# Patient Record
Sex: FEMALE | Race: WHITE | NOT HISPANIC OR LATINO | Employment: OTHER | URBAN - NONMETROPOLITAN AREA
[De-identification: names, ages, dates, MRNs, and addresses within clinical notes are randomized per-mention and may not be internally consistent; named-entity substitution may affect disease eponyms.]

---

## 2021-04-01 ENCOUNTER — APPOINTMENT (EMERGENCY)
Dept: RADIOLOGY | Facility: HOSPITAL | Age: 76
End: 2021-04-01
Payer: MEDICARE

## 2021-04-01 ENCOUNTER — HOSPITAL ENCOUNTER (EMERGENCY)
Facility: HOSPITAL | Age: 76
End: 2021-04-01
Attending: EMERGENCY MEDICINE | Admitting: EMERGENCY MEDICINE
Payer: MEDICARE

## 2021-04-01 ENCOUNTER — HOSPITAL ENCOUNTER (INPATIENT)
Facility: HOSPITAL | Age: 76
LOS: 6 days | Discharge: LEFT BEFORE DISCHARGE | DRG: 660 | End: 2021-04-07
Attending: INTERNAL MEDICINE | Admitting: INTERNAL MEDICINE
Payer: MEDICARE

## 2021-04-01 ENCOUNTER — APPOINTMENT (EMERGENCY)
Dept: CT IMAGING | Facility: HOSPITAL | Age: 76
End: 2021-04-01
Payer: MEDICARE

## 2021-04-01 VITALS
TEMPERATURE: 97.4 F | SYSTOLIC BLOOD PRESSURE: 124 MMHG | WEIGHT: 161.6 LBS | HEIGHT: 60 IN | DIASTOLIC BLOOD PRESSURE: 61 MMHG | BODY MASS INDEX: 31.73 KG/M2 | OXYGEN SATURATION: 95 % | HEART RATE: 134 BPM | RESPIRATION RATE: 24 BRPM

## 2021-04-01 DIAGNOSIS — N17.9 ACUTE KIDNEY INJURY (HCC): Primary | ICD-10-CM

## 2021-04-01 DIAGNOSIS — N13.30 BILATERAL HYDRONEPHROSIS: Primary | ICD-10-CM

## 2021-04-01 DIAGNOSIS — N19 RENAL FAILURE: ICD-10-CM

## 2021-04-01 DIAGNOSIS — N13.39 OTHER HYDRONEPHROSIS: ICD-10-CM

## 2021-04-01 PROBLEM — K74.60 CIRRHOSIS OF LIVER (HCC): Status: ACTIVE | Noted: 2021-04-01

## 2021-04-01 PROBLEM — E05.90 HYPERTHYROIDISM: Status: ACTIVE | Noted: 2021-04-01

## 2021-04-01 PROBLEM — E11.9 CONTROLLED TYPE 2 DIABETES MELLITUS, WITHOUT LONG-TERM CURRENT USE OF INSULIN (HCC): Status: ACTIVE | Noted: 2021-04-01

## 2021-04-01 PROBLEM — Z85.3 HISTORY OF BREAST CANCER: Status: ACTIVE | Noted: 2021-04-01

## 2021-04-01 PROBLEM — K59.00 CONSTIPATION: Status: ACTIVE | Noted: 2021-04-01

## 2021-04-01 PROBLEM — E83.52 HYPERCALCEMIA: Status: ACTIVE | Noted: 2021-04-01

## 2021-04-01 PROBLEM — R07.9 CHEST PAIN: Status: ACTIVE | Noted: 2021-04-01

## 2021-04-01 LAB
ALBUMIN SERPL BCP-MCNC: 3.3 G/DL (ref 3.5–5)
ALP SERPL-CCNC: 98 U/L (ref 46–116)
ALT SERPL W P-5'-P-CCNC: 17 U/L (ref 12–78)
ANION GAP SERPL CALCULATED.3IONS-SCNC: 14 MMOL/L (ref 4–13)
AST SERPL W P-5'-P-CCNC: 29 U/L (ref 5–45)
BASOPHILS # BLD AUTO: 0.03 THOUSANDS/ΜL (ref 0–0.1)
BASOPHILS NFR BLD AUTO: 0 % (ref 0–1)
BILIRUB SERPL-MCNC: 0.93 MG/DL (ref 0.2–1)
BILIRUB UR QL STRIP: NEGATIVE
BUN SERPL-MCNC: 35 MG/DL (ref 5–25)
CALCIUM ALBUM COR SERPL-MCNC: 10.3 MG/DL (ref 8.3–10.1)
CALCIUM SERPL-MCNC: 9.7 MG/DL (ref 8.3–10.1)
CHLORIDE SERPL-SCNC: 101 MMOL/L (ref 100–108)
CLARITY UR: CLEAR
CO2 SERPL-SCNC: 24 MMOL/L (ref 21–32)
COLOR UR: YELLOW
CREAT SERPL-MCNC: 4.26 MG/DL (ref 0.6–1.3)
EOSINOPHIL # BLD AUTO: 0.05 THOUSAND/ΜL (ref 0–0.61)
EOSINOPHIL NFR BLD AUTO: 1 % (ref 0–6)
ERYTHROCYTE [DISTWIDTH] IN BLOOD BY AUTOMATED COUNT: 13 % (ref 11.6–15.1)
FLUAV RNA RESP QL NAA+PROBE: NEGATIVE
FLUBV RNA RESP QL NAA+PROBE: NEGATIVE
GFR SERPL CREATININE-BSD FRML MDRD: 10 ML/MIN/1.73SQ M
GLUCOSE SERPL-MCNC: 119 MG/DL (ref 65–140)
GLUCOSE SERPL-MCNC: 138 MG/DL (ref 65–140)
GLUCOSE UR STRIP-MCNC: NEGATIVE MG/DL
HCT VFR BLD AUTO: 46.9 % (ref 34.8–46.1)
HGB BLD-MCNC: 15.6 G/DL (ref 11.5–15.4)
HGB UR QL STRIP.AUTO: NEGATIVE
IMM GRANULOCYTES # BLD AUTO: 0.04 THOUSAND/UL (ref 0–0.2)
IMM GRANULOCYTES NFR BLD AUTO: 0 % (ref 0–2)
KETONES UR STRIP-MCNC: NEGATIVE MG/DL
LACTATE SERPL-SCNC: 2 MMOL/L (ref 0.5–2)
LEUKOCYTE ESTERASE UR QL STRIP: NEGATIVE
LYMPHOCYTES # BLD AUTO: 1.53 THOUSANDS/ΜL (ref 0.6–4.47)
LYMPHOCYTES NFR BLD AUTO: 17 % (ref 14–44)
MCH RBC QN AUTO: 30.8 PG (ref 26.8–34.3)
MCHC RBC AUTO-ENTMCNC: 33.3 G/DL (ref 31.4–37.4)
MCV RBC AUTO: 93 FL (ref 82–98)
MONOCYTES # BLD AUTO: 0.45 THOUSAND/ΜL (ref 0.17–1.22)
MONOCYTES NFR BLD AUTO: 5 % (ref 4–12)
NEUTROPHILS # BLD AUTO: 6.91 THOUSANDS/ΜL (ref 1.85–7.62)
NEUTS SEG NFR BLD AUTO: 77 % (ref 43–75)
NITRITE UR QL STRIP: NEGATIVE
NRBC BLD AUTO-RTO: 0 /100 WBCS
NT-PROBNP SERPL-MCNC: 4025 PG/ML
PH UR STRIP.AUTO: 6.5 [PH]
PLATELET # BLD AUTO: 152 THOUSANDS/UL (ref 149–390)
PMV BLD AUTO: 12.1 FL (ref 8.9–12.7)
POTASSIUM SERPL-SCNC: 3.3 MMOL/L (ref 3.5–5.3)
PROT SERPL-MCNC: 8.8 G/DL (ref 6.4–8.2)
PROT UR STRIP-MCNC: NEGATIVE MG/DL
RBC # BLD AUTO: 5.07 MILLION/UL (ref 3.81–5.12)
RSV RNA RESP QL NAA+PROBE: NEGATIVE
SARS-COV-2 RNA RESP QL NAA+PROBE: NEGATIVE
SODIUM SERPL-SCNC: 139 MMOL/L (ref 136–145)
SP GR UR STRIP.AUTO: 1.01 (ref 1–1.03)
TROPONIN I SERPL-MCNC: 0.02 NG/ML
UROBILINOGEN UR QL STRIP.AUTO: 0.2 E.U./DL
WBC # BLD AUTO: 9.01 THOUSAND/UL (ref 4.31–10.16)

## 2021-04-01 PROCEDURE — 99285 EMERGENCY DEPT VISIT HI MDM: CPT

## 2021-04-01 PROCEDURE — 80053 COMPREHEN METABOLIC PANEL: CPT | Performed by: EMERGENCY MEDICINE

## 2021-04-01 PROCEDURE — 83880 ASSAY OF NATRIURETIC PEPTIDE: CPT | Performed by: EMERGENCY MEDICINE

## 2021-04-01 PROCEDURE — 74176 CT ABD & PELVIS W/O CONTRAST: CPT

## 2021-04-01 PROCEDURE — 71045 X-RAY EXAM CHEST 1 VIEW: CPT

## 2021-04-01 PROCEDURE — 96374 THER/PROPH/DIAG INJ IV PUSH: CPT

## 2021-04-01 PROCEDURE — 0241U HB NFCT DS VIR RESP RNA 4 TRGT: CPT | Performed by: EMERGENCY MEDICINE

## 2021-04-01 PROCEDURE — 96361 HYDRATE IV INFUSION ADD-ON: CPT

## 2021-04-01 PROCEDURE — 82948 REAGENT STRIP/BLOOD GLUCOSE: CPT

## 2021-04-01 PROCEDURE — 93005 ELECTROCARDIOGRAM TRACING: CPT

## 2021-04-01 PROCEDURE — 99222 1ST HOSP IP/OBS MODERATE 55: CPT | Performed by: INTERNAL MEDICINE

## 2021-04-01 PROCEDURE — 1124F ACP DISCUSS-NO DSCNMKR DOCD: CPT | Performed by: INTERNAL MEDICINE

## 2021-04-01 PROCEDURE — 36415 COLL VENOUS BLD VENIPUNCTURE: CPT | Performed by: EMERGENCY MEDICINE

## 2021-04-01 PROCEDURE — 96375 TX/PRO/DX INJ NEW DRUG ADDON: CPT

## 2021-04-01 PROCEDURE — 85025 COMPLETE CBC W/AUTO DIFF WBC: CPT | Performed by: EMERGENCY MEDICINE

## 2021-04-01 PROCEDURE — 81003 URINALYSIS AUTO W/O SCOPE: CPT | Performed by: EMERGENCY MEDICINE

## 2021-04-01 PROCEDURE — 99291 CRITICAL CARE FIRST HOUR: CPT | Performed by: EMERGENCY MEDICINE

## 2021-04-01 PROCEDURE — 83605 ASSAY OF LACTIC ACID: CPT | Performed by: EMERGENCY MEDICINE

## 2021-04-01 PROCEDURE — 84484 ASSAY OF TROPONIN QUANT: CPT | Performed by: EMERGENCY MEDICINE

## 2021-04-01 RX ORDER — LETROZOLE 2.5 MG/1
2.5 TABLET, FILM COATED ORAL DAILY
Status: DISCONTINUED | OUTPATIENT
Start: 2021-04-02 | End: 2021-04-07 | Stop reason: HOSPADM

## 2021-04-01 RX ORDER — POLYETHYLENE GLYCOL 3350 17 G/17G
17 POWDER, FOR SOLUTION ORAL DAILY
Status: DISCONTINUED | OUTPATIENT
Start: 2021-04-02 | End: 2021-04-01

## 2021-04-01 RX ORDER — POTASSIUM CHLORIDE 750 MG/1
10 TABLET, EXTENDED RELEASE ORAL ONCE
Status: COMPLETED | OUTPATIENT
Start: 2021-04-01 | End: 2021-04-01

## 2021-04-01 RX ORDER — HEPARIN SODIUM 5000 [USP'U]/ML
5000 INJECTION, SOLUTION INTRAVENOUS; SUBCUTANEOUS EVERY 8 HOURS SCHEDULED
Status: DISCONTINUED | OUTPATIENT
Start: 2021-04-01 | End: 2021-04-07 | Stop reason: HOSPADM

## 2021-04-01 RX ORDER — METHIMAZOLE 5 MG/1
TABLET ORAL
COMMUNITY
Start: 2020-10-12 | End: 2021-08-04 | Stop reason: HOSPADM

## 2021-04-01 RX ORDER — LANOLIN ALCOHOL/MO/W.PET/CERES
1 CREAM (GRAM) TOPICAL
COMMUNITY
End: 2021-08-04 | Stop reason: HOSPADM

## 2021-04-01 RX ORDER — SENNOSIDES 8.6 MG
1 TABLET ORAL DAILY
Status: DISCONTINUED | OUTPATIENT
Start: 2021-04-02 | End: 2021-04-07 | Stop reason: HOSPADM

## 2021-04-01 RX ORDER — ACETAMINOPHEN 325 MG/1
650 TABLET ORAL EVERY 6 HOURS PRN
Status: DISCONTINUED | OUTPATIENT
Start: 2021-04-01 | End: 2021-04-07 | Stop reason: HOSPADM

## 2021-04-01 RX ORDER — ONDANSETRON 2 MG/ML
4 INJECTION INTRAMUSCULAR; INTRAVENOUS ONCE
Status: COMPLETED | OUTPATIENT
Start: 2021-04-01 | End: 2021-04-01

## 2021-04-01 RX ORDER — SODIUM CHLORIDE 9 MG/ML
100 INJECTION, SOLUTION INTRAVENOUS CONTINUOUS
Status: DISPENSED | OUTPATIENT
Start: 2021-04-01 | End: 2021-04-02

## 2021-04-01 RX ORDER — METHIMAZOLE 5 MG/1
2.5 TABLET ORAL EVERY OTHER DAY
Status: DISCONTINUED | OUTPATIENT
Start: 2021-04-02 | End: 2021-04-07 | Stop reason: HOSPADM

## 2021-04-01 RX ORDER — CALCIUM CARBONATE 200(500)MG
2 TABLET,CHEWABLE ORAL
COMMUNITY
End: 2021-08-04 | Stop reason: HOSPADM

## 2021-04-01 RX ORDER — LOPERAMIDE HYDROCHLORIDE 2 MG/1
2 CAPSULE ORAL
COMMUNITY
End: 2021-08-04 | Stop reason: HOSPADM

## 2021-04-01 RX ORDER — POLYETHYLENE GLYCOL 3350 17 G/17G
17 POWDER, FOR SOLUTION ORAL DAILY
Status: DISCONTINUED | OUTPATIENT
Start: 2021-04-01 | End: 2021-04-07 | Stop reason: HOSPADM

## 2021-04-01 RX ORDER — DOCUSATE SODIUM 100 MG/1
100 CAPSULE, LIQUID FILLED ORAL 2 TIMES DAILY
Status: DISCONTINUED | OUTPATIENT
Start: 2021-04-02 | End: 2021-04-07 | Stop reason: HOSPADM

## 2021-04-01 RX ORDER — LETROZOLE 2.5 MG/1
2.5 TABLET, FILM COATED ORAL DAILY
COMMUNITY
End: 2021-08-04 | Stop reason: HOSPADM

## 2021-04-01 RX ORDER — ONDANSETRON 2 MG/ML
4 INJECTION INTRAMUSCULAR; INTRAVENOUS EVERY 6 HOURS PRN
Status: DISCONTINUED | OUTPATIENT
Start: 2021-04-01 | End: 2021-04-07 | Stop reason: HOSPADM

## 2021-04-01 RX ORDER — HYDROMORPHONE HCL/PF 1 MG/ML
1 SYRINGE (ML) INJECTION ONCE
Status: COMPLETED | OUTPATIENT
Start: 2021-04-01 | End: 2021-04-01

## 2021-04-01 RX ORDER — BISACODYL 10 MG
10 SUPPOSITORY, RECTAL RECTAL ONCE
Status: DISCONTINUED | OUTPATIENT
Start: 2021-04-01 | End: 2021-04-07 | Stop reason: HOSPADM

## 2021-04-01 RX ORDER — SODIUM CHLORIDE 9 MG/ML
250 INJECTION, SOLUTION INTRAVENOUS CONTINUOUS
Status: DISCONTINUED | OUTPATIENT
Start: 2021-04-01 | End: 2021-04-01 | Stop reason: HOSPADM

## 2021-04-01 RX ADMIN — POTASSIUM CHLORIDE 10 MEQ: 750 TABLET, EXTENDED RELEASE ORAL at 23:52

## 2021-04-01 RX ADMIN — SODIUM CHLORIDE 100 ML/HR: 0.9 INJECTION, SOLUTION INTRAVENOUS at 23:45

## 2021-04-01 RX ADMIN — SODIUM CHLORIDE 1000 ML: 0.9 INJECTION, SOLUTION INTRAVENOUS at 16:49

## 2021-04-01 RX ADMIN — HYDROMORPHONE HYDROCHLORIDE 1 MG: 1 INJECTION, SOLUTION INTRAMUSCULAR; INTRAVENOUS; SUBCUTANEOUS at 17:22

## 2021-04-01 RX ADMIN — ONDANSETRON 4 MG: 2 INJECTION INTRAMUSCULAR; INTRAVENOUS at 16:47

## 2021-04-01 RX ADMIN — POLYETHYLENE GLYCOL 3350 17 G: 17 POWDER, FOR SOLUTION ORAL at 23:44

## 2021-04-01 RX ADMIN — HEPARIN SODIUM 5000 UNITS: 5000 INJECTION INTRAVENOUS; SUBCUTANEOUS at 23:45

## 2021-04-01 NOTE — EMTALA/ACUTE CARE TRANSFER
[address]  [facility] [address]  Dept: [phone]      EMTALA TRANSFER CONSENT    NAME Stefani John                                         1945                              MRN [id number]    I have been informed of my rights regarding examination, treatment, and transfer   by Dr Mindy Figueroa DO    Benefits: Specialized equipment and/or services available at the receiving facility (Include comment)________________________urology    Risks: Potential for delay in receiving treatment      Consent for Transfer:  I acknowledge that my medical condition has been evaluated and explained to me by the emergency department physician or other qualified medical person and/or my attending physician, who has recommended that I be transferred to the service of  Accepting Physician: Kenia Bliss at [address] Name, [address] : [facility]  The above potential benefits of such transfer, the potential risks associated with such transfer, and the probable risks of not being transferred have been explained to me, and I fully understand them  The doctor has explained that, in my case, the benefits of transfer outweigh the risks  I agree to be transferred  I authorize the performance of emergency medical procedures and treatments upon me in both transit and upon arrival at the receiving facility  Additionally, I authorize the release of any and all medical records to the receiving facility and request they be transported with me, if possible  I understand that the safest mode of transportation during a medical emergency is an ambulance and that the Hospital advocates the use of this mode of transport  Risks of traveling to the receiving facility by car, including absence of medical control, life sustaining equipment, such as oxygen, and medical personnel has been explained to me and I fully understand them      (DENISE CORRECT BOX BELOW)  [x  ]  I consent to the stated transfer and to be transported by ambulance/helicopter  [  ]  I consent to the stated transfer, but refuse transportation by ambulance and accept full responsibility for my transportation by car  I understand the risks of non-ambulance transfers and I exonerate the Hospital and its staff from any deterioration in my condition that results from this refusal     X___________________________________________    DATE  21  TIME________  Signature of patient or legally responsible individual signing on patient behalf           RELATIONSHIP TO PATIENT_________________________          Provider Certification    NAME Eda See                                         1945                              MRN [id number]    A medical screening exam was performed on the above named patient  Based on the examination:    Condition Necessitating Transfer The primary encounter diagnosis was Bilateral hydronephrosis  A diagnosis of Renal failure was also pertinent to this visit  Patient Condition: The patient has been stabilized such that within reasonable medical probability, no material deterioration of the patient condition or the condition of the unborn child(madhavi) is likely to result from the transfer    Reason for Transfer: Level of Care needed not available at this facility    Transfer Requirements: Facility [facility]   · Space available and qualified personnel available for treatment as acknowledged by    · Agreed to accept transfer and to provide appropriate medical treatment as acknowledged by       Robert Phan  · Appropriate medical records of the examination and treatment of the patient are provided at the time of transfer   [address], [address] _______  · Transfer will be performed by qualified personnel from    and appropriate transfer equipment as required, including the use of necessary and appropriate life support measures      Provider Certification: I have examined the patient and explained the following risks and benefits of being transferred/refusing transfer to the patient/family:  General risk, such as traffic hazards, adverse weather conditions, rough terrain or turbulence, possible failure of equipment (including vehicle or aircraft), or consequences of actions of persons outside the control of the transport personnel, Unanticipated needs of medical equipment and personnel during transport      Based on these reasonable risks and benefits to the patient and/or the unborn child(madhavi), and based upon the information available at the time of the patients examination, I certify that the medical benefits reasonably to be expected from the provision of appropriate medical treatments at another medical facility outweigh the increasing risks, if any, to the individuals medical condition, and in the case of labor to the unborn child, from effecting the transfer      X____________________________________________ DATE 04/01/21        TIME_______      ORIGINAL - SEND TO MEDICAL RECORDS   COPY - SEND WITH PATIENT DURING TRANSFER

## 2021-04-01 NOTE — ED PROVIDER NOTES
History  Chief Complaint   Patient presents with    Abdominal Pain     pt c/o lower left back pain and ULQ abdominal pain  hx of diverticulitis  pt also states she was having midsternal chest pain last night that radiated into her left arm     62-year-old female complains of worsening left lower abdominal mid left back pain past 2 5 weeks  Notes history of diverticulitis years ago, colostomy and reversal   Began with key lime pie, having diarrhea diarrhea, but anorexic and stooling less, drinking well  No urinary complaints  Also, had episodes of CP last night  Mild discomfort radiating into LUE lasting 15 minutes at approximately 0100 while in bed, was awake  Orthopnea ongoing for years without change  History provided by:  Patient  Abdominal Pain  Pain location:  LLQ, L flank and LUQ  Pain quality: pressure    Pain severity:  Severe  Onset quality:  Gradual  Timing:  Constant  Progression:  Worsening  Chronicity:  New  Associated symptoms: chest pain and diarrhea    Associated symptoms: no fever, no shortness of breath and no vomiting        Prior to Admission Medications   Prescriptions Last Dose Informant Patient Reported? Taking?   calcium carbonate (Tums) 500 mg chewable tablet   Yes No   Sig: Chew 2 tablets   glucosamine-chondroitin 500-400 MG tablet   Yes No   Sig: Take 1 tablet by mouth   loperamide (IMODIUM) 2 mg capsule   Yes No   Sig: Take 2 mg by mouth   methimazole (TAPAZOLE) 5 mg tablet   Yes Yes   Sig: TAKE 1/2 TABLET BY MOUTH EVERY OTHER DAY      Facility-Administered Medications: None       Past Medical History:   Diagnosis Date    Diabetes mellitus ([facility] [address] )     Diverticulitis     Hyperthyroidism        Past Surgical History:   Procedure Laterality Date    APPENDECTOMY      COLOSTOMY      MASTECTOMY         History reviewed  No pertinent family history  I have reviewed and agree with the history as documented      E-Cigarette/Vaping    E-Cigarette Use Never User      E-Cigarette/Vaping Substances     Social History     Tobacco Use    Smoking status: Former Smoker    Smokeless tobacco: Never Used   Substance Use Topics    Alcohol use: Not Currently    Drug use: Not Currently       Review of Systems   Constitutional: Negative for fever  Respiratory: Negative for shortness of breath  Cardiovascular: Positive for chest pain  Gastrointestinal: Positive for abdominal pain and diarrhea  Negative for vomiting  All other systems reviewed and are negative  Physical Exam  Physical Exam  Vitals signs and nursing note reviewed  Constitutional:       Comments: Pleasant, comfortable-appearing   HENT:      Head: Normocephalic and atraumatic  Eyes:      Conjunctiva/sclera: Conjunctivae normal       Pupils: Pupils are equal, round, and reactive to light  Neck:      Musculoskeletal: Neck supple  Cardiovascular:      Rate and Rhythm: Normal rate and regular rhythm  Heart sounds: Normal heart sounds  Pulmonary:      Effort: Pulmonary effort is normal       Comments: Decreased breath sounds bilaterally  Abdominal:      General: Bowel sounds are normal  There is no distension  Palpations: Abdomen is soft  Tenderness: There is abdominal tenderness in the left upper quadrant and left lower quadrant  There is left CVA tenderness  There is no guarding or rebound  Musculoskeletal: Normal range of motion  Skin:     General: Skin is warm and dry  Neurological:      General: No focal deficit present  Mental Status: She is alert and oriented to person, place, and time  Cranial Nerves: No cranial nerve deficit  Coordination: Coordination normal    Psychiatric:         Mood and Affect: Mood normal          Behavior: Behavior normal          Thought Content:  Thought content normal          Judgment: Judgment normal          Vital Signs  ED Triage Vitals   Temperature Pulse Respirations Blood Pressure SpO2   04/01/21 1544 04/01/21 1544 04/01/21 1544 04/01/21 1544 04/01/21 1544   (!) 97 4 °F (36 3 °C) 83 20 154/71 95 %      Temp Source Heart Rate Source Patient Position - Orthostatic VS BP Location FiO2 (%)   04/01/21 1544 04/01/21 1544 04/01/21 1544 04/01/21 1544 --   Temporal Monitor Sitting Right arm       Pain Score       04/01/21 1722       8           Vitals:    04/01/21 1600 04/01/21 1815 04/01/21 1900 04/01/21 2033   BP: 164/65 134/62 145/63 124/61   Pulse: 76 88 95 (!) 134   Patient Position - Orthostatic VS: Lying Lying  Lying         Visual Acuity      ED Medications  Medications   ondansetron (ZOFRAN) injection 4 mg (4 mg Intravenous Given 4/1/21 1647)   HYDROmorphone (DILAUDID) injection 1 mg (1 mg Intravenous Given 4/1/21 1722)   sodium chloride 0 9 % bolus 1,000 mL (0 mL Intravenous Stopped 4/1/21 1749)       Diagnostic Studies  Results Reviewed     Procedure Component Value Units Date/Time    Fingerstick Glucose (POCT) [[id number]]  (Normal) Collected: 04/01/21 2043    Lab Status: Final result Updated: 04/01/21 2239     POC Glucose 119 mg/dl     UA (URINE) with reflex to Scope [[id number]] Collected: 04/01/21 2012    Lab Status: Final result Specimen: Urine, Clean Catch Updated: 04/01/21 2031     Color, UA Yellow     Clarity, UA Clear     Specific Gravity, UA 1 010     pH, UA 6 5     Leukocytes, UA Negative     Nitrite, UA Negative     Protein, UA Negative mg/dl      Glucose, UA Negative mg/dl      Ketones, UA Negative mg/dl      Urobilinogen, UA 0 2 E U /dl      Bilirubin, UA Negative     Blood, UA Negative    NT-BNP PRO [[id number]]  (Abnormal) Collected: 04/01/21 1551    Lab Status: Final result Specimen: Blood from Arm, Right Updated: 04/01/21 1739     NT-proBNP 4,025 pg/mL     COVID[facility], Influ[facility]/B, [facility] [[id number]]  (Normal) Collected: 04/01/21 1551    Lab Status: Final result Specimen: Nares from Nasopharyngeal Swab Updated: 04/01/21 1643     SARS-CoV-2 Negative     INFLUENZA A PCR Negative     INFLUENZA B PCR Negative     RSV PCR Negative Narrative: This test has been authorized by FDA under an EUA (Emergency Use Assay) for use by authorized laboratories  Clinical caution and judgement should be used with the interpretation of these results with consideration of the clinical impression and other laboratory testing  Testing reported as "Positive" or "Negative" has been proven to be accurate according to standard laboratory validation requirements  All testing is performed with control materials showing appropriate reactivity at standard intervals  Lactic acid, plasma [[id number]]  (Normal) Collected: 04/01/21 1551    Lab Status: Final result Specimen: Blood from Arm, Right Updated: 04/01/21 1617     LACTIC ACID 2 0 mmol/L     Narrative:      Result may be elevated if tourniquet was used during collection      Troponin I [[id number]]  (Normal) Collected: 04/01/21 1555    Lab Status: Final result Specimen: Blood from Arm, Right Updated: 04/01/21 1616     Troponin I 0 02 ng/mL     Comprehensive metabolic panel [[id number]]  (Abnormal) Collected: 04/01/21 1551    Lab Status: Final result Specimen: Blood from Arm, Right Updated: 04/01/21 1612     Sodium 139 mmol/L      Potassium 3 3 mmol/L      Chloride 101 mmol/L      CO2 24 mmol/L      ANION GAP 14 mmol/L      BUN 35 mg/dL      Creatinine 4 26 mg/dL      Glucose 138 mg/dL      Calcium 9 7 mg/dL      Corrected Calcium 10 3 mg/dL      AST 29 U/L      ALT 17 U/L      Alkaline Phosphatase 98 U/L      Total Protein 8 8 g/dL      Albumin 3 3 g/dL      Total Bilirubin 0 93 mg/dL      eGFR 10 ml/min/1 73sq m     Narrative:      [place] guidelines for Chronic Kidney Disease (CKD):     Stage 1 with normal or high GFR (GFR > 90 mL/min/1 73 square meters)    Stage 2 Mild CKD (GFR = 60-89 mL/min/1 73 square meters)    Stage 3A Moderate CKD (GFR = 45-59 mL/min/1 73 square meters)    Stage 3B Moderate CKD (GFR = 30-44 mL/min/1 73 square meters)    Stage 4 Severe CKD (GFR = 15-29 mL/min/1 73 square meters)    Stage 5 End Stage CKD (GFR <15 mL/min/1 73 square meters)  Note: GFR calculation is accurate only with a steady state creatinine    CBC and differential [[id number]]  (Abnormal) Collected: 04/01/21 1551    Lab Status: Final result Specimen: Blood from Arm, Right Updated: 04/01/21 1557     WBC 9 01 Thousand/uL      RBC 5 07 Million/uL      Hemoglobin 15 6 g/dL      Hematocrit 46 9 %      MCV 93 fL      MCH 30 8 pg      MCHC 33 3 g/dL      RDW 13 0 %      MPV 12 1 fL      Platelets 023 Thousands/uL      nRBC 0 /100 WBCs      Neutrophils Relative 77 %      Immat GRANS % 0 %      Lymphocytes Relative 17 %      Monocytes Relative 5 %      Eosinophils Relative 1 %      Basophils Relative 0 %      Neutrophils Absolute 6 91 Thousands/µL      Immature Grans Absolute 0 04 Thousand/uL      Lymphocytes Absolute 1 53 Thousands/µL      Monocytes Absolute 0 45 Thousand/µL      Eosinophils Absolute 0 05 Thousand/µL      Basophils Absolute 0 03 Thousands/µL                  XR chest portable   Final Result by Cam Villalta MD (04/01 1711)      No acute cardiopulmonary disease  Workstation performed: [id number]         CT abdomen pelvis wo contrast   Final Result by Jeff Valle DO (04/01 1718)   This is a limited exam performed without oral or IV contrast and interpreted without benefit of prior examinations  The liver morphology suggests mild cirrhotic change  Main portal vein is top normal caliber at 15 mm  Small volume intra-abdominal ascites  Diffuse mesenteric edema  Mildly distended gallbladder with small calcified stones  No CT evidence for cholecystitis  Normal caliber biliary ducts  Bilateral hydronephrosis and proximal hydroureter  No CT evidence of renal colic  No obstructing lesions are found in the retroperitoneum  Urinary bladder was not distended  Recommend follow-up renal ultrasound to assess for ureteral jets/urinary    obstruction  Diffuse wall thickening affecting the entire colon, most pronounced in the region of the splenic flexure, transverse colon and cecum  This may be related to ascites and/or underdistention  No bowel wall pneumatosis or mesenteric/portal venous gas to    suggest ischemic colitis  Colonic diverticulosis without evidence of diverticulitis  Expected postoperative changes  Large volume of formed stool in the cecum without obstruction  Myomatous uterus  No obvious adnexal masses  I personally discussed this study with Serafin Pal on 4/1/2021 at 5:18 PM                Workstation performed: [id number]                    Procedures  CriticalCare Time  Performed by: Loraine Madsen DO  Authorized by: Loraine Madsen DO     Critical care provider statement:     Critical care time (minutes):  30    Critical care was necessary to treat or prevent imminent or life-threatening deterioration of the following conditions:  Renal failure    Critical care was time spent personally by me on the following activities:  Obtaining history from patient or surrogate, development of treatment plan with patient or surrogate, evaluation of patient's response to treatment, examination of patient, review of old charts, re-evaluation of patient's condition, ordering and review of radiographic studies, ordering and review of laboratory studies and ordering and performing treatments and interventions             ED Course  ED Course as of Apr 02 0754   Thu Apr 01, 2021   1609 EKG 3:59 p m  Normal sinus rhythm rate 89 left axis normal intervals bigeminy no ST elevation or depression interpreted by me      1615 Potassium(!): 3 3   1615 BUN(!): 35   1615 2/24/21 Cr 1 3   Creatinine(!): 4 26   1624 Troponin I: 0 02   1644 SARS-COV-2: Negative   1645 LACTIC ACID: 2 0   1716 Radiology [facility] [employer] cirrhotic liver, ascites, hydro neph / ureter nicky, no renal colic or mass   Colon edematous      1750 We discussed results and agreeable with transfer, [facility]  PACs contacts [facility] accepts, contacting urology                                SBIRT 22yo+      Most Recent Value   SBIRT (23 yo +)   In order to provide better care to our patients, we are screening all of our patients for alcohol and drug use  Would it be okay to ask you these screening questions? Yes Filed at: 04/01/2021 2043   Initial Alcohol Screen: US AUDIT-C    1  How often do you have a drink containing alcohol?  0 Filed at: 04/01/2021 2043   2  How many drinks containing alcohol do you have on a typical day you are drinking? 0 Filed at: 04/01/2021 2043   3a  Male UNDER 65: How often do you have five or more drinks on one occasion? 0 Filed at: 04/01/2021 2043   3b  FEMALE Any Age, or MALE 65+: How often do you have 4 or more drinks on one occassion? 0 Filed at: 04/01/2021 2043   Audit-C Score  0 Filed at: 04/01/2021 2043   ISSA: How many times in the past year have you    Used an illegal drug or used a prescription medication for non-medical reasons? Never Filed at: 04/01/2021 2043                    MDM    Disposition  Final diagnoses:   Bilateral hydronephrosis   Renal failure     Time reflects when diagnosis was documented in both MDM as applicable and the Disposition within this note     Time User Action Codes Description Comment    4/1/2021  5:45 PM Yarely Greer Add [N13 30] Bilateral hydronephrosis     4/1/2021  5:45 PM Yarely Greer Add [N19] Renal failure       ED Disposition     ED Disposition Condition Date/Time Comment    Transfer to Another Facility-In Network  Thu Apr 1, 2021  5:44 PM Caroleeanadna Abarcalovely should be transferred out to [facility]          MD Documentation      Most Recent Value   Patient Condition  The patient has been stabilized such that within reasonable medical probability, no material deterioration of the patient condition or the condition of the unborn child(madhavi) is likely to result from the transfer   Reason for Transfer Level of Care needed not available at this facility   Benefits of Transfer  Specialized equipment and/or services available at the receiving facility (Include comment)________________________   Risks of Transfer  Potential for delay in receiving treatment   Accepting Physician  [address] Name, [place]   Sending MD  [facility]   Provider Certification  General risk, such as traffic hazards, adverse weather conditions, rough terrain or turbulence, possible failure of equipment (including vehicle or aircraft), or consequences of actions of persons outside the control of the transport personnel, Unanticipated needs of medical equipment and personnel during transport      RN Documentation      Most [address] [address] Name, [address]   SL   Bed Assignment  558   Report Given to  Tucker Noguera RN      Follow-up Information    None         Discharge Medication List as of 4/1/2021  9:01 PM      CONTINUE these medications which have NOT CHANGED    Details   methimazole (TAPAZOLE) 5 mg tablet TAKE 1/2 TABLET BY MOUTH EVERY OTHER DAY, Historical Med      calcium carbonate (Tums) 500 mg chewable tablet Chew 2 tablets, Historical Med      glucosamine-chondroitin 500-400 MG tablet Take 1 tablet by mouth, Historical Med      loperamide (IMODIUM) 2 mg capsule Take 2 mg by mouth, Historical Med           No discharge procedures on file      PDMP Review     None          ED Provider  Electronically Signed by           Lorenza Bailey DO  04/02/21 5579

## 2021-04-02 ENCOUNTER — APPOINTMENT (INPATIENT)
Dept: RADIOLOGY | Facility: HOSPITAL | Age: 76
DRG: 660 | End: 2021-04-02
Payer: MEDICARE

## 2021-04-02 PROBLEM — E87.6 HYPOKALEMIA: Status: ACTIVE | Noted: 2021-04-02

## 2021-04-02 LAB
ALBUMIN SERPL BCP-MCNC: 3.1 G/DL (ref 3.5–5)
ALP SERPL-CCNC: 83 U/L (ref 46–116)
ALT SERPL W P-5'-P-CCNC: 14 U/L (ref 12–78)
ANION GAP SERPL CALCULATED.3IONS-SCNC: 8 MMOL/L (ref 4–13)
ANION GAP SERPL CALCULATED.3IONS-SCNC: 9 MMOL/L (ref 4–13)
AST SERPL W P-5'-P-CCNC: 26 U/L (ref 5–45)
BILIRUB SERPL-MCNC: 0.82 MG/DL (ref 0.2–1)
BUN SERPL-MCNC: 31 MG/DL (ref 5–25)
BUN SERPL-MCNC: 33 MG/DL (ref 5–25)
CA-I BLD-SCNC: 1.14 MMOL/L (ref 1.12–1.32)
CALCIUM ALBUM COR SERPL-MCNC: 10 MG/DL (ref 8.3–10.1)
CALCIUM SERPL-MCNC: 9 MG/DL (ref 8.3–10.1)
CALCIUM SERPL-MCNC: 9.3 MG/DL (ref 8.3–10.1)
CHLORIDE SERPL-SCNC: 106 MMOL/L (ref 100–108)
CHLORIDE SERPL-SCNC: 113 MMOL/L (ref 100–108)
CO2 SERPL-SCNC: 21 MMOL/L (ref 21–32)
CO2 SERPL-SCNC: 23 MMOL/L (ref 21–32)
CREAT SERPL-MCNC: 3.76 MG/DL (ref 0.6–1.3)
CREAT SERPL-MCNC: 3.9 MG/DL (ref 0.6–1.3)
ERYTHROCYTE [DISTWIDTH] IN BLOOD BY AUTOMATED COUNT: 13.3 % (ref 11.6–15.1)
GFR SERPL CREATININE-BSD FRML MDRD: 11 ML/MIN/1.73SQ M
GFR SERPL CREATININE-BSD FRML MDRD: 11 ML/MIN/1.73SQ M
GLUCOSE SERPL-MCNC: 106 MG/DL (ref 65–140)
GLUCOSE SERPL-MCNC: 119 MG/DL (ref 65–140)
GLUCOSE SERPL-MCNC: 87 MG/DL (ref 65–140)
GLUCOSE SERPL-MCNC: 91 MG/DL (ref 65–140)
GLUCOSE SERPL-MCNC: 91 MG/DL (ref 65–140)
GLUCOSE SERPL-MCNC: 93 MG/DL (ref 65–140)
GLUCOSE SERPL-MCNC: 95 MG/DL (ref 65–140)
HCT VFR BLD AUTO: 44.7 % (ref 34.8–46.1)
HGB BLD-MCNC: 14.2 G/DL (ref 11.5–15.4)
MAGNESIUM SERPL-MCNC: 2.2 MG/DL (ref 1.6–2.6)
MCH RBC QN AUTO: 30.4 PG (ref 26.8–34.3)
MCHC RBC AUTO-ENTMCNC: 31.8 G/DL (ref 31.4–37.4)
MCV RBC AUTO: 96 FL (ref 82–98)
PLATELET # BLD AUTO: 141 THOUSANDS/UL (ref 149–390)
PMV BLD AUTO: 12.1 FL (ref 8.9–12.7)
POTASSIUM SERPL-SCNC: 3 MMOL/L (ref 3.5–5.3)
POTASSIUM SERPL-SCNC: 3.8 MMOL/L (ref 3.5–5.3)
PROT SERPL-MCNC: 8.2 G/DL (ref 6.4–8.2)
PTH-INTACT SERPL-MCNC: 68.2 PG/ML (ref 18.4–80.1)
RBC # BLD AUTO: 4.67 MILLION/UL (ref 3.81–5.12)
SODIUM SERPL-SCNC: 138 MMOL/L (ref 136–145)
SODIUM SERPL-SCNC: 142 MMOL/L (ref 136–145)
TROPONIN I SERPL-MCNC: 0.02 NG/ML
TROPONIN I SERPL-MCNC: 0.03 NG/ML
WBC # BLD AUTO: 9.48 THOUSAND/UL (ref 4.31–10.16)

## 2021-04-02 PROCEDURE — 80053 COMPREHEN METABOLIC PANEL: CPT | Performed by: INTERNAL MEDICINE

## 2021-04-02 PROCEDURE — 83970 ASSAY OF PARATHORMONE: CPT | Performed by: INTERNAL MEDICINE

## 2021-04-02 PROCEDURE — 82948 REAGENT STRIP/BLOOD GLUCOSE: CPT

## 2021-04-02 PROCEDURE — 83735 ASSAY OF MAGNESIUM: CPT | Performed by: INTERNAL MEDICINE

## 2021-04-02 PROCEDURE — 99232 SBSQ HOSP IP/OBS MODERATE 35: CPT | Performed by: NURSE PRACTITIONER

## 2021-04-02 PROCEDURE — 97167 OT EVAL HIGH COMPLEX 60 MIN: CPT

## 2021-04-02 PROCEDURE — 84484 ASSAY OF TROPONIN QUANT: CPT | Performed by: INTERNAL MEDICINE

## 2021-04-02 PROCEDURE — 99223 1ST HOSP IP/OBS HIGH 75: CPT | Performed by: INTERNAL MEDICINE

## 2021-04-02 PROCEDURE — 82330 ASSAY OF CALCIUM: CPT | Performed by: INTERNAL MEDICINE

## 2021-04-02 PROCEDURE — 76770 US EXAM ABDO BACK WALL COMP: CPT

## 2021-04-02 PROCEDURE — 99222 1ST HOSP IP/OBS MODERATE 55: CPT | Performed by: UROLOGY

## 2021-04-02 PROCEDURE — 85027 COMPLETE CBC AUTOMATED: CPT | Performed by: INTERNAL MEDICINE

## 2021-04-02 PROCEDURE — 97163 PT EVAL HIGH COMPLEX 45 MIN: CPT

## 2021-04-02 PROCEDURE — 80048 BASIC METABOLIC PNL TOTAL CA: CPT | Performed by: NURSE PRACTITIONER

## 2021-04-02 RX ORDER — POTASSIUM CHLORIDE 20 MEQ/1
40 TABLET, EXTENDED RELEASE ORAL ONCE
Status: COMPLETED | OUTPATIENT
Start: 2021-04-02 | End: 2021-04-02

## 2021-04-02 RX ORDER — POTASSIUM CHLORIDE 20 MEQ/1
20 TABLET, EXTENDED RELEASE ORAL ONCE
Status: COMPLETED | OUTPATIENT
Start: 2021-04-02 | End: 2021-04-02

## 2021-04-02 RX ADMIN — ACETAMINOPHEN 650 MG: 325 TABLET, FILM COATED ORAL at 21:19

## 2021-04-02 RX ADMIN — POTASSIUM CHLORIDE 20 MEQ: 1500 TABLET, EXTENDED RELEASE ORAL at 15:28

## 2021-04-02 RX ADMIN — ACETAMINOPHEN 650 MG: 325 TABLET, FILM COATED ORAL at 15:27

## 2021-04-02 RX ADMIN — SODIUM CHLORIDE 100 ML/HR: 0.9 INJECTION, SOLUTION INTRAVENOUS at 19:34

## 2021-04-02 RX ADMIN — SODIUM CHLORIDE 100 ML/HR: 0.9 INJECTION, SOLUTION INTRAVENOUS at 08:21

## 2021-04-02 RX ADMIN — ACETAMINOPHEN 650 MG: 325 TABLET, FILM COATED ORAL at 05:34

## 2021-04-02 RX ADMIN — SENNOSIDES 8.6 MG: 8.6 TABLET, FILM COATED ORAL at 08:21

## 2021-04-02 RX ADMIN — LETROZOLE 2.5 MG: 2.5 TABLET, FILM COATED ORAL at 08:23

## 2021-04-02 RX ADMIN — HEPARIN SODIUM 5000 UNITS: 5000 INJECTION INTRAVENOUS; SUBCUTANEOUS at 21:12

## 2021-04-02 RX ADMIN — DOCUSATE SODIUM 100 MG: 100 CAPSULE, LIQUID FILLED ORAL at 08:21

## 2021-04-02 RX ADMIN — POTASSIUM CHLORIDE 40 MEQ: 1500 TABLET, EXTENDED RELEASE ORAL at 05:25

## 2021-04-02 RX ADMIN — ONDANSETRON 4 MG: 2 INJECTION INTRAMUSCULAR; INTRAVENOUS at 05:34

## 2021-04-02 RX ADMIN — HEPARIN SODIUM 5000 UNITS: 5000 INJECTION INTRAVENOUS; SUBCUTANEOUS at 05:25

## 2021-04-02 RX ADMIN — METHIMAZOLE 2.5 MG: 5 TABLET ORAL at 08:22

## 2021-04-02 RX ADMIN — HEPARIN SODIUM 5000 UNITS: 5000 INJECTION INTRAVENOUS; SUBCUTANEOUS at 15:00

## 2021-04-02 NOTE — PLAN OF CARE
Problem: PHYSICAL THERAPY ADULT  Goal: Performs mobility at highest level of function for planned discharge setting  See evaluation for individualized goals  Description: Treatment/Interventions: Functional transfer training, LE strengthening/ROM, Elevations, Therapeutic exercise, Endurance training, Gait training, Bed mobility          See flowsheet documentation for full assessment, interventions and recommendations  Note: Prognosis: Good  Problem List: Decreased mobility, Decreased endurance, Decreased strength  Assessment: Pt is a 71 yo female admitted to [place] [address] on 4/1/2021 s/p abdominal pain  Dx: acute kidney injury, chest pain, DM, hypercalcemia, hx of breast CA, hyperthyroidism, cirrhosis of liver, constipation  Two patient identifiers were used to confirm  Pt lives alone in a mobile home with 1 step then ramp entrance  Pt was I for ADL's and mobility with RW  Pt has aides 2 days a week for 3 or 2 hours each day  Pt has assistance with IADL's  Pt reports one fall  Pt can dive but does not currently  Pt's impairments include reduced mobility, high risk of falling, poor activity tolerance, gait abnormaltiites  These impairments limit the ability of the patient to perform mobility without increased assistance, return to PLOF and participate in everyday life activities  Pt would benefit from continued skilled therapy while in the hospital to improve overall mobility and work towards a safe d/c  Recommend discharge to rehab  At the end of the session the patient was left in seated position with call bell and phone within reach  The patient's AM-PAC Basic Mobility Inpatient Short Form Raw Score is 19, Standardized Score is 42 48  A standardized score less than 42 9 suggests the patient may benefit from discharge to post-acute rehabilitation services  Please also refer to the recommendation of the Physical Therapist for safe discharge planning    Barriers to Discharge: Inaccessible home environment, Decreased caregiver support     PT Discharge Recommendation: Post-Acute Rehabilitation Services     PT - OK to Discharge: Yes    See flowsheet documentation for full assessment

## 2021-04-02 NOTE — CONSULTS
Consult - Urology   Fernando Reyna 1945, 70 y o  female MRN: [id number]    Unit/Bed#: -[facility] Encounter: [id number]    Assessment and Plan:  1  Acute Kidney Injury with Bilateral Hydronephrosis  - CT (4/1) showed bilateral hydronephrosis and proximal hydroureter with no evidence of renal colic or obstructing lesions  - Creatinine today 3 76, down from yesterday (4 26)  - Continue to monitor for creatinine down trend  - Follow up US kidney and bladder ordered to assess for hydronephrosis improvement and bilateral jets  - Voiding trial prior to discharge  - Follow up outpatient with urology   - No urgent urological intervention needed; will sign off       Subjective: Fernando Reyna is a 70year old female with history of breast cancer diagnosed in 2017 status post chemotherapy, mastectomy, perforated diverticulitis in 2018 with subsequent colostomy now reversed, urinary incontinence, and type II diabetes mellitus  She presented yesterday (4/1) to [facility] ER with left lower abdominal pain radiating to left flank x 2 weeks and diarrhea  CT at that time showed bilateral hydronephrosis and proximal hydroureter with no evidence of renal colic or obstructing lesions and creatinine was 4 26  Way catheter was placed 4/1 in [facility] ER without difficulty; draining well, 650 mL  Patient was transferred to B last night  Patient reports no difficulties with urination  Patient denies frequency, urgency, dysuria, gross hematuria, fever, chills, nausea, or vomiting  Patient denies history of renal stones  Patient saw a urologist in [place], [place] approximately 4-5 years ago for urinary incontinence, but has not followed up since that time  Patient unsure of who she saw for this  Patient denies family history of  malignancies  Today, patient is feeling better  Stating her pain has improved and she now has an appetite  Way in place, draining clear straw color urine       Review of Systems Constitutional: Negative for activity change, appetite change, chills and fever  HENT: Negative for congestion and trouble swallowing  Respiratory: Negative for cough and shortness of breath  Cardiovascular: Negative for chest pain, palpitations and leg swelling  Gastrointestinal: Negative for abdominal pain, nausea and vomiting  Genitourinary: Negative for difficulty urinating, dysuria, flank pain, frequency, hematuria and urgency  Musculoskeletal: Negative for back pain and gait problem  Skin: Negative for wound  Allergic/Immunologic: Negative for immunocompromised state  Neurological: Negative for dizziness and syncope  Hematological: Does not bruise/bleed easily  Psychiatric/Behavioral: Negative for confusion  All other systems reviewed and are negative  Objective:  Vitals: Blood pressure 134/87, pulse 95, temperature 100 4 °F (38 °C), resp  rate 19, weight 71 3 kg (157 lb 3 oz), SpO2 92 %  ,Body mass index is 30 7 kg/m²  Physical Exam  Constitutional:       General: She is not in acute distress  Appearance: Normal appearance  She is not ill-appearing  HENT:      Head: Normocephalic  Neck:      Musculoskeletal: Normal range of motion  Pulmonary:      Effort: Pulmonary effort is normal    Abdominal:      General: There is no distension  Palpations: Abdomen is soft  Tenderness: There is no abdominal tenderness  There is no right CVA tenderness or left CVA tenderness  Genitourinary:     Comments: Way catheter in place; draining clear straw colored urine  Skin:     General: Skin is warm and dry  Neurological:      General: No focal deficit present  Mental Status: She is alert and oriented to person, place, and time     Psychiatric:         Mood and Affect: Mood normal          Behavior: Behavior normal          Imaging:  CT abdomen and pelvis without contrast (4/1)   IMPRESSION:  This is a limited exam performed without oral or IV contrast and interpreted without benefit of prior examinations      The liver morphology suggests mild cirrhotic change  Main portal vein is top normal caliber at 15 mm  Small volume intra-abdominal ascites  Diffuse mesenteric edema      Mildly distended gallbladder with small calcified stones  No CT evidence for cholecystitis  Normal caliber biliary ducts      Bilateral hydronephrosis and proximal hydroureter  No CT evidence of renal colic  No obstructing lesions are found in the retroperitoneum  Urinary bladder was not distended  Recommend follow-up renal ultrasound to assess for ureteral jets/urinary   obstruction      Diffuse wall thickening affecting the entire colon, most pronounced in the region of the splenic flexure, transverse colon and cecum  This may be related to ascites and/or underdistention  No bowel wall pneumatosis or mesenteric/portal venous gas to   suggest ischemic colitis  Colonic diverticulosis without evidence of diverticulitis  Expected postoperative changes  Large volume of formed stool in the cecum without obstruction      Myomatous uterus  No obvious adnexal masses  Imaging reviewed - both report and images personally reviewed       Labs:  Recent Labs     04/01/21  1551 04/02/21  0354   WBC 9 01 9 48     Recent Labs     04/01/21  1551 04/02/21  0354   HGB 15 6* 14 2       Recent Labs     04/01/21  1551 04/02/21  0353   CREATININE 4 26* 3 76*       History:  Social History     Socioeconomic History    Marital status: Single     Spouse name: Not on file    Number of children: Not on file    Years of education: Not on file    Highest education level: Not on file   Occupational History    Not on file   Social Needs    Financial resource strain: Not on file    Food insecurity     Worry: Not on file     Inability: Not on file    Transportation needs     Medical: Not on file     Non-medical: Not on file   Tobacco Use    Smoking status: Former Smoker    Smokeless tobacco: Never Used Substance and Sexual Activity    Alcohol use: Not Currently    Drug use: Not Currently    Sexual activity: Not on file   Lifestyle    Physical activity     Days per week: Not on file     Minutes per session: Not on file    Stress: Not on file   Relationships    Social connections     Talks on phone: Not on file     Gets together: Not on file     Attends L[employer]-day service: Not on file     Active member of club or organization: Not on file     Attends meetings of clubs or organizations: Not on file     Relationship status: Not on file    Intimate partner violence     Fear of current or ex partner: Not on file     Emotionally abused: Not on file     Physically abused: Not on file     Forced sexual activity: Not on file   Other Topics Concern    Not on file   Social History Narrative    Not on file       Past Medical History:   Diagnosis Date    [facility]     Diverticulitis     Hyperthyroidism      Past Surgical History:   Procedure Laterality Date    APPENDECTOMY      COLOSTOMY      MASTECTOMY       No family history on file      Tano Muse PA-C  Date: 4/2/2021 Time: 8:20 AM

## 2021-04-02 NOTE — ASSESSMENT & PLAN NOTE
X1 episode has had no recurrence  Low suspicion for ACS but will obtain serial troponin  EKG from the ED sinus rhythm at 89 beats per minute no acute ST changes  Continue telemetry monitoring

## 2021-04-02 NOTE — ASSESSMENT & PLAN NOTE
· Corrected calcium of 10 3 (cutoff at 10 1)  · Patient chronically on calcium carbonate which will hold  · IV fluid hydration  · Check ionized calcium 1 14 , PTH 68 2

## 2021-04-02 NOTE — ASSESSMENT & PLAN NOTE
Corrected calcium of 10 3 (cutoff at 10 1)  Patient chronically on calcium carbonate which will hold  IV fluid hydration  Check ionized calcium, PTH

## 2021-04-02 NOTE — PROGRESS NOTES
[address] [facility]  Progress Note - Domenic Dixon 1945, 70 y o  female MRN: [id number]  Unit/Bed#: -[facility] Encounter: [id number]  Primary Care Provider: Angie Reeves MD   Date and time admitted to hospital: 4/1/2021  9:57 PM    * Acute kidney injury [facility])  Assessment & Plan  · Concern for obstructive uropathy with bilateral hydronephrosis  · - however pt with slow return to norm on ivfluids and urinary catheter insertion yesterday  · Lab work from "care everywhere" indicating cr of 1 10 (2/24/21)  · Status post CT scan abdomen pelvis without contrast "Bilateral hydronephrosis and proximal hydroureter   No CT evidence of renal colic   No obstructing lesions are found in the retroperitoneum  [place] bladder was not distended   Recommend follow-up renal ultrasound to assess for ureteral jets/urinary   obstruction  "   · - however noting "Large volume of formed stool in the cecum without obstruction"  · Renal ultrasound  Results pending:   · Pt was npo overnight for possible procedure/ per urology not procedure necessary   · Way was placed with yellow urine   · Status was UA no sign of infection  · Continue to monitor input and output  · Avoid nephrotoxins/ NSAIDS and renally dose medication  · IV fluid hydration for now per nephrology continue to run for now   · - rechk bmp in the am     Hypokalemia  Assessment & Plan  Sp iv fluids hydration and poor oral intake   · Pt reports not eating well for last two weeks  · Will administer 40meq po now and rechk labs in the am     Hyperthyroidism  Assessment & Plan  · Recent TSH checked, reviewed from "care everywhere" (2/24) in euthyroid state  · Continue methimazole 2 5 mg every other day    Constipation  Assessment & Plan  · Bowel regimen  · Ct scan with large amt of stool in cecum   · Colace and miralax will add dulcolax at pts request     Chest pain  Assessment & Plan  · X1 episode has had no recurrence  · Low suspicion for ACS serial troponin, are negative   · EKG from the ED sinus rhythm at 89 beats per minute no acute ST changes  · Continue telemetry monitoring    Cirrhosis of liver (HCC)  Assessment & Plan  · Incidental finding status post CT noting mild cirrhotic changes  Liver function tests within normal limits  · Continued evaluation as outpatient    Hypercalcemia  Assessment & Plan  · Corrected calcium of 10 3 (cutoff at 10 1)  · Patient chronically on calcium carbonate which will hold  · IV fluid hydration  · Check ionized calcium 1 14 , PTH 68 2    Controlled type 2 diabetes mellitus, without long-term current use of insulin (HCC)  Assessment & Plan  No results found for: HGBA1C ordered for am fasting     Recent Labs     04/01/21  2043 04/01/21  2343 04/02/21  0540 04/02/21  1212   POCGLU 119 93 95 91       Blood Sugar Average: Last 72 hrs:  (P) 93 chronically on metformin  mg daily which will hold in the setting of acute kidney injury  · Placed on insulin sliding scale  · Pt had remained npo   Started diet as urology with no plan for urological intervention       History of breast cancer  Assessment & Plan  · Initially diagnosed in 2007 status post mastectomy/chemotherapy  · On letrozole, will continue      VTE Pharmacologic Prophylaxis:   Pharmacologic: Heparin  Mechanical VTE Prophylaxis in Place: Yes    Patient Centered Rounds: I have performed bedside rounds with nursing staff today  Discussions with Specialists or Other Care Team Provider: nursing     Education and Discussions with Family / Patient: patient reports she will keep her daughter updated     Time Spent for Care: 45 minutes  More than 50% of total time spent on counseling and coordination of care as described above      Current Length of Stay: 1 day(s)    Current Patient Status: Inpatient   Certification Statement: The patient will continue to require additional inpatient hospital stay due to ongoing treatment for veda on iv fluids with mathis Discharge Plan: when medically stable likely dc home with mathis per urology recommendations - to do voiding trial outpt     Code Status: Level 3 - DNAR and DNI      Subjective:   Pt reports a two week hx sp a "key lime pie few bites" she knew something was wrong with it because after that nothing was the same she lost her appetite she has not really been eating  She has no sob no fever or chills     Objective:     Vitals:   Temp (24hrs), Av 2 °F (37 3 °C), Min:98 °F (36 7 °C), Max:100 4 °F (38 °C)    Temp:  [98 °F (36 7 °C)-100 4 °F (38 °C)] 98 4 °F (36 9 °C)  HR:  [] 62  Resp:  [16-24] 16  BP: (124-156)/(61-87) 156/61  SpO2:  [91 %-95 %] 92 %  Body mass index is 30 7 kg/m²  Input and Output Summary (last 24 hours): Intake/Output Summary (Last 24 hours) at 2021 1841  Last data filed at 2021 1200  Gross per 24 hour   Intake 1340 ml   Output 850 ml   Net 490 ml       Physical Exam:     Physical Exam  Constitutional:       General: She is not in acute distress  Appearance: She is not ill-appearing, toxic-appearing or diaphoretic  HENT:      Head: Normocephalic and atraumatic  Mouth/Throat:      Pharynx: Oropharynx is clear  Eyes:      General:         Right eye: No discharge  Left eye: No discharge  Conjunctiva/sclera: Conjunctivae normal    Cardiovascular:      Rate and Rhythm: Normal rate  Heart sounds: No murmur  No friction rub  No gallop  Pulmonary:      Effort: No respiratory distress  Breath sounds: No stridor  Rales (fine bl crackles ) present  No wheezing or rhonchi  Chest:      Chest wall: No tenderness  Abdominal:      General: There is no distension  Palpations: There is no mass  Tenderness: There is no abdominal tenderness  There is no right CVA tenderness, left CVA tenderness, guarding or rebound  Hernia: No hernia is present        Comments: Round bloated appearing    Skin:     Coloration: Skin is not jaundiced or pale       Findings: No bruising, erythema, lesion or rash  Neurological:      Mental Status: She is alert and oriented to person, place, and time  Psychiatric:         Behavior: Behavior normal            Additional Data:     Labs:    Results from last 7 days   Lab Units 04/02/21  0354 04/01/21  1551   WBC Thousand/uL 9 48 9 01   HEMOGLOBIN g/dL 14 2 15 6*   HEMATOCRIT % 44 7 46 9*   PLATELETS Thousands/uL 141* 152   NEUTROS PCT %  --  77*   LYMPHS PCT %  --  17   MONOS PCT %  --  5   EOS PCT %  --  1     Results from last 7 days   Lab Units 04/02/21  1406 04/02/21  0353   SODIUM mmol/L 142 138   POTASSIUM mmol/L 3 8 3 0*   CHLORIDE mmol/L 113* 106   CO2 mmol/L 21 23   BUN mg/dL 33* 31*   CREATININE mg/dL 3 90* 3 76*   ANION GAP mmol/L 8 9   CALCIUM mg/dL 9 0 9 3   ALBUMIN g/dL  --  3 1*   TOTAL BILIRUBIN mg/dL  --  0 82   ALK PHOS U/L  --  83   ALT U/L  --  14   AST U/L  --  26   GLUCOSE RANDOM mg/dL 87 91         Results from last 7 days   Lab Units 04/02/21  1838 04/02/21  1212 04/02/21  0540 04/01/21  2343 04/01/21  2043   POC GLUCOSE mg/dl 119 91 95 93 119         Results from last 7 days   Lab Units 04/01/21  1551   LACTIC ACID mmol/L 2 0           * I Have Reviewed All Lab Data Listed Above  * Additional Pertinent Lab Tests Reviewed:  All Labs Within Last 24 Hours Reviewed    Imaging:    Imaging Reports Reviewed Today Include: reviewed     Recent Cultures (last 7 days):           Last 24 Hours Medication List:   Current Facility-Administered Medications   Medication Dose Route Frequency Provider Last Rate    acetaminophen  650 mg Oral Q6H PRN Jeffy Vasquez DO      bisacodyl  5 mg Oral Once [facility], CRNP      bisacodyl  10 mg Rectal Once Jeffy Vasquez DO      docusate sodium  100 mg Oral BID Jeffy Vasquez DO      heparin (porcine)  5,000 Units Subcutaneous Q8H [facility] Jeffy Vasquez DO      insulin lispro  1-5 Units Subcutaneous Q6H [facility] Jeffy Vasquez DO      letrozole  2 5 mg Oral Daily Radha Marrow, DO      methimazole  2 5 mg Oral Every Other Day Radha Marrow, DO      ondansetron  4 mg Intravenous Q6H PRN Radha Marrow, DO      polyethylene glycol  17 g Oral Daily Radha Marrow, DO      senna  1 tablet Oral Daily Radha Marrow, DO      sodium chloride  100 mL/hr Intravenous Continuous Radha Marrow,  mL/hr (04/02/21 3101)        Today, Patient Was Seen By: SIMON Adams    ** Please Note: Dictation voice to text software may have been used in the creation of this document   **

## 2021-04-02 NOTE — ASSESSMENT & PLAN NOTE
No results found for: HGBA1C    Recent Labs     04/01/21 2043   POCGLU 119       Blood Sugar Average: Last 72 hrs:   chronically on metformin  mg daily which will hold in the setting of acute kidney injury  Placed on insulin sliding scale

## 2021-04-02 NOTE — PLAN OF CARE
Problem: OCCUPATIONAL THERAPY ADULT  Goal: Performs self-care activities at highest level of function for planned discharge setting  See evaluation for individualized goals  Description: Treatment Interventions: ADL retraining, Functional transfer training, Endurance training, Cognitive reorientation, Patient/family training, Equipment evaluation/education, Compensatory technique education, Energy conservation, Activityengagement          See flowsheet documentation for full assessment, interventions and recommendations  Note: Limitation: Decreased ADL status, Decreased Safe judgement during ADL, Decreased cognition, Decreased endurance, Decreased self-care trans, Decreased high-level ADLs  Prognosis: Good  Assessment: 71 YO Female SEEN FOR INITIAL OCCUPATIONAL THERAPY EVALUATION FOLLOWING ADMISSION WITH ABDOMINAL PAIN WITH DX INCLUDING ZOYA WITH CONCERN FOR OBSTUCTIVE UROPATHY WITH B/L HYDRONEPHROSIS  PT WITH [facility] CATHETER PLACED 4/1  PROBLEMS LIST INCLUDES BREAST CANCER S/P CHEMOTHERAPY, MASTECTOMY, PERFORATED DIVERTICULITIS WITH COLOSTOMY (NOW REVERSED), INCONTINENCE, AND DM  PT IS FROM [facility] WHERE SHE REPORTS BEING INDEPENDENT WITH ADLS/LT IADLS/DRIVING PTA  PT HAS A PERSONAL CARE AIDE WHO COMES 2 DAYS/WK FOR 5 HOURS TOTAL TO ASSIST WITH HEAVY IADLS  PT CURRENTLY REQUIRES OVERALL MIN A WITH ADLS, TRANSFERS AND FUNCTIONAL MOBILITY WITH USE OF RW  PT IS LIMITED 2' PAIN, FATIGUE, IMPAIRED BALANCE, FALL RISK , LIMITED SAFETY AWARENESS/INSIGHT/JUDGEMENT, EASILY DISTRACTED, CUES TO ATTENDING TASK, OVERALL WEAKNESS/DECONDITIONING , LIMITED FAMILY/FRIEND SUPPORT , INACCESSIBLE HOME ENVIRONMENT and OVERALL LIMITED ACTIVITY TOLERANCE  PT EDUCATED ON DEEP BREATHING TECHNIQUES T/O ACTIVITY, ENERGY CONSERVATION TECHNIQUES FOR CARRY OVER UPON D/C, INCREASED FAMILY SUPPORT and CONTINUE PARTICIPATION IN SELF-CARE/MOBILITY WITH STAFF WH[facility]    The patient's raw score on the AM-PAC Daily Activity inpatient short form is 20, standardized score is 42 03, greater than 39 4  Patients at this level are likely to benefit from discharge to home  Please refer to the recommendation of the Occupational Therapist for safe discharge planning  HOWEVER PT WITH LIMITED AVAILABLE SUPPORT THEREFORE, FROM AN OCCUPATIONAL THERAPY PERSPECTIVE, PT WOULD BENEFIT FROM ADDITIONAL OT SERVICES IN AN INPT REHAB SETTING UPON D/C  WILL CONT TO FOLLOW TO ADDRESS THE BELOW DESCRIBED GOALS  OT Discharge Recommendation: Post-Acute Rehabilitation Services(PENDING PT PROGRESS)  OT - OK to Discharge:  Yes

## 2021-04-02 NOTE — PHYSICAL THERAPY NOTE
Physical Therapy Evaluation Note     Patient Name: Angelo Parks    Today's Date: 4/2/2021     Problem List  Principal Problem:    Acute kidney injury [facility])  Active Problems:    Hyperthyroidism    History of breast cancer    Controlled type 2 diabetes mellitus, without long-term current use of insulin ([facility] )    Hypercalcemia    Cirrhosis of liver ([facility] )    Chest pain    Constipation       Past Medical History  Past Medical History:   Diagnosis Date    Diabetes mellitus ([place] [address] )     Diverticulitis     Hyperthyroidism         Past Surgical History  Past Surgical History:   Procedure Laterality Date    APPENDECTOMY      COLOSTOMY      MASTECTOMY        04/02/21 1130   PT Last Visit   PT Visit Date 04/02/21   Note Type   Note type Evaluation   Pain Assessment   Pain Assessment Tool Pain Assessment not indicated - pt denies pain   Pain Score No Pain   Home Living   Type of Home Mobile home   Home Layout One level   Additional Comments Pt lives alone in a mobile home with 1 step on to ramp to enter  Pt was I for ADL's and mobility prior  Pt required some A with IADL's from hired aides  Pt does not drive but has a license  Pt uses a RW at baseline  Pt has an aide come one day for 3 hours and another day for 2 hours per week  Prior Function   Level of [place] Independent with ADLs and functional mobility   Lives With Alone   Receives Help From Personal care attendant   ADL Assistance Independent   IADLs Needs assistance   Falls in the last 6 months 1 to 4   Vocational Retired   Comments does not drive but has a license    Restrictions/Precautions   Weight Bearing Precautions Per Order No   Other Precautions Multiple lines; Fall Risk   General   Family/Caregiver Present No   Cognition   Overall Cognitive Status WFL   Arousal/Participation Alert   Orientation Level Oriented X4   Memory Within functional limits   Following Commands Follows all commands and directions without difficulty   RLE Assessment   RLE Assessment WNL   LLE Assessment   LLE Assessment WNL   Bed Mobility   Supine to Sit 5  Supervision   Additional Comments sitting EOB at a S level   Transfers   Sit to Stand 4  Minimal assistance   Additional items Assist x 1   Stand to Sit 4  Minimal assistance   Additional items Assist x 1   Ambulation/Elevation   Gait pattern Excessively slow; Step to; Foward flexed; Shuffling   Gait Assistance 4  Minimal assist   Additional items Assist x 1   Assistive Device Rolling walker   Distance 10ftx2   Balance   Static Sitting Fair +   Dynamic Sitting Fair   Static Standing Fair -   Dynamic Standing Fair -   Ambulatory Poor +   Endurance Deficit   Endurance Deficit Yes   Activity Tolerance   Activity Tolerance Patient limited by fatigue   Medical Staff Made Aware OT   Nurse Made Aware nurse approved therapy session   Assessment   Prognosis Good   Problem List Decreased mobility; Decreased endurance;Decreased strength   Assessment Pt is a 71 yo female admitted to [place] [address] on 4/1/2021 s/p abdominal pain  Dx: acute kidney injury, chest pain, DM, hypercalcemia, hx of breast CA, hyperthyroidism, cirrhosis of liver, constipation  Two patient identifiers were used to confirm  Pt lives alone in a mobile home with 1 step then ramp entrance  Pt was I for ADL's and mobility with RW  Pt has aides 2 days a week for 3 or 2 hours each day  Pt has assistance with IADL's  Pt reports one fall  Pt can dive but does not currently  Pt's impairments include reduced mobility, high risk of falling, poor activity tolerance, gait abnormaltiites  These impairments limit the ability of the patient to perform mobility without increased assistance, return to PLOF and participate in everyday life activities  Pt would benefit from continued skilled therapy while in the hospital to improve overall mobility and work towards a safe d/c  Recommend discharge to rehab   At the end of the session the patient was left in seated  On tilet with OT present  The patient's AM-PAC Basic Mobility Inpatient Short Form Raw Score is 19, Standardized Score is 42 48  A standardized score less than 42 9 suggests the patient may benefit from discharge to post-acute rehabilitation services  Please also refer to the recommendation of the Physical Therapist for safe discharge planning  Barriers to Discharge Inaccessible home environment;Decreased caregiver support   Goals   STG Expiration Date 04/16/21   Short Term Goal #1 STG 1: Pt will perform transfers at a MI level to return to baseline of function  STG 2: Pt will ambulate 300ft with RW at a MI level to reduce the level of assistance needed upon d/c home  STG 3: Pt will negotiate 1 steps with HR at a MI level to ensure safety with activity when able to ambulate 50ft at a min A level  STG 4: Pt will perform bed mobility at a I to safety return to PLOF  PT Treatment Day 0   Plan   Treatment/Interventions Functional transfer training;LE strengthening/ROM; Elevations; Therapeutic exercise; Endurance training;Gait training;Bed mobility   PT Frequency Other (Comment)  (3-5xwk)   Recommendation   PT Discharge Recommendation Post-Acute Rehabilitation Services   PT - OK to Discharge Yes   Additional Comments if to rehab   Banner 8 in Bed Without Bedrails 4   Lying on Back to Sitting on Edge of Flat Bed 4   Moving Bed to Chair 3   Standing Up From Chair 3   Walk in Room 3   Climb 3-5 Stairs 2   Basic Mobility Inpatient Raw Score 19   Basic Mobility Standardized Score 42 48   Dori Dowd, Pt, DPT

## 2021-04-02 NOTE — ASSESSMENT & PLAN NOTE
· X1 episode has had no recurrence  · Low suspicion for ACS serial troponin, are negative   · EKG from the ED sinus rhythm at 89 beats per minute no acute ST changes  · Continue telemetry monitoring

## 2021-04-02 NOTE — ASSESSMENT & PLAN NOTE
Recent TSH checked, reviewed from "care everywhere" (2/24) in euthyroid state  Continue methimazole 2 5 mg every other day

## 2021-04-02 NOTE — OCCUPATIONAL THERAPY NOTE
Occupational Therapy Evaluation     Patient Name: Domenic Dixon  Today's Date: 4/2/2021  Problem List  Principal Problem:    Acute kidney injury [facility])  Active Problems:    Hyperthyroidism    History of breast cancer    Controlled type 2 diabetes mellitus, without long-term current use of insulin ([email or web address] )    Hypercalcemia    Cirrhosis of liver ([email or web address] )    Chest pain    Constipation    Past Medical History  Past Medical History:   Diagnosis Date    Diabetes mellitus ([place] [address] )     Diverticulitis     Hyperthyroidism      Past Surgical History  Past Surgical History:   Procedure Laterality Date    APPENDECTOMY      COLOSTOMY      MASTECTOMY           04/02/21 1131   OT Last Visit   OT Visit Date 04/02/21   Note Type   Note type Evaluation   Restrictions/Precautions   Weight Bearing Precautions Per Order No   Other Precautions Multiple lines; Fall Risk;Pain;Cognitive; Chair Alarm   Pain Assessment   Pain Assessment Tool 0-10   Pain Score 4   Pain Location/Orientation Location: Abdomen   Hospital Pain Intervention(s) Repositioned; Ambulation/increased activity; Emotional support   Home Living   Type of Home Mobile home   Home Layout One level;Stairs to enter with rails; Ramped entrance  (1 MITCHELL + RAMP)   Bathroom Shower/Tub Tub/shower unit  (SPONGE BATHES AT BASELINE )   Bathroom Toilet Standard   Home Equipment Walker   Additional Comments + USE OF RW AT BASELINE    Prior Function   Level of [place] Independent with ADLs and functional mobility   Lives With Alone   Receives Help From Personal care attendant   ADL Assistance Independent   IADLs Needs assistance   Falls in the last 6 months 1 to 4   Vocational Retired   Lifestyle   Autonomy PT REPORTS BEING I WITH ADLS/LT IADLS/DRIVING AT [address] [place] [address] TO A FRIEND WHO "BLEW UP THE ENGINE"  PCA VISITS 2X PER WEEK, FOR 3 HOURS AND 2 HOURS TO ASSIST WITH HEAVY IADLS  LIMITED ADDITIONAL SUPPORT    Reciprocal Relationships LIVES ALONE   PCA 2 X/ WK FOR 5 HOURS TOTAL  Service to Others RETIRED;     Intrinsic Gratification ENJOYS WORKING ON THE FLOWERS IN HER MOBILE HOME COMMUNITY   ADL   Eating Assistance 7  Independent   Grooming Assistance 7  Independent   UB Bathing Assistance 5  Supervision/Setup   LB Bathing Assistance 4  Minimal Assistance   UB Dressing Assistance 5  Supervision/Setup   LB Dressing Assistance 4  Minimal Assistance   Toileting Assistance  4  Minimal Assistance   Functional Assistance 4  Minimal Assistance   Bed Mobility   Supine to Sit 5  Supervision   Additional items Increased time required;Verbal cues   Sit to Supine Unable to assess  (PT RETURNED TO BEDSIDE CHAIR WITH ALL NEEDS IN REACH + ALARM)   Transfers   Sit to Stand 4  Minimal assistance   Additional items Assist x 1; Increased time required;Verbal cues   Stand to Sit 4  Minimal assistance   Additional items Assist x 1; Increased time required;Verbal cues   Functional Mobility   Functional Mobility 4  Minimal assistance   Additional items Rolling walker   Balance   Static Sitting Fair +   Static Standing Fair -   Ambulatory Poor +   Activity Tolerance   Activity Tolerance Patient tolerated treatment well;Patient limited by fatigue   Medical Staff Made Aware Quynh Chen, PT   Nurse Made Aware APPROPRIATE TO SEE    RUE Assessment   RUE Assessment WFL   LUE Assessment   LUE Assessment WFL   Hand Function   Gross Motor Coordination Functional   Fine Motor Coordination Functional   Sensation   Light Touch No apparent deficits   Cognition   Overall Cognitive Status WFL   Arousal/Participation Alert; Cooperative   Attention Difficulty attending to directions   Orientation Level Oriented X4   Memory Within functional limits   Following Commands Follows one step commands without difficulty   Comments PT IS OVERALL PLEASANT AND COOPERATIVE HOWEVER VERY EASILY DISTRACTED REQUIRING CUES TO ATTEND TO TASK   Assessment   Limitation Decreased ADL status; Decreased Safe judgement during ADL;Decreased cognition;Decreased endurance;Decreased self-care trans;Decreased high-level ADLs   Prognosis Good   Assessment 69 YO Female SEEN FOR INITIAL OCCUPATIONAL THERAPY EVALUATION FOLLOWING ADMISSION WITH ABDOMINAL PAIN WITH DX INCLUDING ZOYA WITH CONCERN FOR OBSTUCTIVE UROPATHY WITH B/L HYDRONEPHROSIS  PT WITH DUMAS CATHETER PLACED 4/1  PROBLEMS LIST INCLUDES BREAST CANCER S/P CHEMOTHERAPY, MASTECTOMY, PERFORATED DIVERTICULITIS WITH COLOSTOMY (NOW REVERSED), INCONTINENCE, AND DM  PT IS FROM MOBILE HOME ALONE WHERE SHE REPORTS BEING INDEPENDENT WITH ADLS/LT IADLS/DRIVING PTA  PT HAS A PERSONAL CARE AIDE WHO COMES 2 DAYS/WK FOR 5 HOURS TOTAL TO ASSIST WITH HEAVY IADLS  PT CURRENTLY REQUIRES OVERALL MIN A WITH ADLS, TRANSFERS AND FUNCTIONAL MOBILITY WITH USE OF RW  PT IS LIMITED 2' PAIN, FATIGUE, IMPAIRED BALANCE, FALL RISK , LIMITED SAFETY AWARENESS/INSIGHT/JUDGEMENT, EASILY DISTRACTED, CUES TO ATTENDING TASK, OVERALL WEAKNESS/DECONDITIONING , LIMITED FAMILY/FRIEND SUPPORT , INACCESSIBLE HOME ENVIRONMENT and OVERALL LIMITED ACTIVITY TOLERANCE  PT EDUCATED ON DEEP BREATHING TECHNIQUES T/O ACTIVITY, ENERGY CONSERVATION TECHNIQUES FOR CARRY OVER UPON D/C, INCREASED FAMILY SUPPORT and CONTINUE PARTICIPATION IN SELF-CARE/MOBILITY WITH STAFF [facility]   The patient's raw score on the AM-PAC Daily Activity inpatient short form is 20, standardized score is 42 03, greater than 39 4  Patients at this level are likely to benefit from discharge to home  Please refer to the recommendation of the Occupational Therapist for safe discharge planning  HOWEVER PT WITH LIMITED AVAILABLE SUPPORT THEREFORE, FROM AN OCCUPATIONAL THERAPY PERSPECTIVE, PT WOULD BENEFIT FROM ADDITIONAL OT SERVICES IN AN INPT REHAB SETTING UPON D/C  WILL CONT TO FOLLOW TO ADDRESS THE BELOW DESCRIBED GOALS      Goals   Patient Goals TO GO TO THE BATHROOM    LTG Time Frame 10-14   Long Term Goal #1 SEE BELOW    Plan   Treatment Interventions ADL retraining;Functional transfer training; Endurance training;Cognitive reorientation;Patient/family training;Equipment evaluation/education; Compensatory technique education; Energy conservation; Activityengagement   Goal Expiration Date 04/16/21   OT Frequency 3-5x/wk   Recommendation   OT Discharge Recommendation Post-Acute Rehabilitation Services  (PENDING PT PROGRESS)   OT - OK to Discharge Yes   AM-PAC Daily Activity Inpatient   Lower Body Dressing 3   Bathing 3   Toileting 3   Upper Body Dressing 3   Grooming 4   Eating 4   Daily Activity Raw Score 20   Daily Activity Standardized Score (Calc for Raw Score >=11) 42 03   AM-PAC Applied Cognition Inpatient   Following a Speech/Presentation 3   Understanding Ordinary Conversation 4   Taking Medications 3   Remembering Where Things Are Placed or Put Away 3   Remembering List of 4-5 Errands 3   Taking Care of Complicated Tasks 3   Applied Cognition Raw Score 19   Applied Cognition Standardized Score 39 77   Modified [place] Scale   Modified [place] Scale 4       OCCUPATIONAL THERAPY GOALS TO BE MET WITHIN 14 DAYS:    -Pt will increase bed mobility to MOD I to participate in functional activities with G tolerance and balance  -Pt will improve functional mobility and transfers to MOD I on/off all surfaces w/ G balance/safety including toileting   -Pt will participate in lt grooming task with MOD I after set-up standing at sink ~3-5 minutes with G safety and balance     -Pt will increase independence in all ADLS to MOD I with G balance sitting upright in chair   -Pt will improve activity tolerance to G for 30 min txment sessions w/ G carry over of learned energy conservation techniques   -Pt will improve independence in lt homemaking activities to MOD I without requiring cues for safety   -Pt will demonstrate G carryover of learned safety techniques and proper body mechanics in functional and leisure activities with use of DME   -Pt will complete additional cognitive assessment with 100% attention to task in order to assist with safe d/c plan         Documentation completed by CHRISTINA Romo, OTR/L

## 2021-04-02 NOTE — ASSESSMENT & PLAN NOTE
· Incidental finding status post CT noting mild cirrhotic changes    Liver function tests within normal limits  · Continued evaluation as outpatient

## 2021-04-02 NOTE — ASSESSMENT & PLAN NOTE
Concern for obstructive uropathy with bilateral hydronephrosis  Lab work from "care [facility]" indicating cr of 1 10 (2/24/21)  Status post CT scan abdomen pelvis without contrast no ureteral stone  Will proceed with renal ultrasound  Consult urology  Make NPO after midnight in the event of  procedure  Way in place, draining yellow-colored  Status was UA no sign of infection  Continue to monitor input and output  Avoid nephrotoxins and renally dose medication  IV fluid hydration

## 2021-04-02 NOTE — ASSESSMENT & PLAN NOTE
· Recent TSH checked, reviewed from "[facility]" (2/24) in euthyroid state  · Continue methimazole 2 5 mg every other day

## 2021-04-02 NOTE — CONSULTS
Consultation - Nephrology   Stefani John 70 y o  female MRN: [id number]  Unit/Bed#: -01 Encounter: [id number]    ASSESSMENT AND PLAN:  Patient is 57-year-old female with multiple medical issues of breast cancer in 2017, status post chemo, history of diverticulitis perforated, status post colostomy eventually reversal, diabetes for five years, presented with abdominal pain  She was found to have bilateral hydronephrosis  We are consulted for ZOYA management  ZOYA POA, baseline serum creatinine 0 7 to 0 8 in 2020, isolated value 1 1 in February 2021  -creatinine 4 2 on admission now improving to 3 7  -ZOYA likely secondary to obstructive nephropathy with bilateral hydronephrosis with hydroureter, component of prerenal given poor p o  Intake  -currently has Awy catheter placed  -agree with continuing IV fluid  -UA this admission bland without hematuria or proteinuria  -avoid nephrotoxins or NSAIDs    Hypokalemia  -serum potassium 3 0 below goal  -status post 40 mEq potassium chloride once today   -serum magnesium stable   -will give additional 20 mEq potassium chloride later today  Repeat BMP in a m  Blood pressure overall acceptable and well controlled  Bilateral hydronephrosis with proximal hydroureter, no obvious evidence of obstructing lesion or calculi  -currently has Way catheter placed   -urology follow-up    Discussed above plan in detail with primary team     HISTORY OF PRESENT ILLNESS:  Requesting Physician: Shabana Bragg MD  Reason for Consult: ZOYA    Stefani John is a 70y o  year old female who was admitted to [facility] after presenting with abdominal/back pain  A renal consultation is requested today for assistance in the management of ZOYA  Old medical records including BMP results from [facility] reviewed  Patient presented with abdominal pain/back pain issues  She was found to have bilateral hydronephrosis with significant ZOYA    Eventually Way catheter was placed and was evaluated by Urology  Creatinine seems to be now improving  She denies any lightheadedness or dizziness  She does have occasional dry heaves  Overall poor p o  Intake  Denies any recent NSAID exposure  Currently has Way catheter  Denies any vomiting, diarrhea  PAST MEDICAL HISTORY:  Past Medical History:   Diagnosis Date    Diabetes mellitus ([address] )     Diverticulitis     Hyperthyroidism        PAST SURGICAL HISTORY:  Past Surgical History:   Procedure Laterality Date    APPENDECTOMY      COLOSTOMY      MASTECTOMY         ALLERGIES:  Allergies   Allergen Reactions    Latex - Food Allergy Itching    Nuts - Food Allergy Other (See Comments)     Martell allergy    Penicillins Rash       SOCIAL HISTORY:  Social History     Substance and Sexual Activity   Alcohol Use Not Currently     Social History     Substance and Sexual Activity   Drug Use Not Currently     Social History     Tobacco Use   Smoking Status Former Smoker   Smokeless Tobacco Never Used       FAMILY HISTORY:  No family history on file      MEDICATIONS:    Current Facility-Administered Medications:     acetaminophen (TYLENOL) tablet 650 mg, 650 mg, Oral, Q6H PRN, Alma Rosa De Luna DO, 650 mg at 04/02/21 0534    bisacodyl (DULCOLAX) rectal suppository 10 mg, 10 mg, Rectal, Once, Alma Rosa De Luna DO    docusate sodium (COLACE) capsule 100 mg, 100 mg, Oral, BID, Alma Rosa De Luna DO, 100 mg at 04/02/21 0703    heparin (porcine) subcutaneous injection 5,000 Units, 5,000 Units, Subcutaneous, Q8H [facility], Alma Rosa De Luna DO, 5,000 Units at 04/02/21 0525    insulin lispro (HumaLOG) 100 units/mL subcutaneous injection 1-5 Units, 1-5 Units, Subcutaneous, Q6H [facility] **AND** Fingerstick Glucose (POCT), , , Q6H, Alma Rosa De Luna DO    letrozole [facility]) tablet 2 5 mg, 2 5 mg, Oral, Daily, Alma Rosa De Luna DO, 2 5 mg at 04/02/21 3421    methimazole (TAPAZOLE) tablet 2 5 mg, 2 5 mg, Oral, Every Other Day, Alma Rosa De Luna DO, 2 5 mg at 04/02/21 0822    ondansetron (ZOFRAN) injection 4 mg, 4 mg, Intravenous, Q6H PRN, Lbis Annamarie, DO, 4 mg at 04/02/21 0534    polyethylene glycol (MIRALAX) packet 17 g, 17 g, Oral, Daily, Trellis Annamarie, DO, Stopped at 04/02/21 1090    senna (SENOKOT) tablet 8 6 mg, 1 tablet, Oral, Daily, Alma Rosa De Luna, , 8 6 mg at 04/02/21 0985    sodium chloride 0 9 % infusion, 100 mL/hr, Intravenous, Continuous, Alma Rosa De Luna DO, Last Rate: 100 mL/hr at 04/02/21 0821, 100 mL/hr at 04/02/21 5773    REVIEW OF SYSTEMS:  Complete 10 point review of systems were obtained and discussed in length with the patient  Complete review of systems were negative / unremarkable except mentioned in the HPI section       PHYSICAL EXAM:  Current Weight: Weight - Scale: 71 3 kg (157 lb 3 oz)  First Weight: Weight - Scale: 71 3 kg (157 lb 3 oz)  Vitals:    04/02/21 1049   BP: 133/85   Pulse:    Resp: 19   Temp: 99 3 °F (37 4 °C)   SpO2:        Intake/Output Summary (Last 24 hours) at 4/2/2021 1444  Last data filed at 4/2/2021 1049  Gross per 24 hour   Intake 1340 ml   Output 850 ml   Net 490 ml     Wt Readings from Last 3 Encounters:   04/01/21 71 3 kg (157 lb 3 oz)   04/01/21 73 3 kg (161 lb 9 6 oz)     Temp Readings from Last 3 Encounters:   04/02/21 99 3 °F (37 4 °C)   04/01/21 (!) 97 4 °F (36 3 °C) (Temporal)     BP Readings from Last 3 Encounters:   04/02/21 133/85   04/01/21 124/61     Pulse Readings from Last 3 Encounters:   04/02/21 95   04/01/21 (!) 134        Physical Examination:  General:  Lying in bed, no acute distress  Eyes:  No conjunctival pallor present  ENT:  External examination of ears and nose unremarkable  Neck:  No obvious lymphadenopathy appreciated  Respiratory:  Bilateral air entry present  CVS:  S1, S2 present  GI:  Soft, nondistended  CNS:  Active alert oriented x3  Extremities:  No significant edema in legs  Psych:  Conscious, coherent, oriented  Skin:  No new rash in legs    Invasive Devices:   Urethral Catheter Non-latex 16 Fr  (Active)   Amt returned on insertion(mL) 5 mL 04/01/21 2043   Reasons to continue Urinary Catheter  Accurate I&O assessment in critically ill patients (48 hr  max) 04/01/21 2253   Site Assessment Clean;Skin intact; Patent 04/01/21 2253   Collection Container Standard drainage bag 04/01/21 2253   Securement Method Securing device (Describe) 04/01/21 2253   Output (mL) 200 mL 04/02/21 1049     Lab Results:   Results from last 7 days   Lab Units 04/02/21  0354 04/02/21  0353 04/01/21  1551   WBC Thousand/uL 9 48  --  9 01   HEMOGLOBIN g/dL 14 2  --  15 6*   HEMATOCRIT % 44 7  --  46 9*   PLATELETS Thousands/uL 141*  --  152   POTASSIUM mmol/L  --  3 0* 3 3*   CHLORIDE mmol/L  --  106 101   CO2 mmol/L  --  23 24   BUN mg/dL  --  31* 35*   CREATININE mg/dL  --  3 76* 4 26*   CALCIUM mg/dL  --  9 3 9 7   MAGNESIUM mg/dL  --  2 2  --        Other Studies:   US kidney and bladder    (Results Pending)   Chest x-ray images personally reviewed which shows clear lungs  Portions of the record may have been created with voice recognition software  Occasional wrong word or "sound a like" substitutions may have occurred due to the inherent limitations of voice recognition software  Read the chart carefully and recognize, using context, where substitutions have occurred

## 2021-04-02 NOTE — ASSESSMENT & PLAN NOTE
Incidental finding status post CT noting mild cirrhotic changes    Liver function tests within normal limits  Continued evaluation as outpatient

## 2021-04-02 NOTE — ASSESSMENT & PLAN NOTE
No results found for: HGBA1C ordered for am fasting     Recent Labs     04/01/21  2043 04/01/21  2343 04/02/21  0540 04/02/21  1212   POCGLU 119 93 95 91       Blood Sugar Average: Last 72 hrs:  (P) 93 chronically on metformin  mg daily which will hold in the setting of acute kidney injury  · Placed on insulin sliding scale  · Pt had remained npo   Started diet as urology with no plan for urological intervention

## 2021-04-02 NOTE — H&P
[facility]  H&P- Ines Crowe 1945, 70 y o  female MRN: [id number]  Unit/Bed#: -[facility] Encounter: [id number]  Primary Care Provider: Swapna Fu MD   Date and time admitted to hospital: 4/1/2021  9:57 PM    * Acute kidney injury [facility])  Assessment & Plan  Concern for obstructive uropathy with bilateral hydronephrosis  Lab work from "care everywhere" indicating cr of 1 10 (2/24/21)  Status post CT scan abdomen pelvis without contrast no ureteral stone  Will proceed with renal ultrasound  Consult urology  Make NPO after midnight in the event of  procedure  Way in place, draining yellow-colored  Status was UA no sign of infection  Continue to monitor input and output  Avoid nephrotoxins and renally dose medication  IV fluid hydration    Chest pain  Assessment & Plan  X1 episode has had no recurrence  Low suspicion for ACS but will obtain serial troponin  EKG from the ED sinus rhythm at 89 beats per minute no acute ST changes  Continue telemetry monitoring    Controlled type 2 diabetes mellitus, without long-term current use of insulin ([facility] [address] )  Assessment & Plan  No results found for: HGBA1C    Recent Labs     04/01/21 2043   POCGLU 119       Blood Sugar Average: Last 72 hrs:   chronically on metformin  mg daily which will hold in the setting of acute kidney injury  Placed on insulin sliding scale      Hypercalcemia  Assessment & Plan  Corrected calcium of 10 3 (cutoff at 10 1)  Patient chronically on calcium carbonate which will hold  IV fluid hydration  Check ionized calcium, PTH    History of breast cancer  Assessment & Plan  Initially diagnosed in 2007 status post mastectomy/chemotherapy  On letrozole, will continue    Hyperthyroidism  Assessment & Plan  Recent TSH checked, reviewed from "care everywhere" (2/24) in euthyroid state  Continue methimazole 2 5 mg every other day    Cirrhosis of liver [facility])  Assessment & Plan  Incidental finding status post CT noting mild cirrhotic changes  Liver function tests within normal limits  Continued evaluation as outpatient    Constipation  Assessment & Plan  Bowel regimen    VTE Prophylaxis: Heparin  / sequential compression device   Code Status:  DNR/DNI  POLST: There is no POLST form on file for this patient (pre-hospital)  Discussion with family:  Plan of care extensively discussed with patient    Anticipated Length of Stay:  Patient will be admitted on an Inpatient basis with an anticipated length of stay of  > 2 midnights  Justification for Hospital Stay:  Acute kidney injury    Total Time for Visit, including Counseling / Coordination of Care: 1 hour  Greater than 50% of this total time spent on direct patient counseling and coordination of care  Chief Complaint:   Abdominal pain    History of Present Illness:    Gery Councilman is a 70 y o  female is a pleasant female past medical history significant for breast cancer diagnosed in 2017 status post chemotherapy, mastectomy, history of perforated diverticulitis in 2018 with subsequent colostomy now reversed, history of hypothyroidism  She presented to [address] [place] [address] ER with complaint of abdominal pain for the past 2 weeks  She reports of pain in the left lower quadrant radiating to her left flank  She denies dysuria, hematuria, fever, chills, urinary urgency  She reports that she has been urinating fine  She also reports one episode of loose bowel movement  She denies nausea, vomiting  She reports  she also had an incident last night where she had left substernal chest pain lasting for a few minutes, has been resolved since then and has not had recurrence  At this time she is chest pain-free  She denied any associated symptoms    Upon presentation to the ER, noted with creatinine of 4 26, her last lab work revealed creatinine of 1 1 (2/24)  Way catheter has been placed and is draining well    CT scan revealing bilateral hydronephrosis, no evidence of calculi    Review of Systems:    Review of Systems   Constitutional: Negative  HENT: Negative  Eyes: Negative  Respiratory: Negative  Cardiovascular: Negative  Gastrointestinal: Positive for abdominal pain and constipation  Endocrine: Negative  Genitourinary: Positive for flank pain  Allergic/Immunologic: Negative  Neurological: Negative  Hematological: Negative  Psychiatric/Behavioral: Negative  Past Medical and Surgical History:     Past Medical History:   Diagnosis Date    Diabetes mellitus ([address] )     Diverticulitis     Hyperthyroidism    Breast cancer status post chemotherapy, next mastectomy    Past Surgical History:   Procedure Laterality Date    APPENDECTOMY      COLOSTOMY      MASTECTOMY         Meds/Allergies:    Prior to Admission medications    Medication Sig Start Date End Date Taking? Authorizing Provider   calcium carbonate (Tums) 500 mg chewable tablet Chew 2 tablets   Yes Historical Provider, MD   glucosamine-chondroitin 500-400 MG tablet Take 1 tablet by mouth   Yes Historical Provider, MD   letrozole (FEMARA) 2 5 mg tablet Take 2 5 mg by mouth daily   Yes Historical Provider, MD   methimazole (TAPAZOLE) 5 mg tablet TAKE 1/2 TABLET BY MOUTH EVERY OTHER DAY 10/12/20  Yes Historical Provider, MD   loperamide (IMODIUM) 2 mg capsule Take 2 mg by mouth    Historical Provider, MD     I have reviewed home medications with patient personally  Allergies:    Allergies   Allergen Reactions    Latex - Food Allergy Itching    Nuts - Food Allergy Other (See Comments)     [place] allergy    Penicillins Rash       Social History:     Marital Status: Single   Occupation:   Patient Pre-hospital Living Situation:  Resides alone  Patient Pre-hospital Level of Mobility:  Independent  Patient Pre-hospital Diet Restrictions:  None  Substance Use History:   Social History     Substance and Sexual Activity   Alcohol Use Not Currently     Social History     Tobacco Use Smoking Status Former Smoker   Smokeless Tobacco Never Used     Social History     Substance and Sexual Activity   Drug Use Not Currently       Family History:    non-contributory    Physical Exam:     Vitals:   Blood Pressure: 147/78 (04/01/21 2216)  Pulse: 66 (04/01/21 2216)  Temperature: 98 °F (36 7 °C) (04/01/21 2216)  Respirations: 18 (04/01/21 2216)  Weight - Scale: 71 3 kg (157 lb 3 oz) (04/01/21 2216)  SpO2: 94 % (04/01/21 2216)    Physical Exam  Constitutional:       Appearance: Normal appearance  Neck:      Musculoskeletal: Normal range of motion and neck supple  Cardiovascular:      Rate and Rhythm: Normal rate and regular rhythm  Pulses: Normal pulses  Heart sounds: Normal heart sounds  Pulmonary:      Effort: Pulmonary effort is normal  No respiratory distress  Breath sounds: Normal breath sounds  No wheezing or rales  Abdominal:      General: Abdomen is flat  Bowel sounds are normal  There is no distension  Palpations: Abdomen is soft  Tenderness: There is abdominal tenderness  There is no right CVA tenderness, left CVA tenderness or guarding  Comments: Left lower quadrant   Musculoskeletal: Normal range of motion  Right lower leg: No edema  Left lower leg: No edema  Skin:     General: Skin is warm and dry  Neurological:      General: No focal deficit present  Mental Status: She is alert and oriented to person, place, and time  Mental status is at baseline  Cranial Nerves: No cranial nerve deficit  Motor: No weakness  Additional Data:     Lab Results: I have personally reviewed pertinent reports        Results from last 7 days   Lab Units 04/01/21  1551   WBC Thousand/uL 9 01   HEMOGLOBIN g/dL 15 6*   HEMATOCRIT % 46 9*   PLATELETS Thousands/uL 152   NEUTROS PCT % 77*   LYMPHS PCT % 17   MONOS PCT % 5   EOS PCT % 1     Results from last 7 days   Lab Units 04/01/21  1551   SODIUM mmol/L 139   POTASSIUM mmol/L 3 3* CHLORIDE mmol/L 101   CO2 mmol/L 24   BUN mg/dL 35*   CREATININE mg/dL 4 26*   ANION GAP mmol/L 14*   CALCIUM mg/dL 9 7   ALBUMIN g/dL 3 3*   TOTAL BILIRUBIN mg/dL 0 93   ALK PHOS U/L 98   ALT U/L 17   AST U/L 29   GLUCOSE RANDOM mg/dL 138         Results from last 7 days   Lab Units 04/01/21  2043   POC GLUCOSE mg/dl 119         Results from last 7 days   Lab Units 04/01/21  1551   LACTIC ACID mmol/L 2 0       Imaging: I have personally reviewed pertinent reports  US kidney and bladder    (Results Pending)       EKG, Pathology, and Other Studies Reviewed on Admission:   · EKG:  Sinus rhythm at 89 beats per minute    Allscripts / Epic Records Reviewed: Yes     ** Please Note: This note has been constructed using a voice recognition system   **

## 2021-04-02 NOTE — ASSESSMENT & PLAN NOTE
· Bowel regimen  · Ct scan with large amt of stool in cecum   · Colace and miralax will add dulcolax at pts request

## 2021-04-02 NOTE — PLAN OF CARE
Problem: Potential for Falls  Goal: Patient will remain free of falls  Description: INTERVENTIONS:  - Assess patient frequently for physical needs  -  Identify cognitive and physical deficits and behaviors that affect risk of falls  -  [place] fall precautions as indicated by assessment   - Educate patient/family on patient safety including physical limitations  - Instruct patient to call for assistance with activity based on assessment  - Modify environment to reduce risk of injury  - Consider OT/PT consult to assist with strengthening/mobility  Outcome: Progressing     Problem: Prexisting or High Potential for Compromised Skin Integrity  Goal: Skin integrity is maintained or improved  Description: INTERVENTIONS:  - Identify patients at risk for skin breakdown  - Assess and monitor skin integrity  - Assess and monitor nutrition and hydration status  - Monitor labs   - Assess for incontinence   - Turn and reposition patient  - Assist with mobility/ambulation  - Relieve pressure over bony prominences  - Avoid friction and shearing  - Provide appropriate hygiene as needed including keeping skin clean and dry  - Evaluate need for skin moisturizer/barrier cream  - Collaborate with interdisciplinary team   - Patient/family teaching  - Consider wound care consult   Outcome: Progressing     Problem: Nutrition/Hydration-ADULT  Goal: Nutrient/Hydration intake appropriate for improving, restoring or maintaining nutritional needs  Description: Monitor and assess patient's nutrition/hydration status for malnutrition  Collaborate with interdisciplinary team and initiate plan and interventions as ordered  Monitor patient's weight and dietary intake as ordered or per policy  Utilize nutrition screening tool and intervene as necessary  Determine patient's food preferences and provide high-protein, high-caloric foods as appropriate       INTERVENTIONS:  - Monitor oral intake, urinary output, labs, and treatment plans  - Assess nutrition and hydration status and recommend course of action  - Evaluate amount of meals eaten  - Assist patient with eating if necessary   - Allow adequate time for meals  - Recommend/ encourage appropriate diets, oral nutritional supplements, and vitamin/mineral supplements  - Order, calculate, and assess calorie counts as needed  - Recommend, monitor, and adjust tube feedings and TPN/PPN based on assessed needs  - Assess need for intravenous fluids  - Provide specific nutrition/hydration education as appropriate  - Include patient/family/caregiver in decisions related to nutrition  Outcome: Progressing     Problem: CARDIOVASCULAR - ADULT  Goal: Maintains optimal cardiac output and hemodynamic stability  Description: INTERVENTIONS:  - Monitor I/O, vital signs and rhythm  - Monitor for S/S and trends of decreased cardiac output  - Administer and titrate ordered vasoactive medications to optimize hemodynamic stability  - Assess quality of pulses, skin color and temperature  - Assess for signs of decreased coronary artery perfusion  - Instruct patient to report change in severity of symptoms  Outcome: Progressing  Goal: Absence of cardiac dysrhythmias or at baseline rhythm  Description: INTERVENTIONS:  - Continuous cardiac monitoring, vital signs, obtain 12 lead EKG if ordered  - Administer antiarrhythmic and heart rate control medications as ordered  - Monitor electrolytes and administer replacement therapy as ordered  Outcome: Progressing     Problem: GASTROINTESTINAL - ADULT  Goal: Minimal or absence of nausea and/or vomiting  Description: INTERVENTIONS:  - Administer IV fluids if ordered to ensure adequate hydration  - Maintain NPO status until nausea and vomiting are resolved  - Nasogastric tube if ordered  - Administer ordered antiemetic medications as needed  - Provide nonpharmacologic comfort measures as appropriate  - Advance diet as tolerated, if ordered  - Consider nutrition services referral to assist patient with adequate nutrition and appropriate food choices  Outcome: Progressing     Problem: GENITOURINARY - ADULT  Goal: Urinary catheter remains patent  Description: INTERVENTIONS:  - Assess patency of urinary catheter  - If patient has a chronic mathis, consider changing catheter if non-functioning  - Follow guidelines for intermittent irrigation of non-functioning urinary catheter  Outcome: Progressing     Problem: METABOLIC, FLUID AND ELECTROLYTES - ADULT  Goal: Glucose maintained within target range  Description: INTERVENTIONS:  - Monitor Blood Glucose as ordered  - Assess for signs and symptoms of hyperglycemia and hypoglycemia  - Administer ordered medications to maintain glucose within target range  - Assess nutritional intake and initiate nutrition service referral as needed  Outcome: Progressing

## 2021-04-02 NOTE — ASSESSMENT & PLAN NOTE
· Concern for obstructive uropathy with bilateral hydronephrosis  · - however pt with slow return to norm on ivfluids and urinary catheter insertion yesterday  · Lab work from "care everywhere" indicating cr of 1 10 (2/24/21)  · Status post CT scan abdomen pelvis without contrast "Bilateral hydronephrosis and proximal hydroureter   No CT evidence of renal colic   No obstructing lesions are found in the retroperitoneum   Urinary bladder was not distended   Recommend follow-up renal ultrasound to assess for ureteral jets/urinary   obstruction  "   · - however noting "Large volume of formed stool in the cecum without obstruction"  · Renal ultrasound  Results pending:   · Pt was npo overnight for possible procedure/ per urology not procedure necessary   · Way was placed with yellow urine   · Status was UA no sign of infection  · Continue to monitor input and output  · Avoid nephrotoxins/ NSAIDS and renally dose medication  · IV fluid hydration for now per nephrology continue to run for now   · - rechk bmp in the am

## 2021-04-03 PROBLEM — E87.0 HYPERNATREMIA: Status: ACTIVE | Noted: 2021-04-03

## 2021-04-03 LAB
ANION GAP SERPL CALCULATED.3IONS-SCNC: 6 MMOL/L (ref 4–13)
BUN SERPL-MCNC: 32 MG/DL (ref 5–25)
CALCIUM SERPL-MCNC: 8.8 MG/DL (ref 8.3–10.1)
CHLORIDE SERPL-SCNC: 119 MMOL/L (ref 100–108)
CO2 SERPL-SCNC: 21 MMOL/L (ref 21–32)
CREAT SERPL-MCNC: 4.03 MG/DL (ref 0.6–1.3)
EST. AVERAGE GLUCOSE BLD GHB EST-MCNC: 128 MG/DL
GFR SERPL CREATININE-BSD FRML MDRD: 10 ML/MIN/1.73SQ M
GLUCOSE SERPL-MCNC: 111 MG/DL (ref 65–140)
GLUCOSE SERPL-MCNC: 126 MG/DL (ref 65–140)
GLUCOSE SERPL-MCNC: 77 MG/DL (ref 65–140)
GLUCOSE SERPL-MCNC: 89 MG/DL (ref 65–140)
HBA1C MFR BLD: 6.1 %
POTASSIUM SERPL-SCNC: 4 MMOL/L (ref 3.5–5.3)
SODIUM SERPL-SCNC: 146 MMOL/L (ref 136–145)

## 2021-04-03 PROCEDURE — 80048 BASIC METABOLIC PNL TOTAL CA: CPT | Performed by: INTERNAL MEDICINE

## 2021-04-03 PROCEDURE — 83036 HEMOGLOBIN GLYCOSYLATED A1C: CPT | Performed by: NURSE PRACTITIONER

## 2021-04-03 PROCEDURE — 82948 REAGENT STRIP/BLOOD GLUCOSE: CPT

## 2021-04-03 PROCEDURE — 99233 SBSQ HOSP IP/OBS HIGH 50: CPT | Performed by: INTERNAL MEDICINE

## 2021-04-03 RX ORDER — SODIUM CHLORIDE 9 MG/ML
75 INJECTION, SOLUTION INTRAVENOUS CONTINUOUS
Status: DISCONTINUED | OUTPATIENT
Start: 2021-04-03 | End: 2021-04-03

## 2021-04-03 RX ORDER — OXYCODONE HYDROCHLORIDE 5 MG/1
2.5 TABLET ORAL EVERY 4 HOURS PRN
Status: DISCONTINUED | OUTPATIENT
Start: 2021-04-03 | End: 2021-04-03

## 2021-04-03 RX ORDER — SODIUM CHLORIDE 450 MG/100ML
75 INJECTION, SOLUTION INTRAVENOUS CONTINUOUS
Status: DISCONTINUED | OUTPATIENT
Start: 2021-04-03 | End: 2021-04-04

## 2021-04-03 RX ORDER — LIDOCAINE 50 MG/G
1 PATCH TOPICAL DAILY
Status: DISCONTINUED | OUTPATIENT
Start: 2021-04-03 | End: 2021-04-07 | Stop reason: HOSPADM

## 2021-04-03 RX ORDER — CALCIUM CARBONATE 200(500)MG
500 TABLET,CHEWABLE ORAL EVERY 8 HOURS PRN
Status: DISCONTINUED | OUTPATIENT
Start: 2021-04-03 | End: 2021-04-07 | Stop reason: HOSPADM

## 2021-04-03 RX ADMIN — LIDOCAINE 1 PATCH: 50 PATCH TOPICAL at 08:15

## 2021-04-03 RX ADMIN — HEPARIN SODIUM 5000 UNITS: 5000 INJECTION INTRAVENOUS; SUBCUTANEOUS at 04:35

## 2021-04-03 RX ADMIN — DOCUSATE SODIUM 100 MG: 100 CAPSULE, LIQUID FILLED ORAL at 08:16

## 2021-04-03 RX ADMIN — POLYETHYLENE GLYCOL 3350 17 G: 17 POWDER, FOR SOLUTION ORAL at 08:15

## 2021-04-03 RX ADMIN — SODIUM CHLORIDE 75 ML/HR: 0.45 INJECTION, SOLUTION INTRAVENOUS at 21:24

## 2021-04-03 RX ADMIN — SENNOSIDES 8.6 MG: 8.6 TABLET, FILM COATED ORAL at 08:16

## 2021-04-03 RX ADMIN — LIDOCAINE 1 PATCH: 50 PATCH TOPICAL at 00:40

## 2021-04-03 RX ADMIN — ACETAMINOPHEN 650 MG: 325 TABLET, FILM COATED ORAL at 21:24

## 2021-04-03 RX ADMIN — SODIUM CHLORIDE 75 ML/HR: 0.45 INJECTION, SOLUTION INTRAVENOUS at 08:17

## 2021-04-03 RX ADMIN — LETROZOLE 2.5 MG: 2.5 TABLET, FILM COATED ORAL at 08:15

## 2021-04-03 RX ADMIN — SODIUM CHLORIDE 75 ML/HR: 0.9 INJECTION, SOLUTION INTRAVENOUS at 04:40

## 2021-04-03 NOTE — PLAN OF CARE
Problem: Potential for Falls  Goal: Patient will remain free of falls  Description: INTERVENTIONS:  - Assess patient frequently for physical needs  -  Identify cognitive and physical deficits and behaviors that affect risk of falls  -  [place] fall precautions as indicated by assessment   - Educate patient/family on patient safety including physical limitations  - Instruct patient to call for assistance with activity based on assessment  - Modify environment to reduce risk of injury  - Consider OT/PT consult to assist with strengthening/mobility  Outcome: Progressing     Problem: Prexisting or High Potential for Compromised Skin Integrity  Goal: Skin integrity is maintained or improved  Description: INTERVENTIONS:  - Identify patients at risk for skin breakdown  - Assess and monitor skin integrity  - Assess and monitor nutrition and hydration status  - Monitor labs   - Assess for incontinence   - Turn and reposition patient  - Assist with mobility/ambulation  - Relieve pressure over bony prominences  - Avoid friction and shearing  - Provide appropriate hygiene as needed including keeping skin clean and dry  - Evaluate need for skin moisturizer/barrier cream  - Collaborate with interdisciplinary team   - Patient/family teaching  - Consider wound care consult   Outcome: Progressing     Problem: Nutrition/Hydration-ADULT  Goal: Nutrient/Hydration intake appropriate for improving, restoring or maintaining nutritional needs  Description: Monitor and assess patient's nutrition/hydration status for malnutrition  Collaborate with interdisciplinary team and initiate plan and interventions as ordered  Monitor patient's weight and dietary intake as ordered or per policy  Utilize nutrition screening tool and intervene as necessary  Determine patient's food preferences and provide high-protein, high-caloric foods as appropriate       INTERVENTIONS:  - Monitor oral intake, urinary output, labs, and treatment plans  - Assess nutrition and hydration status and recommend course of action  - Evaluate amount of meals eaten  - Assist patient with eating if necessary   - Allow adequate time for meals  - Recommend/ encourage appropriate diets, oral nutritional supplements, and vitamin/mineral supplements  - Order, calculate, and assess calorie counts as needed  - Recommend, monitor, and adjust tube feedings and TPN/PPN based on assessed needs  - Assess need for intravenous fluids  - Provide specific nutrition/hydration education as appropriate  - Include patient/family/caregiver in decisions related to nutrition  Outcome: Progressing     Problem: CARDIOVASCULAR - ADULT  Goal: Maintains optimal cardiac output and hemodynamic stability  Description: INTERVENTIONS:  - Monitor I/O, vital signs and rhythm  - Monitor for S/S and trends of decreased cardiac output  - Administer and titrate ordered vasoactive medications to optimize hemodynamic stability  - Assess quality of pulses, skin color and temperature  - Assess for signs of decreased coronary artery perfusion  - Instruct patient to report change in severity of symptoms  Outcome: Progressing  Goal: Absence of cardiac dysrhythmias or at baseline rhythm  Description: INTERVENTIONS:  - Continuous cardiac monitoring, vital signs, obtain 12 lead EKG if ordered  - Administer antiarrhythmic and heart rate control medications as ordered  - Monitor electrolytes and administer replacement therapy as ordered  Outcome: Progressing     Problem: GASTROINTESTINAL - ADULT  Goal: Minimal or absence of nausea and/or vomiting  Description: INTERVENTIONS:  - Administer IV fluids if ordered to ensure adequate hydration  - Maintain NPO status until nausea and vomiting are resolved  - Nasogastric tube if ordered  - Administer ordered antiemetic medications as needed  - Provide nonpharmacologic comfort measures as appropriate  - Advance diet as tolerated, if ordered  - Consider nutrition services referral to assist patient with adequate nutrition and appropriate food choices  Outcome: Progressing     Problem: GENITOURINARY - ADULT  Goal: Urinary catheter remains patent  Description: INTERVENTIONS:  - Assess patency of urinary catheter  - If patient has a chronic mathis, consider changing catheter if non-functioning  - Follow guidelines for intermittent irrigation of non-functioning urinary catheter  Outcome: Progressing     Problem: METABOLIC, FLUID AND ELECTROLYTES - ADULT  Goal: Glucose maintained within target range  Description: INTERVENTIONS:  - Monitor Blood Glucose as ordered  - Assess for signs and symptoms of hyperglycemia and hypoglycemia  - Administer ordered medications to maintain glucose within target range  - Assess nutritional intake and initiate nutrition service referral as needed  Outcome: Progressing     Problem: PAIN - ADULT  Goal: Verbalizes/displays adequate comfort level or baseline comfort level  Description: Interventions:  - Encourage patient to monitor pain and request assistance  - Assess pain using appropriate pain scale  - Administer analgesics based on type and severity of pain and evaluate response  - Implement non-pharmacological measures as appropriate and evaluate response  - Consider cultural and social influences on pain and pain management  - Notify physician/advanced practitioner if interventions unsuccessful or patient reports new pain  Outcome: Progressing     Problem: DISCHARGE PLANNING  Goal: Discharge to home or other facility with appropriate resources  Description: INTERVENTIONS:  - Identify barriers to discharge w/patient and caregiver  - Arrange for needed discharge resources and transportation as appropriate  - Identify discharge learning needs (meds, wound care, etc )  - Arrange for interpretive services to assist at discharge as needed  - Refer to Case Management Department for coordinating discharge planning if the patient needs post-hospital services based on physician/advanced practitioner order or complex needs related to functional status, cognitive ability, or social support system  Outcome: Progressing

## 2021-04-03 NOTE — PROGRESS NOTES
[facility]  Progress Note - Ifrah Turner 1945, 70 y o  female MRN: [id number]  Unit/Bed#: -Forrest Encounter: [id number]  Primary Care Provider: Mara Cornejo MD   Date and time admitted to hospital: 4/1/2021  9:57 PM    * Acute kidney injury [facility])  Assessment & Plan  · Concern for obstructive uropathy with bilateral hydronephrosis  · - however pt with slow return to norm on ivfluids and urinary catheter insertion yesterday  · Lab work from "care everywhere" indicating cr of 1 10 (2/24/21)  · Status post CT scan abdomen pelvis without contrast "Bilateral hydronephrosis and proximal hydroureter   No CT evidence of renal colic   No obstructing lesions are found in the retroperitoneum  Margaret Robb bladder was not distended   Recommend follow-up renal ultrasound to assess for ureteral jets/urinary   obstruction  "   · - however noting "Large volume of formed stool in the cecum without obstruction"  · Renal ultrasound demonstrates:   Mild bilateral hydronephrosis similar to April 1, 2021 allowing for difference in imaging modalities  No perinephric collection  · Way catheter decompressing the bladder and limiting its evaluation     · Pt was npo overnight for possible procedure/ per urology not procedure necessary   · Way was placed with yellow urine continues to drain no obstruction   · Status was UA no sign of infection  · Continue to monitor input and output  · Avoid nephrotoxins/ NSAIDS and renally dose medication  · IV fluid hydration changed to half normal saline   · - rechk bmp in the am     Hypokalemia  Assessment & Plan  Sp iv fluids hydration and poor oral intake   · Pt reports not eating well for last two weeks  · Will administer 40meq po now and rechk labs in the am     Hyperthyroidism  Assessment & Plan  · Recent TSH checked, reviewed from "care everywhere" (2/24) in euthyroid state  · Continue methimazole 2 5 mg every other day    Constipation  Assessment & Plan  · Bowel regimen  · Ct scan with large amt of stool in cecum   · Colace and miralax will add dulcolax at pts request     Chest pain  Assessment & Plan  · X1 episode has had no recurrence  · Low suspicion for ACS serial troponin, are negative   · EKG from the ED sinus rhythm at 89 beats per minute no acute ST changes  · Continue telemetry monitoring    Cirrhosis of liver (HCC)  Assessment & Plan  · Incidental finding status post CT noting mild cirrhotic changes  Liver function tests within normal limits  · Continued evaluation as outpatient    Hypercalcemia  Assessment & Plan  · Corrected calcium of 10 3 (cutoff at 10 1)  · Patient chronically on calcium carbonate which will hold  · IV fluid hydration  · Check ionized calcium 1 14 , PTH 68 2    Controlled type 2 diabetes mellitus, without long-term current use of insulin [facility])  Assessment & Plan  Lab Results   Component Value Date    HGBA1C 6 1 (H) 04/03/2021    ordered for am fasting     Recent Labs     04/02/21  1212 04/02/21  1838 04/02/21  2055 04/03/21  0624   POCGLU 91 119 106 89       Blood Sugar Average: Last 72 hrs:  (P) 18 [id number] chronically on metformin  mg daily which will hold in the setting of acute kidney injury  · Placed on insulin sliding scale  · Pt had remained npo   Started diet as urology with no plan for urological intervention       History of breast cancer  Assessment & Plan  · Initially diagnosed in 2007 status post mastectomy/chemotherapy  · On letrozole, will continue      VTE Pharmacologic Prophylaxis:   Pharmacologic: Heparin  Mechanical VTE Prophylaxis in Place: Yes    Patient Centered Rounds: I have performed bedside rounds with nursing staff today  Discussions with Specialists or Other Care Team Provider: nursing / nephrology     Education and Discussions with Family / Patient: patient prefers to update her daughter herself     Time Spent for Care: 45 minutes    More than 50% of total time spent on counseling and coordination of care as described above  Current Length of Stay: 2 day(s)    Current Patient Status: Inpatient   Certification Statement: The patient will continue to require additional inpatient hospital stay due to worsening renal function     Discharge Plan: Pt is not stable for discharge not in next 72 hrs     Code Status: Level 3 - DNAR and DNI      Subjective:   Pt feels a little tired today never slept well  She states that she cannot drink purified water that is being delivered to her room   She has no n/v/d and has not yet had a bm  She could not eat breakfast as she got artificial sugar and she states this upsets her stomach  She has normal blood sugars and would like regular food in order to eat   We discussed getting some spring water at her request, so that she would drink water  Objective:     Vitals:   Temp (24hrs), Av 9 °F (37 7 °C), Min:99 4 °F (37 4 °C), Max:100 4 °F (38 °C)    Temp:  [99 4 °F (37 4 °C)-100 4 °F (38 °C)] 100 4 °F (38 °C)  HR:  [98] 98  Resp:  [16-18] 18  BP: (142-150)/(89-94) 143/89  SpO2:  [90 %] 90 %  Body mass index is 30 7 kg/m²  Input and Output Summary (last 24 hours): Intake/Output Summary (Last 24 hours) at 4/3/2021 1737  Last data filed at 4/3/2021 0900  Gross per 24 hour   Intake 1858 ml   Output 1200 ml   Net 658 ml       Physical Exam:     Physical Exam  Constitutional:       General: She is not in acute distress  Appearance: She is obese  She is not ill-appearing, toxic-appearing or diaphoretic  HENT:      Head: Normocephalic  Mouth/Throat:      Pharynx: Oropharynx is clear  Eyes:      General:         Right eye: No discharge  Left eye: No discharge  Conjunctiva/sclera: Conjunctivae normal    Cardiovascular:      Rate and Rhythm: Normal rate  Heart sounds: No murmur  No friction rub  No gallop  Abdominal:      General: There is no distension  Palpations: There is no mass  Tenderness:  There is no abdominal tenderness  There is no right CVA tenderness, left CVA tenderness, guarding or rebound  Hernia: No hernia is present  Musculoskeletal:         General: No swelling, tenderness, deformity or signs of injury  Right lower leg: No edema  Left lower leg: No edema  Skin:     Coloration: Skin is not jaundiced or pale  Findings: No bruising, erythema, lesion or rash  Neurological:      Mental Status: She is alert and oriented to person, place, and time  Psychiatric:         Behavior: Behavior normal            Additional Data:     Labs:    Results from last 7 days   Lab Units 04/02/21  0354 04/01/21  1551   WBC Thousand/uL 9 48 9 01   HEMOGLOBIN g/dL 14 2 15 6*   HEMATOCRIT % 44 7 46 9*   PLATELETS Thousands/uL 141* 152   NEUTROS PCT %  --  77*   LYMPHS PCT %  --  17   MONOS PCT %  --  5   EOS PCT %  --  1     Results from last 7 days   Lab Units 04/03/21  0449  04/02/21  0353   SODIUM mmol/L 146*   < > 138   POTASSIUM mmol/L 4 0   < > 3 0*   CHLORIDE mmol/L 119*   < > 106   CO2 mmol/L 21   < > 23   BUN mg/dL 32*   < > 31*   CREATININE mg/dL 4 03*   < > 3 76*   ANION GAP mmol/L 6   < > 9   CALCIUM mg/dL 8 8   < > 9 3   ALBUMIN g/dL  --   --  3 1*   TOTAL BILIRUBIN mg/dL  --   --  0 82   ALK PHOS U/L  --   --  83   ALT U/L  --   --  14   AST U/L  --   --  26   GLUCOSE RANDOM mg/dL 77   < > 91    < > = values in this interval not displayed  Results from last 7 days   Lab Units 04/03/21  1626 04/03/21  1036 04/03/21  0624 04/02/21  2055 04/02/21  1838 04/02/21  1212 04/02/21  0540 04/01/21  2343 04/01/21  2043   POC GLUCOSE mg/dl 111 126 89 106 119 91 95 93 119     Results from last 7 days   Lab Units 04/03/21  0449   HEMOGLOBIN A1C % 6 1*     Results from last 7 days   Lab Units 04/01/21  1551   LACTIC ACID mmol/L 2 0           * I Have Reviewed All Lab Data Listed Above  * Additional Pertinent Lab Tests Reviewed:  All Labs Within Last 24 Hours Reviewed    Imaging:    Imaging Reports Reviewed Today Include:reviewed     Recent Cultures (last 7 days):           Last 24 Hours Medication List:   Current Facility-Administered Medications   Medication Dose Route Frequency Provider Last Rate    acetaminophen  650 mg Oral Q6H PRN Micky Shark, DO      bisacodyl  5 mg Oral Once SIMON Moreno      bisacodyl  10 mg Rectal Once Micky Shark, DO      calcium carbonate  500 mg Oral Q8H PRN SIMON Moreno      docusate sodium  100 mg Oral BID Micky Shark, DO      heparin (porcine)  5,000 Units Subcutaneous Q8H [address] Micky Shark, DO      insulin lispro  1-5 Units Subcutaneous 4x Daily (AC & HS) Meghan Wang PA-C      letrozole  2 5 mg Oral Daily Micky Shark, DO      lidocaine  1 patch Topical Daily Meghan Wang PA-C      methimazole  2 5 mg Oral Every Other Day Micky Shark, DO      ondansetron  4 mg Intravenous Q6H PRN Micky Shark, DO      polyethylene glycol  17 g Oral Daily Micky Shark, DO      senna  1 tablet Oral Daily Micky Shark, DO      sodium chloride  75 mL/hr Intravenous Continuous Marianna Galvan MD 75 mL/hr (04/03/21 0632)        Today, Patient Was Seen By: SIMON Moreno    ** Please Note: Dictation voice to text software may have been used in the creation of this document   **

## 2021-04-03 NOTE — ASSESSMENT & PLAN NOTE
· Concern for obstructive uropathy with bilateral hydronephrosis  · - however pt with slow return to norm on ivfluids and urinary catheter insertion yesterday  · Lab work from "care everywhere" indicating cr of 1 10 (2/24/21)  · Status post CT scan abdomen pelvis without contrast "Bilateral hydronephrosis and proximal hydroureter   No CT evidence of renal colic   No obstructing lesions are found in the retroperitoneum   Urinary bladder was not distended   Recommend follow-up renal ultrasound to assess for ureteral jets/urinary   obstruction  "   · - however noting "Large volume of formed stool in the cecum without obstruction"  · Renal ultrasound demonstrates:   Mild bilateral hydronephrosis similar to April 1, 2021 allowing for difference in imaging modalities  No perinephric collection  · Way catheter decompressing the bladder and limiting its evaluation     · Pt was npo overnight for possible procedure/ per urology not procedure necessary   · Way was placed with yellow urine continues to drain no obstruction   · Status was UA no sign of infection  · Continue to monitor input and output  · Avoid nephrotoxins/ NSAIDS and renally dose medication  · IV fluid hydration changed to half normal saline   · - rechk bmp in the am

## 2021-04-03 NOTE — ASSESSMENT & PLAN NOTE
Sp iv fluids hydration and poor oral intake   · Pt reports not eating well for last two weeks  · Sp repletion   · Monitor labs

## 2021-04-03 NOTE — ASSESSMENT & PLAN NOTE
Pt noted to have mild hypernatremia   · Iv fluids transitioned per nephro  · Continue to trend bmp   · Pt is also not drinking water dt purified water bottles (pt now with spring water bottle in the room)   · Pt has had reduced appetite

## 2021-04-03 NOTE — ASSESSMENT & PLAN NOTE
Lab Results   Component Value Date    HGBA1C 6 1 (H) 04/03/2021    ordered for am fasting     Recent Labs     04/02/21  1212 04/02/21  1838 04/02/21 2055 04/03/21  0624   POCGLU 91 119 106 89       Blood Sugar Average: Last 72 hrs:  (P) 03 [id number] chronically on metformin  mg daily which will hold in the setting of acute kidney injury  · Placed on insulin sliding scale  · Pt was npo yesterday also noted to have poor appetite and low A1c  · She reports she does not like diet as she doesn't like anything artificial (sweetners) she will not eat or drink dt this     · Transitioned diet to regular for now will need to monitor blood sugars

## 2021-04-04 ENCOUNTER — APPOINTMENT (INPATIENT)
Dept: RADIOLOGY | Facility: HOSPITAL | Age: 76
DRG: 660 | End: 2021-04-04
Payer: MEDICARE

## 2021-04-04 LAB
ANION GAP SERPL CALCULATED.3IONS-SCNC: 7 MMOL/L (ref 4–13)
ATRIAL RATE: 89 BPM
BUN SERPL-MCNC: 31 MG/DL (ref 5–25)
CALCIUM SERPL-MCNC: 8.7 MG/DL (ref 8.3–10.1)
CHLORIDE SERPL-SCNC: 115 MMOL/L (ref 100–108)
CO2 SERPL-SCNC: 20 MMOL/L (ref 21–32)
CREAT SERPL-MCNC: 4.08 MG/DL (ref 0.6–1.3)
GFR SERPL CREATININE-BSD FRML MDRD: 10 ML/MIN/1.73SQ M
GLUCOSE SERPL-MCNC: 103 MG/DL (ref 65–140)
GLUCOSE SERPL-MCNC: 116 MG/DL (ref 65–140)
GLUCOSE SERPL-MCNC: 118 MG/DL (ref 65–140)
GLUCOSE SERPL-MCNC: 84 MG/DL (ref 65–140)
GLUCOSE SERPL-MCNC: 85 MG/DL (ref 65–140)
GLUCOSE SERPL-MCNC: 92 MG/DL (ref 65–140)
P AXIS: 84 DEGREES
POTASSIUM SERPL-SCNC: 3.9 MMOL/L (ref 3.5–5.3)
PR INTERVAL: 158 MS
QRS AXIS: -75 DEGREES
QRSD INTERVAL: 102 MS
QT INTERVAL: 558 MS
QTC INTERVAL: 678 MS
SODIUM SERPL-SCNC: 142 MMOL/L (ref 136–145)
T WAVE AXIS: -44 DEGREES
VENTRICULAR RATE: 89 BPM

## 2021-04-04 PROCEDURE — 93010 ELECTROCARDIOGRAM REPORT: CPT | Performed by: INTERNAL MEDICINE

## 2021-04-04 PROCEDURE — 82948 REAGENT STRIP/BLOOD GLUCOSE: CPT

## 2021-04-04 PROCEDURE — 99232 SBSQ HOSP IP/OBS MODERATE 35: CPT | Performed by: NURSE PRACTITIONER

## 2021-04-04 PROCEDURE — 76770 US EXAM ABDO BACK WALL COMP: CPT

## 2021-04-04 PROCEDURE — 80048 BASIC METABOLIC PNL TOTAL CA: CPT | Performed by: INTERNAL MEDICINE

## 2021-04-04 PROCEDURE — 99232 SBSQ HOSP IP/OBS MODERATE 35: CPT | Performed by: UROLOGY

## 2021-04-04 PROCEDURE — 99232 SBSQ HOSP IP/OBS MODERATE 35: CPT | Performed by: INTERNAL MEDICINE

## 2021-04-04 RX ADMIN — HEPARIN SODIUM 5000 UNITS: 5000 INJECTION INTRAVENOUS; SUBCUTANEOUS at 05:37

## 2021-04-04 RX ADMIN — SODIUM BICARBONATE 75 ML/HR: 84 INJECTION INTRAVENOUS at 22:37

## 2021-04-04 RX ADMIN — LETROZOLE 2.5 MG: 2.5 TABLET, FILM COATED ORAL at 10:35

## 2021-04-04 RX ADMIN — LIDOCAINE 1 PATCH: 50 PATCH TOPICAL at 08:57

## 2021-04-04 RX ADMIN — METHIMAZOLE 2.5 MG: 5 TABLET ORAL at 10:35

## 2021-04-04 RX ADMIN — SODIUM BICARBONATE 75 ML/HR: 84 INJECTION INTRAVENOUS at 10:30

## 2021-04-04 NOTE — ASSESSMENT & PLAN NOTE
· X1 episode has had no recurrence  · Low suspicion for ACS serial troponin, are negative   · EKG from the ED sinus rhythm at 89 beats per minute no acute ST changes  · Will discontinue telemetry no events

## 2021-04-04 NOTE — ASSESSMENT & PLAN NOTE
· Corrected calcium of 10 3 (cutoff at 10 1)  · Patient chronically on calcium carbonate/ tums she is however having severe burping and requests one tums I discussed with her that should be used lightly one if any a day   · IV fluid hydration changed today   · Check ionized calcium 1 14 , PTH 68 2  · Repeat cmp in am

## 2021-04-04 NOTE — PROGRESS NOTES
[facility]  Progress Note - Herb Revloc 1945, 70 y o  female MRN: [id number]  Unit/Bed#: -Forrest Encounter: [id number]  Primary Care Provider: Ned Campbell MD   Date and time admitted to hospital: 4/1/2021  9:57 PM    * Acute kidney injury [facility])  Assessment & Plan  · Concern for obstructive uropathy with bilateral hydronephrosis  · - however pt with slow return to norm on ivfluids and urinary catheter insertion yesterday  · Lab work from "care everywhere" indicating cr of 1 10 (2/24/21)  · Status post CT scan abdomen pelvis without contrast "Bilateral hydronephrosis and proximal hydroureter   No CT evidence of renal colic   No obstructing lesions are found in the retroperitoneum  Sherrin Ache bladder was not distended   Recommend follow-up renal ultrasound to assess for ureteral jets/urinary   obstruction  "   · - however noting "Large volume of formed stool in the cecum without obstruction"  · Renal ultrasound demonstrates:   Mild bilateral hydronephrosis similar to April 1, 2021 allowing for difference in imaging modalities  No perinephric collection  · Way catheter decompressing the bladder and limiting its evaluation     · Repeat ultrasound 4/4 demonstrates no change in hydro, plan for stent monday  · Way was placed on admission with yellow urine continues to drain no obstruction   · Status was UA no sign of infection  · Continue to monitor input and output  · Avoid nephrotoxins/ NSAIDS and renally dose medication  · IV fluids changed dt metabolic acidosis to bicarb at 75ml/hr   · Creatinine continues to climb slowly - with mild hydro on US     Hypokalemia  Assessment & Plan  Sp iv fluids hydration and poor oral intake   · Pt reports not eating well for last two weeks  · Sp repletion   · Monitor labs     Hyperthyroidism  Assessment & Plan  · Recent TSH checked, reviewed from "care everywhere" (2/24) in euthyroid state  · Continue methimazole 2 5 mg every other day    Hypernatremia  Assessment & Plan  Pt noted to have mild hypernatremia   · Iv fluids transitioned per nephro  · Continue to trend bmp   · Pt is also not drinking water dt purified water bottles (pt now with spring water bottle in the room)   · Pt has had reduced appetite   · Encouraged to drink at this time     Constipation  Assessment & Plan  · Bowel regimen  · Ct scan with large amt of stool in cecum   · Colace and miralax will add dulcolax at pts request     Chest pain  Assessment & Plan  · X1 episode has had no recurrence  · Low suspicion for ACS serial troponin, are negative   · EKG from the ED sinus rhythm at 89 beats per minute no acute ST changes  · Will discontinue telemetry no events     Cirrhosis of liver ([address] )  Assessment & Plan  · Incidental finding status post CT noting mild cirrhotic changes  Liver function tests within normal limits  · Continued evaluation as outpatient    Hypercalcemia  Assessment & Plan  · Corrected calcium of 10 3 (cutoff at 10 1)  · Patient chronically on calcium carbonate/ tums she is however having severe burping and requests one tums I discussed with her that should be used lightly one if any a day   · IV fluid hydration changed today   · Check ionized calcium 1 14 , PTH 68 2  · Repeat cmp in am     History of breast cancer  Assessment & Plan  · Initially diagnosed in 2007 status post mastectomy/chemotherapy  · On letrozole, will continue      VTE Pharmacologic Prophylaxis:   Pharmacologic: Heparin  Mechanical VTE Prophylaxis in Place: Yes    Patient Centered Rounds: I have performed bedside rounds with nursing staff today  Discussions with Specialists or Other Care Team Provider: nursing/ urology     Education and Discussions with Family / Patient: patient patient reports she will keep family up to date     Time Spent for Care: 45 minutes  More than 50% of total time spent on counseling and coordination of care as described above      Current Length of Stay: 3 day(s)    Current Patient Status: Inpatient   Certification Statement: The patient will continue to require additional inpatient hospital stay due to pending possible need for urological procedure currently npo     Discharge Plan: Discharge home when medically stable not in next 72 hrs    Code Status: Level 3 - DNAR and DNI      Subjective:   Upon arrival to room patient is sitting on edge of bed rather upset as she reports that she is not eating breakfast   Discussion had in regards to patient being NPO for test however at same time texted by Urology who reports patient was eating overnight  Patient states that she did not realize she was not supposed to eat  However she has spoken to urology at this time and plan will be for procedure likely tomorrow  Patient will be NPO after midnight  Patient denies any pain no shortness of breath no dizziness or lightheadedness at this time  She has Way catheter continues to produce yellow urine  She is very particular about food she is eating and drinking  Objective:     Vitals:   Temp (24hrs), Av 5 °F (37 5 °C), Min:98 8 °F (37 1 °C), Max:100 4 °F (38 °C)    Temp:  [98 8 °F (37 1 °C)-100 4 °F (38 °C)] 99 2 °F (37 3 °C)  Resp:  [17-18] 18  BP: (125-143)/(64-89) 133/88  Body mass index is 30 7 kg/m²  Input and Output Summary (last 24 hours): Intake/Output Summary (Last 24 hours) at 2021 1449  Last data filed at 2021 1100  Gross per 24 hour   Intake 1752 ml   Output 1450 ml   Net 302 ml       Physical Exam:     Physical Exam  Constitutional:       General: She is not in acute distress  Appearance: She is not ill-appearing, toxic-appearing or diaphoretic  HENT:      Head: Normocephalic  Mouth/Throat:      Pharynx: Oropharynx is clear  Eyes:      Conjunctiva/sclera: Conjunctivae normal    Cardiovascular:      Rate and Rhythm: Normal rate  Heart sounds: No murmur  No friction rub  No gallop      Pulmonary:      Effort: No respiratory distress  Breath sounds: No stridor  No wheezing, rhonchi or rales  Chest:      Chest wall: No tenderness  Abdominal:      General: There is no distension  Palpations: There is no mass  Tenderness: There is no abdominal tenderness  There is no right CVA tenderness, left CVA tenderness, guarding or rebound  Hernia: No hernia is present  Skin:     Coloration: Skin is not jaundiced or pale  Findings: No bruising, erythema, lesion or rash  Neurological:      Mental Status: She is alert and oriented to person, place, and time  Psychiatric:         Behavior: Behavior normal            Additional Data:     Labs:    Results from last 7 days   Lab Units 04/02/21  0354 04/01/21  1551   WBC Thousand/uL 9 48 9 01   HEMOGLOBIN g/dL 14 2 15 6*   HEMATOCRIT % 44 7 46 9*   PLATELETS Thousands/uL 141* 152   NEUTROS PCT %  --  77*   LYMPHS PCT %  --  17   MONOS PCT %  --  5   EOS PCT %  --  1     Results from last 7 days   Lab Units 04/04/21  0537  04/02/21  0353   SODIUM mmol/L 142   < > 138   POTASSIUM mmol/L 3 9   < > 3 0*   CHLORIDE mmol/L 115*   < > 106   CO2 mmol/L 20*   < > 23   BUN mg/dL 31*   < > 31*   CREATININE mg/dL 4 08*   < > 3 76*   ANION GAP mmol/L 7   < > 9   CALCIUM mg/dL 8 7   < > 9 3   ALBUMIN g/dL  --   --  3 1*   TOTAL BILIRUBIN mg/dL  --   --  0 82   ALK PHOS U/L  --   --  83   ALT U/L  --   --  14   AST U/L  --   --  26   GLUCOSE RANDOM mg/dL 85   < > 91    < > = values in this interval not displayed           Results from last 7 days   Lab Units 04/04/21  1042 04/04/21  0637 04/03/21  2111 04/03/21  1626 04/03/21  1036 04/03/21  0624 04/02/21  2055 04/02/21  1838 04/02/21  1212 04/02/21  0540 04/01/21  2343 04/01/21  2043   POC GLUCOSE mg/dl 84 118 92 111 126 89 106 119 91 95 93 119     Results from last 7 days   Lab Units 04/03/21  0449   HEMOGLOBIN A1C % 6 1*     Results from last 7 days   Lab Units 04/01/21  1551   LACTIC ACID mmol/L 2 0           * I Have Reviewed All Lab Data Listed Above  * Additional Pertinent Lab Tests Reviewed: All Labs Within Last 24 Hours Reviewed    Imaging:    Imaging Reports Reviewed Today Include: reviewed   Recent Cultures (last 7 days):           Last 24 Hours Medication List:   Current Facility-Administered Medications   Medication Dose Route Frequency Provider Last Rate    acetaminophen  650 mg Oral Q6H PRN Ana Cristina Cocker, DO      bisacodyl  5 mg Oral Once SIMON Castle      bisacodyl  10 mg Rectal Once Ana Cristina Mendeler, DO      calcium carbonate  500 mg Oral Q8H PRN SIMON Castle      docusate sodium  100 mg Oral BID Ana Cristina Cocker, DO      heparin (porcine)  5,000 Units Subcutaneous Q8H [address] Ana Cristina Cocker, DO      insulin lispro  1-5 Units Subcutaneous 4x Daily (AC & HS) Momo De La Rosa PA-C      letrozole  2 5 mg Oral Daily Twanna Cocker, DO      lidocaine  1 patch Topical Daily Momo De La Rosa PA-C      methimazole  2 5 mg Oral Every Other Day Ana Cristina Cocker, DO      ondansetron  4 mg Intravenous Q6H PRN Twanna Cocker, DO      polyethylene glycol  17 g Oral Daily Twanna Cocker, DO      senna  1 tablet Oral Daily Ana Cristina Cocker, DO      sodium bicarbonate infusion  75 mL/hr Intravenous Continuous Katie Chávez MD 75 mL/hr (04/04/21 1030)        Today, Patient Was Seen By: SIMON Castle    ** Please Note: Dictation voice to text software may have been used in the creation of this document   **

## 2021-04-04 NOTE — QUICK NOTE
Ultrasound returns  Shows persistent hydronephrosis  Discussed with patient proceeding operating room tomorrow for bilateral stents  Discussed with primary medical service the patient is to be strict NPO after midnight  4/4/21  RENAL ULTRASOUND   INDICATION: Hydronephrosis  COMPARISON: CT abdomen and pelvis 4/1/2021  Retroperitoneal ultrasound 4/2/2021  TECHNIQUE: Ultrasound of the retroperitoneum was performed with a curvilinear transducer utilizing volumetric sweeps and still imaging techniques  KIDNEYS:   RIGHT kidney:   Normal size  Right renal size: 10 4 x 5 1 x 3 6 cm   Normal echogenicity  No suspicious contour-deforming mass detected  Similar mild right hydronephrosis  No shadowing calculi  No perinephric fluid collection  LEFT kidney:   Normal size  Left renal size: 12 4 x 4 7 x 4 5 cm   Normal echogenicity  No suspicious contour-deforming mass detected  Similar mild left hydronephrosis  No shadowing calculi  No perinephric fluid collection  URETERS:   Not well visualized  BLADDER:   Decompressed by a Way catheter, precluding evaluation  Incidentally again noted nodular hepatic contour suggesting cirrhotic change, as well as ascites  IMPRESSION:   1  Mild bilateral hydronephrosis similar to prior study  2  Bladder is decompressed by a Way catheter, precluding evaluation  3  Incidentally again noted nodular hepatic contour suggesting cirrhotic change, as well as ascites

## 2021-04-04 NOTE — PLAN OF CARE
Problem: Potential for Falls  Goal: Patient will remain free of falls  Description: INTERVENTIONS:  - Assess patient frequently for physical needs  -  Identify cognitive and physical deficits and behaviors that affect risk of falls  -  [place] fall precautions as indicated by assessment   - Educate patient/family on patient safety including physical limitations  - Instruct patient to call for assistance with activity based on assessment  - Modify environment to reduce risk of injury  - Consider OT/PT consult to assist with strengthening/mobility  Outcome: Progressing     Problem: Prexisting or High Potential for Compromised Skin Integrity  Goal: Skin integrity is maintained or improved  Description: INTERVENTIONS:  - Identify patients at risk for skin breakdown  - Assess and monitor skin integrity  - Assess and monitor nutrition and hydration status  - Monitor labs   - Assess for incontinence   - Turn and reposition patient  - Assist with mobility/ambulation  - Relieve pressure over bony prominences  - Avoid friction and shearing  - Provide appropriate hygiene as needed including keeping skin clean and dry  - Evaluate need for skin moisturizer/barrier cream  - Collaborate with interdisciplinary team   - Patient/family teaching  - Consider wound care consult   Outcome: Progressing     Problem: Nutrition/Hydration-ADULT  Goal: Nutrient/Hydration intake appropriate for improving, restoring or maintaining nutritional needs  Description: Monitor and assess patient's nutrition/hydration status for malnutrition  Collaborate with interdisciplinary team and initiate plan and interventions as ordered  Monitor patient's weight and dietary intake as ordered or per policy  Utilize nutrition screening tool and intervene as necessary  Determine patient's food preferences and provide high-protein, high-caloric foods as appropriate       INTERVENTIONS:  - Monitor oral intake, urinary output, labs, and treatment plans  - Assess nutrition and hydration status and recommend course of action  - Evaluate amount of meals eaten  - Assist patient with eating if necessary   - Allow adequate time for meals  - Recommend/ encourage appropriate diets, oral nutritional supplements, and vitamin/mineral supplements  - Order, calculate, and assess calorie counts as needed  - Recommend, monitor, and adjust tube feedings and TPN/PPN based on assessed needs  - Assess need for intravenous fluids  - Provide specific nutrition/hydration education as appropriate  - Include patient/family/caregiver in decisions related to nutrition  Outcome: Progressing     Problem: CARDIOVASCULAR - ADULT  Goal: Maintains optimal cardiac output and hemodynamic stability  Description: INTERVENTIONS:  - Monitor I/O, vital signs and rhythm  - Monitor for S/S and trends of decreased cardiac output  - Administer and titrate ordered vasoactive medications to optimize hemodynamic stability  - Assess quality of pulses, skin color and temperature  - Assess for signs of decreased coronary artery perfusion  - Instruct patient to report change in severity of symptoms  Outcome: Progressing  Goal: Absence of cardiac dysrhythmias or at baseline rhythm  Description: INTERVENTIONS:  - Continuous cardiac monitoring, vital signs, obtain 12 lead EKG if ordered  - Administer antiarrhythmic and heart rate control medications as ordered  - Monitor electrolytes and administer replacement therapy as ordered  Outcome: Progressing     Problem: GASTROINTESTINAL - ADULT  Goal: Minimal or absence of nausea and/or vomiting  Description: INTERVENTIONS:  - Administer IV fluids if ordered to ensure adequate hydration  - Maintain NPO status until nausea and vomiting are resolved  - Nasogastric tube if ordered  - Administer ordered antiemetic medications as needed  - Provide nonpharmacologic comfort measures as appropriate  - Advance diet as tolerated, if ordered  - Consider nutrition services referral to assist patient with adequate nutrition and appropriate food choices  Outcome: Progressing     Problem: GENITOURINARY - ADULT  Goal: Urinary catheter remains patent  Description: INTERVENTIONS:  - Assess patency of urinary catheter  - If patient has a chronic mathis, consider changing catheter if non-functioning  - Follow guidelines for intermittent irrigation of non-functioning urinary catheter  Outcome: Progressing     Problem: METABOLIC, FLUID AND ELECTROLYTES - ADULT  Goal: Glucose maintained within target range  Description: INTERVENTIONS:  - Monitor Blood Glucose as ordered  - Assess for signs and symptoms of hyperglycemia and hypoglycemia  - Administer ordered medications to maintain glucose within target range  - Assess nutritional intake and initiate nutrition service referral as needed  Outcome: Progressing     Problem: PAIN - ADULT  Goal: Verbalizes/displays adequate comfort level or baseline comfort level  Description: Interventions:  - Encourage patient to monitor pain and request assistance  - Assess pain using appropriate pain scale  - Administer analgesics based on type and severity of pain and evaluate response  - Implement non-pharmacological measures as appropriate and evaluate response  - Consider cultural and social influences on pain and pain management  - Notify physician/advanced practitioner if interventions unsuccessful or patient reports new pain  Outcome: Progressing     Problem: DISCHARGE PLANNING  Goal: Discharge to home or other facility with appropriate resources  Description: INTERVENTIONS:  - Identify barriers to discharge w/patient and caregiver  - Arrange for needed discharge resources and transportation as appropriate  - Identify discharge learning needs (meds, wound care, etc )  - Arrange for interpretive services to assist at discharge as needed  - Refer to Case Management Department for coordinating discharge planning if the patient needs post-hospital services based on physician/advanced practitioner order or complex needs related to functional status, cognitive ability, or social support system  Outcome: Progressing

## 2021-04-04 NOTE — ASSESSMENT & PLAN NOTE
Pt noted to have mild hypernatremia   · Iv fluids transitioned per nephro  · Continue to trend bmp   · Pt is also not drinking water dt purified water bottles (pt now with spring water bottle in the room)   · Pt has had reduced appetite   · Encouraged to drink at this time

## 2021-04-04 NOTE — PROGRESS NOTES
UROLOGY PROGRESS NOTE   Patient Identifiers: Yessy Jeronimo (MRN [id number])  Date of Service: 4/4/2021        Assessment:   77-year-old female with acute kidney injury and bilateral hydronephrosis    Plan:     I discussed the patient's case with Nephrology service yesterday  She had a CT scan on the 1st and a follow-up ultrasound on the 2nd which demonstrated persistent hydronephrosis  Her kidney function has not resolved although she is now making better urine output through the Way catheter  I had made the patient NPO after midnight in the event that her kidney function did not improved today that we could consider surgical stent placement  Unfortunately, the patient has not been NPO after midnight has been eating and drinking throughout the evening, crackers and juice at the bedside  She is extremely concerned about her diabetes and blood sugar control  After lengthy discussion, the patient I have agreed that she will not proceed to the operating room today especially given her p o  Status  We have agreed to reassess and reimage her with a repeat ultrasound today  If the hydronephrosis persists, she is agreeable to be strict NPO after midnight and she understands that the nursing staff will monitor her blood sugars and administer intravenous dextrose if needed  We can proceed to the operating room tomorrow for cystoscopy bilateral retrograde pyelogram and bilateral stents if the hydronephrosis persists  Patient is agreeable to this plan after discussion  Subjective:     24 HR EVENTS:   no significant events  Patient has significant concerns about her p o  Status  Was concerned that she was held NPO after midnight and has been eating crackers and juice despite this        Objective:     VITALS:    Vitals:    04/04/21 0723   BP: 133/88   Pulse:    Resp: 18   Temp: 99 2 °F (37 3 °C)   SpO2:        INS & OUTS:  1450cc/24 hours    LABS:  Lab Results   Component Value Date    HGB 14 2 04/02/2021    HCT 44 7 04/02/2021    WBC 9 48 04/02/2021     (L) 04/02/2021   ]    Lab Results   Component Value Date    K 3 9 04/04/2021     (H) 04/04/2021    CO2 20 (L) 04/04/2021    BUN 31 (H) 04/04/2021    CREATININE 4 08 (H) 04/04/2021    CALCIUM 8 7 04/04/2021   ]    INPATIENT MEDS:    Current Facility-Administered Medications:     acetaminophen (TYLENOL) tablet 650 mg, 650 mg, Oral, Q6H PRN, Marshall Aaron DO, 650 mg at 04/03/21 2124    bisacodyl (DULCOLAX) EC tablet 5 mg, 5 mg, Oral, Once, SIMON Tapia, Stopped at 04/02/21 1838    bisacodyl (DULCOLAX) rectal suppository 10 mg, 10 mg, Rectal, Once, Marshall Aaron DO    calcium carbonate (TUMS) chewable tablet 500 mg, 500 mg, Oral, Q8H PRN, SIMON Tapia    docusate sodium (COLACE) capsule 100 mg, 100 mg, Oral, BID, Marshall Aaron DO, 100 mg at 04/03/21 0816    heparin (porcine) subcutaneous injection 5,000 Units, 5,000 Units, Subcutaneous, Q8H [address], Marshall Aaron DO, 5,000 Units at 04/04/21 0537    insulin lispro (HumaLOG) 100 units/mL subcutaneous injection 1-5 Units, 1-5 Units, Subcutaneous, 4x Daily (AC & HS) **AND** Fingerstick Glucose (POCT), , , 4x Daily AC and at bedtime, Yocasta Grayson PA-C    letrozole [facility]) tablet 2 5 mg, 2 5 mg, Oral, Daily, Marshall Aaron DO, 2 5 mg at 04/03/21 0815    lidocaine (LIDODERM) 5 % patch 1 patch, 1 patch, Topical, Daily, Yocasta Grayson PA-C, 1 patch at 04/04/21 0857    methimazole (TAPAZOLE) tablet 2 5 mg, 2 5 mg, Oral, Every Other Day, Marshall Aaron DO, 2 5 mg at 04/02/21 7177    ondansetron (ZOFRAN) injection 4 mg, 4 mg, Intravenous, Q6H PRN, Marshall Aaron DO, 4 mg at 04/02/21 0534    polyethylene glycol (MIRALAX) packet 17 g, 17 g, Oral, Daily, Marshall Aaron DO, 17 g at 04/03/21 0815    senna (SENOKOT) tablet 8 6 mg, 1 tablet, Oral, Daily, Marshall Aaron DO, 8 6 mg at 04/03/21 0816    sodium bicarbonate 75 mEq in dextrose 5 % 1,000 mL infusion, 75 mL/hr, Intravenous, Continuous, Sonu Velázquez MD      Physical Exam:   /88   Pulse 98   Temp 99 2 °F (37 3 °C)   Resp 18   Wt 71 3 kg (157 lb 3 oz)   SpO2 90%   BMI 30 70 kg/m²   GEN: No acute distress, patient is irritated, mucous membranes are dry  RESP: breathing comfortably with no accessory muscle use  CV: no significant peripheral edema  ABD: soft and appropriately tender to palpation, midline back pain, no significant CVA tenderness bilaterally  MATHIS: draining pink clear urine    RADIOLOGY:   4/2/21    RENAL ULTRASOUND     INDICATION:   ZOYA, concern for obstructive uropathy      COMPARISON: CT abdomen and pelvis 4/1/2021     TECHNIQUE:   Ultrasound of the retroperitoneum was performed with a curvilinear transducer utilizing volumetric sweeps and still imaging techniques       FINDINGS:     KIDNEYS:  Symmetric and normal size  Right kidney:  10 1 x 4 1 cm  Left kidney:  11 7 x 4 2 cm      Right kidney  Normal echogenicity and contour  No suspicious masses detected  Mild hydronephrosis  Small extra renal pelvis  No shadowing calculi  No perinephric fluid collections      Left kidney  Normal echogenicity and contour  No suspicious masses detected  Mild hydronephrosis  Small extra renal pelvis  No shadowing calculi  No perinephric fluid collections      URETERS:  Nonvisualized      BLADDER:   A mathis catheter is in place, decompressing the bladder and limiting its evaluation         IMPRESSION:     Mild bilateral hydronephrosis similar to April 1, 2021 allowing for difference in imaging modalities  No perinephric collection      Mathis catheter decompressing the bladder and limiting its evaluation

## 2021-04-04 NOTE — PROGRESS NOTES
NEPHROLOGY PROGRESS NOTE   Gery Councilman 70 y o  female MRN: [id number]  Unit/Bed#: -01 Encounter: [id number]  Reason for Consult: ZOYA    ASSESSMENT AND PLAN:  Patient is 79-year-old female with multiple medical issues of breast cancer in 2017, status post chemo, history of diverticulitis perforated, status post colostomy eventually reversal, diabetes for five years, presented with abdominal pain  She was found to have bilateral hydronephrosis  We are consulted for ZOYA management      ZOYA POA, baseline serum creatinine 0 7 to 0 8 in 2020, isolated value 1 1 in February 2021  -creatinine 4 2 on admission improved to 3 7 and now seems to have plateaued at 4 0 since yesterday  -ZOYA likely secondary to obstructive nephropathy with bilateral hydronephrosis with hydroureter, component of prerenal given poor p o  Intake  -renal ultrasound shows bilateral hydronephrosis similar to prior  -currently has Way catheter placed, urine output decent  -IV fluid as below  -UA this admission bland without hematuria or proteinuria  -avoid nephrotoxins or NSAIDs     Hypernatremia, serum sodium improving 142  -encourage free water intake  -change IV fluid as below    Low bicarb suspect in the setting of metabolic acidosis with significant renal failure   -change IV fluid to 75 mEq sodium bicarb at 75 mL/hour      Blood pressure overall acceptable and well controlled      Bilateral hydronephrosis with proximal hydroureter, no obvious evidence of obstructing lesion or calculi  -currently has Way catheter placed   -discussed with Urology yesterday,? Potential plan stent urological intervention given creatinine not improving  Urology to follow      Discussed above plan in detail with primary team    SUBJECTIVE:  Patient seen and examined at bedside   No chest pain, shortness of breath, nausea, vomiting, abdominal pain    OBJECTIVE:  Current Weight: Weight - Scale: 71 3 kg (157 lb 3 oz)  Vitals:    04/04/21 0723   BP: 133/88   Pulse:    Resp: 18   Temp: 99 2 °F (37 3 °C)   SpO2:        Intake/Output Summary (Last 24 hours) at 4/4/2021 0813  Last data filed at 4/4/2021 0554  Gross per 24 hour   Intake 1830 ml   Output 1450 ml   Net 380 ml     Wt Readings from Last 3 Encounters:   04/01/21 71 3 kg (157 lb 3 oz)   04/01/21 73 3 kg (161 lb 9 6 oz)     Temp Readings from Last 3 Encounters:   04/04/21 99 2 °F (37 3 °C)   04/01/21 (!) 97 4 °F (36 3 °C) (Temporal)     BP Readings from Last 3 Encounters:   04/04/21 133/88   04/01/21 124/61     Pulse Readings from Last 3 Encounters:   04/03/21 98   04/01/21 (!) 134        Physical Examination:  General:  Lying in bed, no acute distress   Eyes:  Mild conjunctival pallor present  ENT:  External examination of ears and nose unremarkable  Neck:  No obvious lymphadenopathy appreciated  Respiratory:  Bilateral air entry present  CVS:  S1, S2 present  GI:  Soft, nondistended  CNS:  Active alert oriented x3  Skin:  No new rash in legs  Musculoskeletal:  No obvious gross deformity noted    Medications:    Current Facility-Administered Medications:     acetaminophen (TYLENOL) tablet 650 mg, 650 mg, Oral, Q6H PRN, Virginie Appl, DO, 650 mg at 04/03/21 2124    bisacodyl (DULCOLAX) EC tablet 5 mg, 5 mg, Oral, Once, ISMON Jain, Stopped at 04/02/21 1838    bisacodyl (DULCOLAX) rectal suppository 10 mg, 10 mg, Rectal, Once, Virginie Appl, DO    calcium carbonate (TUMS) chewable tablet 500 mg, 500 mg, Oral, Q8H PRN, Marisela Kresge Eye InstituteSIMON    docusate sodium (COLACE) capsule 100 mg, 100 mg, Oral, BID, Virginie Appl, DO, 100 mg at 04/03/21 0816    heparin (porcine) subcutaneous injection 5,000 Units, 5,000 Units, Subcutaneous, Q8H [address], Emory Appl, DO, 5,000 Units at 04/04/21 0537    insulin lispro (HumaLOG) 100 units/mL subcutaneous injection 1-5 Units, 1-5 Units, Subcutaneous, 4x Daily (AC & HS) **AND** Fingerstick Glucose (POCT), , , 4x Daily AC and at bedtime, Jose Avalos Maria Galaviz PA-C    letrozole [facility]) tablet 2 5 mg, 2 5 mg, Oral, Daily, Ольга Gan DO, 2 5 mg at 04/03/21 0815    lidocaine (LIDODERM) 5 % patch 1 patch, 1 patch, Topical, Daily, Shaheed Pete PA-C, 1 patch at 04/03/21 0815    methimazole (TAPAZOLE) tablet 2 5 mg, 2 5 mg, Oral, Every Other Day, Ольга Gan DO, 2 5 mg at 04/02/21 9106    ondansetron [facility]) injection 4 mg, 4 mg, Intravenous, Q6H PRN, Ольга Gan DO, 4 mg at 04/02/21 0534    polyethylene glycol (MIRALAX) packet 17 g, 17 g, Oral, Daily, Ольга Gan DO, 17 g at 04/03/21 0815    senna (SENOKOT) tablet 8 6 mg, 1 tablet, Oral, Daily, Ольга Gan DO, 8 6 mg at 04/03/21 0816    sodium chloride infusion 0 45 %, 75 mL/hr, Intravenous, Continuous, Jordana Addison MD, Last Rate: 75 mL/hr at 04/03/21 2124, 75 mL/hr at 04/03/21 2124    Laboratory Results:  Results from last 7 days   Lab Units 04/04/21  0537 04/03/21  0449 04/02/21  1406 04/02/21  0354 04/02/21  0353 04/01/21  1551   WBC Thousand/uL  --   --   --  9 48  --  9 01   HEMOGLOBIN g/dL  --   --   --  14 2  --  15 6*   HEMATOCRIT %  --   --   --  44 7  --  46 9*   PLATELETS Thousands/uL  --   --   --  141*  --  152   SODIUM mmol/L 142 146* 142  --  138 139   POTASSIUM mmol/L 3 9 4 0 3 8  --  3 0* 3 3*   CHLORIDE mmol/L 115* 119* 113*  --  106 101   CO2 mmol/L 20* 21 21  --  23 24   BUN mg/dL 31* 32* 33*  --  31* 35*   CREATININE mg/dL 4 08* 4 03* 3 90*  --  3 76* 4 26*   CALCIUM mg/dL 8 7 8 8 9 0  --  9 3 9 7   MAGNESIUM mg/dL  --   --   --   --  2 2  --        US kidney and bladder   Final Result by Christopher Schreiber MD (04/03 0827)      Mild bilateral hydronephrosis similar to April 1, 2021 allowing for difference in imaging modalities  No perinephric collection  Way catheter decompressing the bladder and limiting its evaluation        Workstation performed: [id number]         US kidney and bladder    (Results Pending)       Portions of the record may have been created with voice recognition software  Occasional wrong word or "sound a like" substitutions may have occurred due to the inherent limitations of voice recognition software  Read the chart carefully and recognize, using context, where substitutions have occurred

## 2021-04-04 NOTE — PLAN OF CARE
Problem: Potential for Falls  Goal: Patient will remain free of falls  Description: INTERVENTIONS:  - Assess patient frequently for physical needs  -  Identify cognitive and physical deficits and behaviors that affect risk of falls    -  [place] fall precautions as indicated by assessment   - Educate patient/family on patient safety including physical limitations  - Instruct patient to call for assistance with activity based on assessment  - Modify environment to reduce risk of injury  - Consider OT/PT consult to assist with strengthening/mobility  Outcome: Progressing

## 2021-04-04 NOTE — ASSESSMENT & PLAN NOTE
· Concern for obstructive uropathy with bilateral hydronephrosis  · - however pt with slow return to norm on ivfluids and urinary catheter insertion yesterday  · Lab work from "care everywhere" indicating cr of 1 10 (2/24/21)  · Status post CT scan abdomen pelvis without contrast "Bilateral hydronephrosis and proximal hydroureter   No CT evidence of renal colic   No obstructing lesions are found in the retroperitoneum   Urinary bladder was not distended   Recommend follow-up renal ultrasound to assess for ureteral jets/urinary   obstruction  "   · - however noting "Large volume of formed stool in the cecum without obstruction"  · Renal ultrasound demonstrates:   Mild bilateral hydronephrosis similar to April 1, 2021 allowing for difference in imaging modalities  No perinephric collection  · Way catheter decompressing the bladder and limiting its evaluation     · Repeat ultrasound 4/4 demonstrates no change in hydro, plan for stent monday  · Way was placed on admission with yellow urine continues to drain no obstruction   · Status was UA no sign of infection  · Continue to monitor input and output  · Avoid nephrotoxins/ NSAIDS and renally dose medication  · IV fluids changed dt metabolic acidosis to bicarb at 75ml/hr   · Creatinine continues to climb slowly - with mild hydro on US

## 2021-04-05 ENCOUNTER — ANESTHESIA EVENT (INPATIENT)
Dept: PERIOP | Facility: HOSPITAL | Age: 76
DRG: 660 | End: 2021-04-05
Payer: MEDICARE

## 2021-04-05 ENCOUNTER — ANESTHESIA (INPATIENT)
Dept: PERIOP | Facility: HOSPITAL | Age: 76
DRG: 660 | End: 2021-04-05
Payer: MEDICARE

## 2021-04-05 ENCOUNTER — APPOINTMENT (INPATIENT)
Dept: RADIOLOGY | Facility: HOSPITAL | Age: 76
DRG: 660 | End: 2021-04-05
Payer: MEDICARE

## 2021-04-05 PROBLEM — E83.52 HYPERCALCEMIA: Status: RESOLVED | Noted: 2021-04-01 | Resolved: 2021-04-05

## 2021-04-05 PROBLEM — N13.30 HYDRONEPHROSIS: Status: ACTIVE | Noted: 2021-04-05

## 2021-04-05 PROBLEM — E87.6 HYPOKALEMIA: Status: RESOLVED | Noted: 2021-04-02 | Resolved: 2021-04-05

## 2021-04-05 PROBLEM — E87.2 ACIDOSIS: Status: ACTIVE | Noted: 2021-04-05

## 2021-04-05 PROBLEM — E87.0 HYPERNATREMIA: Status: RESOLVED | Noted: 2021-04-03 | Resolved: 2021-04-05

## 2021-04-05 LAB
ALBUMIN SERPL BCP-MCNC: 2.7 G/DL (ref 3.5–5)
ALP SERPL-CCNC: 95 U/L (ref 46–116)
ALT SERPL W P-5'-P-CCNC: 16 U/L (ref 12–78)
ANION GAP SERPL CALCULATED.3IONS-SCNC: 8 MMOL/L (ref 4–13)
AST SERPL W P-5'-P-CCNC: 23 U/L (ref 5–45)
BASOPHILS # BLD AUTO: 0.05 THOUSANDS/ΜL (ref 0–0.1)
BASOPHILS NFR BLD AUTO: 1 % (ref 0–1)
BILIRUB SERPL-MCNC: 0.53 MG/DL (ref 0.2–1)
BUN SERPL-MCNC: 30 MG/DL (ref 5–25)
CALCIUM ALBUM COR SERPL-MCNC: 9.6 MG/DL (ref 8.3–10.1)
CALCIUM SERPL-MCNC: 8.6 MG/DL (ref 8.3–10.1)
CHLORIDE SERPL-SCNC: 112 MMOL/L (ref 100–108)
CO2 SERPL-SCNC: 20 MMOL/L (ref 21–32)
CREAT SERPL-MCNC: 4.3 MG/DL (ref 0.6–1.3)
EOSINOPHIL # BLD AUTO: 0.17 THOUSAND/ΜL (ref 0–0.61)
EOSINOPHIL NFR BLD AUTO: 3 % (ref 0–6)
ERYTHROCYTE [DISTWIDTH] IN BLOOD BY AUTOMATED COUNT: 13.8 % (ref 11.6–15.1)
GFR SERPL CREATININE-BSD FRML MDRD: 10 ML/MIN/1.73SQ M
GLUCOSE SERPL-MCNC: 122 MG/DL (ref 65–140)
GLUCOSE SERPL-MCNC: 85 MG/DL (ref 65–140)
GLUCOSE SERPL-MCNC: 94 MG/DL (ref 65–140)
GLUCOSE SERPL-MCNC: 95 MG/DL (ref 65–140)
GLUCOSE SERPL-MCNC: 95 MG/DL (ref 65–140)
HCT VFR BLD AUTO: 39 % (ref 34.8–46.1)
HGB BLD-MCNC: 12.3 G/DL (ref 11.5–15.4)
IMM GRANULOCYTES # BLD AUTO: 0.02 THOUSAND/UL (ref 0–0.2)
IMM GRANULOCYTES NFR BLD AUTO: 0 % (ref 0–2)
LYMPHOCYTES # BLD AUTO: 1.87 THOUSANDS/ΜL (ref 0.6–4.47)
LYMPHOCYTES NFR BLD AUTO: 29 % (ref 14–44)
MCH RBC QN AUTO: 30.2 PG (ref 26.8–34.3)
MCHC RBC AUTO-ENTMCNC: 31.5 G/DL (ref 31.4–37.4)
MCV RBC AUTO: 96 FL (ref 82–98)
MONOCYTES # BLD AUTO: 0.57 THOUSAND/ΜL (ref 0.17–1.22)
MONOCYTES NFR BLD AUTO: 9 % (ref 4–12)
NEUTROPHILS # BLD AUTO: 3.68 THOUSANDS/ΜL (ref 1.85–7.62)
NEUTS SEG NFR BLD AUTO: 58 % (ref 43–75)
NRBC BLD AUTO-RTO: 0 /100 WBCS
PLATELET # BLD AUTO: 137 THOUSANDS/UL (ref 149–390)
PMV BLD AUTO: 12.3 FL (ref 8.9–12.7)
POTASSIUM SERPL-SCNC: 3.5 MMOL/L (ref 3.5–5.3)
PROT SERPL-MCNC: 7.3 G/DL (ref 6.4–8.2)
RBC # BLD AUTO: 4.07 MILLION/UL (ref 3.81–5.12)
SODIUM SERPL-SCNC: 140 MMOL/L (ref 136–145)
WBC # BLD AUTO: 6.36 THOUSAND/UL (ref 4.31–10.16)

## 2021-04-05 PROCEDURE — 80053 COMPREHEN METABOLIC PANEL: CPT | Performed by: NURSE PRACTITIONER

## 2021-04-05 PROCEDURE — 99232 SBSQ HOSP IP/OBS MODERATE 35: CPT | Performed by: NURSE PRACTITIONER

## 2021-04-05 PROCEDURE — 99232 SBSQ HOSP IP/OBS MODERATE 35: CPT | Performed by: INTERNAL MEDICINE

## 2021-04-05 PROCEDURE — 82948 REAGENT STRIP/BLOOD GLUCOSE: CPT

## 2021-04-05 PROCEDURE — 85025 COMPLETE CBC W/AUTO DIFF WBC: CPT | Performed by: NURSE PRACTITIONER

## 2021-04-05 RX ADMIN — SODIUM BICARBONATE 75 ML/HR: 84 INJECTION INTRAVENOUS at 12:24

## 2021-04-05 RX ADMIN — HEPARIN SODIUM 5000 UNITS: 5000 INJECTION INTRAVENOUS; SUBCUTANEOUS at 05:51

## 2021-04-05 RX ADMIN — LIDOCAINE 1 PATCH: 50 PATCH TOPICAL at 08:54

## 2021-04-05 RX ADMIN — LETROZOLE 2.5 MG: 2.5 TABLET, FILM COATED ORAL at 13:15

## 2021-04-05 NOTE — PROGRESS NOTES
[facility] [facility]  Progress Note - Stefani John 1945, 70 y o  female MRN: [id number]  Unit/Bed#: [facility] Encounter: [id number]  Primary Care Provider: Katie Polk MD   Date and time admitted to hospital: 4/1/2021  9:57 PM    * Acute kidney injury [facility])  Assessment & Plan  · Concern for obstructive uropathy with bilateral hydronephrosis  · - however pt with slow return to norm on ivfluids and urinary catheter insertion yesterday  · Lab work from "care everywhere" indicating cr of 1 10 (2/24/21)  · Status post CT scan abdomen pelvis without contrast "Bilateral hydronephrosis and proximal hydroureter   No CT evidence of renal colic   No obstructing lesions are found in the retroperitoneum  Vicky Lambert bladder was not distended   Recommend follow-up renal ultrasound to assess for ureteral jets/urinary   obstruction  "   · - however noting "Large volume of formed stool in the cecum without obstruction"  · Renal ultrasound demonstrates:   Mild bilateral hydronephrosis similar to April 1, 2021 allowing for difference in imaging modalities  No perinephric collection  · Way catheter decompressing the bladder and limiting its evaluation     · Repeat ultrasound 4/4 demonstrates no change in hydro, plan for stent monday  · Way was placed on admission with yellow urine continues to drain no obstruction   · Status was UA no sign of infection  · Continue to monitor input and output  · Avoid nephrotoxins/ NSAIDS and renally dose medication  · IV fluids changed dt metabolic acidosis to bicarb at 75ml/hr   · Creatinine continues to climb slowly - with mild hydro on US  Today creatinine 4 30    Constipation  Assessment & Plan  · Bowel regimen  · Ct scan with large amt of stool in cecum   · Colace and miralax will add dulcolax at pts request     Chest pain  Assessment & Plan  · X1 episode has had no recurrence  · Low suspicion for ACS serial troponin, are negative   · EKG from the ED sinus rhythm at 89 beats per minute no acute ST changes  · Will discontinue telemetry no events     Cirrhosis of liver (HCC)  Assessment & Plan  · Incidental finding status post CT noting mild cirrhotic changes  Liver function tests within normal limits  · Continued evaluation as outpatient    Controlled type 2 diabetes mellitus, without long-term current use of insulin [facility])  Assessment & Plan  Lab Results   Component Value Date    HGBA1C 6 1 (H) 04/03/2021    ordered for am fasting     Recent Labs     04/04/21  1042 04/04/21  1623 04/04/21 2057 04/05/21  0519   POCGLU 84 103 116 95       Blood Sugar Average: Last 72 hrs:  (P) 947 [id number] chronically on metformin  mg daily which will hold in the setting of acute kidney injury  · Placed on insulin sliding scale, will keep going as she is more prone to hypoglycemia   · Pt was npo yesterday also noted to have poor appetite and low A1c  · She reports she does not like diet as she doesn't like anything artificial (sweetners) she will not eat or drink dt this  · Transitioned diet to regular for now will need to monitor blood sugars , continue to remain stable however pt has been mostly npo last few night in anticipation for procedure  · However, in am would monitor if need for dextrose as time unclear for procedure         VTE Pharmacologic Prophylaxis:   Pharmacologic: Heparin  Mechanical VTE Prophylaxis in Place: Yes    Patient Centered Rounds: I have performed bedside rounds with nursing staff today  Discussions with Specialists or Other Care Team Provider: nursing / nephrology/ urology pa and attending     Education and Discussions with Family / Patient: patient and daughter     Time Spent for Care: 45 minutes  More than 50% of total time spent on counseling and coordination of care as described above      Current Length of Stay: 4 day(s)    Current Patient Status: Inpatient   Certification Statement: The patient will continue to require additional inpatient hospital stay due to plan for surgical procedure tomorrow     Discharge Plan: discharge to home when medically stable     Code Status: Level 3 - DNAR and DNI      Subjective:   Greater than 1 hrs spend with pt discussion had with daughter at the bedside  Pt is frustrated she reports that she does not want to have procedure  At first unclear as pt reports that she would like a picture to be drawn to explain what the surgeon is going to do  Discussion was had a length and pt at this point wants to wait till her daughter comes in and they will talk   Surgery delayed at this point  No n/v/d no pain     Objective:     Vitals:   Temp (24hrs), Av 7 °F (37 1 °C), Min:97 9 °F (36 6 °C), Max:99 3 °F (37 4 °C)    Temp:  [97 9 °F (36 6 °C)-99 3 °F (37 4 °C)] 98 8 °F (37 1 °C)  Resp:  [18-20] 18  BP: (138-148)/(70-77) 148/77  Body mass index is 30 7 kg/m²  Input and Output Summary (last 24 hours): Intake/Output Summary (Last 24 hours) at 2021 194  Last data filed at 2021 1443  Gross per 24 hour   Intake 2108 75 ml   Output 1500 ml   Net 608 75 ml       Physical Exam:     Physical Exam  Constitutional:       General: She is not in acute distress  Appearance: She is not ill-appearing, toxic-appearing or diaphoretic  HENT:      Nose: No congestion  Mouth/Throat:      Pharynx: Oropharynx is clear  Eyes:      General:         Right eye: No discharge  Left eye: No discharge  Conjunctiva/sclera: Conjunctivae normal    Cardiovascular:      Rate and Rhythm: Normal rate  Heart sounds: No murmur  No friction rub  No gallop  Pulmonary:      Effort: No respiratory distress  Breath sounds: No stridor  Rales (bl crackles ) present  No wheezing or rhonchi  Chest:      Chest wall: No tenderness  Abdominal:      General: There is no distension  Palpations: There is no mass  Tenderness: There is no abdominal tenderness   There is no right CVA tenderness, left CVA tenderness, guarding or rebound  Hernia: No hernia is present  Skin:     Coloration: Skin is not jaundiced or pale  Findings: No bruising, erythema, lesion or rash  Neurological:      Mental Status: She is alert and oriented to person, place, and time  Additional Data:     Labs:    Results from last 7 days   Lab Units 04/05/21  0525   WBC Thousand/uL 6 36   HEMOGLOBIN g/dL 12 3   HEMATOCRIT % 39 0   PLATELETS Thousands/uL 137*   NEUTROS PCT % 58   LYMPHS PCT % 29   MONOS PCT % 9   EOS PCT % 3     Results from last 7 days   Lab Units 04/05/21  0634   SODIUM mmol/L 140   POTASSIUM mmol/L 3 5   CHLORIDE mmol/L 112*   CO2 mmol/L 20*   BUN mg/dL 30*   CREATININE mg/dL 4 30*   ANION GAP mmol/L 8   CALCIUM mg/dL 8 6   ALBUMIN g/dL 2 7*   TOTAL BILIRUBIN mg/dL 0 53   ALK PHOS U/L 95   ALT U/L 16   AST U/L 23   GLUCOSE RANDOM mg/dL 95         Results from last 7 days   Lab Units 04/05/21  1530 04/05/21  1134 04/05/21  0519 04/04/21  2057 04/04/21  1623 04/04/21  1042 04/04/21  0637 04/03/21  2111 04/03/21  1626 04/03/21  1036 04/03/21  0624 04/02/21  2055   POC GLUCOSE mg/dl 122 85 95 116 103 84 118 92 111 126 89 106     Results from last 7 days   Lab Units 04/03/21  0449   HEMOGLOBIN A1C % 6 1*     Results from last 7 days   Lab Units 04/01/21  1551   LACTIC ACID mmol/L 2 0           * I Have Reviewed All Lab Data Listed Above  * Additional Pertinent Lab Tests Reviewed:  All Labs Within Last 24 Hours Reviewed    Imaging:    Imaging Reports Reviewed Today Include: reviewed     Recent Cultures (last 7 days):           Last 24 Hours Medication List:   Current Facility-Administered Medications   Medication Dose Route Frequency Provider Last Rate    acetaminophen  650 mg Oral Q6H PRN Lawerence Chris, DO      bisacodyl  5 mg Oral Once [place]ango SIMON Lara      bisacodyl  10 mg Rectal Once Lawerence Chris, DO      calcium carbonate  500 mg Oral Q8H PRN Dobango SIMON Lara      docusate sodium  100 mg Oral BID Dory Madera, DO      heparin (porcine)  5,000 Units Subcutaneous [facility] Dory Madera, DO      insulin lispro  1-5 Units Subcutaneous 4x Daily (AC & HS) Carlos Galindo PA-C      letrozole  2 5 mg Oral Daily Dory Madera, DO      lidocaine  1 patch Topical Daily Malissa Wild      methimazole  2 5 mg Oral Every Other Day Dory Madera, DO      ondansetron  4 mg Intravenous Q6H PRN Dory Madera, DO      polyethylene glycol  17 g Oral Daily Dory Madera, DO      senna  1 tablet Oral Daily Dory Madera, DO      sodium bicarbonate infusion  75 mL/hr Intravenous Continuous Lidia Vincent MD 75 mL/hr (04/05/21 1224)        Today, Patient Was Seen By: SIMON Groves    ** Please Note: Dictation voice to text software may have been used in the creation of this document   **

## 2021-04-05 NOTE — PROGRESS NOTES
Attempted to visit patient and introduced self  Patient has a visitor who is her daughter  Talked with patient and her daughter together  Explored emotional and relational resources  Patient expressed her appreciate and thanked for stopping by her room       04/05/21 1500   Clinical Encounter Type   Visited With Patient and family together   Routine Visit Introduction

## 2021-04-05 NOTE — PLAN OF CARE
Problem: Potential for Falls  Goal: Patient will remain free of falls  Description: INTERVENTIONS:  - Assess patient frequently for physical needs  -  Identify cognitive and physical deficits and behaviors that affect risk of falls  -  [place] fall precautions as indicated by assessment   - Educate patient/family on patient safety including physical limitations  - Instruct patient to call for assistance with activity based on assessment  - Modify environment to reduce risk of injury  - Consider OT/PT consult to assist with strengthening/mobility  Outcome: Progressing     Problem: Prexisting or High Potential for Compromised Skin Integrity  Goal: Skin integrity is maintained or improved  Description: INTERVENTIONS:  - Identify patients at risk for skin breakdown  - Assess and monitor skin integrity  - Assess and monitor nutrition and hydration status  - Monitor labs   - Assess for incontinence   - Turn and reposition patient  - Assist with mobility/ambulation  - Relieve pressure over bony prominences  - Avoid friction and shearing  - Provide appropriate hygiene as needed including keeping skin clean and dry  - Evaluate need for skin moisturizer/barrier cream  - Collaborate with interdisciplinary team   - Patient/family teaching  - Consider wound care consult   Outcome: Progressing     Problem: Nutrition/Hydration-ADULT  Goal: Nutrient/Hydration intake appropriate for improving, restoring or maintaining nutritional needs  Description: Monitor and assess patient's nutrition/hydration status for malnutrition  Collaborate with interdisciplinary team and initiate plan and interventions as ordered  Monitor patient's weight and dietary intake as ordered or per policy  Utilize nutrition screening tool and intervene as necessary  Determine patient's food preferences and provide high-protein, high-caloric foods as appropriate       INTERVENTIONS:  - Monitor oral intake, urinary output, labs, and treatment plans  - Assess nutrition and hydration status and recommend course of action  - Evaluate amount of meals eaten  - Assist patient with eating if necessary   - Allow adequate time for meals  - Recommend/ encourage appropriate diets, oral nutritional supplements, and vitamin/mineral supplements  - Order, calculate, and assess calorie counts as needed  - Recommend, monitor, and adjust tube feedings and TPN/PPN based on assessed needs  - Assess need for intravenous fluids  - Provide specific nutrition/hydration education as appropriate  - Include patient/family/caregiver in decisions related to nutrition  Outcome: Progressing     Problem: CARDIOVASCULAR - ADULT  Goal: Maintains optimal cardiac output and hemodynamic stability  Description: INTERVENTIONS:  - Monitor I/O, vital signs and rhythm  - Monitor for S/S and trends of decreased cardiac output  - Administer and titrate ordered vasoactive medications to optimize hemodynamic stability  - Assess quality of pulses, skin color and temperature  - Assess for signs of decreased coronary artery perfusion  - Instruct patient to report change in severity of symptoms  Outcome: Progressing  Goal: Absence of cardiac dysrhythmias or at baseline rhythm  Description: INTERVENTIONS:  - Continuous cardiac monitoring, vital signs, obtain 12 lead EKG if ordered  - Administer antiarrhythmic and heart rate control medications as ordered  - Monitor electrolytes and administer replacement therapy as ordered  Outcome: Progressing     Problem: GASTROINTESTINAL - ADULT  Goal: Minimal or absence of nausea and/or vomiting  Description: INTERVENTIONS:  - Administer IV fluids if ordered to ensure adequate hydration  - Maintain NPO status until nausea and vomiting are resolved  - Nasogastric tube if ordered  - Administer ordered antiemetic medications as needed  - Provide nonpharmacologic comfort measures as appropriate  - Advance diet as tolerated, if ordered  - Consider nutrition services referral to assist patient with adequate nutrition and appropriate food choices  Outcome: Progressing     Problem: GENITOURINARY - ADULT  Goal: Urinary catheter remains patent  Description: INTERVENTIONS:  - Assess patency of urinary catheter  - If patient has a chronic mathis, consider changing catheter if non-functioning  - Follow guidelines for intermittent irrigation of non-functioning urinary catheter  Outcome: Progressing     Problem: METABOLIC, FLUID AND ELECTROLYTES - ADULT  Goal: Glucose maintained within target range  Description: INTERVENTIONS:  - Monitor Blood Glucose as ordered  - Assess for signs and symptoms of hyperglycemia and hypoglycemia  - Administer ordered medications to maintain glucose within target range  - Assess nutritional intake and initiate nutrition service referral as needed  Outcome: Progressing     Problem: PAIN - ADULT  Goal: Verbalizes/displays adequate comfort level or baseline comfort level  Description: Interventions:  - Encourage patient to monitor pain and request assistance  - Assess pain using appropriate pain scale  - Administer analgesics based on type and severity of pain and evaluate response  - Implement non-pharmacological measures as appropriate and evaluate response  - Consider cultural and social influences on pain and pain management  - Notify physician/advanced practitioner if interventions unsuccessful or patient reports new pain  Outcome: Progressing     Problem: DISCHARGE PLANNING  Goal: Discharge to home or other facility with appropriate resources  Description: INTERVENTIONS:  - Identify barriers to discharge w/patient and caregiver  - Arrange for needed discharge resources and transportation as appropriate  - Identify discharge learning needs (meds, wound care, etc )  - Arrange for interpretive services to assist at discharge as needed  - Refer to Case Management Department for coordinating discharge planning if the patient needs post-hospital services based on physician/advanced practitioner order or complex needs related to functional status, cognitive ability, or social support system  Outcome: Progressing

## 2021-04-05 NOTE — PROGRESS NOTES
8714: Ry Cee 5 with SLIM regarding patient stating to this RN that she was upset and did not want to proceed with her scheduled procedure today  Provider at bedside to speak with patient  0930: Transport on unit to take patient to procedure  Patient refused to go down for procedure, despite speaking with SLIM and Urology providers regarding patient condition and needed interventions, patient not agreeable at this time  Catherene Organ notified via [employer]  1150: Patient's daughter in room  Provider to speak with patient and daughter regarding procedure  1515: Patient agreeable to proceed with getting scheduled procedure completed  Catherene Organ notified via [employer]

## 2021-04-05 NOTE — ASSESSMENT & PLAN NOTE
· Concern for obstructive uropathy with bilateral hydronephrosis  · - however pt with slow return to norm on ivfluids and urinary catheter insertion yesterday  · Lab work from "care everywhere" indicating cr of 1 10 (2/24/21)  · Status post CT scan abdomen pelvis without contrast "Bilateral hydronephrosis and proximal hydroureter   No CT evidence of renal colic   No obstructing lesions are found in the retroperitoneum   Urinary bladder was not distended   Recommend follow-up renal ultrasound to assess for ureteral jets/urinary   obstruction  "   · - however noting "Large volume of formed stool in the cecum without obstruction"  · Renal ultrasound demonstrates:   Mild bilateral hydronephrosis similar to April 1, 2021 allowing for difference in imaging modalities  No perinephric collection  · Way catheter decompressing the bladder and limiting its evaluation     · Repeat ultrasound 4/4 demonstrates no change in hydro, plan for stent monday  · Way was placed on admission with yellow urine continues to drain no obstruction   · Status was UA no sign of infection  · Continue to monitor input and output  · Avoid nephrotoxins/ NSAIDS and renally dose medication  · IV fluids changed dt metabolic acidosis to bicarb at 75ml/hr   · Creatinine continues to climb slowly - with mild hydro on US  Today creatinine 4 30

## 2021-04-05 NOTE — ANESTHESIA PREPROCEDURE EVALUATION
Procedure:  CYSTOSCOPY RETROGRADE PYELOGRAM WITH INSERTION STENT URETERAL (Bilateral Bladder)    Relevant Problems   CARDIO   (+) Chest pain      ENDO   (+) Controlled type 2 diabetes mellitus, without long-term current use of insulin (HCC)   (+) Hyperthyroidism      GI/HEPATIC   (+) Cirrhosis of liver (HCC)      /RENAL   (+) Acute kidney injury ([facility] [address] )   (+) Hydronephrosis      NEURO/PSYCH   (+) History of breast cancer        Physical Exam    Airway    Mallampati score: II  TM Distance: >3 FB  Neck ROM: full     Dental   Comment: 4 teeth remaining,     Cardiovascular      Pulmonary      Other Findings        Anesthesia Plan  ASA Score- 2     Anesthesia Type- general with ASA Monitors  Additional Monitors:   Airway Plan: LMA  Comment: LMA VS ETT, ALL COMPLICATIONS AND POSSIBLE ANESTHESIA TX'S DISCUSSED W/ PT  Plan Factors-    Chart reviewed  Induction- intravenous  Postoperative Plan-     Informed Consent- Anesthetic plan and risks discussed with patient  I personally reviewed this patient with the CRNA  Discussed and agreed on the Anesthesia Plan with the CRNA           Lab Results   Component Value Date    WBC 6 18 04/06/2021    HGB 12 8 04/06/2021     (L) 04/06/2021     Lab Results   Component Value Date    K 3 1 (L) 04/06/2021    BUN 30 (H) 04/06/2021    CREATININE 4 28 (H) 04/06/2021       Lab Results   Component Value Date    HGBA1C 6 1 (H) 04/03/2021

## 2021-04-05 NOTE — ASSESSMENT & PLAN NOTE
Lab Results   Component Value Date    HGBA1C 6 1 (H) 04/03/2021    ordered for am fasting     Recent Labs     04/04/21  1042 04/04/21  1623 04/04/21 2057 04/05/21  0519   POCGLU 84 103 116 95       Blood Sugar Average: Last 72 hrs:  (P) 150 [id number] chronically on metformin  mg daily which will hold in the setting of acute kidney injury  · Placed on insulin sliding scale, will keep going as she is more prone to hypoglycemia   · Pt was npo yesterday also noted to have poor appetite and low A1c  · She reports she does not like diet as she doesn't like anything artificial (sweetners) she will not eat or drink dt this     · Transitioned diet to regular for now will need to monitor blood sugars , continue to remain stable however pt has been mostly npo last few night in anticipation for procedure  · However, in am would monitor if need for dextrose as time unclear for procedure

## 2021-04-05 NOTE — CASE MANAGEMENT
LOS 4 days  Not a Bundle  Not a Readmission  Unplanned Readmission Risk is 13 GREEN    Tx from [address]    CM spoke with pt and daughter at bedside to discuss role of CM and d/c planning  Pt lives alone in a mobile home, a few steps to enter  Pt receives waiver services for [facility] about 5 hours per week  Pt has a hospital bed, commode, RW, and cane at home as needed  Hx of VNA and IP rehab post diverticulitis a few years ago  No reported hx of MH or D/A treatment  PCP is Dr Coby Michael  Pt uses [facility] in [place]  Primary Contact:  Aquiles Vincent (dtr) [phone]  No POA, LW [address] the [address] pending dispo    PT/OT recommending IP rehab  Pt and dtr awaiting return call from pt's Physician whether or not to stay for Cysto or leave AMA and go to [address] [place] [address],[address] t[address]    CM reviewed d/c planning process including the following: identifying help at home, patient preference for d/c planning needs, Discharge Lounge, [facility] Meds to Bed program, availability of treatment team to discuss questions or concerns patient and/or family may have regarding understanding medications and recognizing signs and symptoms once discharged  CM also encouraged patient to follow up with all recommended appointments after discharge  Patient advised of importance for patient and family to participate in managing patients medical well being

## 2021-04-05 NOTE — QUICK NOTE
I again went to visit the patient  At this point time, her daughter was at the bedside  I again detailed for the patient and her daughter our initial impression on Friday when the patient had 600 cc urinary retention and bilateral hydronephrosis  Our hope was that Way catheter decompression would resolve the hydronephrosis and kidney injury  Over the weekend when her creatinine did not resolve, nephrology requested our Re involvement  Patient was scheduled for possible bilateral stent placement yesterday but the 5 her NPO order and was eating  We had a lengthy discussion together at that time about a repeat ultrasound and NPO status into today for possible stent placement if the ultrasound was concerning in her kidney function worsened  This was in fact the case  Ultrasound return with persistent hydronephrosis and her kidney function again has risen  Patient was scheduled for surgery today at 10:00 a m  but refused to be transferred for the procedure  The patient and her daughter reported that they have a trusted physician at the [facility] that they wanted to discuss the case with  They were awaiting a call back at the time of our visit  Subsequent to our visit, they did speak to this physician who is a general surgeon  They are now ready to proceed with bilateral stent placement which we will prepare for the patient tomorrow

## 2021-04-05 NOTE — QUICK NOTE
Urology  Quick note    I spoke to the patient and her daughter over the phone  I explained the procedure  Cystoscopy bilateral retrograde pyelogram and bilateral stent insertions  I explained urinary retention and neurogenic bladder with bilateral hydronephrosis and worsening renal function  Creatinine today is 4 30  Patient wishes to leave and see her doctor at [facility]   I recommended she have her procedure and stent placement here  She can then see her regular doctor as an outpatient  All questions were answered  Is unclear whether she will proceed with the cystoscopy bilateral stent insertion or not

## 2021-04-05 NOTE — ANESTHESIA PREPROCEDURE EVALUATION
Procedure:  CYSTOSCOPY RETROGRADE PYELOGRAM WITH INSERTION STENT URETERAL (Bilateral Bladder)    Relevant Problems   CARDIO   (+) Chest pain      ENDO   (+) Controlled type 2 diabetes mellitus, without long-term current use of insulin (HCC)   (+) Hyperthyroidism      GI/HEPATIC   (+) Cirrhosis of liver (HCC)      /RENAL   (+) Acute kidney injury ([facility] [address] )      NEURO/PSYCH   (+) History of breast cancer        Physical Exam    Airway    Mallampati score: II  TM Distance: >3 FB  Neck ROM: full     Dental       Cardiovascular      Pulmonary      Other Findings        Anesthesia Plan  ASA Score- 2     Anesthesia Type- general with ASA Monitors  Additional Monitors:   Airway Plan: LMA  Plan Factors-    Chart reviewed  Induction- intravenous  Postoperative Plan-     Informed Consent- Anesthetic plan and risks discussed with patient  I personally reviewed this patient with the CRNA  Discussed and agreed on the Anesthesia Plan with the CRNA  Charles Jaime

## 2021-04-05 NOTE — PROGRESS NOTES
Progress Note - Nephrology   Crawford Dakin 70 y o  female MRN: [id number]  Unit/Bed#: -01 Encounter: [id number]      Assessment / Plan:  1  ZOYA, POA, in setting of obstructive nephropathy with bilateral hydronephrosis with hydroureter plus or minus prerenal component as decreased oral intake on admission as well  -baseline serum creatinine 0 7-0 8, up to 1 1 once in 2021  -4 2 on admission now 4 3  -would highly benefit from relief of obstruction but patient refuses this at this time   -continue Way catheter per Urology  -urinalysis bland on admission  -avoid nephrotoxins/NSAIDs  -avoid relative hypotension in the setting of acute renal impairment  -thankfully, adequate UOP noted    2  Low serum bicarbonate likely due to metabolic acidosis from Acute kidney injury-continue sodium bicarbonate drip at 75 mL/hr, monitor bicarbonate level, bicarb stable at 20    3  Hypernatremia - resolved today    4  hypercalcemia - resolved    Subjective:   She denies chest pain, shortness of breath, nausea or vomiting  She canceled her surgery today as she wants to see another doctor  She complains of miscommunication months doctors  She also complains of being on sodium bicarbonate drip as she states that "that is 52% sodium "      Objective:     Vitals: Blood pressure 147/74, pulse 98, temperature 97 9 °F (36 6 °C), resp  rate 20, weight 71 3 kg (157 lb 3 oz), SpO2 90 %  ,Body mass index is 30 7 kg/m²  Temp (24hrs), Av 9 °F (37 2 °C), Min:97 9 °F (36 6 °C), Max:99 6 °F (37 6 °C)      Weight (last 2 days)     None            Intake/Output Summary (Last 24 hours) at 2021 1322  Last data filed at 2021 0900  Gross per 24 hour   Intake 1687 5 ml   Output 800 ml   Net 887 5 ml     I/O last 24 hours: In: 1907 5 [P O :220; I V :1687 5]  Out: 1450 [Urine:1450]        Physical Exam:   Physical Exam  Vitals signs and nursing note reviewed  Constitutional:       General: She is not in acute distress  Appearance: Normal appearance  She is well-developed  She is ill-appearing  She is not diaphoretic  HENT:      Head: Normocephalic and atraumatic  Mouth/Throat:      Pharynx: No oropharyngeal exudate  Eyes:      General: No scleral icterus  Right eye: No discharge  Left eye: No discharge  Neck:      Musculoskeletal: Normal range of motion and neck supple  Thyroid: No thyromegaly  Cardiovascular:      Rate and Rhythm: Normal rate and regular rhythm  Heart sounds: No murmur  Pulmonary:      Effort: Pulmonary effort is normal  No respiratory distress  Breath sounds: Normal breath sounds  No wheezing  Abdominal:      General: Bowel sounds are normal  There is no distension  Palpations: Abdomen is soft  Genitourinary:     Comments: +Way  Musculoskeletal:         General: No swelling  Skin:     General: Skin is warm and dry  Findings: No rash  Neurological:      General: No focal deficit present  Mental Status: She is alert  Motor: No abnormal muscle tone        Comments: awake   Psychiatric:         Mood and Affect: Mood normal       Comments: Odd affect         Invasive Devices     Peripheral Intravenous Line            Peripheral IV 04/04/21 Distal;Right;Ventral (anterior) Wrist 1 day          Drain            Urethral Catheter Non-latex 16 Fr  3 days                Medications:    Scheduled Meds:  Current Facility-Administered Medications   Medication Dose Route Frequency Provider Last Rate    acetaminophen  650 mg Oral Q6H PRN Ольга Gan, DO      bisacodyl  5 mg Oral Once Luisa Sartorius, CRNP      bisacodyl  10 mg Rectal Once Ольга Gan DO      calcium carbonate  500 mg Oral Q8H PRN Luisa Sartorius, CRNP      docusate sodium  100 mg Oral BID Ольга Gan DO      heparin (porcine)  5,000 Units Subcutaneous Q8H [facility] Ольга Gan DO      insulin lispro  1-5 Units Subcutaneous 4x Daily (AC & HS) Shaheed Pete PA-C  letrozole  2 5 mg Oral Daily Cheryl Perez, DO      lidocaine  1 patch Topical Daily [place], [place]      methimazole  2 5 mg Oral Every Other Day Cheryl Perez, DO      ondansetron  4 mg Intravenous Q6H PRN Cheryl Perez, DO      polyethylene glycol  17 g Oral Daily Cheryl Ana, DO      senna  1 tablet Oral Daily Cheryl Perez, DO      sodium bicarbonate infusion  75 mL/hr Intravenous Continuous Brendon Stallings MD 75 mL/hr (04/05/21 1224)       PRN Meds:   acetaminophen    calcium carbonate    ondansetron    Continuous Infusions:sodium bicarbonate infusion, 75 mL/hr, Last Rate: 75 mL/hr (04/05/21 1224)            LAB RESULTS:      Results from last 7 days   Lab Units 04/05/21  0634 04/05/21  0525 04/04/21  0537 04/03/21  0449 04/02/21  1406 04/02/21  0354 04/02/21  0353 04/01/21  1551   WBC Thousand/uL  --  6 36  --   --   --  9 48  --  9 01   HEMOGLOBIN g/dL  --  12 3  --   --   --  14 2  --  15 6*   HEMATOCRIT %  --  39 0  --   --   --  44 7  --  46 9*   PLATELETS Thousands/uL  --  137*  --   --   --  141*  --  152   NEUTROS PCT %  --  58  --   --   --   --   --  77*   LYMPHS PCT %  --  29  --   --   --   --   --  17   MONOS PCT %  --  9  --   --   --   --   --  5   EOS PCT %  --  3  --   --   --   --   --  1   POTASSIUM mmol/L 3 5  --  3 9 4 0 3 8  --  3 0* 3 3*   CHLORIDE mmol/L 112*  --  115* 119* 113*  --  106 101   CO2 mmol/L 20*  --  20* 21 21  --  23 24   BUN mg/dL 30*  --  31* 32* 33*  --  31* 35*   CREATININE mg/dL 4 30*  --  4 08* 4 03* 3 90*  --  3 76* 4 26*   CALCIUM mg/dL 8 6  --  8 7 8 8 9 0  --  9 3 9 7   ALK PHOS U/L 95  --   --   --   --   --  83 98   ALT U/L 16  --   --   --   --   --  14 17   AST U/L 23  --   --   --   --   --  26 29   MAGNESIUM mg/dL  --   --   --   --   --   --  2 2  --        CUTURES:  No results found for: Denette Wallace              Portions of the record may have been created with voice recognition software   Occasional wrong word or "sound a like" substitutions may have occurred due to the inherent limitations of voice recognition software  Read the chart carefully and recognize, using context, where substitutions have occurred  If you have any questions, please contact the dictating provider

## 2021-04-06 ENCOUNTER — APPOINTMENT (INPATIENT)
Dept: RADIOLOGY | Facility: HOSPITAL | Age: 76
DRG: 660 | End: 2021-04-06
Payer: MEDICARE

## 2021-04-06 ENCOUNTER — TELEPHONE (OUTPATIENT)
Dept: OTHER | Facility: HOSPITAL | Age: 76
End: 2021-04-06

## 2021-04-06 ENCOUNTER — ANESTHESIA (INPATIENT)
Dept: PERIOP | Facility: HOSPITAL | Age: 76
DRG: 660 | End: 2021-04-06
Payer: MEDICARE

## 2021-04-06 PROBLEM — E87.2 ACIDOSIS: Status: RESOLVED | Noted: 2021-04-05 | Resolved: 2021-04-06

## 2021-04-06 LAB
ANION GAP SERPL CALCULATED.3IONS-SCNC: 10 MMOL/L (ref 4–13)
BASOPHILS # BLD AUTO: 0.05 THOUSANDS/ΜL (ref 0–0.1)
BASOPHILS NFR BLD AUTO: 1 % (ref 0–1)
BUN SERPL-MCNC: 30 MG/DL (ref 5–25)
CALCIUM SERPL-MCNC: 8.6 MG/DL (ref 8.3–10.1)
CHLORIDE SERPL-SCNC: 108 MMOL/L (ref 100–108)
CO2 SERPL-SCNC: 24 MMOL/L (ref 21–32)
CREAT SERPL-MCNC: 4.28 MG/DL (ref 0.6–1.3)
EOSINOPHIL # BLD AUTO: 0.15 THOUSAND/ΜL (ref 0–0.61)
EOSINOPHIL NFR BLD AUTO: 2 % (ref 0–6)
ERYTHROCYTE [DISTWIDTH] IN BLOOD BY AUTOMATED COUNT: 13.7 % (ref 11.6–15.1)
GFR SERPL CREATININE-BSD FRML MDRD: 10 ML/MIN/1.73SQ M
GLUCOSE SERPL-MCNC: 114 MG/DL (ref 65–140)
GLUCOSE SERPL-MCNC: 116 MG/DL (ref 65–140)
GLUCOSE SERPL-MCNC: 164 MG/DL (ref 65–140)
GLUCOSE SERPL-MCNC: 196 MG/DL (ref 65–140)
GLUCOSE SERPL-MCNC: 80 MG/DL (ref 65–140)
GLUCOSE SERPL-MCNC: 83 MG/DL (ref 65–140)
HCT VFR BLD AUTO: 40.8 % (ref 34.8–46.1)
HGB BLD-MCNC: 12.8 G/DL (ref 11.5–15.4)
IMM GRANULOCYTES # BLD AUTO: 0.02 THOUSAND/UL (ref 0–0.2)
IMM GRANULOCYTES NFR BLD AUTO: 0 % (ref 0–2)
LYMPHOCYTES # BLD AUTO: 2.04 THOUSANDS/ΜL (ref 0.6–4.47)
LYMPHOCYTES NFR BLD AUTO: 33 % (ref 14–44)
MCH RBC QN AUTO: 30.3 PG (ref 26.8–34.3)
MCHC RBC AUTO-ENTMCNC: 31.4 G/DL (ref 31.4–37.4)
MCV RBC AUTO: 97 FL (ref 82–98)
MONOCYTES # BLD AUTO: 0.5 THOUSAND/ΜL (ref 0.17–1.22)
MONOCYTES NFR BLD AUTO: 8 % (ref 4–12)
NEUTROPHILS # BLD AUTO: 3.42 THOUSANDS/ΜL (ref 1.85–7.62)
NEUTS SEG NFR BLD AUTO: 56 % (ref 43–75)
NRBC BLD AUTO-RTO: 0 /100 WBCS
PLATELET # BLD AUTO: 137 THOUSANDS/UL (ref 149–390)
PMV BLD AUTO: 11.9 FL (ref 8.9–12.7)
POTASSIUM SERPL-SCNC: 3.1 MMOL/L (ref 3.5–5.3)
RBC # BLD AUTO: 4.22 MILLION/UL (ref 3.81–5.12)
SODIUM SERPL-SCNC: 142 MMOL/L (ref 136–145)
WBC # BLD AUTO: 6.18 THOUSAND/UL (ref 4.31–10.16)

## 2021-04-06 PROCEDURE — 80048 BASIC METABOLIC PNL TOTAL CA: CPT | Performed by: INTERNAL MEDICINE

## 2021-04-06 PROCEDURE — 74420 UROGRAPHY RTRGR +-KUB: CPT

## 2021-04-06 PROCEDURE — C2617 STENT, NON-COR, TEM W/O DEL: HCPCS | Performed by: UROLOGY

## 2021-04-06 PROCEDURE — 99232 SBSQ HOSP IP/OBS MODERATE 35: CPT | Performed by: INTERNAL MEDICINE

## 2021-04-06 PROCEDURE — 82948 REAGENT STRIP/BLOOD GLUCOSE: CPT

## 2021-04-06 PROCEDURE — C1769 GUIDE WIRE: HCPCS | Performed by: UROLOGY

## 2021-04-06 PROCEDURE — 52332 CYSTOSCOPY AND TREATMENT: CPT | Performed by: UROLOGY

## 2021-04-06 PROCEDURE — 99232 SBSQ HOSP IP/OBS MODERATE 35: CPT | Performed by: STUDENT IN AN ORGANIZED HEALTH CARE EDUCATION/TRAINING PROGRAM

## 2021-04-06 PROCEDURE — C1758 CATHETER, URETERAL: HCPCS | Performed by: UROLOGY

## 2021-04-06 PROCEDURE — 0T788DZ DILATION OF BILATERAL URETERS WITH INTRALUMINAL DEVICE, VIA NATURAL OR ARTIFICIAL OPENING ENDOSCOPIC: ICD-10-PCS | Performed by: UROLOGY

## 2021-04-06 PROCEDURE — 85025 COMPLETE CBC W/AUTO DIFF WBC: CPT | Performed by: INTERNAL MEDICINE

## 2021-04-06 PROCEDURE — BT141ZZ FLUOROSCOPY OF KIDNEYS, URETERS AND BLADDER USING LOW OSMOLAR CONTRAST: ICD-10-PCS | Performed by: UROLOGY

## 2021-04-06 DEVICE — INLAY OPTIMA URETERAL STENT W/O GUIDEWIRE
Type: IMPLANTABLE DEVICE | Site: URETER | Status: FUNCTIONAL
Brand: BARD® INLAY OPTIMA® URETERAL STENT

## 2021-04-06 RX ORDER — FENTANYL CITRATE 50 UG/ML
INJECTION, SOLUTION INTRAMUSCULAR; INTRAVENOUS AS NEEDED
Status: DISCONTINUED | OUTPATIENT
Start: 2021-04-06 | End: 2021-04-06

## 2021-04-06 RX ORDER — ONDANSETRON 2 MG/ML
4 INJECTION INTRAMUSCULAR; INTRAVENOUS ONCE AS NEEDED
Status: COMPLETED | OUTPATIENT
Start: 2021-04-06 | End: 2021-04-06

## 2021-04-06 RX ORDER — FENTANYL CITRATE/PF 50 MCG/ML
25 SYRINGE (ML) INJECTION
Status: DISCONTINUED | OUTPATIENT
Start: 2021-04-06 | End: 2021-04-06 | Stop reason: HOSPADM

## 2021-04-06 RX ORDER — CIPROFLOXACIN 2 MG/ML
400 INJECTION, SOLUTION INTRAVENOUS
Status: COMPLETED | OUTPATIENT
Start: 2021-04-06 | End: 2021-04-06

## 2021-04-06 RX ORDER — SODIUM CHLORIDE, SODIUM LACTATE, POTASSIUM CHLORIDE, CALCIUM CHLORIDE 600; 310; 30; 20 MG/100ML; MG/100ML; MG/100ML; MG/100ML
125 INJECTION, SOLUTION INTRAVENOUS CONTINUOUS
Status: DISCONTINUED | OUTPATIENT
Start: 2021-04-06 | End: 2021-04-07 | Stop reason: HOSPADM

## 2021-04-06 RX ORDER — ONDANSETRON 2 MG/ML
INJECTION INTRAMUSCULAR; INTRAVENOUS AS NEEDED
Status: DISCONTINUED | OUTPATIENT
Start: 2021-04-06 | End: 2021-04-06

## 2021-04-06 RX ORDER — PROPOFOL 10 MG/ML
INJECTION, EMULSION INTRAVENOUS AS NEEDED
Status: DISCONTINUED | OUTPATIENT
Start: 2021-04-06 | End: 2021-04-06

## 2021-04-06 RX ORDER — POTASSIUM CHLORIDE 20 MEQ/1
40 TABLET, EXTENDED RELEASE ORAL ONCE
Status: COMPLETED | OUTPATIENT
Start: 2021-04-06 | End: 2021-04-06

## 2021-04-06 RX ORDER — DEXAMETHASONE SODIUM PHOSPHATE 10 MG/ML
INJECTION, SOLUTION INTRAMUSCULAR; INTRAVENOUS AS NEEDED
Status: DISCONTINUED | OUTPATIENT
Start: 2021-04-06 | End: 2021-04-06

## 2021-04-06 RX ORDER — LEVOFLOXACIN 5 MG/ML
500 INJECTION, SOLUTION INTRAVENOUS ONCE
Status: DISCONTINUED | OUTPATIENT
Start: 2021-04-06 | End: 2021-04-06

## 2021-04-06 RX ORDER — SODIUM CHLORIDE, SODIUM LACTATE, POTASSIUM CHLORIDE, CALCIUM CHLORIDE 600; 310; 30; 20 MG/100ML; MG/100ML; MG/100ML; MG/100ML
INJECTION, SOLUTION INTRAVENOUS CONTINUOUS PRN
Status: DISCONTINUED | OUTPATIENT
Start: 2021-04-06 | End: 2021-04-06

## 2021-04-06 RX ORDER — LIDOCAINE HYDROCHLORIDE 10 MG/ML
INJECTION, SOLUTION EPIDURAL; INFILTRATION; INTRACAUDAL; PERINEURAL AS NEEDED
Status: DISCONTINUED | OUTPATIENT
Start: 2021-04-06 | End: 2021-04-06

## 2021-04-06 RX ORDER — POTASSIUM CHLORIDE 14.9 MG/ML
20 INJECTION INTRAVENOUS ONCE
Status: COMPLETED | OUTPATIENT
Start: 2021-04-06 | End: 2021-04-06

## 2021-04-06 RX ADMIN — ONDANSETRON 4 MG: 2 INJECTION INTRAMUSCULAR; INTRAVENOUS at 07:48

## 2021-04-06 RX ADMIN — PROPOFOL 130 MG: 10 INJECTION, EMULSION INTRAVENOUS at 07:37

## 2021-04-06 RX ADMIN — POTASSIUM CHLORIDE 20 MEQ: 14.9 INJECTION, SOLUTION INTRAVENOUS at 09:55

## 2021-04-06 RX ADMIN — FENTANYL CITRATE 25 MCG: 50 INJECTION INTRAMUSCULAR; INTRAVENOUS at 07:47

## 2021-04-06 RX ADMIN — HEPARIN SODIUM 5000 UNITS: 5000 INJECTION INTRAVENOUS; SUBCUTANEOUS at 05:10

## 2021-04-06 RX ADMIN — CIPROFLOXACIN: 2 INJECTION, SOLUTION INTRAVENOUS at 07:41

## 2021-04-06 RX ADMIN — POTASSIUM CHLORIDE 40 MEQ: 1500 TABLET, EXTENDED RELEASE ORAL at 09:52

## 2021-04-06 RX ADMIN — SODIUM CHLORIDE, SODIUM LACTATE, POTASSIUM CHLORIDE, AND CALCIUM CHLORIDE: .6; .31; .03; .02 INJECTION, SOLUTION INTRAVENOUS at 07:30

## 2021-04-06 RX ADMIN — FENTANYL CITRATE 25 MCG: 50 INJECTION INTRAMUSCULAR; INTRAVENOUS at 07:43

## 2021-04-06 RX ADMIN — DEXAMETHASONE SODIUM PHOSPHATE 4 MG: 10 INJECTION, SOLUTION INTRAMUSCULAR; INTRAVENOUS at 07:40

## 2021-04-06 RX ADMIN — ONDANSETRON 4 MG: 2 INJECTION INTRAMUSCULAR; INTRAVENOUS at 08:18

## 2021-04-06 RX ADMIN — METHIMAZOLE 2.5 MG: 5 TABLET ORAL at 09:08

## 2021-04-06 RX ADMIN — SODIUM BICARBONATE 75 ML/HR: 84 INJECTION INTRAVENOUS at 00:22

## 2021-04-06 RX ADMIN — LIDOCAINE HYDROCHLORIDE 40 MG: 10 INJECTION, SOLUTION EPIDURAL; INFILTRATION; INTRACAUDAL; PERINEURAL at 07:37

## 2021-04-06 RX ADMIN — LETROZOLE 2.5 MG: 2.5 TABLET, FILM COATED ORAL at 09:07

## 2021-04-06 NOTE — ASSESSMENT & PLAN NOTE
Lab Results   Component Value Date    HGBA1C 6 1 (H) 04/03/2021    ordered for am fasting     Recent Labs     04/05/21  2109 04/06/21  0610 04/06/21  0824 04/06/21  1030   POCGLU 94 83 116 114       Blood Sugar Average: Last 72 hrs:  (P) 103 2 chronically on metformin  mg daily which will hold in the setting of acute kidney injury  · Placed on insulin sliding scale, will keep going as she is more prone to hypoglycemia   · She reports she does not like diet as she doesn't like anything artificial (sweetners) she will not eat or drink dt this     · Refusing diabetic diet

## 2021-04-06 NOTE — QUICK NOTE
After multiple conversations on Saturday and Sunday, the patient again presents for surgery this morning  We discussed the plan for surgery and as she is signing consent she reports that she is doing under protest "  We stop the process of consent and I had a again a lengthy discussion with the patient  I am not interested in proceeding to the operating room if the patient feels that she is being forced to do so by the surgical or medical teams  She reports that after discussion with her daughter and her outside physician, she is agreeable to proceed  She is frustrated with the need for the procedure but is willing to undertake the stent placement in hopes that it will improve her renal dysfunction  She then signed informed consent of her own volition understanding the risks and benefits

## 2021-04-06 NOTE — NURSING NOTE
Patient became verbally abusive when taking away all cups of water and food of bedside table  Educated and explained that she is strict NPO for procedure this morning  Patient screamed at top of lungs to leave the cup alone and it needs to be kept on the table at all times  Unsure as to why this causes patient a great deal of anxiety

## 2021-04-06 NOTE — DISCHARGE INSTRUCTIONS
You have acute kidney injury  See instructions below  Avoid NSAIDs  Obtain BMP in 2 days after discharge  Establish care with a nephrologist at [facility]     Acute Kidney Injury (ZOYA)  You have been diagnosed with Acute Kidney Injury (ZOYA)  The following information has been developed to provide you with information about ZOYA and treatment  What is Acute Kidney Injury (ZOYA)? ZOYA occurs when kidney function decreases over a short period of time  This condition causes a buildup of waste products in the blood and can cause fluid to build up causing swelling in the legs and shortness of breath  Sometimes called Acute Kidney Failure or Acute Renal Failure, OZYA is often reversible if it is found and treated quickly  How do you know if you have ZOYA? ZOYA is diagnosed by assessing kidney function  This is done by obtaining a blood test to measure the blood level of creatinine  Decreased urine output can sometimes also indicate ZOYA  Who is at risk for ZOYA? ZOYA can happen to anyone but usually happens to people who are already sick and may be in the hospital  People are at higher risk for ZOYA if they have any of the following:   age 72 years or older   high or low blood pressure   underlying kidney disease (e g , Chronic Kidney Disease (CKD)   peripheral vascular disease (hardening of arteries)   chronic diseases such as liver disease, heart disease and diabetes   a single kidney    What are the symptoms of ZOYA? You may or may not have the symptoms to suggest you have kidney injury until the ZOYA has progressed  Some of the symptoms are listed below:   Not making enough urine   Increased swelling in legs    Feeling tired   Trouble breathing or shortness of breath   Nausea   New or worsening confusion    What causes ZOYA?   The causes are divided into three categories:   Not enough blood flowing to the kidneys (e g , low blood pressure, bleeding, diarrhea, dehydration)   Injury directly to the kidneys (e g , blood clots, severe infections such as sepsis, medicine toxicity, IV contrast dye used for cardiac catheterization or CT scans)   Blockage to the tubes (ureters) that drain the urine from the kidneys (e g , enlarged prostate, kidney stones, blood clots)    What is the treatment for ZOYA? The treatment for ZOYA depends on correcting what caused it  Treatment usually involves removing the cause and measures to prevent further injury to the kidneys  This may require the use of intravenous fluids or medications and/or temporary dialysis  Dialysis is a process using a machine that does the job of the kidneys to remove waste and help correct the electrolyte and fluid balance while the kidneys are recovering  If dialysis is needed to treat ZOYA, the doctor will assess daily to see if the kidneys are showing signs of recovery  The daily assessments determine how long dialysis needs to continue  Depending on the cause and the extent of damage, an episode of ZOYA may resolve in a few days to several weeks to several months  What are the long term effects of having an episode of ZOYA?  People who have one episode of ZOYA are at an increased risk of having another episode of ZOYA as well as other health problems such as kidney disease, stroke, and heart disease   In a small number of people who had unrecognized kidney disease, an ZOYA episode may result in Chronic Kidney Disease (CKD) which requires lifelong monitoring and treatment  How do you prevent future episodes of ZOYA?  Make sure to follow up with the kidney doctor after hospital discharge and obtain blood work to reassess and monitor kidney function     If you have diabetes, keep your blood sugar in goal range and keep appointments with your diabetes specialist    Gerson Tan If you have high blood pressure, have your blood pressure checked regularly to make sure it is in target range  o If you take blood pressure medicine called ACEIs or ARBs (e g , Lisinopril, Enalapril, Diovan, Losartan), your doctor may tell you to skip a dose or two if you have severe dehydration and your blood pressure is running low   Avoid using medicines such as NSAIDs (Nonsteroidal Anti-inflammatory Drugs) and Simmons-2 Inhibitors (a type of NSAID) that may be harmful to kidney function  These may include the medicines listed in the table that follows  Examples of NSAIDS and Simmons-2 Inhibitors   Talk to your doctor or healthcare provider before stopping any medicine ordered for you  Celecoxib (CELEBREX) Ketoprofen (ORUDIS, ORUVAIL)   Diclofenac (VOLTAREN, CATAFLAM) Ketorolac (TORADOL)   Diflunisal (DOLOBID) Meloxicam (MOBIC)   Etodolac (LODINE) Nabumetone (RELAFEN)   Fenoprofen (NALFON) Naproxen (ALEVE, NAPROSYN,    NAPRELAN, ANAPROX)   Flurbiprofen (ANSAID) Oxaprozin (DAYPRO)   Ibuprofen (MOTRIN, ADVIL) Piroxicam (FELDENE)   Indomethacin (INDOCIN) Sulindac (CLINORIL)    Tolmetin (Vannesa Adjutant)     Is there a special diet for people with ZOYA? People with ZOYA and/or other kidney disease often have high potassium and phosphorus levels in their blood  To protect the kidneys from further injury and to avoid complications, most doctors recommend following a healthy diet choosing foods low potassium and low phosphorus   Limiting dietary potassium to 2 5 grams/day and phosphorus to 800 milligrams/day is recommended   A dietitian can help you with learning more about this type of diet   The tables on the following page may help you to choose lower potassium and phosphorus foods  The following websites are also good sources of information:  Maris at  org/nutrition/Kidney-Disease-Stages-1-4 Ekaterina johnson  https://www niddk nih gov/health-information/kidney-disease/chronic-kidney-disease-ckd/eating-nutrition  https://[facility]/health/articles/15641-renal-diet-basics  [employer]    If you have any questions or concerns about your condition, please contact your doctor or healthcare provider  These tables may help you to choose lower potassium and phosphorus foods    AVOID these higher phosphorus* foods: CHOOSE these lower phosphorus* foods:   Milk, pudding , yogurt made from animals and from many soy varieties Rice milk (unfortified), nondairy creamer   Hard cheeses, ricotta, cottage cheese, fat-free cream cheese Regular and low-fat cream cheese   Ice cream, frozen yogurt Sherbet, frozen fruit pops, sorbet   Soups made with milk, dried peas, beans, lentils or other high phosphorus ingredients Soups made with broth, are water-based, or other lower phosphorus ingredients   Whole grains, including whole-grain breads, crackers, cereal, rice and pasta, corn tortillas Refined grains, including white bread, crackers, cereals, rice and pasta   Quick breads, biscuits, cornbread, muffins, pancakes, waffles, granola, wheat germ Homemade refined (white) dinner rolls, bagels, English muffins, sugar cookies, shortbread cookies, derrick food cake   Dried peas (split, black-eyed), beans (black, garbanzo, lima, kidney, navy, mukherjee), lentils Green peas (canned, frozen), green beans, wax beans   Organ meats, walleye, [place], sardines Lean beef, pork, lamb, poultry, other fish   Nuts and seeds Popcorn   Peanut butter, other nut butters; tofu, veggie or soy burgers Jam, jelly, honey   Chocolate, including chocolate drinks Carob (chocolate-flavored) candy, hard candy,  gumdrops   Cristian, pepper-type sodas, flavored perera, bottled teas, beer Lemon-lime soda, ginger ale or root beer, plain water, cream soda, grape soda   *Always read labels and avoid foods with ingredients containing "phos"  AVOID these higher potassium foods: CHOOSE these lower potassium foods:      Milk (fat free, low fat, whole, buttermilk, Soy), yogurt    Regular and low-fat cream cheese      Beans (white, [place]), [place] sprouts, spinach Swiss       chard, broccoli, avocado, artichoke, potatoes, sweet      potatoes, tomatoes/tomato sauce, beet greens      Green beans, alfalfa sprouts, bamboo shoots (canned),       cabbage, carrots, cauliflower, corn, cucumber, eggplant,      endive, lettuce, mushrooms, onions, radishes,  watercress,       water chestnuts (canned), rice, peas      Halibut, tuna, cod, snapper, tuna fish, turkey    Egg, lean beef, pork, lamb, shellfish, chicken       Banana, papaya, orange, cantaloupe, dates, raisins and      other dried fruit, pomegranate, avocado      Apple, applesauce, blackberries, raspberries, pears,     watermelon, cucumbers, blueberries, cranberries,       peaches      Almonds, peanuts, hazelnuts, [place], cashew, mixed,      seeds (sunflower, pumpkin)     Homemade refined (white) dinner rolls, bagels,      English muffins, flour tortilla, crackers, gaviota      crackers, popcorn, pretzels, spaghetti or macaroni,       hummus      Tomato or vegetable juice, prune juice    Papaya, lary, or pear nectar, cranberry juice cocktail      Molasses                                                                     Stents were placed in both kidneys  The stents are not permanent  You will need to establish with your urologist in [place] or return to our office within the next 6 weeks to determine a plan for the stents  [address]  for Urology office number: [phone]    Ureteral Stent Placement   AMBULATORY CARE:   What you need to know about ureteral stent placement:  Ureteral stent placement is a procedure to open a blocked or narrow ureter   The ureter is the tube that carries urine from your kidney into your bladder  A stent is a thin hollow plastic tube used to hold your ureter open and allow urine to flow  The stent may stay in for several weeks  Long-term stents will stay in longer and need to be replaced within a certain period of time  How to prepare for ureteral stent placement:  Your healthcare provider will talk to you about how to prepare for this procedure  He or she may tell you not to eat or drink anything after midnight on the day of your procedure  Your provider will tell you what medicines to take or not take on the day of your procedure  You may be given an antibiotic through your IV to help prevent a bacterial infection  Arrange for a ride home after your procedure if you will have general anesthesia during this procedure  What will happen during ureteral stent placement:   · You may be given general anesthesia to keep you asleep and free from pain during the procedure  You may instead be given local anesthesia to numb the urethra  With local anesthesia, you may still feel pressure or pushing during the procedure, but you should not feel any pain  Your healthcare provider will use x-rays and contrast liquid to find the area where the stent needs to be placed  · A cystoscope (small tube with a light and camera on the end) will be placed into your bladder through your urethra  The urethra is the tube that urine flows through when you urinate  A wire will be put through the scope into your ureter and moved close to your kidney  The stent will be pushed over the wire into your ureter  The wire is used to guide the stent  It will be removed when the stent is in place  A string that is attached to the end of the stent may be left hanging down through the urethra and out of the body  This string may be used to remove the stent later on  What will happen after ureteral stent placement:  You may have pain when you urinate, or around your bladder or kidney   You may also need to urinate more frequently than normal, or feel a sudden, urgent need to urinate  You may have blood or brown discharge from your urethra for 48 to 72 hours  You may see blood in your urine  These symptoms are common and should get better with time  Risks of ureteral stent placement:  Your ureter may be damaged during the procedure and you will need surgery to fix it  You may need surgery if the stent cannot be put in safely  The stent may become blocked or move out of place  If the stent remains in place for a long time, minerals and bacteria may grow over it  This can cause a blockage or a bladder infection  Seek care immediately if:   · You urinate very little or not at all  · You have severe pain in your abdomen, even after you take medicine  · You have heavy bleeding from your urethra  · You see large blood clots in your urine, or your urine is bright red  Contact your healthcare provider or urologist if:   · You have a fever or chills  · You feel like you need to urinate very often  · Your symptoms get worse, or you develop new symptoms  · You have questions or concerns about your condition or care  Medicines: You may  need any of the following:  · Acetaminophen  decreases pain and fever  It is available without a doctor's order  Ask how much to take and how often to take it  Follow directions  Read the labels of all other medicines you are using to see if they also contain acetaminophen, or ask your doctor or pharmacist  Acetaminophen can cause liver damage if not taken correctly  Do not use more than 4 grams (4,000 milligrams) total of acetaminophen in one day  · Prescription pain medicine  may be given  Ask your healthcare provider how to take this medicine safely  Some prescription pain medicines contain acetaminophen  Do not take other medicines that contain acetaminophen without talking to your healthcare provider  Too much acetaminophen may cause liver damage   Prescription pain medicine may cause constipation  Ask your healthcare provider how to prevent or treat constipation  · Antibiotics  help prevent or treat bacterial infections  Your healthcare provider may prescribe these for you while you have a ureteral stent  · Take your medicine as directed  Contact your healthcare provider if you think your medicine is not helping or if you have side effects  Tell him or her if you are allergic to any medicine  Keep a list of the medicines, vitamins, and herbs you take  Include the amounts, and when and why you take them  Bring the list or the pill bottles to follow-up visits  Carry your medicine list with you in case of an emergency  Self-care:   · Drink liquids as directed  Liquids will help flush your urinary tract and prevent infection  Ask your healthcare provider how much liquid to drink each day and which liquids are best for you  · Ask when you can return to daily activities  You may be able to return to normal activities the day after your stent placement  Follow up with your urologist as directed: You will need regular follow-up visits with your urologist as long as you have a stent  He or she will check to make sure the stent is working properly  He or she will remove your temporary stent in several weeks  Your provider may do urine cultures to check for infection  Write down your questions so you remember to ask them during your visits  © Copyright [address] Information is for End User's use only and may not be sold, redistributed or otherwise used for commercial purposes  All illustrations and images included in CareNotes® are the copyrighted property of A D A M , Inc  or Brenda Draper   The above information is an  only  It is not intended as medical advice for individual conditions or treatments  Talk to your doctor, nurse or pharmacist before following any medical regimen to see if it is safe and effective for you

## 2021-04-06 NOTE — PERIOPERATIVE NURSING NOTE
Pt resting comfortably in bed// pt states that she feels back to her baseline even with the nausea  Positioned on her side for  Abdominal gas pain she was having

## 2021-04-06 NOTE — PLAN OF CARE
Problem: Potential for Falls  Goal: Patient will remain free of falls  Description: INTERVENTIONS:  - Assess patient frequently for physical needs  -  Identify cognitive and physical deficits and behaviors that affect risk of falls  -  [place] fall precautions as indicated by assessment   - Educate patient/family on patient safety including physical limitations  - Instruct patient to call for assistance with activity based on assessment  - Modify environment to reduce risk of injury  - Consider OT/PT consult to assist with strengthening/mobility  Outcome: Progressing     Problem: Prexisting or High Potential for Compromised Skin Integrity  Goal: Skin integrity is maintained or improved  Description: INTERVENTIONS:  - Identify patients at risk for skin breakdown  - Assess and monitor skin integrity  - Assess and monitor nutrition and hydration status  - Monitor labs   - Assess for incontinence   - Turn and reposition patient  - Assist with mobility/ambulation  - Relieve pressure over bony prominences  - Avoid friction and shearing  - Provide appropriate hygiene as needed including keeping skin clean and dry  - Evaluate need for skin moisturizer/barrier cream  - Collaborate with interdisciplinary team   - Patient/family teaching  - Consider wound care consult   Outcome: Progressing     Problem: Nutrition/Hydration-ADULT  Goal: Nutrient/Hydration intake appropriate for improving, restoring or maintaining nutritional needs  Description: Monitor and assess patient's nutrition/hydration status for malnutrition  Collaborate with interdisciplinary team and initiate plan and interventions as ordered  Monitor patient's weight and dietary intake as ordered or per policy  Utilize nutrition screening tool and intervene as necessary  Determine patient's food preferences and provide high-protein, high-caloric foods as appropriate       INTERVENTIONS:  - Monitor oral intake, urinary output, labs, and treatment plans  - Assess nutrition and hydration status and recommend course of action  - Evaluate amount of meals eaten  - Assist patient with eating if necessary   - Allow adequate time for meals  - Recommend/ encourage appropriate diets, oral nutritional supplements, and vitamin/mineral supplements  - Order, calculate, and assess calorie counts as needed  - Recommend, monitor, and adjust tube feedings and TPN/PPN based on assessed needs  - Assess need for intravenous fluids  - Provide specific nutrition/hydration education as appropriate  - Include patient/family/caregiver in decisions related to nutrition  Outcome: Progressing     Problem: CARDIOVASCULAR - ADULT  Goal: Maintains optimal cardiac output and hemodynamic stability  Description: INTERVENTIONS:  - Monitor I/O, vital signs and rhythm  - Monitor for S/S and trends of decreased cardiac output  - Administer and titrate ordered vasoactive medications to optimize hemodynamic stability  - Assess quality of pulses, skin color and temperature  - Assess for signs of decreased coronary artery perfusion  - Instruct patient to report change in severity of symptoms  Outcome: Progressing  Goal: Absence of cardiac dysrhythmias or at baseline rhythm  Description: INTERVENTIONS:  - Continuous cardiac monitoring, vital signs, obtain 12 lead EKG if ordered  - Administer antiarrhythmic and heart rate control medications as ordered  - Monitor electrolytes and administer replacement therapy as ordered  Outcome: Progressing     Problem: GASTROINTESTINAL - ADULT  Goal: Minimal or absence of nausea and/or vomiting  Description: INTERVENTIONS:  - Administer IV fluids if ordered to ensure adequate hydration  - Maintain NPO status until nausea and vomiting are resolved  - Nasogastric tube if ordered  - Administer ordered antiemetic medications as needed  - Provide nonpharmacologic comfort measures as appropriate  - Advance diet as tolerated, if ordered  - Consider nutrition services referral to assist patient with adequate nutrition and appropriate food choices  Outcome: Progressing     Problem: GENITOURINARY - ADULT  Goal: Urinary catheter remains patent  Description: INTERVENTIONS:  - Assess patency of urinary catheter  - If patient has a chronic mathis, consider changing catheter if non-functioning  - Follow guidelines for intermittent irrigation of non-functioning urinary catheter  Outcome: Progressing     Problem: METABOLIC, FLUID AND ELECTROLYTES - ADULT  Goal: Glucose maintained within target range  Description: INTERVENTIONS:  - Monitor Blood Glucose as ordered  - Assess for signs and symptoms of hyperglycemia and hypoglycemia  - Administer ordered medications to maintain glucose within target range  - Assess nutritional intake and initiate nutrition service referral as needed  Outcome: Progressing     Problem: PAIN - ADULT  Goal: Verbalizes/displays adequate comfort level or baseline comfort level  Description: Interventions:  - Encourage patient to monitor pain and request assistance  - Assess pain using appropriate pain scale  - Administer analgesics based on type and severity of pain and evaluate response  - Implement non-pharmacological measures as appropriate and evaluate response  - Consider cultural and social influences on pain and pain management  - Notify physician/advanced practitioner if interventions unsuccessful or patient reports new pain  Outcome: Progressing     Problem: DISCHARGE PLANNING  Goal: Discharge to home or other facility with appropriate resources  Description: INTERVENTIONS:  - Identify barriers to discharge w/patient and caregiver  - Arrange for needed discharge resources and transportation as appropriate  - Identify discharge learning needs (meds, wound care, etc )  - Arrange for interpretive services to assist at discharge as needed  - Refer to Case Management Department for coordinating discharge planning if the patient needs post-hospital services based on physician/advanced practitioner order or complex needs related to functional status, cognitive ability, or social support system  Outcome: Progressing

## 2021-04-06 NOTE — ASSESSMENT & PLAN NOTE
· Concern for obstructive uropathy with bilateral hydronephrosis  · - however pt with slow return to norm on ivf and urinary catheter insertion  · Lab work from "care everywhere" indicating cr of 1 10 (2/24/21)  · Status post CT scan abdomen pelvis without contrast "Bilateral hydronephrosis and proximal hydroureter   No CT evidence of renal colic   No obstructing lesions are found in the retroperitoneum   Urinary bladder was not distended   Recommend follow-up renal ultrasound to assess for ureteral jets/urinary   obstruction  "   · - however noting "Large volume of formed stool in the cecum without obstruction"  · Renal ultrasound demonstrates:   Mild bilateral hydronephrosis similar to April 1, 2021 allowing for difference in imaging modalities  No perinephric collection  · Way catheter decompressing the bladder and limiting its evaluation     · Repeat ultrasound 4/4 demonstrates no change in hydro  · Way was placed on admission with yellow urine continues to drain no obstruction   · Status was UA no sign of infection  · Continue to monitor input and output  · Avoid nephrotoxins/ NSAIDS and renally dose medication  · Creatinine improved to 4 28 - will monitor for improvement following procedure

## 2021-04-06 NOTE — PHYSICAL THERAPY NOTE
04/06/21 0810   Note Type   Note Type Treatment   Cancel Reasons Patient to operating room   Pt off floor at OR  Will follow    Laramiekevin Vogt PT, DPT CSRS

## 2021-04-06 NOTE — ANESTHESIA POSTPROCEDURE EVALUATION
Post-Op Assessment Note    CV Status:  Stable  Pain Score: 0    Pain management: adequate     Mental Status:  Awake   Hydration Status:  Stable   PONV Controlled:  None   Airway Patency:  Patent      Post Op Vitals Reviewed: Yes      Staff: CRNA, Anesthesiologist         No complications documented      BP   145/78   Temp   97 3   Pulse  83   Resp   16   SpO2   97

## 2021-04-06 NOTE — OP NOTE
OPERATIVE REPORT  PATIENT NAME: Mele Nagel    :  1945  MRN: [id number]  Pt Location: [facility]OM 01    SURGERY DATE: 2021    Surgeon(s) and Role:     * Madhuri López MD - Primary    Preop Diagnosis:  Acute kidney injury ([address] ) [N17 9]  Other hydronephrosis [N13 39]    Post-Op Diagnosis Codes:     * Acute kidney injury ([address] ) [N17 9]     * Other hydronephrosis [N13 39]    Procedure(s) (LRB):  CYSTOSCOPY RETROGRADE PYELOGRAM WITH INSERTION STENT URETERAL (Bilateral)    Specimen(s):  * No specimens in log *    Estimated Blood Loss:   Minimal    Drains:  Urethral Catheter Non-latex 16 Fr  (Active)   Amt returned on insertion(mL) 5 mL 21   Reasons to continue Urinary Catheter  Accurate I&O assessment in critically ill patients (48 hr  max) 21   Goal for Removal Will consult urology 21   Site Assessment Clean;Skin intact 21   Collection Container Standard drainage bag 21   Securement Method Securing device (Describe) 21   Output (mL) 525 mL 21 0435   Number of days: 5       Urethral Catheter Non-latex;Straight-tip 18 Fr  (Active)   Number of days: 0       Anesthesia Type:   General    Operative Indications:  Acute kidney injury ([address] ) [N17 9]  Other hydronephrosis [N13 39]      Operative Findings:  1  Mathis irritation in the bladder, no tumor no ureteral orifice obstruction  2  Bilateral hydronephrosis, no signs of ureteral obstruction  3  Bilateral 0H84 stents    Complications:   None    Procedure and Technique:  Mele Nagel is a 70y o -year-old female who presented with retention and bilateral hydronephrosis  Patient did not resolve her renal function with mathis drainage  Risk and benefits of cystoscopy with bilateral ureteral stent insertion were discussed and reviewed  We discussed that ureteral stent cannot be consider permanent and will need to be removed or exchanged within 3-6 months  Informed consent was obtained  The patient was brought to the operating room on 4/6/2021  After the smooth induction of general LMA anesthesia, the patient was placed in the dorsal lithotomy position  Her genitalia was prepped and draped in a sterile fashion  Venodyne boots had been applied  Intravenous antibiotics were administered in the form of cipro  A timeout was performed with all members of the operative team confirming the patient's identity, procedure to be performed, and laterality of the case  A 24 Pakistani rigid cystoscope with 30° lens was inserted  The bladder was thoroughly inspected  There was bladder irritation  Attention was focused on first the left ureteral orifice  A Bard Solo Plus Wire was placed into the collecting system  Not appear to be any distal tumor or ureteral obstruction  Dual-lumen catheter was advanced over the left wire into the distal ureter retrograde pyelogram was performed  There was some tortuosity of the ureter and proximal moderate hydronephrosis  There did not appear to be arnoldo obstruction  A 6 x 24 double-J stent was easily advanced over the wire and placed in the correct position  This was confirmed with fluoroscopy  A new Bard solo +wire was introduced in the right collecting system and again the dual-lumen catheter was advanced into the distal ureter  Retrograde pyelogram again demonstrated some mild tortuosity of the ureter but no arnoldo obstruction  Again moderate hydronephrosis was noted  Over the placed wire, a 2nd 6 x 24 double-J stent was placed  No strings were attached either stent  Once the stents were noted in good position visually in the bladder and fluoroscopy, a 18 Pakistani silicone Way catheter was placed with 10 cc of in the balloon  This was hooked to gravity drainage  Patient was taken out of dorsal lithotomy  Overall the patient tolerated the procedure well and there were no complications    The patient was extubated in the operating room and transferred to the PACU in stable condition at the conclusion of the case  Patient will need follow-up to manage the indwelling stents  She has a team in [place] that she will likely prefer to follow with       I was present for the entire procedure    Patient Disposition:  PACU     SIGNATURE: Nya Esquivel MD  DATE: April 6, 2021  TIME: 8:04 AM

## 2021-04-06 NOTE — PROGRESS NOTES
Progress Note - Nephrology   Eda See 70 y o  female MRN: [id number]  Unit/Bed#: -01 Encounter: [id number]      Assessment / Plan:  1  ZOYA, POA, in setting of obstructive nephropathy with bilateral hydronephrosis with hydroureter plus or minus prerenal component as decreased oral intake on admission as well  -baseline serum creatinine 0 7-0 8, up to 1 1 once in 2021  -4 2 on admission now 4 28 and stable  -s/p cystoscopy with ureteral stent insertion  -continue Way catheter per Urology  -urinalysis bland on admission  -avoid nephrotoxins/NSAIDs  -avoid relative hypotension in the setting of acute renal impairment  -thankfully, adequate UOP noted with polyuria s/p stent placement  -monitor electrolytes closely  -f/u am BMP    2  Low serum bicarbonate likely due to metabolic acidosis from Acute kidney injury-resolved, monitor off bicarb infusion    3  Hypokalemia, in part due to bicarbonate infusion - d/c bicarb gtt  Monitor potassium/mag levels s/p oral and IV KCl (total 60meq)  4  hypercalcemia - resolved    5  Thrombocytopenia - work up per primary team    Subjective:   She complains about her diet  Denies any chest pain or shortness of breath  She also complains of burning after Way placement  She is on lactated Ringer's IV fluid  Objective:     Vitals: Blood pressure 150/82, pulse 80, temperature 97 7 °F (36 5 °C), resp  rate 12, weight 71 3 kg (157 lb 3 oz), SpO2 95 %  ,Body mass index is 30 7 kg/m²  Temp (24hrs), Av 9 °F (36 6 °C), Min:97 3 °F (36 3 °C), Max:98 8 °F (37 1 °C)      Weight (last 2 days)     None            Intake/Output Summary (Last 24 hours) at 2021 1239  Last data filed at 2021 1217  Gross per 24 hour   Intake 1888 75 ml   Output 2875 ml   Net -986 25 ml     I/O last 24 hours: In: 2573 8 [P O :240; I V : 8; IV Piggyback:300]  Out: 7311 [Urine:2875]        Physical Exam:   Physical Exam  Vitals signs and nursing note reviewed  Constitutional:       General: She is not in acute distress  Appearance: She is well-developed  She is ill-appearing  She is not diaphoretic  HENT:      Head: Normocephalic and atraumatic  Nose: Nose normal       Mouth/Throat:      Mouth: Mucous membranes are moist       Pharynx: No oropharyngeal exudate  Eyes:      General: No scleral icterus  Right eye: No discharge  Left eye: No discharge  Neck:      Musculoskeletal: Normal range of motion and neck supple  Thyroid: No thyromegaly  Cardiovascular:      Rate and Rhythm: Normal rate and regular rhythm  Heart sounds: No murmur  Pulmonary:      Effort: Pulmonary effort is normal  No respiratory distress  Breath sounds: Normal breath sounds  No wheezing  Abdominal:      General: Bowel sounds are normal  There is no distension  Palpations: Abdomen is soft  Genitourinary:     Comments: +mathis  Musculoskeletal:         General: No swelling  Skin:     General: Skin is warm and dry  Findings: No rash  Neurological:      General: No focal deficit present  Mental Status: She is alert  Motor: No abnormal muscle tone        Comments: awake   Psychiatric:         Mood and Affect: Mood normal          Behavior: Behavior normal          Invasive Devices     Peripheral Intravenous Line            Peripheral IV 04/04/21 Distal;Right;Ventral (anterior) Wrist 2 days          Drain            Urethral Catheter Non-latex;Straight-tip 18 Fr  less than 1 day                Medications:    Scheduled Meds:  Current Facility-Administered Medications   Medication Dose Route Frequency Provider Last Rate    acetaminophen  650 mg Oral Q6H PRN Monica Ontiveros MD      bisacodyl  5 mg Oral Once Monica Ontiveros MD      bisacodyl  10 mg Rectal Once Monica Ontiveros MD      calcium carbonate  500 mg Oral Q8H PRN Monica Ontiveros MD      docusate sodium  100 mg Oral BID Monica Ontiveros MD  heparin (porcine)  5,000 Units Subcutaneous [facility] Juli Rodríguez MD      insulin lispro  1-5 Units Subcutaneous 4x Daily (AC & HS) Juli Rodríguez MD      lactated ringers  125 mL/hr Intravenous Continuous Finesse Yu PA-C      letrozole  2 5 mg Oral Daily Juli Rodríguez MD      lidocaine  1 patch Topical Daily Juli Rodrígeuz MD      methimazole  2 5 mg Oral Every Other Day Juli Rodríguez MD      ondansetron  4 mg Intravenous Q6H PRN Juli Rodríguez MD      polyethylene glycol  17 g Oral Daily Juli Rodríguez MD      senna  1 tablet Oral Daily Juli Rodríguez MD         PRN Meds:   acetaminophen    calcium carbonate    ondansetron    Continuous Infusions:lactated ringers, 125 mL/hr            LAB RESULTS:      Results from last 7 days   Lab Units 04/06/21  0434 04/05/21  0634 04/05/21  0525 04/04/21  0537 04/03/21  0449 04/02/21  1406 04/02/21  0354 04/02/21  0353 04/01/21  1551   WBC Thousand/uL 6 18  --  6 36  --   --   --  9 48  --  9 01   HEMOGLOBIN g/dL 12 8  --  12 3  --   --   --  14 2  --  15 6*   HEMATOCRIT % 40 8  --  39 0  --   --   --  44 7  --  46 9*   PLATELETS Thousands/uL 137*  --  137*  --   --   --  141*  --  152   NEUTROS PCT % 56  --  58  --   --   --   --   --  77*   LYMPHS PCT % 33  --  29  --   --   --   --   --  17   MONOS PCT % 8  --  9  --   --   --   --   --  5   EOS PCT % 2  --  3  --   --   --   --   --  1   POTASSIUM mmol/L 3 1* 3 5  --  3 9 4 0 3 8  --  3 0* 3 3*   CHLORIDE mmol/L 108 112*  --  115* 119* 113*  --  106 101   CO2 mmol/L 24 20*  --  20* 21 21  --  23 24   BUN mg/dL 30* 30*  --  31* 32* 33*  --  31* 35*   CREATININE mg/dL 4 28* 4 30*  --  4 08* 4 03* 3 90*  --  3 76* 4 26*   CALCIUM mg/dL 8 6 8 6  --  8 7 8 8 9 0  --  9 3 9 7   ALK PHOS U/L  --  95  --   --   --   --   --  83 98   ALT U/L  --  16  --   --   --   --   --  14 17   AST U/L  --  23  --   --   --   --   --  26 29   MAGNESIUM mg/dL  --   -- --   --   --   --   --  2 2  --        CUTURES:  No results found for: Jose Martin Low              Portions of the record may have been created with voice recognition software  Occasional wrong word or "sound a like" substitutions may have occurred due to the inherent limitations of voice recognition software  Read the chart carefully and recognize, using context, where substitutions have occurred  If you have any questions, please contact the dictating provider

## 2021-04-06 NOTE — TELEPHONE ENCOUNTER
Flower Raza is a 74-year-old status post bilateral ureteral stent placement by Dr Gay Mcdaniels at [facility] while on call  Patient will require follow-up to determine long term plan for stents  Please contact patient for discussion within 6--8 weeks  Please verify if   patient will  follow up with urologist in [place] area instead  Thank you

## 2021-04-06 NOTE — PROGRESS NOTES
[facility]  Progress Note - Lissette Tomas 1945, 70 y o  female MRN: [id number]  Unit/Bed#: -[facility] Encounter: [id number]  Primary Care Provider: Ryan Lujan MD   Date and time admitted to hospital: 4/1/2021  9:57 PM    * Acute kidney injury [facility])  Assessment & Plan  · Concern for obstructive uropathy with bilateral hydronephrosis  · - however pt with slow return to norm on ivf and urinary catheter insertion  · Lab work from "[facility] everywhere" indicating cr of 1 10 (2/24/21)  · Status post CT scan abdomen pelvis without contrast "Bilateral hydronephrosis and proximal hydroureter   No CT evidence of renal colic   No obstructing lesions are found in the retroperitoneum  Bee Spoon bladder was not distended   Recommend follow-up renal ultrasound to assess for ureteral jets/urinary   obstruction  "   · - however noting "Large volume of formed stool in the cecum without obstruction"  · Renal ultrasound demonstrates:   Mild bilateral hydronephrosis similar to April 1, 2021 allowing for difference in imaging modalities  No perinephric collection  · Way catheter decompressing the bladder and limiting its evaluation     · Repeat ultrasound 4/4 demonstrates no change in hydro  · Way was placed on admission with yellow urine continues to drain no obstruction   · Status was UA no sign of infection  · Continue to monitor input and output  · Avoid nephrotoxins/ NSAIDS and renally dose medication  · Creatinine improved to 4 28 - will monitor for improvement following procedure    Constipation  Assessment & Plan  · Bowel regimen  · Ct scan with large amt of stool in cecum   · Colace and miralax will add dulcolax at pts request     Chest pain  Assessment & Plan  · X1 episode has had no recurrence  · Low suspicion for ACS serial troponin, are negative   · EKG from the ED sinus rhythm at 89 beats per minute no acute ST changes  · Will discontinue telemetry no events     Cirrhosis of liver [facility])  Assessment & Plan  · Incidental finding status post CT noting mild cirrhotic changes  Liver function tests within normal limits  · Continued evaluation as outpatient    Controlled type 2 diabetes mellitus, without long-term current use of insulin [facility])  Assessment & Plan  Lab Results   Component Value Date    HGBA1C 6 1 (H) 04/03/2021    ordered for am fasting     Recent Labs     04/05/21  2109 04/06/21  0610 04/06/21  0824 04/06/21  1030   POCGLU 94 83 116 114       Blood Sugar Average: Last 72 hrs:  (P) 103 2 chronically on metformin  mg daily which will hold in the setting of acute kidney injury  · Placed on insulin sliding scale, will keep going as she is more prone to hypoglycemia   · She reports she does not like diet as she doesn't like anything artificial (sweetners) she will not eat or drink dt this  · Refusing diabetic diet      History of breast cancer  Assessment & Plan  · Initially diagnosed in 2007 status post mastectomy/chemotherapy  · On letrozole, will continue    Hyperthyroidism  Assessment & Plan  · Recent TSH checked, reviewed from "care [facility]" (2/24) in euthyroid state  · Continue methimazole 2 5 mg every other day    VTE Pharmacologic Prophylaxis:   Pharmacologic: Heparin  Mechanical VTE Prophylaxis in Place: Yes    Patient Centered Rounds: I have performed bedside rounds with nursing staff today  Education and Discussions with Family / Patient:  Educated patient on acidosis, renal dysfunction  Time Spent for Care: 30 minutes  More than 50% of total time spent on counseling and coordination of care as described above  Current Length of Stay: 5 day(s)    Current Patient Status: Inpatient   Certification Statement: The patient will continue to require additional inpatient hospital stay due to further moritor creatinine    Discharge Plan: TBD    Code Status: Level 3 - DNAR and DNI      Subjective:   Patient examined at bedside    Patient states that potassium infusion is burning her arm and would like it to stop  She states that she is tired of the diabetic diet and is refusing diabetic diet at this time  She refers burning sensation she has been having with her catheter  She states overall she feels lousy and does feel hopeless at times  She refers adequate bowel movements  All other review of systems negative this time  Objective:     Vitals:   Temp (24hrs), Av 9 °F (36 6 °C), Min:97 3 °F (36 3 °C), Max:98 8 °F (37 1 °C)    Temp:  [97 3 °F (36 3 °C)-98 8 °F (37 1 °C)] 97 7 °F (36 5 °C)  HR:  [78-85] 80  Resp:  [12-27] 12  BP: (137-154)/(72-84) 150/82  SpO2:  [95 %-99 %] 95 %  Body mass index is 30 7 kg/m²  Input and Output Summary (last 24 hours): Intake/Output Summary (Last 24 hours) at 2021 1205  Last data filed at 2021 1001  Gross per 24 hour   Intake 1908 75 ml   Output 2875 ml   Net -966 25 ml       Physical Exam:     Physical Exam  Vitals signs and nursing note reviewed  Constitutional:       General: She is not in acute distress  Appearance: She is well-developed  She is not diaphoretic  Comments: SMI 30 7   HENT:      Head: Normocephalic and atraumatic  Mouth/Throat:      Pharynx: No oropharyngeal exudate  Eyes:      General: No scleral icterus  Conjunctiva/sclera: Conjunctivae normal       Pupils: Pupils are equal, round, and reactive to light  Neck:      Musculoskeletal: Normal range of motion and neck supple  Cardiovascular:      Rate and Rhythm: Normal rate and regular rhythm  Heart sounds: No murmur  Pulmonary:      Effort: Pulmonary effort is normal       Breath sounds: Rales (mild) present  No wheezing  Comments: RA  Abdominal:      General: Bowel sounds are normal       Palpations: Abdomen is soft  Genitourinary:     Comments: Rayna  Musculoskeletal: Normal range of motion  General: No deformity  Right lower leg: No edema  Left lower leg: No edema     Skin: General: Skin is warm and dry  Findings: No erythema or rash  Neurological:      Mental Status: She is alert and oriented to person, place, and time  Cranial Nerves: No cranial nerve deficit  Psychiatric:         Behavior: Behavior normal          Additional Data:     Labs:    Results from last 7 days   Lab Units 04/06/21  0434   WBC Thousand/uL 6 18   HEMOGLOBIN g/dL 12 8   HEMATOCRIT % 40 8   PLATELETS Thousands/uL 137*   NEUTROS PCT % 56   LYMPHS PCT % 33   MONOS PCT % 8   EOS PCT % 2     Results from last 7 days   Lab Units 04/06/21  0434 04/05/21  0634   POTASSIUM mmol/L 3 1* 3 5   CHLORIDE mmol/L 108 112*   CO2 mmol/L 24 20*   BUN mg/dL 30* 30*   CREATININE mg/dL 4 28* 4 30*   CALCIUM mg/dL 8 6 8 6   ALK PHOS U/L  --  95   ALT U/L  --  16   AST U/L  --  23           * I Have Reviewed All Lab Data Listed Above  * Additional Pertinent Lab Tests Reviewed:  All Labs Within Last 24 Hours Reviewed    Imaging:    Imaging Reports Reviewed Today Include: US KB  Imaging Personally Reviewed by Myself Includes:  none    Recent Cultures (last 7 days):           Last 24 Hours Medication List:   Current Facility-Administered Medications   Medication Dose Route Frequency Provider Last Rate    acetaminophen  650 mg Oral Q6H PRN Verdell Gilford, MD      bisacodyl  5 mg Oral Once Verdell Gilford, MD      bisacodyl  10 mg Rectal Once Verdell Gilford, MD      calcium carbonate  500 mg Oral Q8H PRN Verdell Gilford, MD      docusate sodium  100 mg Oral BID Verdell Gilford, MD      heparin (porcine)  5,000 Units Subcutaneous [facility] Verdell Gilford, MD      insulin lispro  1-5 Units Subcutaneous 4x Daily (AC & HS) Verdell Gilford, MD      lactated ringers  125 mL/hr Intravenous Continuous Minor Heard PA-C      letrozole  2 5 mg Oral Daily Verdell Gilford, MD      lidocaine  1 patch Topical Daily Verdell Gilford, MD      methimazole  2 5 mg Oral Every Other Day She Londono MD      ondansetron  4 mg Intravenous Q6H PRN She Londono MD      polyethylene glycol  17 g Oral Daily She Londono MD      senna  1 tablet Oral Daily She Londono MD          Today, Patient Was Seen By: Jossy Johnson MD    ** Please Note: Dictation voice to text software may have been used in the creation of this document   **

## 2021-04-07 ENCOUNTER — TELEPHONE (OUTPATIENT)
Dept: UROLOGY | Facility: CLINIC | Age: 76
End: 2021-04-07

## 2021-04-07 VITALS
BODY MASS INDEX: 30.7 KG/M2 | DIASTOLIC BLOOD PRESSURE: 65 MMHG | HEART RATE: 79 BPM | SYSTOLIC BLOOD PRESSURE: 96 MMHG | TEMPERATURE: 98.5 F | RESPIRATION RATE: 18 BRPM | WEIGHT: 157.19 LBS | OXYGEN SATURATION: 97 %

## 2021-04-07 PROBLEM — E83.42 HYPOMAGNESEMIA: Status: ACTIVE | Noted: 2021-04-07

## 2021-04-07 LAB
ANION GAP SERPL CALCULATED.3IONS-SCNC: 8 MMOL/L (ref 4–13)
BUN SERPL-MCNC: 24 MG/DL (ref 5–25)
CALCIUM SERPL-MCNC: 9.2 MG/DL (ref 8.3–10.1)
CHLORIDE SERPL-SCNC: 108 MMOL/L (ref 100–108)
CO2 SERPL-SCNC: 26 MMOL/L (ref 21–32)
CREAT SERPL-MCNC: 2.25 MG/DL (ref 0.6–1.3)
ERYTHROCYTE [DISTWIDTH] IN BLOOD BY AUTOMATED COUNT: 13.3 % (ref 11.6–15.1)
GFR SERPL CREATININE-BSD FRML MDRD: 21 ML/MIN/1.73SQ M
GLUCOSE SERPL-MCNC: 106 MG/DL (ref 65–140)
GLUCOSE SERPL-MCNC: 123 MG/DL (ref 65–140)
GLUCOSE SERPL-MCNC: 160 MG/DL (ref 65–140)
GLUCOSE SERPL-MCNC: 98 MG/DL (ref 65–140)
HCT VFR BLD AUTO: 42.2 % (ref 34.8–46.1)
HGB BLD-MCNC: 13.6 G/DL (ref 11.5–15.4)
MAGNESIUM SERPL-MCNC: 1.6 MG/DL (ref 1.6–2.6)
MCH RBC QN AUTO: 30.5 PG (ref 26.8–34.3)
MCHC RBC AUTO-ENTMCNC: 32.2 G/DL (ref 31.4–37.4)
MCV RBC AUTO: 95 FL (ref 82–98)
PHOSPHATE SERPL-MCNC: 3.5 MG/DL (ref 2.3–4.1)
PLATELET # BLD AUTO: 158 THOUSANDS/UL (ref 149–390)
PMV BLD AUTO: 12.2 FL (ref 8.9–12.7)
POTASSIUM SERPL-SCNC: 3 MMOL/L (ref 3.5–5.3)
RBC # BLD AUTO: 4.46 MILLION/UL (ref 3.81–5.12)
SODIUM SERPL-SCNC: 142 MMOL/L (ref 136–145)
WBC # BLD AUTO: 8.13 THOUSAND/UL (ref 4.31–10.16)

## 2021-04-07 PROCEDURE — 85027 COMPLETE CBC AUTOMATED: CPT | Performed by: STUDENT IN AN ORGANIZED HEALTH CARE EDUCATION/TRAINING PROGRAM

## 2021-04-07 PROCEDURE — 83735 ASSAY OF MAGNESIUM: CPT | Performed by: STUDENT IN AN ORGANIZED HEALTH CARE EDUCATION/TRAINING PROGRAM

## 2021-04-07 PROCEDURE — 99232 SBSQ HOSP IP/OBS MODERATE 35: CPT | Performed by: INTERNAL MEDICINE

## 2021-04-07 PROCEDURE — 97535 SELF CARE MNGMENT TRAINING: CPT

## 2021-04-07 PROCEDURE — 80048 BASIC METABOLIC PNL TOTAL CA: CPT | Performed by: STUDENT IN AN ORGANIZED HEALTH CARE EDUCATION/TRAINING PROGRAM

## 2021-04-07 PROCEDURE — 82948 REAGENT STRIP/BLOOD GLUCOSE: CPT

## 2021-04-07 PROCEDURE — 99232 SBSQ HOSP IP/OBS MODERATE 35: CPT | Performed by: PHYSICIAN ASSISTANT

## 2021-04-07 PROCEDURE — 99239 HOSP IP/OBS DSCHRG MGMT >30: CPT | Performed by: INTERNAL MEDICINE

## 2021-04-07 PROCEDURE — 84100 ASSAY OF PHOSPHORUS: CPT | Performed by: STUDENT IN AN ORGANIZED HEALTH CARE EDUCATION/TRAINING PROGRAM

## 2021-04-07 RX ORDER — POTASSIUM CHLORIDE 20 MEQ/1
40 TABLET, EXTENDED RELEASE ORAL 2 TIMES DAILY
Status: COMPLETED | OUTPATIENT
Start: 2021-04-07 | End: 2021-04-07

## 2021-04-07 RX ORDER — MAGNESIUM SULFATE 1 G/100ML
1 INJECTION INTRAVENOUS ONCE
Status: COMPLETED | OUTPATIENT
Start: 2021-04-07 | End: 2021-04-07

## 2021-04-07 RX ADMIN — POTASSIUM CHLORIDE 40 MEQ: 1500 TABLET, EXTENDED RELEASE ORAL at 17:17

## 2021-04-07 RX ADMIN — MAGNESIUM SULFATE HEPTAHYDRATE 1 G: 1 INJECTION, SOLUTION INTRAVENOUS at 10:45

## 2021-04-07 RX ADMIN — POTASSIUM CHLORIDE 40 MEQ: 1500 TABLET, EXTENDED RELEASE ORAL at 08:28

## 2021-04-07 RX ADMIN — LETROZOLE 2.5 MG: 2.5 TABLET, FILM COATED ORAL at 08:21

## 2021-04-07 RX ADMIN — HEPARIN SODIUM 5000 UNITS: 5000 INJECTION INTRAVENOUS; SUBCUTANEOUS at 05:58

## 2021-04-07 NOTE — PROGRESS NOTES
Progress Note - Nephrology   Max Moralez 70 y o  female MRN: [id number]  Unit/Bed#: -[facility] Encounter: [id number]      Assessment / Plan:  1  ZOYA, POA, in setting of obstructive nephropathy with bilateral hydronephrosis with hydroureter plus or minus prerenal component as decreased oral intake on admission as well  -baseline serum creatinine 0 7-0 8, up to 1 1 once in 2021  -4 2 on admission now improved to 2 25 s/p cysto with b/l ureteral stent placement  -continue Mathis catheter per Urology  -urinalysis bland on admission  -avoid nephrotoxins/NSAIDs  -avoid relative hypotension in the setting of acute renal impairment  -thankfully, adequate UOP noted with polyuria s/p stent placement  -monitor electrolytes closely  -f/u BMP outpatient  Patient leaving against medical advice and states she will see physicians at [facility]    2  Low serum bicarbonate likely due to metabolic acidosis from Acute kidney injury-resolved, monitor off bicarb infusion    3  Hypokalemia with hypomagnesemia - mag 1 6 - receiving 1g IV mag sulfate today, K 3, receiving repletion per primary team, monitor BMP     4  hypercalcemia - resolved    5  Thrombocytopenia - resolved    6  Obstructive uropathy - s/p cysto and b/l ureteral stent per urology, mtahis in place, needs outpatient urologic follow up    Subjective:   She denies chest pain or shortness of breath  She insists that she wants to go  She is upset about her diet  She is upset about lack of communication  She will follow with doctors at [place]  She insists on leaving against medical advice today  She understands that she could die by doing this  Objective:     Vitals: Blood pressure 145/74, pulse 70, temperature 98 6 °F (37 °C), temperature source Oral, resp  rate 18, weight 71 3 kg (157 lb 3 oz), SpO2 94 %  ,Body mass index is 30 7 kg/m²  Temp (24hrs), Av 8 °F (37 1 °C), Min:98 6 °F (37 °C), Max:99 1 °F (37 3 °C)      Weight (last 2 days) None            Intake/Output Summary (Last 24 hours) at 4/7/2021 1124  Last data filed at 4/7/2021 0800  Gross per 24 hour   Intake 100 ml   Output 4825 ml   Net -4725 ml     I/O last 24 hours: In: 300 [IV Piggyback:300]  Out: 9537 [Urine:6025]        Physical Exam:   Physical Exam  Vitals signs and nursing note reviewed  Constitutional:       General: She is not in acute distress  Appearance: She is well-developed  She is ill-appearing  She is not diaphoretic  HENT:      Head: Normocephalic and atraumatic  Mouth/Throat:      Pharynx: No oropharyngeal exudate  Eyes:      General: No scleral icterus  Right eye: No discharge  Left eye: No discharge  Neck:      Musculoskeletal: Normal range of motion and neck supple  Thyroid: No thyromegaly  Cardiovascular:      Rate and Rhythm: Normal rate and regular rhythm  Heart sounds: No murmur  Pulmonary:      Effort: Pulmonary effort is normal  No respiratory distress  Breath sounds: Normal breath sounds  No wheezing  Abdominal:      General: Bowel sounds are normal  There is distension  Palpations: Abdomen is soft  Genitourinary:     Comments: +mathis  Musculoskeletal: Normal range of motion  General: No swelling  Skin:     General: Skin is warm and dry  Findings: No rash  Neurological:      General: No focal deficit present  Mental Status: She is alert  Motor: No abnormal muscle tone        Comments: awake   Psychiatric:         Behavior: Behavior normal       Comments: irritable         Invasive Devices     Peripheral Intravenous Line            Peripheral IV 04/04/21 Distal;Right;Ventral (anterior) Wrist 3 days          Drain            Urethral Catheter Non-latex;Straight-tip 18 Fr  1 day                Medications:    Scheduled Meds:  Current Facility-Administered Medications   Medication Dose Route Frequency Provider Last Rate    acetaminophen  650 mg Oral Q6H PRN Yana Carbon Phillip Vargas MD      bisacodyl  5 mg Oral Once Rocco Lino MD      bisacodyl  10 mg Rectal Once Rocco Lino MD      calcium carbonate  500 mg Oral Q8H PRN Rocco Lino MD      docusate sodium  100 mg Oral BID Rocco Lino MD      heparin (porcine)  5,000 Units Subcutaneous [facility] Rocco Lino MD      insulin lispro  1-5 Units Subcutaneous 4x Daily [facility] Rocco Lino MD      lactated ringers  125 mL/hr Intravenous Continuous Vivien Fermin PA-C      letrozole  2 5 mg Oral Daily Rocco Lino MD      lidocaine  1 patch Topical Daily Rocco Lino MD      magnesium sulfate  1 g Intravenous Once Brit Yves Shabazz DO      methimazole  2 5 mg Oral Every Other Day Rocco Lino MD      ondansetron  4 mg Intravenous Q6H PRN Rocco Lino MD      polyethylene glycol  17 g Oral Daily Rocco Lino MD      potassium chloride  40 mEq Oral BID Monique Carvajal MD      senna  1 tablet Oral Daily Rocco Lino MD         PRN Meds:   acetaminophen    calcium carbonate    ondansetron    Continuous Infusions:lactated ringers, 125 mL/hr            LAB RESULTS:      Results from last 7 days   Lab Units 04/07/21  0601 04/06/21  0434 04/05/21  0634 04/05/21  0525 04/04/21  0537 04/03/21  0449 04/02/21  1406 04/02/21  0354 04/02/21  0353 04/01/21  1551   WBC Thousand/uL 8 13 6 18  --  6 36  --   --   --  9 48  --  9 01   HEMOGLOBIN g/dL 13 6 12 8  --  12 3  --   --   --  14 2  --  15 6*   HEMATOCRIT % 42 2 40 8  --  39 0  --   --   --  44 7  --  46 9*   PLATELETS Thousands/uL 158 137*  --  137*  --   --   --  141*  --  152   NEUTROS PCT %  --  56  --  58  --   --   --   --   --  77*   LYMPHS PCT %  --  33  --  29  --   --   --   --   --  17   MONOS PCT %  --  8  --  9  --   --   --   --   --  5   EOS PCT %  --  2  --  3  --   --   --   --   --  1   POTASSIUM mmol/L 3 0* 3 1* 3 5  --  3 9 4 0 3 8  --  3 0* 3 3* CHLORIDE mmol/L 108 108 112*  --  115* 119* 113*  --  106 101   CO2 mmol/L 26 24 20*  --  20* 21 21  --  23 24   BUN mg/dL 24 30* 30*  --  31* 32* 33*  --  31* 35*   CREATININE mg/dL 2 25* 4 28* 4 30*  --  4 08* 4 03* 3 90*  --  3 76* 4 26*   CALCIUM mg/dL 9 2 8 6 8 6  --  8 7 8 8 9 0  --  9 3 9 7   ALK PHOS U/L  --   --  95  --   --   --   --   --  83 98   ALT U/L  --   --  16  --   --   --   --   --  14 17   AST U/L  --   --  23  --   --   --   --   --  26 29   MAGNESIUM mg/dL 1 6  --   --   --   --   --   --   --  2 2  --    PHOSPHORUS mg/dL 3 5  --   --   --   --   --   --   --   --   --        CUTURES:  No results found for: Kesha Wallace              Portions of the record may have been created with voice recognition software  Occasional wrong word or "sound a like" substitutions may have occurred due to the inherent limitations of voice recognition software  Read the chart carefully and recognize, using context, where substitutions have occurred  If you have any questions, please contact the dictating provider

## 2021-04-07 NOTE — OCCUPATIONAL THERAPY NOTE
[address] Progress Note     Patient Name: Ally Mancia  PPZCS'I Date: 4/7/2021  Problem List  Principal Problem:    Acute kidney injury [facility])  Active Problems:    Hyperthyroidism    History of breast cancer    Controlled type 2 diabetes mellitus, without long-term current use of insulin ([facility] [address] )    [facility]    Chest pain    Constipation    Hypokalemia    Hydronephrosis    Hypomagnesemia          04/07/21 1406   OT Last Visit   OT Visit Date 04/07/21   Note Type   Note Type Treatment   Restrictions/Precautions   Weight Bearing Precautions Per Order No   Other Precautions Cognitive; Bed Alarm; Fall Risk   General   Response to Previous Treatment Patient with no complaints from previous session   Pain Assessment   Pain Assessment Tool 0-10   Pain Score No Pain   ADL   Grooming Assistance 5  Supervision/Setup   Grooming Deficit Increased time to complete;Supervision/safety;Standing with assistive device   Grooming Comments washed hands standing at sink   LB Dressing Assistance 3  Moderate Assistance   LB Dressing Deficit Don/doff R sock; Don/doff L sock   LB Dressing Comments Pt able to slip flip-flops on/off in seated position with set up assist - however required assist to don socks  Toileting Assistance  3  Moderate Assistance   Toileting Deficit Steadying;Supervison/safety; Increased time to complete;Grab bar use;Perineal hygiene   Toileting Comments assist for standing elizabeth hygiene with unilateral UE support on RW   Bed Mobility   Sit to Supine 5  Supervision   Additional items Increased time required   Transfers   Sit to Stand 4  Minimal assistance   Additional items Assist x 1; Increased time required   Stand to Sit 5  Supervision   Additional items Increased time required   Toilet transfer 4  Minimal assistance   Additional items Assist x 1;Standard toilet  (grab bar use)   Additional Comments RW   Functional Mobility   Functional Mobility 4  Minimal assistance   Additional Comments w/in room and bathroom   Additional items Rolling walker   Cognition   Overall Cognitive Status [facility]   Arousal/Participation Alert; Cooperative   Attention Attends with cues to redirect   Orientation Level Oriented X4   Memory Within functional limits   Following Commands Follows one step commands without difficulty   Comments Pt requiring verbal cues to re-direct to tasks   Activity Tolerance   Activity Tolerance Patient tolerated treatment well   Medical Staff Made Aware Per RN pt appropriate to be seen   Assessment   Assessment Patient participated in Skilled OT session this date with interventions consisting of ADL re-training, functional transfers/mobility, home safety education  Patient agreeable to OT treatment session, upon arrival patient was found OOB on toilet  Pt required MOD A for toileting - required assist for standing elizabeth care with unilateral UE support on RW  Pt able to slip flip-flops on/off in seated position with set up assist - however required assist to don socks  Pt reports she has grab bar near toilet at home and denies need for commode; pt also reports she sponge bathes baseline  Educated pt on safe toilet transfers with use of RW  Patient continues to be functioning below baseline level, occupational performance remains limited secondary to factors listed above and increased risk for falls and injury  From OT standpoint, recommendation at time of d/c would be POST-ACUTE [facility] SERVICES  Patient to benefit from continued Occupational Therapy treatment while in the hospital to address deficits as defined above and maximize level of functional independence with ADLs and functional mobility  Plan   Treatment Interventions ADL retraining;Functional transfer training;Patient/family training;Equipment evaluation/education; Activityengagement   Goal Expiration Date 04/16/21   OT Treatment Day 1   OT Frequency 3-5x/wk   Recommendation   OT Discharge Recommendation Post-Acute Rehabilitation Services AM-PAC Daily Activity Inpatient   Lower Body Dressing 3   Bathing 3   Toileting 2   Upper Body Dressing 3   Grooming 4   Eating 4   Daily Activity Raw Score 19   Daily Activity Standardized Score (Calc for Raw Score >=11) 40 22   AM-PAC Applied Cognition Inpatient   Following a Speech/Presentation 3   Understanding Ordinary Conversation 4   Taking Medications 3   Remembering Where Things Are Placed or Put Away 3   Remembering List of 4-5 Errands 3   Taking Care of Complicated Tasks 3   Applied Cognition Raw Score 19   Applied Cognition Standardized Score 39 77   Modified [place] Scale   Modified Hall Scale 4       The patient's raw score on the AM-PAC Daily Activity inpatient short form is 19, standardized score is 40 22, greater than 39 4  Patients at this level are likely to benefit from discharge to home  However, pt requires Ax1 for ADLs and functional transfers/mobility and has limited home support; pt would benefit from further OT services in post-acute rehab setting  Please refer to the recommendation of the Occupational Therapist for safe discharge planning          Olaf Norris, OT

## 2021-04-07 NOTE — PLAN OF CARE
Problem: Potential for Falls  Goal: Patient will remain free of falls  Description: INTERVENTIONS:  - Assess patient frequently for physical needs  -  Identify cognitive and physical deficits and behaviors that affect risk of falls    -  [place] fall precautions as indicated by assessment   - Educate patient/family on patient safety including physical limitations  - Instruct patient to call for assistance with activity based on assessment  - Modify environment to reduce risk of injury  - Consider OT/PT consult to assist with strengthening/mobility  Outcome: Progressing

## 2021-04-07 NOTE — PROGRESS NOTES
Progress Note - Urology   Celia Campbell 70 y o  female MRN: [id number]  Unit/Bed#: -01 Encounter: [id number]    Assessment & Plan:    Acute kidney injury with bilateral hydronephrosis  -CT scan from a 0 4/1 showed bilateral hydronephrosis and proximal ureter with no evidence of renal calculi or obstructing lesions  -patient's creatinine prior to stent placement 4 28 current creatinine trending down order 2 2 5  Nephrology following   -postop day 1 bilateral ureteral stents in place  Patient progressing well  She denies any flank pain or renal colic at this time   -no additional  surgical intervention needed at this time  Urology will be signing off  We are always available for additional questions or concerns regarding this patient  Patient will follow-up with her primary urologist in [place]  A task was also been center office should patient prefer to continue to follow with our practice   -Way catheter has been in place for the past 7 days  Patient may have voiding trial prior to discharge  Subjective/Objective      Chief Complaint:  Frustration    Subjective:   Patient is a 42-year-old female with a history of breast cancer diagnosed in 2017 status post chemotherapy, mastectomy, perforated diverticulitis in 2018 with subsequent colostomy and reversal, urinary incontinence and type 2 diabetes mellitus  She presented to the emergency room on 04/01 at [address] [place] [address] with lower abdominal pain radiating to the left flank for 2 weeks and diarrhea  CT at that time revealed bilateral hydronephrosis and proximal hydroureter with no evidence of renal colic or obstructing lesions and elevated creatinine of 4  Way catheter was placed due to urinary retention of 600 cc  [place] [facility] [place]  Patient was initially refusing surgical intervention at that time  Multiple discussions were had with patient and daughter for ureteral stents    Patient agreed to cystoscopy with bilateral stent insertion on 04/06  Patient is postop day 1 for bilateral stent placement  Progressing well  Patient denies any current flank pain, nausea, vomiting, fevers, chills  Overall she is frustrated with her care at [facility]  Patient is extremely eager for discharge  Objective:     Blood pressure 145/74, pulse 70, temperature 98 6 °F (37 °C), temperature source Oral, resp  rate 18, weight 71 3 kg (157 lb 3 oz), SpO2 94 %  ,Body mass index is 30 7 kg/m²  Intake/Output Summary (Last 24 hours) at 4/7/2021 1352  Last data filed at 4/7/2021 1145  Gross per 24 hour   Intake 280 ml   Output 4475 ml   Net -4195 ml       Invasive Devices     Peripheral Intravenous Line            Peripheral IV 04/04/21 Distal;Right;Ventral (anterior) Wrist 3 days          Drain            Urethral Catheter Non-latex;Straight-tip 18 Fr  1 day                Physical Exam  Constitutional:       General: She is not in acute distress  Appearance: Normal appearance  She is normal weight  She is not ill-appearing or toxic-appearing  HENT:      Head: Normocephalic and atraumatic  Right Ear: External ear normal       Left Ear: External ear normal       Nose: Nose normal    Eyes:      General: No scleral icterus  Conjunctiva/sclera: Conjunctivae normal    Neck:      Musculoskeletal: Normal range of motion  Cardiovascular:      Rate and Rhythm: Normal rate and regular rhythm  Pulses: Normal pulses  Heart sounds: No murmur  No friction rub  No gallop  Pulmonary:      Effort: Pulmonary effort is normal  No respiratory distress  Breath sounds: No wheezing, rhonchi or rales  Abdominal:      General: Bowel sounds are normal  There is no distension  Palpations: Abdomen is soft  Tenderness: There is no abdominal tenderness  There is no right CVA tenderness or left CVA tenderness     Genitourinary:     Comments: Urethral Way catheter in place draining light pink urine   Musculoskeletal: Normal range of motion  Neurological:      General: No focal deficit present  Mental Status: She is alert and oriented to person, place, and time  Sensory: Sensory deficit:    lastcbc  Psychiatric:         Mood and Affect: Mood normal          Behavior: Behavior normal          Thought Content: Thought content normal          Judgment: Judgment normal          Lab, Imaging and other studies:I have personally reviewed pertinent lab results      Lab Results   Component Value Date    WBC 8 13 04/07/2021    HGB 13 6 04/07/2021    HCT 42 2 04/07/2021    MCV 95 04/07/2021     04/07/2021     Lab Results   Component Value Date    SODIUM 142 04/07/2021    K 3 0 (L) 04/07/2021     04/07/2021    CO2 26 04/07/2021    BUN 24 04/07/2021    CREATININE 2 25 (H) 04/07/2021    GLUC 98 04/07/2021    CALCIUM 9 2 04/07/2021      VTE Pharmacologic Prophylaxis: Heparin  VTE Mechanical Prophylaxis: sequential compression device     Duana Boxer, PA-C

## 2021-04-07 NOTE — PROGRESS NOTES
Patient states she feels fluid is dripping down her leg from her mathis  Mathis appears patent, with urine in the tube  Small amount of liquid found on patient's leg when ambulating to the bathroom  Will monitor, and pass on to oncoming nurse for urology follow up in the morning

## 2021-04-07 NOTE — PROGRESS NOTES
SLIM made aware patient wants to leave AMA  Daughter is here to pick her up  Dr Darwin Virk had AMA consent form signed  Way removed  IV removed  Pt left via wheelchair AMA with daughter  AVS printed

## 2021-04-07 NOTE — DISCHARGE SUMMARY
[facility]    Discharge- Max Naomy 1945, 70 y o  female MRN: [id number]  Unit/Bed#: -[facility] Encounter: [id number]  Primary Care Provider: Rene Núñez MD   Date and time admitted to hospital: 4/1/2021  9:57 PM      Hydronephrosis  Assessment & Plan  Status post bilateral stent placement by Urology  For urology discontinue Way prior to discharge  Way was removed  Patient did void prior leaving AMA    Hypokalemia  Assessment & Plan  Sp iv fluids hydration and poor oral intake   · Pt reports not eating well for last two weeks  Eating well this am/pm and dinner  · Total of 80 mEq given today    Cirrhosis of liver (HCC)  Assessment & Plan  · Incidental finding status post CT noting mild cirrhotic changes  Liver function tests within normal limits  · Continued evaluation as outpatient    Controlled type 2 diabetes mellitus, without long-term current use of insulin [facility])  Assessment & Plan  Lab Results   Component Value Date    HGBA1C 6 1 (H) 04/03/2021    ordered for am fasting     Recent Labs     04/06/21  2106 04/07/21  0555 04/07/21  1111 04/07/21  1606   POCGLU 164* 106 160* 123       Blood Sugar Average: Last 72 hrs:  (P) 117 4375 chronically on metformin  mg daily which will hold in the setting of acute kidney injury  · Placed on insulin sliding scale, will keep going as she is more prone to hypoglycemia   · She reports she does not like diet as she doesn't like anything artificial (sweetners) she will not eat or drink dt this     · Refusing diabetic diet      History of breast cancer  Assessment & Plan  · Initially diagnosed in 2007 status post mastectomy/chemotherapy  · On letrozole, will continue    Hyperthyroidism  Assessment & Plan  · Recent TSH checked, reviewed from "care everywhere" (2/24) in euthyroid state  · Continue methimazole 2 5 mg every other day    * Acute kidney injury [facility])  Assessment & Plan  · Concern for obstructive uropathy with bilateral hydronephrosis  · - however pt with slow return to norm on ivf and urinary catheter insertion  · Lab work from "care everywhere" indicating cr of 1 10 (2/24/21)  · Status post CT scan abdomen pelvis without contrast "Bilateral hydronephrosis and proximal hydroureter   No CT evidence of renal colic   No obstructing lesions are found in the retroperitoneum   Urinary bladder was not distended   Recommend follow-up renal ultrasound to assess for ureteral jets/urinary   obstruction  "   · - however noting "Large volume of formed stool in the cecum without obstruction"  · Renal ultrasound demonstrates:   Mild bilateral hydronephrosis similar to April 1, 2021 allowing for difference in imaging modalities  No perinephric collection  · Way catheter decompressing the bladder and limiting its evaluation  · Repeat ultrasound 4/4 demonstrates no change in hydro  · Way was placed on admission with yellow urine continues to drain no obstruction   · Status was UA no sign of infection  · Continue to monitor input and output  · Avoid nephrotoxins/ NSAIDS and renally dose medication  · Status post bilateral ureteral stent placement on 4/5   · Creatinine improved to 2 2  · Patient wanted to leave AMA  · Daughter at bedside and myself spoke to patient and ultimately patient is alert oriented x4, was able to verbalize the risks of leaving and had a backup plan if things worsen at home  · Patient signed AMA form  Way was discontinued, intravenous was pulled out  · Patient was able to void prior to leaving patient was eating dinner prior to leaving    · Patient is aware she is to follow-up with her nephrologist and urologist      Discharging Physician / Practitioner: Ernst Serrano MD  PCP: Angie Reeves MD  Admission Date:   Admission Orders (From admission, onward)     Ordered        04/01/21 2203  Inpatient Admission  Once                   Discharge Date: 04/07/21    Resolved Problems  Date Reviewed: 4/7/2021          Resolved    Hypercalcemia 4/5/2021     Resolved by  General Meline, DO    Hypernatremia 4/5/2021     Resolved by  General Meline, DO    Acidosis 4/6/2021     Resolved by  General Meline, DO          Consultations During Hospital Stay:  · Nephrology  · Urology    Procedures Performed:   · Bilateral stent placement 4/6    Significant Findings / Test Results:   Ct Abdomen Pelvis Wo Contrast    Result Date: 4/1/2021  Impression: This is a limited exam performed without oral or IV contrast and interpreted without benefit of prior examinations  The liver morphology suggests mild cirrhotic change  Main portal vein is top normal caliber at 15 mm  Small volume intra-abdominal ascites  Diffuse mesenteric edema  Mildly distended gallbladder with small calcified stones  No CT evidence for cholecystitis  Normal caliber biliary ducts  Bilateral hydronephrosis and proximal hydroureter  No CT evidence of renal colic  No obstructing lesions are found in the retroperitoneum  Urinary bladder was not distended  Recommend follow-up renal ultrasound to assess for ureteral jets/urinary obstruction  Diffuse wall thickening affecting the entire colon, most pronounced in the region of the splenic flexure, transverse colon and cecum  This may be related to ascites and/or underdistention  No bowel wall pneumatosis or mesenteric/portal venous gas to suggest ischemic colitis  Colonic diverticulosis without evidence of diverticulitis  Expected postoperative changes  Large volume of formed stool in the cecum without obstruction  Myomatous uterus  No obvious adnexal masses  I personally discussed this study with Michelle Cifuentes on 4/1/2021 at 5:18 PM  Workstation performed: [id number]     Xr Chest Portable    Result Date: 4/1/2021  Impression: No acute cardiopulmonary disease    Workstation performed: [id number]     Fl Retrograde Pyelogram    Result Date: 4/7/2021  Impression: Fluoroscopic guidance provided for bilateral retrograde pyelogram and ureteral stent placement  Please see procedure report for further details  Workstation performed: [id number]     Us Kidney And Bladder    Result Date: 4/4/2021  Impression: 1  Mild bilateral hydronephrosis similar to prior study  2   Bladder is decompressed by a Way catheter, precluding evaluation  3   Incidentally again noted nodular hepatic contour suggesting cirrhotic change, as well as ascites  Workstation performed: [id number]     Us Kidney And Bladder    Result Date: 4/3/2021  · Impression: Mild bilateral hydronephrosis similar to April 1, 2021 allowing for difference in imaging modalities  No perinephric collection  Way catheter decompressing the bladder and limiting its evaluation  Workstation performed: [id number]   ·     Incidental Findings:   Ct Abdomen Pelvis Wo Contrast    Result Date: 4/1/2021  Impression: This is a limited exam performed without oral or IV contrast and interpreted without benefit of prior examinations  The liver morphology suggests mild cirrhotic change  Main portal vein is top normal caliber at 15 mm  Small volume intra-abdominal ascites  Diffuse mesenteric edema  Mildly distended gallbladder with small calcified stones  No CT evidence for cholecystitis  Normal caliber biliary ducts  Bilateral hydronephrosis and proximal hydroureter  No CT evidence of renal colic  No obstructing lesions are found in the retroperitoneum  Urinary bladder was not distended  Recommend follow-up renal ultrasound to assess for ureteral jets/urinary obstruction  Diffuse wall thickening affecting the entire colon, most pronounced in the region of the splenic flexure, transverse colon and cecum  This may be related to ascites and/or underdistention  No bowel wall pneumatosis or mesenteric/portal venous gas to suggest ischemic colitis  Colonic diverticulosis without evidence of diverticulitis  Expected postoperative changes    Large volume of formed stool in the cecum without obstruction  Myomatous uterus  No obvious adnexal masses  I personally discussed this study with Pawan Brand on 4/1/2021 at 5:18 PM  Workstation performed: [id number]     Xr Chest Portable    Result Date: 4/1/2021  Impression: No acute cardiopulmonary disease  Workstation performed: [id number]     Fl Retrograde Pyelogram    Result Date: 4/7/2021  Impression: Fluoroscopic guidance provided for bilateral retrograde pyelogram and ureteral stent placement  Please see procedure report for further details  Workstation performed: [id number]     Us Kidney And Bladder    Result Date: 4/4/2021  Impression: 1  Mild bilateral hydronephrosis similar to prior study  2   Bladder is decompressed by a Way catheter, precluding evaluation  3   Incidentally again noted nodular hepatic contour suggesting cirrhotic change, as well as ascites  Workstation performed: [id number]     Us Kidney And Bladder    Result Date: 4/3/2021  · Impression: Mild bilateral hydronephrosis similar to April 1, 2021 allowing for difference in imaging modalities  No perinephric collection  Way catheter decompressing the bladder and limiting its evaluation  Workstation performed: [id number]   ·     Test Results Pending at Discharge (will require follow up):   · none     Outpatient Tests Requested:  · BMP    Complications:  none    Reason for Admission:  ZOYA    Hospital Course:     Domenic Dixon is a 70 y o  female patient who originally presented to the hospital on 4/1/2021 due to complaints of abdominal pain  Patient reports left the left lower quadrant as well as her left flank  Creatinine on admission was 4 26 increased from baseline labs  Patient had a Way catheter placed on that admission and was draining well  CT on admission showed bilateral hydronephrosis  Urology was consulted recommended bilateral ureteral stent placement  Patient also was treated with intravenous vanc for hypomagnesemia hypokalemia with gator  Her hypoglycemia resolved  Her thrombocytopenia resolved  Nephrology was consulted  Patient was frustrated with meals  Patient decided to leave AMA however was complaints to get her stent placement which ultimately happened on April 6th  Creatinine improved to 2 2  Patient ultimately wanted to leave AMA  Patient was complaining my exam today  Discussed that at length with patient and daughter at bedside  Ultimately Way was removed and intravenous discontinue then patient left AMA  She was able to void  prior to leaving the hospital Patient is aware she has a follow-up with her nephrologist and urologist as outpatient  Please see above list of diagnoses and related plan for additional information  Condition at Discharge: stable     Discharge Day Visit / Exam:     Subjective:  Patient seen examined at bedside  Patient currently frustrated with her care  Patient says that she finding was able to be regular diet  She does not like the water  Patient wants to leave AMA  Vitals: Blood Pressure: 96/65 (04/07/21 1458)  Pulse: 79 (04/07/21 1458)  Temperature: 98 5 °F (36 9 °C) (04/07/21 1458)  Temp Source: Oral (04/07/21 0711)  Respirations: 18 (04/07/21 0711)  Weight - Scale: 71 3 kg (157 lb 3 oz) (04/01/21 2216)  SpO2: 97 % (04/07/21 1458)  Exam:   Physical Exam  Constitutional:       General: She is not in acute distress  Appearance: Normal appearance  HENT:      Head: Normocephalic and atraumatic  Eyes:      General:         Right eye: No discharge  Left eye: No discharge  Cardiovascular:      Rate and Rhythm: Normal rate and regular rhythm  Pulses: Normal pulses  Heart sounds: No murmur  Pulmonary:      Effort: Pulmonary effort is normal  No respiratory distress  Breath sounds: Normal breath sounds  No wheezing  Genitourinary:     Comments: Way with bloody urine  Musculoskeletal:      Right lower leg: No edema  Left lower leg: No edema     Skin: General: Skin is warm and dry  Neurological:      General: No focal deficit present  Mental Status: She is alert and oriented to person, place, and time  Mental status is at baseline  Psychiatric:         Mood and Affect: Mood normal        Discussion with Family:  Discussed at bedside with daughter    Discharge instructions/Information to patient and family:   See after visit summary for information provided to patient and family  Provisions for Follow-Up Care:  See after visit summary for information related to follow-up care and any pertinent home health orders  Disposition:     Other: Left AMA        Planned Readmission:  None     Discharge Statement:  I spent 31 minutes discharging the patient  This time was spent on the day of discharge  I had direct contact with the patient on the day of discharge  Greater than 50% of the total time was spent examining patient, answering all patient questions, arranging and discussing plan of care with patient as well as directly providing post-discharge instructions  Additional time then spent on discharge activities  Discharge Medications:  See after visit summary for reconciled discharge medications provided to patient and family        ** Please Note: This note has been constructed using a voice recognition system **

## 2021-04-07 NOTE — PLAN OF CARE
Problem: OCCUPATIONAL THERAPY ADULT  Goal: Performs self-care activities at highest level of function for planned discharge setting  See evaluation for individualized goals  Description: Treatment Interventions: ADL retraining, Functional transfer training, Endurance training, Cognitive reorientation, Patient/family training, Equipment evaluation/education, Compensatory technique education, Energy conservation, Activityengagement          See flowsheet documentation for full assessment, interventions and recommendations  Outcome: Progressing  Note: Limitation: Decreased ADL status, Decreased Safe judgement during ADL, Decreased cognition, Decreased endurance, Decreased self-care trans, Decreased high-level ADLs  Prognosis: Good  Assessment: Patient participated in Skilled OT session this date with interventions consisting of ADL re-training, functional transfers/mobility, home safety education  Patient agreeable to OT treatment session, upon arrival patient was found OOB on toilet  Pt required MOD A for toileting - required assist for standing elizabeth care with unilateral UE support on RW  Pt able to slip flip-flops on/off in seated position with set up assist - however required assist to don socks  Pt reports she has grab bar near toilet at home and denies need for commode; pt also reports she sponge bathes baseline  Educated pt on safe toilet transfers with use of RW  Patient continues to be functioning below baseline level, occupational performance remains limited secondary to factors listed above and increased risk for falls and injury  From OT standpoint, recommendation at time of d/c would be POST-ACUTE [facility] SERVICES  Patient to benefit from continued Occupational Therapy treatment while in the hospital to address deficits as defined above and maximize level of functional independence with ADLs and functional mobility        OT Discharge Recommendation: Post-Acute Rehabilitation Services  OT - OK to Discharge:  Yes

## 2021-04-07 NOTE — TELEPHONE ENCOUNTER
Patient had bilateral stents placed while hospitalized due to acute kidney injury and hydronephrosis  She voiced preference to follow-up with her known urologist at [facility]   Please contact the patient and make sure she has established within the next month otherwise should follow up with us to manage her stents

## 2021-04-08 NOTE — ASSESSMENT & PLAN NOTE
Status post bilateral stent placement by Urology    For urology discontinue Way prior to discharge  Way was removed  Patient did void prior leaving A

## 2021-04-08 NOTE — ASSESSMENT & PLAN NOTE
Sp iv fluids hydration and poor oral intake   · Pt reports not eating well for last two weeks   Eating well this am/pm and dinner  · Total of 80 mEq given today

## 2021-04-08 NOTE — ASSESSMENT & PLAN NOTE
Lab Results   Component Value Date    HGBA1C 6 1 (H) 04/03/2021    ordered for am fasting     Recent Labs     04/06/21  2106 04/07/21  0555 04/07/21  1111 04/07/21  1606   POCGLU 164* 106 160* 123       Blood Sugar Average: Last 72 hrs:  (P) 117 4375 chronically on metformin  mg daily which will hold in the setting of acute kidney injury  · Placed on insulin sliding scale, will keep going as she is more prone to hypoglycemia   · She reports she does not like diet as she doesn't like anything artificial (sweetners) she will not eat or drink dt this     · Refusing diabetic diet

## 2021-04-08 NOTE — TELEPHONE ENCOUNTER
Attempted to contact patient at number provided in chart  Number is no longer in service  NO alternate number provided

## 2021-04-08 NOTE — ASSESSMENT & PLAN NOTE
· Concern for obstructive uropathy with bilateral hydronephrosis  · - however pt with slow return to norm on ivf and urinary catheter insertion  · Lab work from "care everywhere" indicating cr of 1 10 (2/24/21)  · Status post CT scan abdomen pelvis without contrast "Bilateral hydronephrosis and proximal hydroureter   No CT evidence of renal colic   No obstructing lesions are found in the retroperitoneum   Urinary bladder was not distended   Recommend follow-up renal ultrasound to assess for ureteral jets/urinary   obstruction  "   · - however noting "Large volume of formed stool in the cecum without obstruction"  · Renal ultrasound demonstrates:   Mild bilateral hydronephrosis similar to April 1, 2021 allowing for difference in imaging modalities  No perinephric collection  · Way catheter decompressing the bladder and limiting its evaluation  · Repeat ultrasound 4/4 demonstrates no change in hydro  · Way was placed on admission with yellow urine continues to drain no obstruction   · Status was UA no sign of infection  · Continue to monitor input and output  · Avoid nephrotoxins/ NSAIDS and renally dose medication  · Status post bilateral ureteral stent placement on 4/5   · Creatinine improved to 2 2  · Patient wanted to leave AMA  · Daughter at bedside and myself spoke to patient and ultimately patient is alert oriented x4, was able to verbalize the risks of leaving and had a backup plan if things worsen at home  · Patient signed AMA form  Way was discontinued, intravenous was pulled out  · Patient was able to void prior to leaving patient was eating dinner prior to leaving    · Patient is aware she is to follow-up with her nephrologist and urologist

## 2021-04-12 NOTE — TELEPHONE ENCOUNTER
Attempted to contact patient and phone number was disconnected and unable to reach patient  Called patient's EC listed in her chart  Spoke with her daughter and she informed me that patient will not be following up with [facility] moving forward

## 2021-05-07 ENCOUNTER — APPOINTMENT (EMERGENCY)
Dept: CT IMAGING | Facility: HOSPITAL | Age: 76
DRG: 155 | End: 2021-05-07
Payer: MEDICARE

## 2021-05-07 ENCOUNTER — HOSPITAL ENCOUNTER (INPATIENT)
Facility: HOSPITAL | Age: 76
LOS: 4 days | Discharge: HOME HEALTH | DRG: 155 | End: 2021-05-11
Attending: EMERGENCY MEDICINE | Admitting: FAMILY MEDICINE
Payer: MEDICARE

## 2021-05-07 DIAGNOSIS — E87.2 HIGH ANION GAP METABOLIC ACIDOSIS: ICD-10-CM

## 2021-05-07 DIAGNOSIS — E87.6 HYPOKALEMIA: ICD-10-CM

## 2021-05-07 DIAGNOSIS — K11.20 PAROTIDITIS: ICD-10-CM

## 2021-05-07 DIAGNOSIS — Z85.3 HISTORY OF BREAST CANCER: ICD-10-CM

## 2021-05-07 DIAGNOSIS — L03.211 FACIAL CELLULITIS: Primary | ICD-10-CM

## 2021-05-07 DIAGNOSIS — N17.9 ACUTE KIDNEY INJURY (HCC): ICD-10-CM

## 2021-05-07 LAB
ALBUMIN SERPL BCP-MCNC: 2.2 G/DL (ref 3.5–5)
ALP SERPL-CCNC: 317 U/L (ref 46–116)
ALT SERPL W P-5'-P-CCNC: 27 U/L (ref 12–78)
ANION GAP SERPL CALCULATED.3IONS-SCNC: 19 MMOL/L (ref 4–13)
AST SERPL W P-5'-P-CCNC: 63 U/L (ref 5–45)
BASOPHILS # BLD AUTO: 0.09 THOUSANDS/ΜL (ref 0–0.1)
BASOPHILS NFR BLD AUTO: 0 % (ref 0–1)
BILIRUB SERPL-MCNC: 1.33 MG/DL (ref 0.2–1)
BUN SERPL-MCNC: 58 MG/DL (ref 5–25)
CALCIUM ALBUM COR SERPL-MCNC: 10.5 MG/DL (ref 8.3–10.1)
CALCIUM SERPL-MCNC: 9.1 MG/DL (ref 8.3–10.1)
CHLORIDE SERPL-SCNC: 100 MMOL/L (ref 100–108)
CO2 SERPL-SCNC: 16 MMOL/L (ref 21–32)
CREAT SERPL-MCNC: 2.61 MG/DL (ref 0.6–1.3)
EOSINOPHIL # BLD AUTO: 0.08 THOUSAND/ΜL (ref 0–0.61)
EOSINOPHIL NFR BLD AUTO: 0 % (ref 0–6)
ERYTHROCYTE [DISTWIDTH] IN BLOOD BY AUTOMATED COUNT: 15.1 % (ref 11.6–15.1)
GFR SERPL CREATININE-BSD FRML MDRD: 18 ML/MIN/1.73SQ M
GLUCOSE SERPL-MCNC: 149 MG/DL (ref 65–140)
HCT VFR BLD AUTO: 41.1 % (ref 34.8–46.1)
HGB BLD-MCNC: 13.8 G/DL (ref 11.5–15.4)
IMM GRANULOCYTES # BLD AUTO: 0.29 THOUSAND/UL (ref 0–0.2)
IMM GRANULOCYTES NFR BLD AUTO: 1 % (ref 0–2)
LYMPHOCYTES # BLD AUTO: 2 THOUSANDS/ΜL (ref 0.6–4.47)
LYMPHOCYTES NFR BLD AUTO: 9 % (ref 14–44)
MCH RBC QN AUTO: 30.1 PG (ref 26.8–34.3)
MCHC RBC AUTO-ENTMCNC: 33.6 G/DL (ref 31.4–37.4)
MCV RBC AUTO: 90 FL (ref 82–98)
MONOCYTES # BLD AUTO: 0.72 THOUSAND/ΜL (ref 0.17–1.22)
MONOCYTES NFR BLD AUTO: 3 % (ref 4–12)
NEUTROPHILS # BLD AUTO: 19.51 THOUSANDS/ΜL (ref 1.85–7.62)
NEUTS SEG NFR BLD AUTO: 87 % (ref 43–75)
NRBC BLD AUTO-RTO: 0 /100 WBCS
PLATELET # BLD AUTO: 327 THOUSANDS/UL (ref 149–390)
PMV BLD AUTO: 10.6 FL (ref 8.9–12.7)
POTASSIUM SERPL-SCNC: 2.8 MMOL/L (ref 3.5–5.3)
PROT SERPL-MCNC: 8.3 G/DL (ref 6.4–8.2)
RBC # BLD AUTO: 4.59 MILLION/UL (ref 3.81–5.12)
SODIUM SERPL-SCNC: 135 MMOL/L (ref 136–145)
WBC # BLD AUTO: 22.69 THOUSAND/UL (ref 4.31–10.16)

## 2021-05-07 PROCEDURE — 36415 COLL VENOUS BLD VENIPUNCTURE: CPT | Performed by: EMERGENCY MEDICINE

## 2021-05-07 PROCEDURE — 1124F ACP DISCUSS-NO DSCNMKR DOCD: CPT | Performed by: EMERGENCY MEDICINE

## 2021-05-07 PROCEDURE — 86038 ANTINUCLEAR ANTIBODIES: CPT | Performed by: OTOLARYNGOLOGY

## 2021-05-07 PROCEDURE — 96365 THER/PROPH/DIAG IV INF INIT: CPT

## 2021-05-07 PROCEDURE — 96375 TX/PRO/DX INJ NEW DRUG ADDON: CPT

## 2021-05-07 PROCEDURE — 80053 COMPREHEN METABOLIC PANEL: CPT | Performed by: EMERGENCY MEDICINE

## 2021-05-07 PROCEDURE — 70486 CT MAXILLOFACIAL W/O DYE: CPT

## 2021-05-07 PROCEDURE — 70490 CT SOFT TISSUE NECK W/O DYE: CPT

## 2021-05-07 PROCEDURE — 99285 EMERGENCY DEPT VISIT HI MDM: CPT | Performed by: EMERGENCY MEDICINE

## 2021-05-07 PROCEDURE — 99285 EMERGENCY DEPT VISIT HI MDM: CPT

## 2021-05-07 PROCEDURE — 99223 1ST HOSP IP/OBS HIGH 75: CPT | Performed by: NURSE PRACTITIONER

## 2021-05-07 PROCEDURE — 86430 RHEUMATOID FACTOR TEST QUAL: CPT | Performed by: OTOLARYNGOLOGY

## 2021-05-07 PROCEDURE — 85025 COMPLETE CBC W/AUTO DIFF WBC: CPT | Performed by: EMERGENCY MEDICINE

## 2021-05-07 PROCEDURE — 86235 NUCLEAR ANTIGEN ANTIBODY: CPT | Performed by: OTOLARYNGOLOGY

## 2021-05-07 RX ORDER — PILOCARPINE HYDROCHLORIDE 5 MG/1
5 TABLET, FILM COATED ORAL 3 TIMES DAILY
Status: DISCONTINUED | OUTPATIENT
Start: 2021-05-07 | End: 2021-05-11 | Stop reason: HOSPADM

## 2021-05-07 RX ORDER — HEPARIN SODIUM 5000 [USP'U]/ML
5000 INJECTION, SOLUTION INTRAVENOUS; SUBCUTANEOUS EVERY 8 HOURS SCHEDULED
Status: DISCONTINUED | OUTPATIENT
Start: 2021-05-08 | End: 2021-05-11 | Stop reason: HOSPADM

## 2021-05-07 RX ORDER — VANCOMYCIN HYDROCHLORIDE 1 G/200ML
1000 INJECTION, SOLUTION INTRAVENOUS ONCE
Status: COMPLETED | OUTPATIENT
Start: 2021-05-07 | End: 2021-05-08

## 2021-05-07 RX ORDER — SODIUM CHLORIDE 9 MG/ML
75 INJECTION, SOLUTION INTRAVENOUS CONTINUOUS
Status: DISCONTINUED | OUTPATIENT
Start: 2021-05-08 | End: 2021-05-09

## 2021-05-07 RX ORDER — METHIMAZOLE 5 MG/1
2.5 TABLET ORAL EVERY OTHER DAY
Status: DISCONTINUED | OUTPATIENT
Start: 2021-05-08 | End: 2021-05-11 | Stop reason: HOSPADM

## 2021-05-07 RX ORDER — ONDANSETRON 2 MG/ML
4 INJECTION INTRAMUSCULAR; INTRAVENOUS EVERY 6 HOURS PRN
Status: DISCONTINUED | OUTPATIENT
Start: 2021-05-07 | End: 2021-05-11 | Stop reason: HOSPADM

## 2021-05-07 RX ORDER — METHYLPREDNISOLONE SODIUM SUCCINATE 125 MG/2ML
125 INJECTION, POWDER, LYOPHILIZED, FOR SOLUTION INTRAMUSCULAR; INTRAVENOUS ONCE
Status: COMPLETED | OUTPATIENT
Start: 2021-05-07 | End: 2021-05-07

## 2021-05-07 RX ORDER — POTASSIUM CHLORIDE 14.9 MG/ML
20 INJECTION INTRAVENOUS ONCE
Status: COMPLETED | OUTPATIENT
Start: 2021-05-07 | End: 2021-05-08

## 2021-05-07 RX ORDER — HYDROCODONE BITARTRATE AND ACETAMINOPHEN 5; 325 MG/1; MG/1
1 TABLET ORAL EVERY 6 HOURS PRN
Status: DISCONTINUED | OUTPATIENT
Start: 2021-05-07 | End: 2021-05-11 | Stop reason: HOSPADM

## 2021-05-07 RX ORDER — LETROZOLE 2.5 MG/1
2.5 TABLET, FILM COATED ORAL DAILY
Status: DISCONTINUED | OUTPATIENT
Start: 2021-05-08 | End: 2021-05-11 | Stop reason: HOSPADM

## 2021-05-07 RX ORDER — POTASSIUM CHLORIDE 20 MEQ/1
40 TABLET, EXTENDED RELEASE ORAL ONCE
Status: COMPLETED | OUTPATIENT
Start: 2021-05-07 | End: 2021-05-08

## 2021-05-07 RX ORDER — METHYLPREDNISOLONE SODIUM SUCCINATE 40 MG/ML
40 INJECTION, POWDER, LYOPHILIZED, FOR SOLUTION INTRAMUSCULAR; INTRAVENOUS EVERY 8 HOURS SCHEDULED
Status: DISCONTINUED | OUTPATIENT
Start: 2021-05-08 | End: 2021-05-08

## 2021-05-07 RX ORDER — ACETAMINOPHEN 325 MG/1
650 TABLET ORAL EVERY 6 HOURS PRN
Status: DISCONTINUED | OUTPATIENT
Start: 2021-05-07 | End: 2021-05-11 | Stop reason: HOSPADM

## 2021-05-07 RX ORDER — VANCOMYCIN HYDROCHLORIDE 1 G/200ML
1000 INJECTION, SOLUTION INTRAVENOUS ONCE
Status: DISCONTINUED | OUTPATIENT
Start: 2021-05-08 | End: 2021-05-07 | Stop reason: DRUGHIGH

## 2021-05-07 RX ADMIN — VANCOMYCIN HYDROCHLORIDE 1000 MG: 1 INJECTION, SOLUTION INTRAVENOUS at 21:54

## 2021-05-07 RX ADMIN — METRONIDAZOLE 500 MG: 500 INJECTION, SOLUTION INTRAVENOUS at 21:30

## 2021-05-07 RX ADMIN — METHYLPREDNISOLONE SODIUM SUCCINATE 125 MG: 125 INJECTION, POWDER, FOR SOLUTION INTRAMUSCULAR; INTRAVENOUS at 19:56

## 2021-05-07 NOTE — ED PROVIDER NOTES
History  Chief Complaint   Patient presents with    Facial Swelling     Patient reports left sided facial swelling that started yesterday  States the swelling has been getting worse all day  Reports difficulty swallowing and breathing  States that she feels like her tongue is swollen  Patient reports left-sided facial swelling which started either last night or this morning  Patient is unsure which  She denies any fevers or trauma  She denies any dental pain  She states that she feels as if her tongue is swollen  She denies any drooling or inability to swallow  History provided by:  Patient  History limited by: Patient is a poor historian   used: No    Medical Problem  Location:  Left face  Quality:  Swollen  Severity:  Severe  Onset quality:  Gradual  Duration:  1 day  Timing:  Constant  Progression:  Unchanged  Chronicity:  New  Context:  Facial swelling with minimal pain  No dental pain or throat pain  Relieved by:  Nothing  Worsened by:  Nothing  Ineffective treatments:  None tried  Associated symptoms: no abdominal pain, no chest pain, no congestion, no cough, no diarrhea, no ear pain, no fatigue, no fever, no headaches, no loss of consciousness, no nausea, no rash, no rhinorrhea, no shortness of breath, no sore throat, no vomiting and no wheezing        Prior to Admission Medications   Prescriptions Last Dose Informant Patient Reported?  Taking?   calcium carbonate (Tums) 500 mg chewable tablet Unknown at Unknown time  Yes No   Sig: Chew 2 tablets   glucosamine-chondroitin 500-400 MG tablet Unknown at Unknown time  Yes No   Sig: Take 1 tablet by mouth   letrozole (FEMARA) 2 5 mg tablet Unknown at Unknown time  Yes No   Sig: Take 2 5 mg by mouth daily   loperamide (IMODIUM) 2 mg capsule Not Taking at Unknown time  Yes No   Sig: Take 2 mg by mouth   methimazole (TAPAZOLE) 5 mg tablet Unknown at Unknown time  Yes No   Sig: TAKE 1/2 TABLET BY MOUTH EVERY OTHER DAY Facility-Administered Medications: None       Past Medical History:   Diagnosis Date    Diabetes mellitus ([facility] [address] )     Diverticulitis     Hyperthyroidism        Past Surgical History:   Procedure Laterality Date    APPENDECTOMY      COLOSTOMY      [place] RETROGRADE PYELOGRAM  4/6/2021    MASTECTOMY      [place] CYSTOURETHROSCOPY,URETER CATHETER Bilateral 4/6/2021    Procedure: CYSTOSCOPY RETROGRADE PYELOGRAM WITH INSERTION STENT URETERAL;  Surgeon: Mary Ellen Nunez MD;  Location: [facility];  Service: Urology       History reviewed  No pertinent family history  I have reviewed and agree with the history as documented  E-Cigarette/Vaping    E-Cigarette Use Never User      E-Cigarette/Vaping Substances     Social History     Tobacco Use    Smoking status: Former Smoker    Smokeless tobacco: Never Used   Substance Use Topics    Alcohol use: Not Currently    Drug use: Not Currently       Review of Systems   Constitutional: Negative for chills, fatigue and fever  HENT: Negative for congestion, ear pain, hearing loss, rhinorrhea, sore throat, trouble swallowing and voice change  Eyes: Negative for pain and discharge  Respiratory: Negative for cough, shortness of breath and wheezing  Cardiovascular: Negative for chest pain and palpitations  Gastrointestinal: Negative for abdominal pain, blood in stool, constipation, diarrhea, nausea and vomiting  Genitourinary: Negative for dysuria, flank pain, frequency and hematuria  Musculoskeletal: Negative for joint swelling, neck pain and neck stiffness  Skin: Negative for rash and wound  Neurological: Negative for dizziness, seizures, loss of consciousness, syncope, facial asymmetry and headaches  Psychiatric/Behavioral: Negative for hallucinations, self-injury and suicidal ideas  All other systems reviewed and are negative  Physical Exam  Physical Exam  Vitals signs and nursing note reviewed     Constitutional:       General: She is not in acute distress  Appearance: She is well-developed  HENT:      Head: Normocephalic and atraumatic  Jaw: Swelling (Significant left submandibular swelling  Moderately tender  No fluctuance ) present  Salivary Glands: Left salivary gland is diffusely enlarged and tender  Right Ear: External ear normal       Left Ear: External ear normal       Nose: No congestion or rhinorrhea  Mouth/Throat:      Lips: No lesions  Mouth: Mucous membranes are dry  Tongue: No lesions  Tongue does not deviate from midline  Palate: No mass and lesions  Pharynx: Oropharynx is clear  Uvula midline  No pharyngeal swelling, oropharyngeal exudate, posterior oropharyngeal erythema or uvula swelling  Tonsils: No tonsillar exudate or tonsillar abscesses  Comments: Patient mostly edentulous  Eyes:      General:         Right eye: No discharge  Left eye: No discharge  Extraocular Movements: Extraocular movements intact  Conjunctiva/sclera: Conjunctivae normal       Pupils: Pupils are equal, round, and reactive to light  Neck:      Musculoskeletal: Normal range of motion and neck supple  Cardiovascular:      Rate and Rhythm: Normal rate and regular rhythm  Heart sounds: Normal heart sounds  No murmur  Pulmonary:      Effort: Pulmonary effort is normal       Breath sounds: Normal breath sounds  No wheezing or rales  Abdominal:      General: Bowel sounds are normal  There is no distension  Palpations: Abdomen is soft  Tenderness: There is no abdominal tenderness  There is no guarding or rebound  Musculoskeletal: Normal range of motion  General: No deformity  Skin:     General: Skin is warm and dry  Findings: No rash  Neurological:      General: No focal deficit present  Mental Status: She is alert and oriented to person, place, and time  Cranial Nerves: No cranial nerve deficit     Psychiatric:         Behavior: Behavior normal  Vital Signs  ED Triage Vitals   Temperature Pulse Respirations Blood Pressure SpO2   05/07/21 1948 05/07/21 1948 05/07/21 1948 05/07/21 1948 05/07/21 1948   (!) 96 8 °F (36 °C) 85 18 139/75 98 %      Temp Source Heart Rate Source Patient Position - Orthostatic VS BP Location FiO2 (%)   05/07/21 1948 05/07/21 1948 05/07/21 1948 05/07/21 1948 --   Temporal Monitor Lying Left arm       Pain Score       05/07/21 2130       No Pain           Vitals:    05/07/21 1948 05/07/21 2130 05/07/21 2242   BP: 139/75 142/65 125/57   Pulse: 85 80 83   Patient Position - Orthostatic VS: Lying Sitting Lying         Visual Acuity      ED Medications  Medications   HYDROcodone-acetaminophen (NORCO) 5-325 mg per tablet 1 tablet (has no administration in time range)   pilocarpine (SALAGEN) tablet 5 mg (5 mg Oral Given 5/8/21 0033)   letrozole [facility]) tablet 2 5 mg (has no administration in time range)   methimazole (TAPAZOLE) tablet 2 5 mg (has no administration in time range)   sodium chloride 0 9 % infusion (125 mL/hr Intravenous New Bag 5/8/21 0036)   ondansetron (ZOFRAN) injection 4 mg (has no administration in time range)   acetaminophen (TYLENOL) tablet 650 mg (650 mg Oral Given 5/8/21 0033)   heparin (porcine) subcutaneous injection 5,000 Units (5,000 Units Subcutaneous Given 5/8/21 0046)   insulin lispro (HumaLOG) 100 units/mL subcutaneous injection 1-5 Units (has no administration in time range)   insulin lispro (HumaLOG) 100 units/mL subcutaneous injection 1-5 Units (2 Units Subcutaneous Given 5/8/21 0053)   potassium chloride 20 mEq IVPB (premix) (20 mEq Intravenous New Bag 5/8/21 0043)   metroNIDAZOLE (FLAGYL) IVPB (premix) 500 mg 100 mL (has no administration in time range)   vancomycin (VANCOCIN) IVPB (premix in dextrose) 750 mg 150 mL (has no administration in time range)   methylPREDNISolone sodium succinate (Solu-MEDROL) injection 125 mg (has no administration in time range)   methylPREDNISolone sodium succinate (Solu-MEDROL) injection 125 mg (125 mg Intravenous Given 5/7/21 1956)   vancomycin (VANCOCIN) IVPB (premix in dextrose) 1,000 mg 200 mL (1,000 mg Intravenous New Bag 5/7/21 2154)   metroNIDAZOLE (FLAGYL) IVPB (premix) 500 mg 100 mL (0 mg Intravenous Stopped 5/7/21 2153)   potassium chloride (K-DUR,KLOR-CON) CR tablet 40 mEq (40 mEq Oral Given 5/8/21 0034)       Diagnostic Studies  Results Reviewed     Procedure Component Value Units Date/Time    Sjogren's Antibodies [[id number]] Collected: 05/07/21 2211    Lab Status: In process Specimen: Blood from Arm, Right Updated: 05/07/21 2215    RF Screen w/ Reflex to Titer [[id number]] Collected: 05/07/21 2211    Lab Status: In process Specimen: Blood from Arm, Right Updated: 05/07/21 2215    MEHREEN Screen w/ Reflex to Titer/Pattern [[id number]] Collected: 05/07/21 2211    Lab Status:  In process Specimen: Blood from Arm, Right Updated: 05/07/21 2215    Comprehensive metabolic panel [[id number]]  (Abnormal) Collected: 05/07/21 1955    Lab Status: Final result Specimen: Blood from Arm, Right Updated: 05/07/21 2023     Sodium 135 mmol/L      Potassium 2 8 mmol/L      Chloride 100 mmol/L      CO2 16 mmol/L      ANION GAP 19 mmol/L      BUN 58 mg/dL      Creatinine 2 61 mg/dL      Glucose 149 mg/dL      Calcium 9 1 mg/dL      Corrected Calcium 10 5 mg/dL      AST 63 U/L      ALT 27 U/L      Alkaline Phosphatase 317 U/L      Total Protein 8 3 g/dL      Albumin 2 2 g/dL      Total Bilirubin 1 33 mg/dL      eGFR 18 ml/min/1 73sq m     Narrative:      [place] guidelines for Chronic Kidney Disease (CKD):     Stage 1 with normal or high GFR (GFR > 90 mL/min/1 73 square meters)    Stage 2 Mild CKD (GFR = 60-89 mL/min/1 73 square meters)    Stage 3A Moderate CKD (GFR = 45-59 mL/min/1 73 square meters)    Stage 3B Moderate CKD (GFR = 30-44 mL/min/1 73 square meters)    Stage 4 Severe CKD (GFR = 15-29 mL/min/1 73 square meters)    Stage 5 End Stage CKD (GFR <15 mL/min/1 73 square meters)  Note: GFR calculation is accurate only with a steady state creatinine    CBC and differential [[id number]]  (Abnormal) Collected: 05/07/21 1955    Lab Status: Final result Specimen: Blood from Arm, Right Updated: 05/07/21 2005     WBC 22 69 Thousand/uL      RBC 4 59 Million/uL      Hemoglobin 13 8 g/dL      Hematocrit 41 1 %      MCV 90 fL      MCH 30 1 pg      MCHC 33 6 g/dL      RDW 15 1 %      MPV 10 6 fL      Platelets 654 Thousands/uL      nRBC 0 /100 WBCs      Neutrophils Relative 87 %      Immat GRANS % 1 %      Lymphocytes Relative 9 %      Monocytes Relative 3 %      Eosinophils Relative 0 %      Basophils Relative 0 %      Neutrophils Absolute 19 51 Thousands/µL      Immature Grans Absolute 0 29 Thousand/uL      Lymphocytes Absolute 2 00 Thousands/µL      Monocytes Absolute 0 72 Thousand/µL      Eosinophils Absolute 0 08 Thousand/µL      Basophils Absolute 0 09 Thousands/µL                  CT facial bones without contrast   Final Result by Asad Tran MD (05/07 2059)      1  Acute left parotiditis and associated significant left facial and neck soft tissue cellulitis with multi-spatial left neck inflammation  Please refer to the report of concurrent neck CT for details  2   Anteriorly subluxed right temporomandibular joint  Workstation performed: [id number]         CT soft tissue neck wo contrast   Final Result by Asad Tran MD (05/07 2056)      1  Acute left parotiditis and associated significant left facial and neck soft tissue cellulitis with involvement of left carotid, , parapharyngeal, and left submandibular spaces  Inflamed left palatine tonsil  Airway is narrowed  No    discrete fluid collection  2   Anterior subluxation of right temporomandibular joint  The study was marked in [facility] for immediate notification        Workstation performed: [id number]                    Procedures  Procedures         ED Course  ED Course as of May 08 0129   Fri May 07, 2021   2118 Discussed with ENT, Dr Guille Silva, recommends Vanco/Iggy, will see patient in the emergency department to determine ultimate disposition whether admitted here or transferred for oral surgery      2154 Seen in ED by ENT, Dr Guille Silva, agrees medical admission appropriate, he will consult  Medicine paged                                                MDM  Number of Diagnoses or Management Options  Facial cellulitis:   Parotiditis:      Amount and/or Complexity of Data Reviewed  Clinical lab tests: ordered and reviewed  Tests in the radiology section of CPT®: ordered and reviewed  Decide to obtain previous medical records or to obtain history from someone other than the patient: yes  Review and summarize past medical records: yes  Independent visualization of images, tracings, or specimens: yes        Disposition  Final diagnoses:   Facial cellulitis   Parotiditis     Time reflects when diagnosis was documented in both MDM as applicable and the Disposition within this note     Time User Action Codes Description Comment    5/7/2021 10:06 PM Jolene Oneal Add [M23 107] Facial cellulitis     5/7/2021 11:34 PM Geetha Monge Add [K11 20] Parotiditis       ED Disposition     ED Disposition Condition Date/Time Comment    Admit Stable Fri May 7, 2021 10:06 PM Case was discussed with Ricke Lennox and the patient's admission status was agreed to be Admission Status: inpatient status to the service of Dr Seth Ann           Follow-up Information    None         Current Discharge Medication List      CONTINUE these medications which have NOT CHANGED    Details   calcium carbonate (Tums) 500 mg chewable tablet Chew 2 tablets      glucosamine-chondroitin 500-400 MG tablet Take 1 tablet by mouth      letrozole (FEMARA) 2 5 mg tablet Take 2 5 mg by mouth daily      loperamide (IMODIUM) 2 mg capsule Take 2 mg by mouth      methimazole (TAPAZOLE) 5 mg tablet TAKE 1/2 TABLET BY MOUTH EVERY OTHER DAY           No discharge procedures on file      PDMP Review     None          ED Provider  Electronically Signed by           Fartun Betancourt MD  05/08/21 7602

## 2021-05-08 ENCOUNTER — APPOINTMENT (INPATIENT)
Dept: ULTRASOUND IMAGING | Facility: HOSPITAL | Age: 76
DRG: 155 | End: 2021-05-08
Payer: MEDICARE

## 2021-05-08 LAB
ANION GAP SERPL CALCULATED.3IONS-SCNC: 14 MMOL/L (ref 4–13)
BASOPHILS # BLD MANUAL: 0 THOUSAND/UL (ref 0–0.1)
BASOPHILS NFR MAR MANUAL: 0 % (ref 0–1)
BUN SERPL-MCNC: 56 MG/DL (ref 5–25)
CALCIUM SERPL-MCNC: 8.3 MG/DL (ref 8.3–10.1)
CHLORIDE SERPL-SCNC: 104 MMOL/L (ref 100–108)
CO2 SERPL-SCNC: 16 MMOL/L (ref 21–32)
CREAT SERPL-MCNC: 2.44 MG/DL (ref 0.6–1.3)
EOSINOPHIL # BLD MANUAL: 0 THOUSAND/UL (ref 0–0.4)
EOSINOPHIL NFR BLD MANUAL: 0 % (ref 0–6)
ERYTHROCYTE [DISTWIDTH] IN BLOOD BY AUTOMATED COUNT: 14.6 % (ref 11.6–15.1)
GFR SERPL CREATININE-BSD FRML MDRD: 19 ML/MIN/1.73SQ M
GIANT PLATELETS BLD QL SMEAR: PRESENT
GLUCOSE SERPL-MCNC: 159 MG/DL (ref 65–140)
GLUCOSE SERPL-MCNC: 171 MG/DL (ref 65–140)
GLUCOSE SERPL-MCNC: 197 MG/DL (ref 65–140)
GLUCOSE SERPL-MCNC: 202 MG/DL (ref 65–140)
GLUCOSE SERPL-MCNC: 210 MG/DL (ref 65–140)
GLUCOSE SERPL-MCNC: 221 MG/DL (ref 65–140)
HCT VFR BLD AUTO: 37.7 % (ref 34.8–46.1)
HGB BLD-MCNC: 12.9 G/DL (ref 11.5–15.4)
LYMPHOCYTES # BLD AUTO: 1.07 THOUSAND/UL (ref 0.6–4.47)
LYMPHOCYTES # BLD AUTO: 6 % (ref 14–44)
MCH RBC QN AUTO: 29.9 PG (ref 26.8–34.3)
MCHC RBC AUTO-ENTMCNC: 34.2 G/DL (ref 31.4–37.4)
MCV RBC AUTO: 88 FL (ref 82–98)
MONOCYTES # BLD AUTO: 0 THOUSAND/UL (ref 0–1.22)
MONOCYTES NFR BLD: 0 % (ref 4–12)
NEUTROPHILS # BLD MANUAL: 16.69 THOUSAND/UL (ref 1.85–7.62)
NEUTS SEG NFR BLD AUTO: 94 % (ref 43–75)
NRBC BLD AUTO-RTO: 0 /100 WBCS
PLATELET # BLD AUTO: 281 THOUSANDS/UL (ref 149–390)
PLATELET BLD QL SMEAR: ADEQUATE
PMV BLD AUTO: 10.8 FL (ref 8.9–12.7)
POLYCHROMASIA BLD QL SMEAR: PRESENT
POTASSIUM SERPL-SCNC: 3.2 MMOL/L (ref 3.5–5.3)
RBC # BLD AUTO: 4.31 MILLION/UL (ref 3.81–5.12)
SMUDGE CELLS BLD QL SMEAR: PRESENT
SODIUM SERPL-SCNC: 134 MMOL/L (ref 136–145)
TOTAL CELLS COUNTED SPEC: 100
TSH SERPL DL<=0.05 MIU/L-ACNC: 0.63 UIU/ML (ref 0.36–3.74)
VANCOMYCIN SERPL-MCNC: 11.2 UG/ML
WBC # BLD AUTO: 17.75 THOUSAND/UL (ref 4.31–10.16)

## 2021-05-08 PROCEDURE — 80048 BASIC METABOLIC PNL TOTAL CA: CPT | Performed by: NURSE PRACTITIONER

## 2021-05-08 PROCEDURE — 85027 COMPLETE CBC AUTOMATED: CPT | Performed by: NURSE PRACTITIONER

## 2021-05-08 PROCEDURE — 85007 BL SMEAR W/DIFF WBC COUNT: CPT | Performed by: NURSE PRACTITIONER

## 2021-05-08 PROCEDURE — 84443 ASSAY THYROID STIM HORMONE: CPT | Performed by: NURSE PRACTITIONER

## 2021-05-08 PROCEDURE — 82948 REAGENT STRIP/BLOOD GLUCOSE: CPT

## 2021-05-08 PROCEDURE — 80202 ASSAY OF VANCOMYCIN: CPT | Performed by: NURSE PRACTITIONER

## 2021-05-08 PROCEDURE — 76770 US EXAM ABDO BACK WALL COMP: CPT

## 2021-05-08 PROCEDURE — 99233 SBSQ HOSP IP/OBS HIGH 50: CPT | Performed by: FAMILY MEDICINE

## 2021-05-08 RX ORDER — METHYLPREDNISOLONE SODIUM SUCCINATE 125 MG/2ML
125 INJECTION, POWDER, LYOPHILIZED, FOR SOLUTION INTRAMUSCULAR; INTRAVENOUS EVERY 6 HOURS SCHEDULED
Status: DISCONTINUED | OUTPATIENT
Start: 2021-05-09 | End: 2021-05-08

## 2021-05-08 RX ORDER — POTASSIUM CHLORIDE 20 MEQ/1
40 TABLET, EXTENDED RELEASE ORAL ONCE
Status: COMPLETED | OUTPATIENT
Start: 2021-05-08 | End: 2021-05-08

## 2021-05-08 RX ORDER — VANCOMYCIN HYDROCHLORIDE 1 G/200ML
20 INJECTION, SOLUTION INTRAVENOUS ONCE AS NEEDED
Status: DISCONTINUED | OUTPATIENT
Start: 2021-05-08 | End: 2021-05-08

## 2021-05-08 RX ORDER — METHYLPREDNISOLONE SODIUM SUCCINATE 125 MG/2ML
125 INJECTION, POWDER, LYOPHILIZED, FOR SOLUTION INTRAMUSCULAR; INTRAVENOUS EVERY 6 HOURS SCHEDULED
Status: DISCONTINUED | OUTPATIENT
Start: 2021-05-08 | End: 2021-05-08

## 2021-05-08 RX ORDER — METHYLPREDNISOLONE SODIUM SUCCINATE 125 MG/2ML
60 INJECTION, POWDER, LYOPHILIZED, FOR SOLUTION INTRAMUSCULAR; INTRAVENOUS EVERY 6 HOURS SCHEDULED
Status: DISCONTINUED | OUTPATIENT
Start: 2021-05-08 | End: 2021-05-09

## 2021-05-08 RX ADMIN — SODIUM CHLORIDE 125 ML/HR: 0.9 INJECTION, SOLUTION INTRAVENOUS at 00:36

## 2021-05-08 RX ADMIN — PILOCARPINE HYDROCHLORIDE 5 MG: 5 TABLET, FILM COATED ORAL at 21:46

## 2021-05-08 RX ADMIN — METHYLPREDNISOLONE SODIUM SUCCINATE 125 MG: 125 INJECTION, POWDER, FOR SOLUTION INTRAMUSCULAR; INTRAVENOUS at 01:30

## 2021-05-08 RX ADMIN — POTASSIUM CHLORIDE 40 MEQ: 1500 TABLET, EXTENDED RELEASE ORAL at 00:34

## 2021-05-08 RX ADMIN — INSULIN LISPRO 2 UNITS: 100 INJECTION, SOLUTION INTRAVENOUS; SUBCUTANEOUS at 16:40

## 2021-05-08 RX ADMIN — SODIUM CHLORIDE 125 ML/HR: 0.9 INJECTION, SOLUTION INTRAVENOUS at 21:05

## 2021-05-08 RX ADMIN — LETROZOLE 2.5 MG: 2.5 TABLET, FILM COATED ORAL at 10:10

## 2021-05-08 RX ADMIN — PILOCARPINE HYDROCHLORIDE 5 MG: 5 TABLET, FILM COATED ORAL at 16:38

## 2021-05-08 RX ADMIN — METRONIDAZOLE 500 MG: 500 INJECTION, SOLUTION INTRAVENOUS at 13:39

## 2021-05-08 RX ADMIN — METHYLPREDNISOLONE SODIUM SUCCINATE 60 MG: 125 INJECTION, POWDER, FOR SOLUTION INTRAMUSCULAR; INTRAVENOUS at 23:24

## 2021-05-08 RX ADMIN — INSULIN LISPRO 4 UNITS: 100 INJECTION, SOLUTION INTRAVENOUS; SUBCUTANEOUS at 13:36

## 2021-05-08 RX ADMIN — METHIMAZOLE 2.5 MG: 5 TABLET ORAL at 08:15

## 2021-05-08 RX ADMIN — METHYLPREDNISOLONE SODIUM SUCCINATE 125 MG: 125 INJECTION, POWDER, FOR SOLUTION INTRAMUSCULAR; INTRAVENOUS at 08:23

## 2021-05-08 RX ADMIN — INSULIN LISPRO 1 UNITS: 100 INJECTION, SOLUTION INTRAVENOUS; SUBCUTANEOUS at 08:18

## 2021-05-08 RX ADMIN — VANCOMYCIN HYDROCHLORIDE 1250 MG: 1 INJECTION, POWDER, LYOPHILIZED, FOR SOLUTION INTRAVENOUS at 21:05

## 2021-05-08 RX ADMIN — INSULIN LISPRO 4 UNITS: 100 INJECTION, SOLUTION INTRAVENOUS; SUBCUTANEOUS at 16:40

## 2021-05-08 RX ADMIN — INSULIN LISPRO 2 UNITS: 100 INJECTION, SOLUTION INTRAVENOUS; SUBCUTANEOUS at 00:53

## 2021-05-08 RX ADMIN — INSULIN LISPRO 4 UNITS: 100 INJECTION, SOLUTION INTRAVENOUS; SUBCUTANEOUS at 13:35

## 2021-05-08 RX ADMIN — ACETAMINOPHEN 650 MG: 325 TABLET ORAL at 00:33

## 2021-05-08 RX ADMIN — PILOCARPINE HYDROCHLORIDE 5 MG: 5 TABLET, FILM COATED ORAL at 00:33

## 2021-05-08 RX ADMIN — METHYLPREDNISOLONE SODIUM SUCCINATE 60 MG: 125 INJECTION, POWDER, FOR SOLUTION INTRAMUSCULAR; INTRAVENOUS at 17:31

## 2021-05-08 RX ADMIN — HYDROCODONE BITARTRATE AND ACETAMINOPHEN 1 TABLET: 5; 325 TABLET ORAL at 16:38

## 2021-05-08 RX ADMIN — POTASSIUM CHLORIDE 20 MEQ: 14.9 INJECTION, SOLUTION INTRAVENOUS at 00:43

## 2021-05-08 RX ADMIN — INSULIN LISPRO 1 UNITS: 100 INJECTION, SOLUTION INTRAVENOUS; SUBCUTANEOUS at 21:48

## 2021-05-08 RX ADMIN — METRONIDAZOLE 500 MG: 500 INJECTION, SOLUTION INTRAVENOUS at 05:13

## 2021-05-08 RX ADMIN — HEPARIN SODIUM 5000 UNITS: 5000 INJECTION INTRAVENOUS; SUBCUTANEOUS at 00:46

## 2021-05-08 RX ADMIN — SODIUM CHLORIDE 125 ML/HR: 0.9 INJECTION, SOLUTION INTRAVENOUS at 10:11

## 2021-05-08 RX ADMIN — PILOCARPINE HYDROCHLORIDE 5 MG: 5 TABLET, FILM COATED ORAL at 08:15

## 2021-05-08 RX ADMIN — POTASSIUM CHLORIDE 40 MEQ: 1500 TABLET, EXTENDED RELEASE ORAL at 13:40

## 2021-05-08 RX ADMIN — METRONIDAZOLE 500 MG: 500 INJECTION, SOLUTION INTRAVENOUS at 23:23

## 2021-05-08 NOTE — ASSESSMENT & PLAN NOTE
· Presented with left facial swelling  · CT soft tissue neck:  Acute left parotiditis associated significant left facial and neck soft tissue cellulitis with involvement of left carotid  parapharyngeal and left submandibular spaces  Inflamed left palatine tonsil  Airway is narrowed  No discrete fluid collection  Anterior subluxation of right temporomandibular joint  · WBC 22 68  · Dr Owens Feeling reviewed images-recommendations appreciated including vancomycin and Flagyl, Solu-Medrol or Decadron, monitor blood sugar, hydrate well with IV fluids  Heating pads and sialogogues  Patient encouraged to massage neck    · If noted to have abscess formation may need surgical intervention

## 2021-05-08 NOTE — PLAN OF CARE
Problem: Potential for Falls  Goal: Patient will remain free of falls  Description: INTERVENTIONS:  - Assess patient frequently for physical needs  -  Identify cognitive and physical deficits and behaviors that affect risk of falls    -  [place] fall precautions as indicated by assessment   - Educate patient/family on patient safety including physical limitations  - Instruct patient to call for assistance with activity based on assessment  - Modify environment to reduce risk of injury  - Consider OT/PT consult to assist with strengthening/mobility  Outcome: Progressing     Problem: Prexisting or High Potential for Compromised Skin Integrity  Goal: Skin integrity is maintained or improved  Description: INTERVENTIONS:  - Identify patients at risk for skin breakdown  - Assess and monitor skin integrity  - Assess and monitor nutrition and hydration status  - Monitor labs   - Assess for incontinence   - Turn and reposition patient  - Assist with mobility/ambulation  - Relieve pressure over bony prominences  - Avoid friction and shearing  - Provide appropriate hygiene as needed including keeping skin clean and dry  - Evaluate need for skin moisturizer/barrier cream  - Collaborate with interdisciplinary team   - Patient/family teaching  - Consider wound care consult   Outcome: Progressing     Problem: PAIN - ADULT  Goal: Verbalizes/displays adequate comfort level or baseline comfort level  Description: Interventions:  - Encourage patient to monitor pain and request assistance  - Assess pain using appropriate pain scale  - Administer analgesics based on type and severity of pain and evaluate response  - Implement non-pharmacological measures as appropriate and evaluate response  - Consider cultural and social influences on pain and pain management  - Notify physician/advanced practitioner if interventions unsuccessful or patient reports new pain  Outcome: Progressing     Problem: INFECTION - ADULT  Goal: Absence or prevention of progression during hospitalization  Description: INTERVENTIONS:  - Assess and monitor for signs and symptoms of infection  - Monitor lab/diagnostic results  - Monitor all insertion sites, i e  indwelling lines, tubes, and drains  - Monitor endotracheal if appropriate and nasal secretions for changes in amount and color  - [place] appropriate cooling/warming therapies per order  - Administer medications as ordered  - Instruct and encourage patient and family to use good hand hygiene technique  - Identify and instruct in appropriate isolation precautions for identified infection/condition  Outcome: Progressing  Goal: Absence of fever/infection during neutropenic period  Description: INTERVENTIONS:  - Monitor WBC    Outcome: Progressing     Problem: SAFETY ADULT  Goal: Patient will remain free of falls  Description: INTERVENTIONS:  - Assess patient frequently for physical needs  -  Identify cognitive and physical deficits and behaviors that affect risk of falls    -  [place] fall precautions as indicated by assessment   - Educate patient/family on patient safety including physical limitations  - Instruct patient to call for assistance with activity based on assessment  - Modify environment to reduce risk of injury  - Consider OT/PT consult to assist with strengthening/mobility  Outcome: Progressing  Goal: Maintain or return to baseline ADL function  Description: INTERVENTIONS:  -  Assess patient's ability to carry out ADLs; assess patient's baseline for ADL function and identify physical deficits which impact ability to perform ADLs (bathing, care of mouth/teeth, toileting, grooming, dressing, etc )  - Assess/evaluate cause of self-care deficits   - Assess range of motion  - Assess patient's mobility; develop plan if impaired  - Assess patient's need for assistive devices and provide as appropriate  - Encourage maximum independence but intervene and supervise when necessary  - Involve family in performance of ADLs  - Assess for home care needs following discharge   - Consider OT consult to assist with ADL evaluation and planning for discharge  - Provide patient education as appropriate  Outcome: Progressing  Goal: Maintain or return mobility status to optimal level  Description: INTERVENTIONS:  - Assess patient's baseline mobility status (ambulation, transfers, stairs, etc )    - Identify cognitive and physical deficits and behaviors that affect mobility  - Identify mobility aids required to assist with transfers and/or ambulation (gait belt, sit-to-stand, lift, walker, cane, etc )  - [place] fall precautions as indicated by assessment  - Record patient progress and toleration of activity level on Mobility SBAR; progress patient to next Phase/Stage  - Instruct patient to call for assistance with activity based on assessment  - Consider rehabilitation consult to assist with strengthening/weightbearing, etc   Outcome: Progressing     Problem: DISCHARGE PLANNING  Goal: Discharge to home or other facility with appropriate resources  Description: INTERVENTIONS:  - Identify barriers to discharge w/patient and caregiver  - Arrange for needed discharge resources and transportation as appropriate  - Identify discharge learning needs (meds, wound care, etc )  - Arrange for interpretive services to assist at discharge as needed  - Refer to Case Management Department for coordinating discharge planning if the patient needs post-hospital services based on physician/advanced practitioner order or complex needs related to functional status, cognitive ability, or social support system  Outcome: Progressing     Problem: Knowledge Deficit  Goal: Patient/family/caregiver demonstrates understanding of disease process, treatment plan, medications, and discharge instructions  Description: Complete learning assessment and assess knowledge base    Interventions:  - Provide teaching at level of understanding  - Provide teaching via preferred learning methods  Outcome: Progressing     Problem: METABOLIC, FLUID AND ELECTROLYTES - ADULT  Goal: Electrolytes maintained within normal limits  Description: INTERVENTIONS:  - Monitor labs and assess patient for signs and symptoms of electrolyte imbalances  - Administer electrolyte replacement as ordered  - Monitor response to electrolyte replacements, including repeat lab results as appropriate  - Instruct patient on fluid and nutrition as appropriate  Outcome: Progressing  Goal: Fluid balance maintained  Description: INTERVENTIONS:  - Monitor labs   - Monitor I/O and WT  - Instruct patient on fluid and nutrition as appropriate  - Assess for signs & symptoms of volume excess or deficit  Outcome: Progressing  Goal: Glucose maintained within target range  Description: INTERVENTIONS:  - Monitor Blood Glucose as ordered  - Assess for signs and symptoms of hyperglycemia and hypoglycemia  - Administer ordered medications to maintain glucose within target range  - Assess nutritional intake and initiate nutrition service referral as needed  Outcome: Progressing     Problem: SKIN/TISSUE INTEGRITY - ADULT  Goal: Skin integrity remains intact  Description: INTERVENTIONS  - Identify patients at risk for skin breakdown  - Assess and monitor skin integrity  - Assess and monitor nutrition and hydration status  - Monitor labs (i e  albumin)  - Assess for incontinence   - Turn and reposition patient  - Assist with mobility/ambulation  - Relieve pressure over bony prominences  - Avoid friction and shearing  - Provide appropriate hygiene as needed including keeping skin clean and dry  - Evaluate need for skin moisturizer/barrier cream  - Collaborate with interdisciplinary team (i e  Nutrition, Rehabilitation, etc )   - Patient/family teaching  Outcome: Progressing  Goal: Incision(s), wounds(s) or drain site(s) healing without S/S of infection  Description: INTERVENTIONS  - Assess and document risk factors for skin impairment   - Assess and document dressing, incision, wound bed, drain sites and surrounding tissue  - Consider nutrition services referral as needed  - Oral mucous membranes remain intact  - Provide patient/ family education  Outcome: Progressing  Goal: Oral mucous membranes remain intact  Description: INTERVENTIONS  - Assess oral mucosa and hygiene practices  - Implement preventative oral hygiene regimen  - Implement oral medicated treatments as ordered  - Initiate Nutrition services referral as needed  Outcome: Progressing     Problem: Nutrition/Hydration-ADULT  Goal: Nutrient/Hydration intake appropriate for improving, restoring or maintaining nutritional needs  Description: Monitor and assess patient's nutrition/hydration status for malnutrition  Collaborate with interdisciplinary team and initiate plan and interventions as ordered  Monitor patient's weight and dietary intake as ordered or per policy  Utilize nutrition screening tool and intervene as necessary  Determine patient's food preferences and provide high-protein, high-caloric foods as appropriate       INTERVENTIONS:  - Monitor oral intake, urinary output, labs, and treatment plans  - Assess nutrition and hydration status and recommend course of action  - Evaluate amount of meals eaten  - Assist patient with eating if necessary   - Allow adequate time for meals  - Recommend/ encourage appropriate diets, oral nutritional supplements, and vitamin/mineral supplements  - Order, calculate, and assess calorie counts as needed  - Recommend, monitor, and adjust tube feedings and TPN/PPN based on assessed needs  - Assess need for intravenous fluids  - Provide specific nutrition/hydration education as appropriate  - Include patient/family/caregiver in decisions related to nutrition  Outcome: Progressing

## 2021-05-08 NOTE — ASSESSMENT & PLAN NOTE
Lab Results   Component Value Date    HGBA1C 6 1 (H) 04/03/2021       Recent Labs     05/08/21  0001 05/08/21  0736 05/08/21  1104   POCGLU 210* 197* 221*       Blood Sugar Average: Last 72 hrs:  (P) 420 [id number]   · Blood sugars are elevated secondary to steroid use  · Fingerstick blood sugar with sliding scale coverage  · Hold Glucophage while in hospital

## 2021-05-08 NOTE — PROGRESS NOTES
Vancomycin Assessment    Carolee Canchola is a 70 y o  female who is currently receiving vancomycin 1000 mg iv once (given) then 1000 mg iv q 24 hours for skin-soft tissue infection     Relevant clinical data and objective history reviewed:  Creatinine   Date Value Ref Range Status   05/07/2021 2 61 (H) 0 60 - 1 30 mg/dL Final     Comment:     Standardized to IDMS reference method   04/07/2021 2 25 (H) 0 60 - 1 30 mg/dL Final     Comment:     Standardized to IDMS reference method   04/06/2021 4 28 (H) 0 60 - 1 30 mg/dL Final     Comment:     Standardized to IDMS reference method     /57 (BP Location: Right arm)   Pulse 83   Temp (!) 96 8 °F (36 °C) (Temporal)   Resp 18   Ht 4' 10" (1 473 m)   Wt 63 4 kg (139 lb 12 8 oz)   SpO2 95%   BMI 29 22 kg/m²   No intake/output data recorded  Lab Results   Component Value Date/Time    BUN 58 (H) 05/07/2021 07:55 PM    WBC 22 69 (H) 05/07/2021 07:55 PM    HGB 13 8 05/07/2021 07:55 PM    HCT 41 1 05/07/2021 07:55 PM    MCV 90 05/07/2021 07:55 PM     05/07/2021 07:55 PM     Temp Readings from Last 3 Encounters:   05/07/21 (!) 96 8 °F (36 °C) (Temporal)   04/07/21 98 5 °F (36 9 °C)   04/01/21 (!) 97 4 °F (36 3 °C) (Temporal)     Vancomycin Days of Therapy: 1    Assessment/Plan  The patient is currently on vancomycin utilizing scheduled dosing based on actual body weight  Baseline risks associated with therapy include: pre-existing renal impairment, concomitant nephrotoxic medications, advanced age, and dehydration  The patient is currently receiving 1000 mg iv once (given) then 1000 mg iv q 24 hours and after clinical evaluation will be changed to 750 mg iv q 24 hours  Pharmacy will also follow closely for s/sx of nephrotoxicity, infusion reactions, and appropriateness of therapy  BMP and CBC will be ordered per protocol  Plan for trough as patient approaches steady state, prior to the 4th  dose at approximately 22:00 on 5/10/2021    Due to infection severity, will target a trough of 15-20 (appropriate for most indications)   Pharmacy will continue to follow the patients culture results and clinical progress daily      Nisreen Bro, Pharmacist

## 2021-05-08 NOTE — NURSING NOTE
Patient admitted to nursing unit 5/7/21 @ 2300  Heat pack applied to left face with which it provided some relief of pain, patient requesting tylenol instead of the hydrocodone for pain  Patient able to swallow water without difficulty  Able to swallow pills whole with pudding and water  Speech is slurred and hoarse due to swelling and dry mouth, patient given lemon packets to help produce saliva  Patient willing to accept one heparin SQ injection daily for DVT prophylaxis  Patient initially not willing to accept insulin, educated patient on glucose levels and how illness and steroid medications can elevate sugars and delay healing, patient verbalized understanding and then accepted insulin as ordered for bedtime glucose of 210  Patient also tolerated SCD's on bilat legs for approximately 45 minutes then requested they be removed, verbalized understanding of their indication but continued to ask they be removed  Patient made comfortable in bed and currently sleeping/resting with heat pack in place  Will continue to monitor

## 2021-05-08 NOTE — PLAN OF CARE
Problem: Potential for Falls  Goal: Patient will remain free of falls  Description: INTERVENTIONS:  - Assess patient frequently for physical needs  -  Identify cognitive and physical deficits and behaviors that affect risk of falls    -  [place] fall precautions as indicated by assessment   - Educate patient/family on patient safety including physical limitations  - Instruct patient to call for assistance with activity based on assessment  - Modify environment to reduce risk of injury  - Consider OT/PT consult to assist with strengthening/mobility  Outcome: Progressing     Problem: Prexisting or High Potential for Compromised Skin Integrity  Goal: Skin integrity is maintained or improved  Description: INTERVENTIONS:  - Identify patients at risk for skin breakdown  - Assess and monitor skin integrity  - Assess and monitor nutrition and hydration status  - Monitor labs   - Assess for incontinence   - Turn and reposition patient  - Assist with mobility/ambulation  - Relieve pressure over bony prominences  - Avoid friction and shearing  - Provide appropriate hygiene as needed including keeping skin clean and dry  - Evaluate need for skin moisturizer/barrier cream  - Collaborate with interdisciplinary team   - Patient/family teaching  - Consider wound care consult   Outcome: Progressing     Problem: PAIN - ADULT  Goal: Verbalizes/displays adequate comfort level or baseline comfort level  Description: Interventions:  - Encourage patient to monitor pain and request assistance  - Assess pain using appropriate pain scale  - Administer analgesics based on type and severity of pain and evaluate response  - Implement non-pharmacological measures as appropriate and evaluate response  - Consider cultural and social influences on pain and pain management  - Notify physician/advanced practitioner if interventions unsuccessful or patient reports new pain  Outcome: Progressing     Problem: INFECTION - ADULT  Goal: Absence or prevention of progression during hospitalization  Description: INTERVENTIONS:  - Assess and monitor for signs and symptoms of infection  - Monitor lab/diagnostic results  - Monitor all insertion sites, i e  indwelling lines, tubes, and drains  - Monitor endotracheal if appropriate and nasal secretions for changes in amount and color  - [place] appropriate cooling/warming therapies per order  - Administer medications as ordered  - Instruct and encourage patient and family to use good hand hygiene technique  - Identify and instruct in appropriate isolation precautions for identified infection/condition  Outcome: Progressing  Goal: Absence of fever/infection during neutropenic period  Description: INTERVENTIONS:  - Monitor WBC    Outcome: Progressing     Problem: SAFETY ADULT  Goal: Patient will remain free of falls  Description: INTERVENTIONS:  - Assess patient frequently for physical needs  -  Identify cognitive and physical deficits and behaviors that affect risk of falls    -  [place] fall precautions as indicated by assessment   - Educate patient/family on patient safety including physical limitations  - Instruct patient to call for assistance with activity based on assessment  - Modify environment to reduce risk of injury  - Consider OT/PT consult to assist with strengthening/mobility  Outcome: Progressing  Goal: Maintain or return to baseline ADL function  Description: INTERVENTIONS:  -  Assess patient's ability to carry out ADLs; assess patient's baseline for ADL function and identify physical deficits which impact ability to perform ADLs (bathing, care of mouth/teeth, toileting, grooming, dressing, etc )  - Assess/evaluate cause of self-care deficits   - Assess range of motion  - Assess patient's mobility; develop plan if impaired  - Assess patient's need for assistive devices and provide as appropriate  - Encourage maximum independence but intervene and supervise when necessary  - Involve family in performance of ADLs  - Assess for home care needs following discharge   - Consider OT consult to assist with ADL evaluation and planning for discharge  - Provide patient education as appropriate  Outcome: Progressing  Goal: Maintain or return mobility status to optimal level  Description: INTERVENTIONS:  - Assess patient's baseline mobility status (ambulation, transfers, stairs, etc )    - Identify cognitive and physical deficits and behaviors that affect mobility  - Identify mobility aids required to assist with transfers and/or ambulation (gait belt, sit-to-stand, lift, walker, cane, etc )  - [place] fall precautions as indicated by assessment  - Record patient progress and toleration of activity level on Mobility SBAR; progress patient to next Phase/Stage  - Instruct patient to call for assistance with activity based on assessment  - Consider rehabilitation consult to assist with strengthening/weightbearing, etc   Outcome: Progressing     Problem: DISCHARGE PLANNING  Goal: Discharge to home or other facility with appropriate resources  Description: INTERVENTIONS:  - Identify barriers to discharge w/patient and caregiver  - Arrange for needed discharge resources and transportation as appropriate  - Identify discharge learning needs (meds, wound care, etc )  - Arrange for interpretive services to assist at discharge as needed  - Refer to Case Management Department for coordinating discharge planning if the patient needs post-hospital services based on physician/advanced practitioner order or complex needs related to functional status, cognitive ability, or social support system  Outcome: Progressing     Problem: Knowledge Deficit  Goal: Patient/family/caregiver demonstrates understanding of disease process, treatment plan, medications, and discharge instructions  Description: Complete learning assessment and assess knowledge base    Interventions:  - Provide teaching at level of understanding  - Provide teaching via preferred learning methods  Outcome: Progressing     Problem: METABOLIC, FLUID AND ELECTROLYTES - ADULT  Goal: Electrolytes maintained within normal limits  Description: INTERVENTIONS:  - Monitor labs and assess patient for signs and symptoms of electrolyte imbalances  - Administer electrolyte replacement as ordered  - Monitor response to electrolyte replacements, including repeat lab results as appropriate  - Instruct patient on fluid and nutrition as appropriate  Outcome: Progressing  Goal: Fluid balance maintained  Description: INTERVENTIONS:  - Monitor labs   - Monitor I/O and WT  - Instruct patient on fluid and nutrition as appropriate  - Assess for signs & symptoms of volume excess or deficit  Outcome: Progressing  Goal: Glucose maintained within target range  Description: INTERVENTIONS:  - Monitor Blood Glucose as ordered  - Assess for signs and symptoms of hyperglycemia and hypoglycemia  - Administer ordered medications to maintain glucose within target range  - Assess nutritional intake and initiate nutrition service referral as needed  Outcome: Progressing     Problem: SKIN/TISSUE INTEGRITY - ADULT  Goal: Skin integrity remains intact  Description: INTERVENTIONS  - Identify patients at risk for skin breakdown  - Assess and monitor skin integrity  - Assess and monitor nutrition and hydration status  - Monitor labs (i e  albumin)  - Assess for incontinence   - Turn and reposition patient  - Assist with mobility/ambulation  - Relieve pressure over bony prominences  - Avoid friction and shearing  - Provide appropriate hygiene as needed including keeping skin clean and dry  - Evaluate need for skin moisturizer/barrier cream  - Collaborate with interdisciplinary team (i e  Nutrition, Rehabilitation, etc )   - Patient/family teaching  Outcome: Progressing  Goal: Incision(s), wounds(s) or drain site(s) healing without S/S of infection  Description: INTERVENTIONS  - Assess and document risk factors for skin impairment   - Assess and document dressing, incision, wound bed, drain sites and surrounding tissue  - Consider nutrition services referral as needed  - Oral mucous membranes remain intact  - Provide patient/ family education  Outcome: Progressing  Goal: Oral mucous membranes remain intact  Description: INTERVENTIONS  - Assess oral mucosa and hygiene practices  - Implement preventative oral hygiene regimen  - Implement oral medicated treatments as ordered  - Initiate Nutrition services referral as needed  Outcome: Progressing     Problem: Nutrition/Hydration-ADULT  Goal: Nutrient/Hydration intake appropriate for improving, restoring or maintaining nutritional needs  Description: Monitor and assess patient's nutrition/hydration status for malnutrition  Collaborate with interdisciplinary team and initiate plan and interventions as ordered  Monitor patient's weight and dietary intake as ordered or per policy  Utilize nutrition screening tool and intervene as necessary  Determine patient's food preferences and provide high-protein, high-caloric foods as appropriate       INTERVENTIONS:  - Monitor oral intake, urinary output, labs, and treatment plans  - Assess nutrition and hydration status and recommend course of action  - Evaluate amount of meals eaten  - Assist patient with eating if necessary   - Allow adequate time for meals  - Recommend/ encourage appropriate diets, oral nutritional supplements, and vitamin/mineral supplements  - Order, calculate, and assess calorie counts as needed  - Recommend, monitor, and adjust tube feedings and TPN/PPN based on assessed needs  - Assess need for intravenous fluids  - Provide specific nutrition/hydration education as appropriate  - Include patient/family/caregiver in decisions related to nutrition  Outcome: Progressing

## 2021-05-08 NOTE — ASSESSMENT & PLAN NOTE
· 2 61 with baseline of around 0 7-0 8  Suspect prerenal due to decreased oral intake, dry mucous membranes  · Recent stent placement in February for hydronephrosis  · Normal saline 125 mL/hr  · Daily metabolic panel and trend creatinine  · Avoid nephrotoxins and hypotension  · Creatinine improved to 2 4 this morning    Ordered kidney ultrasound to rule out obstruction

## 2021-05-08 NOTE — ED NOTES
Patients friend, Fausto Hunter, can be contacted at anytime per patient   [address] RN  05/07/21 [id number]

## 2021-05-08 NOTE — PROGRESS NOTES
Vancomycin Assessment    Gisell Sheppard is a 70 y o  female who is currently receiving vancomycin for skin-soft tissue infection     Relevant clinical data and objective history reviewed:  Creatinine   Date Value Ref Range Status   05/08/2021 2 44 (H) 0 60 - 1 30 mg/dL Final     Comment:     Standardized to IDMS reference method   05/07/2021 2 61 (H) 0 60 - 1 30 mg/dL Final     Comment:     Standardized to IDMS reference method   04/07/2021 2 25 (H) 0 60 - 1 30 mg/dL Final     Comment:     Standardized to IDMS reference method     /70   Pulse 67   Temp (!) 97 4 °F (36 3 °C)   Resp 18   Ht 4' 10" (1 473 m)   Wt 63 4 kg (139 lb 12 8 oz)   SpO2 96%   BMI 29 22 kg/m²   I/O last 3 completed shifts: In: 1231 7 [P O :240; I V :591 7; IV Piggyback:400]  Out: -   Lab Results   Component Value Date/Time    BUN 56 (H) 05/08/2021 05:19 AM    WBC 17 75 (H) 05/08/2021 05:19 AM    HGB 12 9 05/08/2021 05:19 AM    HCT 37 7 05/08/2021 05:19 AM    MCV 88 05/08/2021 05:19 AM     05/08/2021 05:19 AM     Temp Readings from Last 3 Encounters:   05/08/21 (!) 97 4 °F (36 3 °C)   04/07/21 98 5 °F (36 9 °C)   04/01/21 (!) 97 4 °F (36 3 °C) (Temporal)     Vancomycin Days of Therapy: 2    Assessment/Plan  The patient is currently on vancomycin utilizing pulse dosing  Baseline risks associated with therapy include: concomitant nephrotoxic medications and advanced age  Patient was admitted with an ZOYA (2 61)  Baseline creatinine is around 0 7-0 8  On 5/7, she was loaded with 1000mg IV  Will convert to pulse dosing 15mg/kg daily prn when random level <20  Assess a random level tonight @ 2000 and re-dose when level falls below 20  Eventually, she will be on scheduled vancomycin once her baseline renal clearance returns  Pharmacy will continue to follow closely for s/sx of nephrotoxicity, infusion reactions, and appropriateness of therapy  BMP and CBC will be ordered per protocol   Pharmacy will continue to follow the patients culture results and clinical progress daily      María Mackay, Pharmacist

## 2021-05-08 NOTE — H&P
[address] 1945, 70 y o  female MRN: [id number]  Unit/Bed#: -Forrest Encounter: [id number]  Primary Care Provider: Irasema Clark MD   Date and time admitted to hospital: 5/7/2021  7:43 PM    * Parotiditis  Assessment & Plan  · Presented with left facial swelling  · CT soft tissue neck:  Acute left parotiditis associated significant left facial and neck soft tissue cellulitis with involvement of left carotid  parapharyngeal and left submandibular spaces  Inflamed left palatine tonsil  Airway is narrowed  No discrete fluid collection  Anterior subluxation of right temporomandibular joint  · WBC 22 68  · Dr Owens Feeling reviewed images-recommendations appreciated including vancomycin and Flagyl, Solu-Medrol or Decadron, monitor blood sugar, hydrate well with IV fluids  Heating pads and sialogogues  Patient encouraged to massage neck  Acute kidney injury ([facility] [address] )  Assessment & Plan  · 2 61 with baseline of around 0 7-0 8  Suspect prerenal due to decreased oral intake, dry mucous membranes  · Recent stent placement in February for hydronephrosis  · Normal saline 125 mL/hr  · Daily metabolic panel and trend creatinine  · Avoid nephrotoxins and hypotension    Controlled type 2 diabetes mellitus, without long-term current use of insulin (HCC)  Assessment & Plan  Lab Results   Component Value Date    HGBA1C 6 1 (H) 04/03/2021       No results for input(s): POCGLU in the last 72 hours      Blood Sugar Average: Last 72 hrs:     · Glucose 149  · Fingerstick blood sugar with sliding scale coverage  · Hold Glucophage while in hospital    Hypokalemia  Assessment & Plan  · Potassium 2 8  · Telemetry monitoring  · Will give potassium 40 mEq orally, 20 mEq IV  · Daily metabolic panel and trend potassium    History of breast cancer  Assessment & Plan  · Status post left mastectomy  · Continue Femera  · Continue outpatient follow-up    Hyperthyroidism  Assessment & Plan  · Check TSH  · Continue Tapazole    VTE Prophylaxis: Heparin  / sequential compression device   Code Status:  DNR DNI  POLST: POLST form is not discussed and not completed at this time  Discussion with family:  Patient    Anticipated Length of Stay:  Patient will be admitted on an Inpatient basis with an anticipated length of stay of  greater than 2 midnights  Justification for Hospital Stay:  Per plan above    Total Time for Visit, including Counseling / Coordination of Care: 30 minutes  Greater than 50% of this total time spent on direct patient counseling and coordination of care  Chief Complaint:   Facial swelling    History of Present Illness:    Carolee Canchola is a 70 y o  female history of left breast cancer status post mastectomy on Femara, hyperthyroidism on Tapazole, chronic kidney disease, and non-insulin-dependent type 2 diabetes, who presents with facial swelling  She says the swelling started about a day ago, it has gotten worse  She says it is painful, especially when palpated  She is able to swallow, but said she has only been taking Jell-O  She has not noted any injury, she has not done anything out of the ordinary, for example forcefully blowing up a balloon, she denies any recent infections  Denies fever, nausea, vomiting, abdominal pain, chest pain, shortness of breath, drooling, or focal weakness  Review of Systems:    Review of Systems   Constitutional: Negative for chills and fever  HENT: Positive for facial swelling, sore throat and trouble swallowing  Respiratory: Negative for cough and shortness of breath  Cardiovascular: Negative for chest pain, palpitations and leg swelling  Gastrointestinal: Negative for abdominal distention, abdominal pain, constipation, diarrhea, nausea and vomiting  Endocrine: Negative for heat intolerance  Genitourinary: Negative for dysuria and frequency  Musculoskeletal: Negative for arthralgias and myalgias     Skin: Negative for color change  Neurological: Negative for dizziness, syncope, weakness and headaches  Psychiatric/Behavioral: Negative for dysphoric mood  All other systems reviewed and are negative  Past Medical and Surgical History:     Past Medical History:   Diagnosis Date    Diabetes mellitus ([address] )     Diverticulitis     Hyperthyroidism        Past Surgical History:   Procedure Laterality Date    APPENDECTOMY      COLOSTOMY      FL RETROGRADE PYELOGRAM  4/6/2021    MASTECTOMY      VT CYSTOURETHROSCOPY,URETER CATHETER Bilateral 4/6/2021    Procedure: CYSTOSCOPY RETROGRADE PYELOGRAM WITH INSERTION STENT URETERAL;  Surgeon: Peggyann Bosworth, MD;  Location: [facility];  Service: Urology       Meds/Allergies:    Prior to Admission medications    Medication Sig Start Date End Date Taking? Authorizing Provider   calcium carbonate (Tums) 500 mg chewable tablet Chew 2 tablets   Yes Historical Provider, MD   letrozole (FEMARA) 2 5 mg tablet Take 2 5 mg by mouth daily   Yes Historical Provider, MD   methimazole (TAPAZOLE) 5 mg tablet TAKE 1/2 TABLET BY MOUTH EVERY OTHER DAY 10/12/20  Yes Historical Provider, MD   glucosamine-chondroitin 500-400 MG tablet Take 1 tablet by mouth    Historical Provider, MD   loperamide (IMODIUM) 2 mg capsule Take 2 mg by mouth    Historical Provider, MD     I have reviewed home medications with patient personally  Allergies:    Allergies   Allergen Reactions    Latex Itching    Nuts - Food Allergy Other (See Comments)     [place] allergy    Chlorhexidine Rash    Penicillins Rash       Social History:     Marital Status: Single   Occupation:  Retired  Patient Pre-hospital Living Situation:  Home  Patient Pre-hospital Level of Mobility:  Independent  Patient Pre-hospital Diet Restrictions:  Carb controlled  Substance Use History:   Social History     Substance and Sexual Activity   Alcohol Use Not Currently     Social History     Tobacco Use   Smoking Status Former Smoker Smokeless Tobacco Never Used     Social History     Substance and Sexual Activity   Drug Use Not Currently       Family History:    non-contributory    Physical Exam:     Vitals:   Blood Pressure: 125/57 (05/07/21 2242)  Pulse: 83 (05/07/21 2242)  Temperature: (!) 96 8 °F (36 °C) (05/07/21 1948)  Temp Source: Temporal (05/07/21 1948)  Respirations: 18 (05/07/21 2242)  Height: 4' 10" (147 3 cm) (05/07/21 2300)  Weight - Scale: 63 4 kg (139 lb 12 8 oz) (05/07/21 2300)  SpO2: 95 % (05/07/21 2242)    Physical Exam  Vitals signs and nursing note reviewed  Constitutional:       Appearance: She is ill-appearing  HENT:      Head: Normocephalic and atraumatic  Mouth/Throat:      Mouth: Mucous membranes are dry  Pharynx: Oropharynx is clear  Eyes:      Extraocular Movements: Extraocular movements intact  Pupils: Pupils are equal, round, and reactive to light  Neck:      Musculoskeletal: Normal range of motion and neck supple  Comments: Left-sided parotid swelling and tenderness  Cardiovascular:      Rate and Rhythm: Normal rate and regular rhythm  Pulses: Normal pulses  Heart sounds: Normal heart sounds  Pulmonary:      Effort: Pulmonary effort is normal       Breath sounds: Normal breath sounds  Abdominal:      General: Abdomen is flat  Bowel sounds are normal       Palpations: Abdomen is soft  Musculoskeletal: Normal range of motion  Skin:     General: Skin is warm and dry  Capillary Refill: Capillary refill takes less than 2 seconds  Coloration: Skin is pale  Neurological:      General: No focal deficit present  Mental Status: She is alert and oriented to person, place, and time  Additional Data:     Lab Results: I have personally reviewed pertinent reports        Results from last 7 days   Lab Units 05/07/21 1955   WBC Thousand/uL 22 69*   HEMOGLOBIN g/dL 13 8   HEMATOCRIT % 41 1   PLATELETS Thousands/uL 327   NEUTROS PCT % 87*   LYMPHS PCT % 9* MONOS PCT % 3*   EOS PCT % 0     Results from last 7 days   Lab Units 05/07/21 1955   SODIUM mmol/L 135*   POTASSIUM mmol/L 2 8*   CHLORIDE mmol/L 100   CO2 mmol/L 16*   BUN mg/dL 58*   CREATININE mg/dL 2 61*   ANION GAP mmol/L 19*   CALCIUM mg/dL 9 1   ALBUMIN g/dL 2 2*   TOTAL BILIRUBIN mg/dL 1 33*   ALK PHOS U/L 317*   ALT U/L 27   AST U/L 63*   GLUCOSE RANDOM mg/dL 149*                       Imaging: I have personally reviewed pertinent reports  CT facial bones without contrast   Final Result by Jamarcus Jerome MD (05/07 2059)      1  Acute left parotiditis and associated significant left facial and neck soft tissue cellulitis with multi-spatial left neck inflammation  Please refer to the report of concurrent neck CT for details  2   Anteriorly subluxed right temporomandibular joint  Workstation performed: [id number]         CT soft tissue neck wo contrast   Final Result by Jamarcus Jerome MD (05/07 2056)      1  Acute left parotiditis and associated significant left facial and neck soft tissue cellulitis with involvement of left carotid, , parapharyngeal, and left submandibular spaces  Inflamed left palatine tonsil  Airway is narrowed  No    discrete fluid collection  2   Anterior subluxation of right temporomandibular joint  The study was marked in [facility] for immediate notification  Workstation performed: [id number]             Allscripts / Epic Records Reviewed: Yes     ** Please Note: This note has been constructed using a voice recognition system   **

## 2021-05-08 NOTE — CONSULTS
History:  The patient is 60-year-old female who over the last 24-48 hours has noted increasing left-sided facial swelling in the area the parotid gland  It is quite tender  She had a white count on admission over 22,000  She has noted that her mouth is dry  She denies any history of Sjogren syndrome or autoimmune disorders  She did have elevated BUN with a creatinine less than 3  CT scan of her neck was reviewed  It was consistent with parotitis with cellulitis  The cellulitis also involve the left parapharyngeal space  She is having no airway issues  The cellulitis extends from the parotid into the neck itself  ENT consultation was requested for evaluation  Allergies:  Latex  Nuts  Chlorhexidine  Penicillins  Meds:  As per chart    Past medical history:  Hydronephrosis with stents  Breast cancer  Type 2 diabetes  Cirrhosis of the liver  Hyperthyroidism  Hypokalemia  Hypomagnesemia     Past surgical history:  Renal stents  Family history:  Noncontributory    Social history:  As per chart    Review of systems:  Noted as above  Physical exam:  Reveals a well-developed female with marked left facial swelling in no apparent distress  Head:  Normocephalic/atraumatic  There is marked swelling of the left parotid gland which is tender to palpation  Neck:  Swelling of the left neck down to the level of the hyoid bone without palpable adenopathy  There is no thyromegaly  Ears:  Mild cerumen noted  TMs clear  Nose:  Midline septum  Normal appearing turbinates  Adequate nasal airway  Oral cavity/oropharynx:  Noted for extreme dryness of the oral mucosa  Pus could not be expressed from the left Stensen's duct  There appears to be some mild parapharyngeal space enlargement  Tonsils otherwise appear normal   Nasopharynx/larynx:  Not visualized    Assessment:  1  Acute left parotitis  2   Xerostomia Sjogren syndrome versus just poor oral intake and dryness in dehydration    Plan:  Parotitis is usually secondary to Staph aureus  MRSA needs to be covered  Anaerobic bacteria wae also a possibility  I agree with starting vancomycin with pharmacy dosing the drug  I have also recommended Flagyl 500 mg IV q 8h  I would also like her to continue Solu-Medrol 125 mg q 6 or Decadron 10 mg Q 8  Her sugars will need to be followed  I would also make sure she is well hydrated with IV fluids  A heating pad would be of benefit  I have also recommended sialagogues which stimulate saliva production such as lemon juice or lemon wedges to be sucked on  She should also try to massage her neck and a heating pad should be helpful  I will follow the patient along with you  Thank you very much for this consultation

## 2021-05-08 NOTE — PROGRESS NOTES
Progress Note - David Orellana 70 y o  female MRN: [id number]    Unit/Bed#: -01 Encounter: [id number]      Assessment:  Improving left-sided parotitis most likely secondary to Staph aureus suspect MRSA  Xerostomia    Plan:  I would continue the IV vancomycin and Flagyl to cover both MRSA and the potential for anaerobic bacteria  She is showing improvement  I would continue the Solu-Medrol as you have been doing  I would continue with the heat to the left face, massage, and sialagogues as she has been doing  I will continue to follow the patient along with you  We need to watch for abscess formation due to the significant left parotid swelling  Her mouth is less dry today  I think this is the cause for her parotitis  I did order autoimmune studies including Sjogren's antibodies  Subjective:   Patient notes improvement in the left parotid swelling  It is not as tender  The neck on the left side is not as swollen     Objective:     Vitals: Blood pressure 133/70, pulse 67, temperature (!) 97 4 °F (36 3 °C), resp  rate 18, height 4' 10" (1 473 m), weight 63 4 kg (139 lb 12 8 oz), SpO2 96 %  ,Body mass index is 29 22 kg/m²  Intake/Output Summary (Last 24 hours) at 5/8/2021 0746  Last data filed at 5/8/2021 0520  Gross per 24 hour   Intake 1231 67 ml   Output --   Net 1231 67 ml       Physical Exam:  There is still marked swelling of the left parotid gland  No pus could be seen emanating from the left Stensen's duct with massage of the left parotid gland  Her mouth is less dry  There is minimal edema of the left neck  There is no overlying erythema  Invasive Devices     Peripheral Intravenous Line            Peripheral IV 05/07/21 Right Antecubital less than 1 day                Lab, Imaging and other studies: I have personally reviewed pertinent reports      VTE Pharmacologic Prophylaxis: Reason for no pharmacologic prophylaxis This is entirely up to the medicine attending  VTE Mechanical Prophylaxis: reason for no mechanical VTE prophylaxis She is fully mobile

## 2021-05-08 NOTE — ASSESSMENT & PLAN NOTE
· Potassium 2 8 on admission and now improved to 3 2    Will replace more oral potassium and recheck BMP again tomorrow  · Daily metabolic panel and trend potassium

## 2021-05-08 NOTE — ASSESSMENT & PLAN NOTE
· 2 61 with baseline of around 0 7-0 8    Suspect prerenal due to decreased oral intake, dry mucous membranes  · Recent stent placement in February for hydronephrosis  · Normal saline 125 mL/hr  · Daily metabolic panel and trend creatinine  · Avoid nephrotoxins and hypotension

## 2021-05-08 NOTE — PROGRESS NOTES
[address]  Progress Note - Tamiko Beverly 1945, 70 y o  female MRN: [id number]  Unit/Bed#: -Forrest Encounter: [id number]  Primary Care Provider: Emani Ambrosio MD   Date and time admitted to hospital: 5/7/2021  7:43 PM  Left-sided facial cellulitis and acute  * Parotiditis  Assessment & Plan  · Presented with left facial swelling  · CT soft tissue neck:  Acute left parotiditis associated significant left facial and neck soft tissue cellulitis with involvement of left carotid  parapharyngeal and left submandibular spaces  Inflamed left palatine tonsil  Airway is narrowed  No discrete fluid collection  Anterior subluxation of right temporomandibular joint  · WBC 22 68  · Dr Marine Tan reviewed images-recommendations appreciated including vancomycin and Flagyl, Solu-Medrol or Decadron, monitor blood sugar, hydrate well with IV fluids  Heating pads and sialogogues  Patient encouraged to massage neck  · If noted to have abscess formation may need surgical intervention    Hypokalemia  Assessment & Plan  · Potassium 2 8 on admission and now improved to 3 2    Will replace more oral potassium and recheck BMP again tomorrow  · Daily metabolic panel and trend potassium    Controlled type 2 diabetes mellitus, without long-term current use of insulin [facility])  Assessment & Plan  Lab Results   Component Value Date    HGBA1C 6 1 (H) 04/03/2021       Recent Labs     05/08/21  0001 05/08/21  0736 05/08/21  1104   POCGLU 210* 197* 221*       Blood Sugar Average: Last 72 hrs:  (P) 806 [id number]   · Blood sugars are elevated secondary to steroid use  · Fingerstick blood sugar with sliding scale coverage  · Hold Glucophage while in hospital    History of breast cancer  Assessment & Plan  · Status post left mastectomy  · Continue Femera  · Continue outpatient follow-up    Hyperthyroidism  Assessment & Plan  · normal TSH  · Continue Tapazole    Acute kidney injury ([facility] [address] )  Assessment & Plan  · 2 61 with baseline of around 0 7-0 8  Suspect prerenal due to decreased oral intake, dry mucous membranes  · Recent stent placement in February for hydronephrosis  · Normal saline 125 mL/hr  · Daily metabolic panel and trend creatinine  · Avoid nephrotoxins and hypotension  · Creatinine improved to 2 4 this morning  Ordered kidney ultrasound to rule out obstruction      VTE Pharmacologic Prophylaxis:   Pharmacologic: Heparin  Mechanical VTE Prophylaxis in Place: Yes    Patient Centered Rounds: I have performed bedside rounds with nursing staff today  Discussions with Specialists or Other Care Team Provider:  None but appreciate ENT input    Education and Discussions with Family / Patient:  Discussed with patient at bedside    Time Spent for Care: 45 minutes  More than 50% of total time spent on counseling and coordination of care as described above  Current Length of Stay: 1 day(s)    Current Patient Status: Inpatient   Certification Statement: The patient will continue to require additional inpatient hospital stay due to Acute parotitis    Discharge Plan:  Pending progress    Code Status: Level 3 - DNAR and DNI      Subjective:   Patient complains of pain on the left side of her face  No history ever having any facial problems  Denies any chest pain or shortness of breath    Objective:     Vitals:   Temp (24hrs), Av 1 °F (36 2 °C), Min:96 8 °F (36 °C), Max:97 4 °F (36 3 °C)    Temp:  [96 8 °F (36 °C)-97 4 °F (36 3 °C)] 97 4 °F (36 3 °C)  HR:  [67-85] 67  Resp:  [18] 18  BP: (125-142)/(57-75) 133/70  SpO2:  [95 %-98 %] 95 %  Body mass index is 29 22 kg/m²  Input and Output Summary (last 24 hours): Intake/Output Summary (Last 24 hours) at 2021 1206  Last data filed at 2021 1011  Gross per 24 hour   Intake 1631 67 ml   Output --   Net 1631 67 ml       Physical Exam:     Physical Exam  Vitals signs and nursing note reviewed  Constitutional:       Appearance: Normal appearance  HENT:      Head: Normocephalic and atraumatic  Right Ear: External ear normal       Left Ear: External ear normal       Nose: Nose normal       Mouth/Throat:      Pharynx: Oropharynx is clear  Comments: Tenderness on palpation of the left side of the lower jaw with firm area with fluctuation noted  Neck:      Musculoskeletal: Normal range of motion and neck supple  Cardiovascular:      Rate and Rhythm: Normal rate and regular rhythm  Heart sounds: Normal heart sounds  Pulmonary:      Effort: Pulmonary effort is normal       Breath sounds: Normal breath sounds  Abdominal:      General: Bowel sounds are normal       Palpations: Abdomen is soft  Tenderness: There is no abdominal tenderness  Musculoskeletal: Normal range of motion  Skin:     General: Skin is warm and dry  Capillary Refill: Capillary refill takes less than 2 seconds  Neurological:      General: No focal deficit present  Mental Status: She is alert and oriented to person, place, and time     Psychiatric:         Mood and Affect: Mood normal            Additional Data:     Labs:    Results from last 7 days   Lab Units 05/08/21 0519 05/07/21 1955   WBC Thousand/uL 17 75* 22 69*   HEMOGLOBIN g/dL 12 9 13 8   HEMATOCRIT % 37 7 41 1   PLATELETS Thousands/uL 281 327   NEUTROS PCT %  --  87*   LYMPHS PCT %  --  9*   LYMPHO PCT % 6*  --    MONOS PCT %  --  3*   MONO PCT % 0*  --    EOS PCT % 0 0     Results from last 7 days   Lab Units 05/08/21 0519 05/07/21 1955   SODIUM mmol/L 134* 135*   POTASSIUM mmol/L 3 2* 2 8*   CHLORIDE mmol/L 104 100   CO2 mmol/L 16* 16*   BUN mg/dL 56* 58*   CREATININE mg/dL 2 44* 2 61*   ANION GAP mmol/L 14* 19*   CALCIUM mg/dL 8 3 9 1   ALBUMIN g/dL  --  2 2*   TOTAL BILIRUBIN mg/dL  --  1 33*   ALK PHOS U/L  --  317*   ALT U/L  --  27   AST U/L  --  63*   GLUCOSE RANDOM mg/dL 202* 149*         Results from last 7 days   Lab Units 05/08/21  1104 05/08/21  0736 05/08/21  0001   POC GLUCOSE mg/dl 221* 197* 210*                   * I Have Reviewed All Lab Data Listed Above  * Additional Pertinent Lab Tests Reviewed: [address] Admission Reviewed    Imaging:    Imaging Reports Reviewed Today Include: ct soft tissue neck and facial bones  Imaging Personally Reviewed by Myself Includes:  None    Recent Cultures (last 7 days):           Last 24 Hours Medication List:   Current Facility-Administered Medications   Medication Dose Route Frequency Provider Last Rate    acetaminophen  650 mg Oral Q6H PRN Jonh Reyes, CRNP      heparin (porcine)  5,000 Units Subcutaneous [facility] Jonh Reyes, CRNP      HYDROcodone-acetaminophen  1 tablet Oral Q6H PRN Jonh Reyes, CRNP      insulin lispro  1-5 Units Subcutaneous HS Jonh Reyes, CRNP      insulin lispro  2-12 Units Subcutaneous TID AC Erma Garcia MD      letrozole  2 5 mg Oral Daily Jonh Reyes, CRNP      methimazole  2 5 mg Oral Every Other Day Jonh Reyes, CRNP      methylPREDNISolone sodium succinate  60 mg Intravenous Q6H [address] Erma Garcia MD      metroNIDAZOLE  500 mg Intravenous Q8H Jonh Reyes, CRNP 500 mg (05/08/21 0513)    ondansetron  4 mg Intravenous Q6H PRN Jonh Reyes, CRNP      pilocarpine  5 mg Oral TID Jonh Reyes, CRNP      potassium chloride  40 mEq Oral Once Erma Garcia MD      sodium chloride  125 mL/hr Intravenous Continuous Jonh Reyes, CRNP 125 mL/hr (05/08/21 1011)    vancomycin  15 mg/kg (Adjusted) Intravenous Daily PRN JonhAMY SabillonNP          Today, Patient Was Seen By: Erma Garcia MD    ** Please Note: Dictation voice to text software may have been used in the creation of this document   **

## 2021-05-08 NOTE — ASSESSMENT & PLAN NOTE
· Presented with left facial swelling  · CT soft tissue neck:  Acute left parotiditis associated significant left facial and neck soft tissue cellulitis with involvement of left carotid  parapharyngeal and left submandibular spaces  Inflamed left palatine tonsil  Airway is narrowed  No discrete fluid collection  Anterior subluxation of right temporomandibular joint  · WBC 22 68  · Dr Paulo Farrell reviewed images-recommendations appreciated including vancomycin and Flagyl, Solu-Medrol or Decadron, monitor blood sugar, hydrate well with IV fluids  Heating pads and sialogogues  Patient encouraged to massage neck

## 2021-05-08 NOTE — ASSESSMENT & PLAN NOTE
Lab Results   Component Value Date    HGBA1C 6 1 (H) 04/03/2021       No results for input(s): POCGLU in the last 72 hours      Blood Sugar Average: Last 72 hrs:     · Glucose 149  · Fingerstick blood sugar with sliding scale coverage  · Hold Glucophage while in hospital

## 2021-05-09 PROBLEM — IMO0002 UNCONTROLLED TYPE 2 DIABETES MELLITUS, WITHOUT LONG-TERM CURRENT USE OF INSULIN: Status: ACTIVE | Noted: 2021-04-01

## 2021-05-09 PROBLEM — E87.1 HYPONATREMIA: Status: ACTIVE | Noted: 2021-05-09

## 2021-05-09 PROBLEM — E87.2 HIGH ANION GAP METABOLIC ACIDOSIS: Status: ACTIVE | Noted: 2021-05-09

## 2021-05-09 LAB
ANION GAP SERPL CALCULATED.3IONS-SCNC: 14 MMOL/L (ref 4–13)
BUN SERPL-MCNC: 45 MG/DL (ref 5–25)
CALCIUM SERPL-MCNC: 8.2 MG/DL (ref 8.3–10.1)
CHLORIDE SERPL-SCNC: 112 MMOL/L (ref 100–108)
CO2 SERPL-SCNC: 16 MMOL/L (ref 21–32)
CREAT SERPL-MCNC: 1.89 MG/DL (ref 0.6–1.3)
GFR SERPL CREATININE-BSD FRML MDRD: 26 ML/MIN/1.73SQ M
GLUCOSE SERPL-MCNC: 138 MG/DL (ref 65–140)
GLUCOSE SERPL-MCNC: 139 MG/DL (ref 65–140)
GLUCOSE SERPL-MCNC: 142 MG/DL (ref 65–140)
GLUCOSE SERPL-MCNC: 154 MG/DL (ref 65–140)
GLUCOSE SERPL-MCNC: 158 MG/DL (ref 65–140)
POTASSIUM SERPL-SCNC: 3.2 MMOL/L (ref 3.5–5.3)
SODIUM SERPL-SCNC: 142 MMOL/L (ref 136–145)
VANCOMYCIN SERPL-MCNC: 19.4 UG/ML

## 2021-05-09 PROCEDURE — 99233 SBSQ HOSP IP/OBS HIGH 50: CPT | Performed by: FAMILY MEDICINE

## 2021-05-09 PROCEDURE — 80202 ASSAY OF VANCOMYCIN: CPT | Performed by: NURSE PRACTITIONER

## 2021-05-09 PROCEDURE — 82948 REAGENT STRIP/BLOOD GLUCOSE: CPT

## 2021-05-09 PROCEDURE — 80048 BASIC METABOLIC PNL TOTAL CA: CPT | Performed by: FAMILY MEDICINE

## 2021-05-09 RX ORDER — MAGNESIUM HYDROXIDE/ALUMINUM HYDROXICE/SIMETHICONE 120; 1200; 1200 MG/30ML; MG/30ML; MG/30ML
30 SUSPENSION ORAL EVERY 4 HOURS PRN
Status: DISCONTINUED | OUTPATIENT
Start: 2021-05-09 | End: 2021-05-11 | Stop reason: HOSPADM

## 2021-05-09 RX ORDER — CALCIUM CARBONATE 200(500)MG
500 TABLET,CHEWABLE ORAL DAILY PRN
Status: DISCONTINUED | OUTPATIENT
Start: 2021-05-09 | End: 2021-05-11 | Stop reason: HOSPADM

## 2021-05-09 RX ORDER — POTASSIUM CHLORIDE 14.9 MG/ML
20 INJECTION INTRAVENOUS
Status: DISCONTINUED | OUTPATIENT
Start: 2021-05-09 | End: 2021-05-09

## 2021-05-09 RX ORDER — POTASSIUM CHLORIDE 20 MEQ/1
40 TABLET, EXTENDED RELEASE ORAL ONCE
Status: COMPLETED | OUTPATIENT
Start: 2021-05-09 | End: 2021-05-09

## 2021-05-09 RX ORDER — METHYLPREDNISOLONE SODIUM SUCCINATE 40 MG/ML
40 INJECTION, POWDER, LYOPHILIZED, FOR SOLUTION INTRAMUSCULAR; INTRAVENOUS EVERY 12 HOURS SCHEDULED
Status: DISCONTINUED | OUTPATIENT
Start: 2021-05-09 | End: 2021-05-11 | Stop reason: HOSPADM

## 2021-05-09 RX ADMIN — METRONIDAZOLE 500 MG: 500 INJECTION, SOLUTION INTRAVENOUS at 21:46

## 2021-05-09 RX ADMIN — CALCIUM CARBONATE (ANTACID) CHEW TAB 500 MG 500 MG: 500 CHEW TAB at 14:17

## 2021-05-09 RX ADMIN — ACETAMINOPHEN 650 MG: 325 TABLET ORAL at 09:22

## 2021-05-09 RX ADMIN — SODIUM CHLORIDE 125 ML/HR: 0.9 INJECTION, SOLUTION INTRAVENOUS at 08:00

## 2021-05-09 RX ADMIN — PILOCARPINE HYDROCHLORIDE 5 MG: 5 TABLET, FILM COATED ORAL at 08:01

## 2021-05-09 RX ADMIN — METRONIDAZOLE 500 MG: 500 INJECTION, SOLUTION INTRAVENOUS at 05:46

## 2021-05-09 RX ADMIN — INSULIN LISPRO 4 UNITS: 100 INJECTION, SOLUTION INTRAVENOUS; SUBCUTANEOUS at 16:43

## 2021-05-09 RX ADMIN — PILOCARPINE HYDROCHLORIDE 5 MG: 5 TABLET, FILM COATED ORAL at 16:43

## 2021-05-09 RX ADMIN — PILOCARPINE HYDROCHLORIDE 5 MG: 5 TABLET, FILM COATED ORAL at 21:43

## 2021-05-09 RX ADMIN — INSULIN LISPRO 2 UNITS: 100 INJECTION, SOLUTION INTRAVENOUS; SUBCUTANEOUS at 16:43

## 2021-05-09 RX ADMIN — VANCOMYCIN HYDROCHLORIDE 1250 MG: 1 INJECTION, POWDER, LYOPHILIZED, FOR SOLUTION INTRAVENOUS at 20:53

## 2021-05-09 RX ADMIN — HEPARIN SODIUM 5000 UNITS: 5000 INJECTION INTRAVENOUS; SUBCUTANEOUS at 05:50

## 2021-05-09 RX ADMIN — METHYLPREDNISOLONE SODIUM SUCCINATE 40 MG: 40 INJECTION, POWDER, FOR SOLUTION INTRAMUSCULAR; INTRAVENOUS at 21:43

## 2021-05-09 RX ADMIN — INSULIN LISPRO 4 UNITS: 100 INJECTION, SOLUTION INTRAVENOUS; SUBCUTANEOUS at 11:38

## 2021-05-09 RX ADMIN — INSULIN LISPRO 4 UNITS: 100 INJECTION, SOLUTION INTRAVENOUS; SUBCUTANEOUS at 08:01

## 2021-05-09 RX ADMIN — METRONIDAZOLE 500 MG: 500 INJECTION, SOLUTION INTRAVENOUS at 13:05

## 2021-05-09 RX ADMIN — SODIUM BICARBONATE 100 ML/HR: 84 INJECTION, SOLUTION INTRAVENOUS at 14:18

## 2021-05-09 RX ADMIN — POTASSIUM CHLORIDE 40 MEQ: 1500 TABLET, EXTENDED RELEASE ORAL at 13:05

## 2021-05-09 RX ADMIN — INSULIN LISPRO 2 UNITS: 100 INJECTION, SOLUTION INTRAVENOUS; SUBCUTANEOUS at 08:01

## 2021-05-09 RX ADMIN — LETROZOLE 2.5 MG: 2.5 TABLET, FILM COATED ORAL at 08:05

## 2021-05-09 RX ADMIN — METHYLPREDNISOLONE SODIUM SUCCINATE 60 MG: 125 INJECTION, POWDER, FOR SOLUTION INTRAMUSCULAR; INTRAVENOUS at 05:50

## 2021-05-09 NOTE — ASSESSMENT & PLAN NOTE
Secondary dehydration poor oral intake  Sodium between 134-135  Will recheck BMP again now    Continue gentle hydration

## 2021-05-09 NOTE — PROGRESS NOTES
Progress Note - Herb [place] 70 y o  female MRN: [id number]    Unit/Bed#: -01 Encounter: [id number]      Assessment:  SLOWLY IMPROVING LEFT PAROTITIS  XEROSTOMIA    Plan:  WE WILL CONTINUE THE VANCOMYCIN AND FLAGYL  WE WILL DECREASE THE DECADRON TO 10 MG IV  Q 12H   AWAIT AUTOIMMUNE STUDIES INCLUDING SJOGREN SYNDROME ANTIBODIES  HER XEROSTOMIA SEEMS TO BE IMPROVING  I DO NOT FEEL SHE HAS ABSCESS FORMATION AT THIS POINT  IF SHE IS IMPROVING BY TOMORROW, WE SHOULD BE ABLE TO DISCHARGE HER ON BACTRIM OR DOXYCYCLINE ALONG WITH FLAGYL  I WILL SEE HER TOMORROW MORNING  Subjective:   PATIENT NOTICING SLOW IMPROVEMENT  SHE NOTES MUCH LESS SWELLING BUT  IN THE LEFT PAROTID GLAND  Objective:     Vitals: Blood pressure 132/64, pulse 65, temperature 98 2 °F (36 8 °C), temperature source Oral, resp  rate 18, height 4' 10" (1 473 m), weight 63 4 kg (139 lb 12 8 oz), SpO2 95 %  ,Body mass index is 29 22 kg/m²  Intake/Output Summary (Last 24 hours) at 5/9/2021 0934  Last data filed at 5/9/2021 0901  Gross per 24 hour   Intake 2892 5 ml   Output --   Net 2892 5 ml       Physical Exam:   REVEALS A SWOLLEN TENDER LEFT PAROTID GLAND  THERE IS NO SWELLING IN THE LEFT SIDE OF THE NECK  THERE IS NO EDEMA IN THE LEFT PARAPHARYNGEAL SPACE   ORAL CAVITY/OROPHARYNX REVEALS LESS DRYNESS OF THE MUCOSA    Invasive Devices     Peripheral Intravenous Line            Peripheral IV 05/07/21 Right Antecubital 1 day                Lab, Imaging and other studies: I have personally reviewed pertinent reports      VTE Pharmacologic Prophylaxis: Reason for no pharmacologic prophylaxis BEING REGULATED BY MEDICINE  VTE Mechanical Prophylaxis: reason for no mechanical VTE prophylaxis BEING REGULATED BY MEDICINE

## 2021-05-09 NOTE — ASSESSMENT & PLAN NOTE
Lab Results   Component Value Date    HGBA1C 6 1 (H) 04/03/2021       Recent Labs     05/08/21  1104 05/08/21  1555 05/08/21  2125 05/09/21  0736   POCGLU 221* 171* 159* 158*       Blood Sugar Average: Last 72 hrs:  (P) 186   · Blood sugars are elevated secondary to steroid use  · Fingerstick blood sugar with sliding scale coverage  · Hold Glucophage while in hospital due to acute kidney injury  · Continue insulin sliding scale    Continue to titrate down steroids

## 2021-05-09 NOTE — PROGRESS NOTES
Vancomycin Assessment    Lexx Anaya is a 70 y o  female who is currently receiving vancomycin 15 mg/kg (750 mg ) iv prn for random level < 20 for skin-soft tissue infection     Relevant clinical data and objective history reviewed:  Creatinine   Date Value Ref Range Status   05/08/2021 2 44 (H) 0 60 - 1 30 mg/dL Final     Comment:     Standardized to IDMS reference method   05/07/2021 2 61 (H) 0 60 - 1 30 mg/dL Final     Comment:     Standardized to IDMS reference method   04/07/2021 2 25 (H) 0 60 - 1 30 mg/dL Final     Comment:     Standardized to IDMS reference method     Vancomycin Rm   Date Value Ref Range Status   05/08/2021 11 2 ug/mL Final     /68 (BP Location: Right arm)   Pulse 68   Temp 98 2 °F (36 8 °C) (Oral)   Resp 18   Ht 4' 10" (1 473 m)   Wt 63 4 kg (139 lb 12 8 oz)   SpO2 95%   BMI 29 22 kg/m²   I/O last 3 completed shifts: In: 1691 7 [P O :300; I V :991 7; IV Piggyback:400]  Out: -   Lab Results   Component Value Date/Time    BUN 56 (H) 05/08/2021 05:19 AM    WBC 17 75 (H) 05/08/2021 05:19 AM    HGB 12 9 05/08/2021 05:19 AM    HCT 37 7 05/08/2021 05:19 AM    MCV 88 05/08/2021 05:19 AM     05/08/2021 05:19 AM     Temp Readings from Last 3 Encounters:   05/08/21 98 2 °F (36 8 °C) (Oral)   04/07/21 98 5 °F (36 9 °C)   04/01/21 (!) 97 4 °F (36 3 °C) (Temporal)     Vancomycin Days of Therapy: 2    Assessment/Plan  The patient is currently on vancomycin utilizing pulse dosing  Baseline risks associated with therapy include: pre-existing renal impairment, concomitant nephrotoxic medications, advanced age, and dehydration  The patient received 20 mg/kg (1000 mg ) iv once with the most recent vancomycin random level being at steady-state and sub-therapeutic based on a goal of 15-20 (appropriate for most indications) ; therefore, after clinical evaluation will given 1250 mg iv prn for random level < 20     Pharmacy will continue to follow closely for s/sx of nephrotoxicity, infusion reactions, and appropriateness of therapy  BMP and CBC will be ordered per protocol  Plan is for a random level to be drawn on 5/9/21 @ 1930  Pharmacy will continue to follow the patients culture results and clinical progress daily      Krystal Nazario, Pharmacist

## 2021-05-09 NOTE — ASSESSMENT & PLAN NOTE
Secondary dehydration and acute kidney injury and poor oral intake  Patient had anion gap of 19 on admission with metabolic acidosis of CO2 of 16 on BMP  Recheck BMP again today    Continue IV fluid hydration  If CO2 is still low may need to to a bicarb drip

## 2021-05-09 NOTE — ASSESSMENT & PLAN NOTE
· Presented with left facial swelling  · CT soft tissue neck:  Acute left parotiditis associated significant left facial and neck soft tissue cellulitis with involvement of left carotid  parapharyngeal and left submandibular spaces  Inflamed left palatine tonsil  Airway is narrowed  No discrete fluid collection  Anterior subluxation of right temporomandibular joint  · WBC 22 68 on admission  · cont vancomycin and Flagyl, Solu-Medrol decreased to 40 mg IV b i d  monitor blood sugar, hydrate well with IV fluids  Heating pads and sialogogues  Patient encouraged to massage neck  · If noted to have abscess formation may need surgical intervention  · Clinically improving  Voice and swallowing improving

## 2021-05-09 NOTE — PLAN OF CARE
Problem: Potential for Falls  Goal: Patient will remain free of falls  Description: INTERVENTIONS:  - Assess patient frequently for physical needs  -  Identify cognitive and physical deficits and behaviors that affect risk of falls    -  [place] fall precautions as indicated by assessment   - Educate patient/family on patient safety including physical limitations  - Instruct patient to call for assistance with activity based on assessment  - Modify environment to reduce risk of injury  - Consider OT/PT consult to assist with strengthening/mobility  Outcome: Progressing     Problem: Prexisting or High Potential for Compromised Skin Integrity  Goal: Skin integrity is maintained or improved  Description: INTERVENTIONS:  - Identify patients at risk for skin breakdown  - Assess and monitor skin integrity  - Assess and monitor nutrition and hydration status  - Monitor labs   - Assess for incontinence   - Turn and reposition patient  - Assist with mobility/ambulation  - Relieve pressure over bony prominences  - Avoid friction and shearing  - Provide appropriate hygiene as needed including keeping skin clean and dry  - Evaluate need for skin moisturizer/barrier cream  - Collaborate with interdisciplinary team   - Patient/family teaching  - Consider wound care consult   Outcome: Progressing     Problem: PAIN - ADULT  Goal: Verbalizes/displays adequate comfort level or baseline comfort level  Description: Interventions:  - Encourage patient to monitor pain and request assistance  - Assess pain using appropriate pain scale  - Administer analgesics based on type and severity of pain and evaluate response  - Implement non-pharmacological measures as appropriate and evaluate response  - Consider cultural and social influences on pain and pain management  - Notify physician/advanced practitioner if interventions unsuccessful or patient reports new pain  Outcome: Progressing     Problem: INFECTION - ADULT  Goal: Absence or prevention of progression during hospitalization  Description: INTERVENTIONS:  - Assess and monitor for signs and symptoms of infection  - Monitor lab/diagnostic results  - Monitor all insertion sites, i e  indwelling lines, tubes, and drains  - Monitor endotracheal if appropriate and nasal secretions for changes in amount and color  - [place] appropriate cooling/warming therapies per order  - Administer medications as ordered  - Instruct and encourage patient and family to use good hand hygiene technique  - Identify and instruct in appropriate isolation precautions for identified infection/condition  Outcome: Progressing  Goal: Absence of fever/infection during neutropenic period  Description: INTERVENTIONS:  - Monitor WBC    Outcome: Progressing     Problem: SAFETY ADULT  Goal: Patient will remain free of falls  Description: INTERVENTIONS:  - Assess patient frequently for physical needs  -  Identify cognitive and physical deficits and behaviors that affect risk of falls    -  [place] fall precautions as indicated by assessment   - Educate patient/family on patient safety including physical limitations  - Instruct patient to call for assistance with activity based on assessment  - Modify environment to reduce risk of injury  - Consider OT/PT consult to assist with strengthening/mobility  Outcome: Progressing  Goal: Maintain or return to baseline ADL function  Description: INTERVENTIONS:  -  Assess patient's ability to carry out ADLs; assess patient's baseline for ADL function and identify physical deficits which impact ability to perform ADLs (bathing, care of mouth/teeth, toileting, grooming, dressing, etc )  - Assess/evaluate cause of self-care deficits   - Assess range of motion  - Assess patient's mobility; develop plan if impaired  - Assess patient's need for assistive devices and provide as appropriate  - Encourage maximum independence but intervene and supervise when necessary  - Involve family in performance of ADLs  - Assess for home care needs following discharge   - Consider OT consult to assist with ADL evaluation and planning for discharge  - Provide patient education as appropriate  Outcome: Progressing  Goal: Maintain or return mobility status to optimal level  Description: INTERVENTIONS:  - Assess patient's baseline mobility status (ambulation, transfers, stairs, etc )    - Identify cognitive and physical deficits and behaviors that affect mobility  - Identify mobility aids required to assist with transfers and/or ambulation (gait belt, sit-to-stand, lift, walker, cane, etc )  - [place] fall precautions as indicated by assessment  - Record patient progress and toleration of activity level on Mobility SBAR; progress patient to next Phase/Stage  - Instruct patient to call for assistance with activity based on assessment  - Consider rehabilitation consult to assist with strengthening/weightbearing, etc   Outcome: Progressing     Problem: DISCHARGE PLANNING  Goal: Discharge to home or other facility with appropriate resources  Description: INTERVENTIONS:  - Identify barriers to discharge w/patient and caregiver  - Arrange for needed discharge resources and transportation as appropriate  - Identify discharge learning needs (meds, wound care, etc )  - Arrange for interpretive services to assist at discharge as needed  - Refer to Case Management Department for coordinating discharge planning if the patient needs post-hospital services based on physician/advanced practitioner order or complex needs related to functional status, cognitive ability, or social support system  Outcome: Progressing     Problem: Knowledge Deficit  Goal: Patient/family/caregiver demonstrates understanding of disease process, treatment plan, medications, and discharge instructions  Description: Complete learning assessment and assess knowledge base    Interventions:  - Provide teaching at level of understanding  - Provide teaching via preferred learning methods  Outcome: Progressing     Problem: METABOLIC, FLUID AND ELECTROLYTES - ADULT  Goal: Electrolytes maintained within normal limits  Description: INTERVENTIONS:  - Monitor labs and assess patient for signs and symptoms of electrolyte imbalances  - Administer electrolyte replacement as ordered  - Monitor response to electrolyte replacements, including repeat lab results as appropriate  - Instruct patient on fluid and nutrition as appropriate  Outcome: Progressing  Goal: Fluid balance maintained  Description: INTERVENTIONS:  - Monitor labs   - Monitor I/O and WT  - Instruct patient on fluid and nutrition as appropriate  - Assess for signs & symptoms of volume excess or deficit  Outcome: Progressing  Goal: Glucose maintained within target range  Description: INTERVENTIONS:  - Monitor Blood Glucose as ordered  - Assess for signs and symptoms of hyperglycemia and hypoglycemia  - Administer ordered medications to maintain glucose within target range  - Assess nutritional intake and initiate nutrition service referral as needed  Outcome: Progressing     Problem: SKIN/TISSUE INTEGRITY - ADULT  Goal: Skin integrity remains intact  Description: INTERVENTIONS  - Identify patients at risk for skin breakdown  - Assess and monitor skin integrity  - Assess and monitor nutrition and hydration status  - Monitor labs (i e  albumin)  - Assess for incontinence   - Turn and reposition patient  - Assist with mobility/ambulation  - Relieve pressure over bony prominences  - Avoid friction and shearing  - Provide appropriate hygiene as needed including keeping skin clean and dry  - Evaluate need for skin moisturizer/barrier cream  - Collaborate with interdisciplinary team (i e  Nutrition, Rehabilitation, etc )   - Patient/family teaching  Outcome: Progressing  Goal: Incision(s), wounds(s) or drain site(s) healing without S/S of infection  Description: INTERVENTIONS  - Assess and document risk factors for skin impairment   - Assess and document dressing, incision, wound bed, drain sites and surrounding tissue  - Consider nutrition services referral as needed  - Oral mucous membranes remain intact  - Provide patient/ family education  Outcome: Progressing  Goal: Oral mucous membranes remain intact  Description: INTERVENTIONS  - Assess oral mucosa and hygiene practices  - Implement preventative oral hygiene regimen  - Implement oral medicated treatments as ordered  - Initiate Nutrition services referral as needed  Outcome: Progressing     Problem: Nutrition/Hydration-ADULT  Goal: Nutrient/Hydration intake appropriate for improving, restoring or maintaining nutritional needs  Description: Monitor and assess patient's nutrition/hydration status for malnutrition  Collaborate with interdisciplinary team and initiate plan and interventions as ordered  Monitor patient's weight and dietary intake as ordered or per policy  Utilize nutrition screening tool and intervene as necessary  Determine patient's food preferences and provide high-protein, high-caloric foods as appropriate       INTERVENTIONS:  - Monitor oral intake, urinary output, labs, and treatment plans  - Assess nutrition and hydration status and recommend course of action  - Evaluate amount of meals eaten  - Assist patient with eating if necessary   - Allow adequate time for meals  - Recommend/ encourage appropriate diets, oral nutritional supplements, and vitamin/mineral supplements  - Order, calculate, and assess calorie counts as needed  - Recommend, monitor, and adjust tube feedings and TPN/PPN based on assessed needs  - Assess need for intravenous fluids  - Provide specific nutrition/hydration education as appropriate  - Include patient/family/caregiver in decisions related to nutrition  Outcome: Progressing

## 2021-05-09 NOTE — PROGRESS NOTES
[address]  Progress Note - Chay Emery 1945, 70 y o  female MRN: [id number]  Unit/Bed#: -[facility] Encounter: [id number]  Primary Care Provider: Servando Brown MD   Date and time admitted to hospital: 5/7/2021  7:43 PM    * Parotiditis  Assessment & Plan  · Presented with left facial swelling  · CT soft tissue neck:  Acute left parotiditis associated significant left facial and neck soft tissue cellulitis with involvement of left carotid  parapharyngeal and left submandibular spaces  Inflamed left palatine tonsil  Airway is narrowed  No discrete fluid collection  Anterior subluxation of right temporomandibular joint  · WBC 22 68 on admission  · cont vancomycin and Flagyl, Solu-Medrol decreased to 40 mg IV b i d  monitor blood sugar, hydrate well with IV fluids  Heating pads and sialogogues  Patient encouraged to massage neck  · If noted to have abscess formation may need surgical intervention  · Clinically improving  Voice and swallowing improving  High anion gap metabolic acidosis  Assessment & Plan  Secondary dehydration and acute kidney injury and poor oral intake  Patient had anion gap of 19 on admission with metabolic acidosis of CO2 of 16 on BMP  Recheck BMP again today  Continue IV fluid hydration  If CO2 is still low may need to to a bicarb drip    Acute kidney injury ([facility] [address] )  Assessment & Plan  · 2 61 with baseline of around 0 7-0 8  Suspect prerenal due to decreased oral intake, dry mucous membranes  · Recent stent placement in February for hydronephrosis  · Continue IV fluid hydration  Repeat BMP ordered for now  · Daily metabolic panel and trend creatinine  · Avoid nephrotoxins and hypotension  · Renal ultrasound shows mild bilateral hydronephrosis with indwelling ureteral stents better compared to before    Hyponatremia  Assessment & Plan  Secondary dehydration poor oral intake  Sodium between 134-135  Will recheck BMP again now    Continue gentle hydration    Hypokalemia  Assessment & Plan  · Potassium 2 8 on admission and now improved to 3 2  Recheck BMP     Uncontrolled type 2 diabetes mellitus, without long-term current use of insulin [facility])  Assessment & Plan  Lab Results   Component Value Date    HGBA1C 6 1 (H) 04/03/2021       Recent Labs     05/08/21  1104 05/08/21  1555 05/08/21  2125 05/09/21  0736   POCGLU 221* 171* 159* 158*       Blood Sugar Average: Last 72 hrs:  (P) 186   · Blood sugars are elevated secondary to steroid use  · Fingerstick blood sugar with sliding scale coverage  · Hold Glucophage while in hospital due to acute kidney injury  · Continue insulin sliding scale  Continue to titrate down steroids    History of breast cancer  Assessment & Plan  · Status post left mastectomy  · Continue Femera  · Continue outpatient follow-up    Hyperthyroidism  Assessment & Plan  · normal TSH  · Continue Tapazole    Acute cellulitis of the left facial and neck soft tissue involving the left carotid  and parapharyngeal and left submandibular spaces along with acute left palatine tonsillitis:  Continue IV vancomycin and Flagyl and supportive care  Clinically improving  VTE Pharmacologic Prophylaxis:   Pharmacologic: Heparin  Mechanical VTE Prophylaxis in Place: Yes    Patient Centered Rounds: I have performed bedside rounds with nursing staff today  Discussions with Specialists or Other Care Team Provider:  Discussed with ENT    Education and Discussions with Family / Patient:  Discussed with patient bedside and update family    Time Spent for Care: 45 minutes  More than 50% of total time spent on counseling and coordination of care as described above      Current Length of Stay: 2 day(s)    Current Patient Status: Inpatient   Certification Statement: The patient will continue to require additional inpatient hospital stay due to Acute parotitis    Discharge Plan:  Pending progress    Code Status: Level 3 - DNAR and DNI      Subjective:   Patient states that she is feeling a little bit better today her voice sounds better and she is able to swallow solids  She still has tenderness on palpation of the left side of the face and swelling which is a little bit smaller compared to yesterday    Objective:     Vitals:   Temp (24hrs), Av 2 °F (36 8 °C), Min:98 2 °F (36 8 °C), Max:98 2 °F (36 8 °C)    Temp:  [98 2 °F (36 8 °C)] 98 2 °F (36 8 °C)  HR:  [65-68] 65  Resp:  [18] 18  BP: (132-134)/(63-68) 132/64  SpO2:  [95 %-96 %] 95 %  Body mass index is 29 22 kg/m²  Input and Output Summary (last 24 hours): Intake/Output Summary (Last 24 hours) at 2021 1010  Last data filed at 2021 0901  Gross per 24 hour   Intake 2892 5 ml   Output --   Net 2892 5 ml       Physical Exam:     Physical Exam  Vitals signs and nursing note reviewed  Constitutional:       Appearance: Normal appearance  HENT:      Head: Normocephalic and atraumatic  Right Ear: External ear normal       Left Ear: External ear normal       Nose: Nose normal       Mouth/Throat:      Pharynx: Oropharynx is clear  Comments: Area of tenderness warmth and swelling noted on the left side of the face near the lower jaw  Neck:      Musculoskeletal: Normal range of motion and neck supple  Cardiovascular:      Rate and Rhythm: Normal rate and regular rhythm  Heart sounds: Normal heart sounds  Pulmonary:      Effort: Pulmonary effort is normal       Breath sounds: Normal breath sounds  Abdominal:      General: Bowel sounds are normal       Palpations: Abdomen is soft  Tenderness: There is no abdominal tenderness  Musculoskeletal: Normal range of motion  Skin:     General: Skin is warm and dry  Capillary Refill: Capillary refill takes less than 2 seconds  Neurological:      General: No focal deficit present  Mental Status: She is alert and oriented to person, place, and time     Psychiatric:         Mood and Affect: Mood normal            Additional Data:     Labs:    Results from last 7 days   Lab Units 05/08/21  0519 05/07/21 1955   WBC Thousand/uL 17 75* 22 69*   HEMOGLOBIN g/dL 12 9 13 8   HEMATOCRIT % 37 7 41 1   PLATELETS Thousands/uL 281 327   NEUTROS PCT %  --  87*   LYMPHS PCT %  --  9*   LYMPHO PCT % 6*  --    MONOS PCT %  --  3*   MONO PCT % 0*  --    EOS PCT % 0 0     Results from last 7 days   Lab Units 05/08/21  0519 05/07/21 1955   SODIUM mmol/L 134* 135*   POTASSIUM mmol/L 3 2* 2 8*   CHLORIDE mmol/L 104 100   CO2 mmol/L 16* 16*   BUN mg/dL 56* 58*   CREATININE mg/dL 2 44* 2 61*   ANION GAP mmol/L 14* 19*   CALCIUM mg/dL 8 3 9 1   ALBUMIN g/dL  --  2 2*   TOTAL BILIRUBIN mg/dL  --  1 33*   ALK PHOS U/L  --  317*   ALT U/L  --  27   AST U/L  --  63*   GLUCOSE RANDOM mg/dL 202* 149*         Results from last 7 days   Lab Units 05/09/21  0736 05/08/21  2125 05/08/21  1555 05/08/21  1104 05/08/21  0736 05/08/21  0001   POC GLUCOSE mg/dl 158* 159* 171* 221* 197* 210*                   * I Have Reviewed All Lab Data Listed Above  * Additional Pertinent Lab Tests Reviewed:  [address] Admission Reviewed    Imaging:    Imaging Reports Reviewed Today Include:  CT soft tissue neck and ultrasound kidney  Imaging Personally Reviewed by Myself Includes:  None    Recent Cultures (last 7 days):           Last 24 Hours Medication List:   Current Facility-Administered Medications   Medication Dose Route Frequency Provider Last Rate    acetaminophen  650 mg Oral Q6H PRN Artice Dung, CRNP      heparin (porcine)  5,000 Units Subcutaneous [facility] ArticSIMON Gann      HYDROcodone-acetaminophen  1 tablet Oral Q6H PRN Articclarke NguyengAMYNP      insulin lispro  1-5 Units Subcutaneous HS SIMON Gallardo      insulin lispro  2-12 Units Subcutaneous TID AC Colette Valentine MD      insulin lispro  4 Units Subcutaneous TID With Meals Colette Valentine MD      letrozole  2 5 mg Oral Daily ArticSIMON Gann  methimazole  2 5 mg Oral Every Other Day Junior Spikes, CRNP      methylPREDNISolone sodium succinate  40 mg Intravenous Q12H [facility] Yenni Heaton MD      metroNIDAZOLE  500 mg Intravenous Q8H [place] Spikes, CRNP 500 mg (05/09/21 0546)    ondansetron  4 mg Intravenous Q6H PRN [place] Spikes, CRNP      pilocarpine  5 mg Oral TID [place] Spikes, CRNP      sodium chloride  75 mL/hr Intravenous Continuous Yenni Heaton  mL/hr (05/09/21 0800)    vancomycin  1,250 mg Intravenous Once PRN Junior Spikes, CRNP          Today, Patient Was Seen By: Yenni Heaton MD    ** Please Note: Dictation voice to text software may have been used in the creation of this document   **

## 2021-05-09 NOTE — ASSESSMENT & PLAN NOTE
· 2 61 with baseline of around 0 7-0 8  Suspect prerenal due to decreased oral intake, dry mucous membranes  · Recent stent placement in February for hydronephrosis  · Continue IV fluid hydration    Repeat BMP ordered for now  · Daily metabolic panel and trend creatinine  · Avoid nephrotoxins and hypotension  · Renal ultrasound shows mild bilateral hydronephrosis with indwelling ureteral stents better compared to before

## 2021-05-09 NOTE — PLAN OF CARE
Problem: Potential for Falls  Goal: Patient will remain free of falls  Description: INTERVENTIONS:  - Assess patient frequently for physical needs  -  Identify cognitive and physical deficits and behaviors that affect risk of falls    -  [place] fall precautions as indicated by assessment   - Educate patient/family on patient safety including physical limitations  - Instruct patient to call for assistance with activity based on assessment  - Modify environment to reduce risk of injury  - Consider OT/PT consult to assist with strengthening/mobility  Outcome: Progressing     Problem: Prexisting or High Potential for Compromised Skin Integrity  Goal: Skin integrity is maintained or improved  Description: INTERVENTIONS:  - Identify patients at risk for skin breakdown  - Assess and monitor skin integrity  - Assess and monitor nutrition and hydration status  - Monitor labs   - Assess for incontinence   - Turn and reposition patient  - Assist with mobility/ambulation  - Relieve pressure over bony prominences  - Avoid friction and shearing  - Provide appropriate hygiene as needed including keeping skin clean and dry  - Evaluate need for skin moisturizer/barrier cream  - Collaborate with interdisciplinary team   - Patient/family teaching  - Consider wound care consult   Outcome: Progressing     Problem: PAIN - ADULT  Goal: Verbalizes/displays adequate comfort level or baseline comfort level  Description: Interventions:  - Encourage patient to monitor pain and request assistance  - Assess pain using appropriate pain scale  - Administer analgesics based on type and severity of pain and evaluate response  - Implement non-pharmacological measures as appropriate and evaluate response  - Consider cultural and social influences on pain and pain management  - Notify physician/advanced practitioner if interventions unsuccessful or patient reports new pain  Outcome: Progressing     Problem: INFECTION - ADULT  Goal: Absence or prevention of progression during hospitalization  Description: INTERVENTIONS:  - Assess and monitor for signs and symptoms of infection  - Monitor lab/diagnostic results  - Monitor all insertion sites, i e  indwelling lines, tubes, and drains  - Monitor endotracheal if appropriate and nasal secretions for changes in amount and color  - [place] appropriate cooling/warming therapies per order  - Administer medications as ordered  - Instruct and encourage patient and family to use good hand hygiene technique  - Identify and instruct in appropriate isolation precautions for identified infection/condition  Outcome: Progressing  Goal: Absence of fever/infection during neutropenic period  Description: INTERVENTIONS:  - Monitor WBC    Outcome: Progressing     Problem: SAFETY ADULT  Goal: Patient will remain free of falls  Description: INTERVENTIONS:  - Assess patient frequently for physical needs  -  Identify cognitive and physical deficits and behaviors that affect risk of falls    -  [place] fall precautions as indicated by assessment   - Educate patient/family on patient safety including physical limitations  - Instruct patient to call for assistance with activity based on assessment  - Modify environment to reduce risk of injury  - Consider OT/PT consult to assist with strengthening/mobility  Outcome: Progressing  Goal: Maintain or return to baseline ADL function  Description: INTERVENTIONS:  -  Assess patient's ability to carry out ADLs; assess patient's baseline for ADL function and identify physical deficits which impact ability to perform ADLs (bathing, care of mouth/teeth, toileting, grooming, dressing, etc )  - Assess/evaluate cause of self-care deficits   - Assess range of motion  - Assess patient's mobility; develop plan if impaired  - Assess patient's need for assistive devices and provide as appropriate  - Encourage maximum independence but intervene and supervise when necessary  - Involve family in performance of ADLs  - Assess for home care needs following discharge   - Consider OT consult to assist with ADL evaluation and planning for discharge  - Provide patient education as appropriate  Outcome: Progressing  Goal: Maintain or return mobility status to optimal level  Description: INTERVENTIONS:  - Assess patient's baseline mobility status (ambulation, transfers, stairs, etc )    - Identify cognitive and physical deficits and behaviors that affect mobility  - Identify mobility aids required to assist with transfers and/or ambulation (gait belt, sit-to-stand, lift, walker, cane, etc )  - [place] fall precautions as indicated by assessment  - Record patient progress and toleration of activity level on Mobility SBAR; progress patient to next Phase/Stage  - Instruct patient to call for assistance with activity based on assessment  - Consider rehabilitation consult to assist with strengthening/weightbearing, etc   Outcome: Progressing     Problem: DISCHARGE PLANNING  Goal: Discharge to home or other facility with appropriate resources  Description: INTERVENTIONS:  - Identify barriers to discharge w/patient and caregiver  - Arrange for needed discharge resources and transportation as appropriate  - Identify discharge learning needs (meds, wound care, etc )  - Arrange for interpretive services to assist at discharge as needed  - Refer to Case Management Department for coordinating discharge planning if the patient needs post-hospital services based on physician/advanced practitioner order or complex needs related to functional status, cognitive ability, or social support system  Outcome: Progressing     Problem: Knowledge Deficit  Goal: Patient/family/caregiver demonstrates understanding of disease process, treatment plan, medications, and discharge instructions  Description: Complete learning assessment and assess knowledge base    Interventions:  - Provide teaching at level of understanding  - Provide teaching via preferred learning methods  Outcome: Progressing     Problem: METABOLIC, FLUID AND ELECTROLYTES - ADULT  Goal: Electrolytes maintained within normal limits  Description: INTERVENTIONS:  - Monitor labs and assess patient for signs and symptoms of electrolyte imbalances  - Administer electrolyte replacement as ordered  - Monitor response to electrolyte replacements, including repeat lab results as appropriate  - Instruct patient on fluid and nutrition as appropriate  Outcome: Progressing  Goal: Fluid balance maintained  Description: INTERVENTIONS:  - Monitor labs   - Monitor I/O and WT  - Instruct patient on fluid and nutrition as appropriate  - Assess for signs & symptoms of volume excess or deficit  Outcome: Progressing  Goal: Glucose maintained within target range  Description: INTERVENTIONS:  - Monitor Blood Glucose as ordered  - Assess for signs and symptoms of hyperglycemia and hypoglycemia  - Administer ordered medications to maintain glucose within target range  - Assess nutritional intake and initiate nutrition service referral as needed  Outcome: Progressing     Problem: SKIN/TISSUE INTEGRITY - ADULT  Goal: Skin integrity remains intact  Description: INTERVENTIONS  - Identify patients at risk for skin breakdown  - Assess and monitor skin integrity  - Assess and monitor nutrition and hydration status  - Monitor labs (i e  albumin)  - Assess for incontinence   - Turn and reposition patient  - Assist with mobility/ambulation  - Relieve pressure over bony prominences  - Avoid friction and shearing  - Provide appropriate hygiene as needed including keeping skin clean and dry  - Evaluate need for skin moisturizer/barrier cream  - Collaborate with interdisciplinary team (i e  Nutrition, Rehabilitation, etc )   - Patient/family teaching  Outcome: Progressing  Goal: Incision(s), wounds(s) or drain site(s) healing without S/S of infection  Description: INTERVENTIONS  - Assess and document risk factors for skin impairment   - Assess and document dressing, incision, wound bed, drain sites and surrounding tissue  - Consider nutrition services referral as needed  - Oral mucous membranes remain intact  - Provide patient/ family education  Outcome: Progressing  Goal: Oral mucous membranes remain intact  Description: INTERVENTIONS  - Assess oral mucosa and hygiene practices  - Implement preventative oral hygiene regimen  - Implement oral medicated treatments as ordered  - Initiate Nutrition services referral as needed  Outcome: Progressing     Problem: Nutrition/Hydration-ADULT  Goal: Nutrient/Hydration intake appropriate for improving, restoring or maintaining nutritional needs  Description: Monitor and assess patient's nutrition/hydration status for malnutrition  Collaborate with interdisciplinary team and initiate plan and interventions as ordered  Monitor patient's weight and dietary intake as ordered or per policy  Utilize nutrition screening tool and intervene as necessary  Determine patient's food preferences and provide high-protein, high-caloric foods as appropriate       INTERVENTIONS:  - Monitor oral intake, urinary output, labs, and treatment plans  - Assess nutrition and hydration status and recommend course of action  - Evaluate amount of meals eaten  - Assist patient with eating if necessary   - Allow adequate time for meals  - Recommend/ encourage appropriate diets, oral nutritional supplements, and vitamin/mineral supplements  - Order, calculate, and assess calorie counts as needed  - Recommend, monitor, and adjust tube feedings and TPN/PPN based on assessed needs  - Assess need for intravenous fluids  - Provide specific nutrition/hydration education as appropriate  - Include patient/family/caregiver in decisions related to nutrition  Outcome: Progressing

## 2021-05-10 LAB
ANION GAP SERPL CALCULATED.3IONS-SCNC: 11 MMOL/L (ref 4–13)
ANION GAP SERPL CALCULATED.3IONS-SCNC: 12 MMOL/L (ref 4–13)
BACTERIA UR QL AUTO: ABNORMAL /HPF
BILIRUB UR QL STRIP: NEGATIVE
BUN SERPL-MCNC: 42 MG/DL (ref 5–25)
BUN SERPL-MCNC: 44 MG/DL (ref 5–25)
CALCIUM SERPL-MCNC: 8.1 MG/DL (ref 8.3–10.1)
CALCIUM SERPL-MCNC: 8.5 MG/DL (ref 8.3–10.1)
CHLORIDE SERPL-SCNC: 110 MMOL/L (ref 100–108)
CHLORIDE SERPL-SCNC: 111 MMOL/L (ref 100–108)
CLARITY UR: ABNORMAL
CO2 SERPL-SCNC: 17 MMOL/L (ref 21–32)
CO2 SERPL-SCNC: 20 MMOL/L (ref 21–32)
COLOR UR: ABNORMAL
CREAT SERPL-MCNC: 1.55 MG/DL (ref 0.6–1.3)
CREAT SERPL-MCNC: 1.68 MG/DL (ref 0.6–1.3)
ENA SS-A AB SER-ACNC: <0.2 AI (ref 0–0.9)
ENA SS-B AB SER-ACNC: <0.2 AI (ref 0–0.9)
GFR SERPL CREATININE-BSD FRML MDRD: 30 ML/MIN/1.73SQ M
GFR SERPL CREATININE-BSD FRML MDRD: 33 ML/MIN/1.73SQ M
GLUCOSE SERPL-MCNC: 142 MG/DL (ref 65–140)
GLUCOSE SERPL-MCNC: 146 MG/DL (ref 65–140)
GLUCOSE SERPL-MCNC: 156 MG/DL (ref 65–140)
GLUCOSE SERPL-MCNC: 157 MG/DL (ref 65–140)
GLUCOSE SERPL-MCNC: 169 MG/DL (ref 65–140)
GLUCOSE SERPL-MCNC: 177 MG/DL (ref 65–140)
GLUCOSE UR STRIP-MCNC: NEGATIVE MG/DL
HGB UR QL STRIP.AUTO: ABNORMAL
KETONES UR STRIP-MCNC: NEGATIVE MG/DL
LEUKOCYTE ESTERASE UR QL STRIP: ABNORMAL
NITRITE UR QL STRIP: NEGATIVE
NON-SQ EPI CELLS URNS QL MICRO: ABNORMAL /HPF
PH UR STRIP.AUTO: 6 [PH]
POTASSIUM SERPL-SCNC: 3.2 MMOL/L (ref 3.5–5.3)
POTASSIUM SERPL-SCNC: 3.4 MMOL/L (ref 3.5–5.3)
PROT UR STRIP-MCNC: NEGATIVE MG/DL
RBC #/AREA URNS AUTO: ABNORMAL /HPF
RHEUMATOID FACT SER QL LA: NEGATIVE
RYE IGE QN: NEGATIVE
SODIUM SERPL-SCNC: 140 MMOL/L (ref 136–145)
SODIUM SERPL-SCNC: 141 MMOL/L (ref 136–145)
SP GR UR STRIP.AUTO: 1.01 (ref 1–1.03)
UROBILINOGEN UR QL STRIP.AUTO: 0.2 E.U./DL
VANCOMYCIN SERPL-MCNC: 24.2 UG/ML
WBC #/AREA URNS AUTO: ABNORMAL /HPF

## 2021-05-10 PROCEDURE — 99232 SBSQ HOSP IP/OBS MODERATE 35: CPT | Performed by: FAMILY MEDICINE

## 2021-05-10 PROCEDURE — 82570 ASSAY OF URINE CREATININE: CPT | Performed by: INTERNAL MEDICINE

## 2021-05-10 PROCEDURE — 99223 1ST HOSP IP/OBS HIGH 75: CPT | Performed by: INTERNAL MEDICINE

## 2021-05-10 PROCEDURE — 81001 URINALYSIS AUTO W/SCOPE: CPT | Performed by: INTERNAL MEDICINE

## 2021-05-10 PROCEDURE — 84300 ASSAY OF URINE SODIUM: CPT | Performed by: INTERNAL MEDICINE

## 2021-05-10 PROCEDURE — 82043 UR ALBUMIN QUANTITATIVE: CPT | Performed by: INTERNAL MEDICINE

## 2021-05-10 PROCEDURE — 84156 ASSAY OF PROTEIN URINE: CPT | Performed by: INTERNAL MEDICINE

## 2021-05-10 PROCEDURE — 80202 ASSAY OF VANCOMYCIN: CPT | Performed by: NURSE PRACTITIONER

## 2021-05-10 PROCEDURE — 80048 BASIC METABOLIC PNL TOTAL CA: CPT | Performed by: FAMILY MEDICINE

## 2021-05-10 PROCEDURE — 82948 REAGENT STRIP/BLOOD GLUCOSE: CPT

## 2021-05-10 RX ORDER — SODIUM BICARBONATE 650 MG/1
1300 TABLET ORAL
Status: DISCONTINUED | OUTPATIENT
Start: 2021-05-10 | End: 2021-05-11 | Stop reason: HOSPADM

## 2021-05-10 RX ORDER — PREDNISONE 20 MG/1
40 TABLET ORAL DAILY
Qty: 10 TABLET | Refills: 0 | Status: SHIPPED | OUTPATIENT
Start: 2021-05-11 | End: 2021-05-16

## 2021-05-10 RX ORDER — POTASSIUM CHLORIDE 750 MG/1
20 CAPSULE, EXTENDED RELEASE ORAL DAILY
Qty: 60 CAPSULE | Refills: 0 | Status: SHIPPED | OUTPATIENT
Start: 2021-05-10 | End: 2021-08-04 | Stop reason: HOSPADM

## 2021-05-10 RX ORDER — POTASSIUM CHLORIDE 20 MEQ/1
40 TABLET, EXTENDED RELEASE ORAL ONCE
Status: COMPLETED | OUTPATIENT
Start: 2021-05-10 | End: 2021-05-10

## 2021-05-10 RX ORDER — PILOCARPINE HYDROCHLORIDE 5 MG/1
5 TABLET, FILM COATED ORAL 3 TIMES DAILY
Qty: 90 TABLET | Refills: 0 | Status: SHIPPED | OUTPATIENT
Start: 2021-05-10 | End: 2021-08-04 | Stop reason: HOSPADM

## 2021-05-10 RX ORDER — CLINDAMYCIN HYDROCHLORIDE 150 MG/1
150 CAPSULE ORAL EVERY 6 HOURS SCHEDULED
Qty: 20 CAPSULE | Refills: 0 | Status: SHIPPED | OUTPATIENT
Start: 2021-05-11 | End: 2021-05-16

## 2021-05-10 RX ORDER — ACETAMINOPHEN 325 MG/1
650 TABLET ORAL EVERY 6 HOURS PRN
Qty: 30 TABLET | Refills: 0 | Status: SHIPPED | OUTPATIENT
Start: 2021-05-10 | End: 2021-08-04 | Stop reason: HOSPADM

## 2021-05-10 RX ADMIN — PILOCARPINE HYDROCHLORIDE 5 MG: 5 TABLET, FILM COATED ORAL at 16:17

## 2021-05-10 RX ADMIN — SODIUM BICARBONATE 100 ML/HR: 84 INJECTION, SOLUTION INTRAVENOUS at 11:47

## 2021-05-10 RX ADMIN — INSULIN LISPRO 2 UNITS: 100 INJECTION, SOLUTION INTRAVENOUS; SUBCUTANEOUS at 11:48

## 2021-05-10 RX ADMIN — CALCIUM CARBONATE (ANTACID) CHEW TAB 500 MG 500 MG: 500 CHEW TAB at 14:21

## 2021-05-10 RX ADMIN — PILOCARPINE HYDROCHLORIDE 5 MG: 5 TABLET, FILM COATED ORAL at 08:12

## 2021-05-10 RX ADMIN — SODIUM BICARBONATE 650 MG TABLET 1300 MG: at 11:46

## 2021-05-10 RX ADMIN — METHYLPREDNISOLONE SODIUM SUCCINATE 40 MG: 40 INJECTION, POWDER, FOR SOLUTION INTRAMUSCULAR; INTRAVENOUS at 08:13

## 2021-05-10 RX ADMIN — INSULIN LISPRO 4 UNITS: 100 INJECTION, SOLUTION INTRAVENOUS; SUBCUTANEOUS at 08:13

## 2021-05-10 RX ADMIN — POTASSIUM CHLORIDE 40 MEQ: 1500 TABLET, EXTENDED RELEASE ORAL at 11:47

## 2021-05-10 RX ADMIN — LETROZOLE 2.5 MG: 2.5 TABLET, FILM COATED ORAL at 08:13

## 2021-05-10 RX ADMIN — HYDROCODONE BITARTRATE AND ACETAMINOPHEN 1 TABLET: 5; 325 TABLET ORAL at 20:44

## 2021-05-10 RX ADMIN — METHIMAZOLE 2.5 MG: 5 TABLET ORAL at 08:13

## 2021-05-10 RX ADMIN — HEPARIN SODIUM 5000 UNITS: 5000 INJECTION INTRAVENOUS; SUBCUTANEOUS at 05:14

## 2021-05-10 RX ADMIN — METRONIDAZOLE 500 MG: 500 INJECTION, SOLUTION INTRAVENOUS at 13:38

## 2021-05-10 RX ADMIN — METRONIDAZOLE 500 MG: 500 INJECTION, SOLUTION INTRAVENOUS at 05:17

## 2021-05-10 RX ADMIN — HYDROCODONE BITARTRATE AND ACETAMINOPHEN 1 TABLET: 5; 325 TABLET ORAL at 01:21

## 2021-05-10 RX ADMIN — ONDANSETRON 4 MG: 2 INJECTION INTRAMUSCULAR; INTRAVENOUS at 13:52

## 2021-05-10 RX ADMIN — SODIUM BICARBONATE 650 MG TABLET 1300 MG: at 16:17

## 2021-05-10 RX ADMIN — SODIUM BICARBONATE 100 ML/HR: 84 INJECTION, SOLUTION INTRAVENOUS at 07:03

## 2021-05-10 RX ADMIN — POTASSIUM CHLORIDE 40 MEQ: 1500 TABLET, EXTENDED RELEASE ORAL at 16:17

## 2021-05-10 NOTE — CASE MANAGEMENT
CM made follow up appointment for pt to follow up with Dr Lenard Andrade ENT, appointment made for 5/17/21 at 1400    AVS updated

## 2021-05-10 NOTE — DISCHARGE INSTR - AVS FIRST PAGE
Please massage the left side of your face with a warm compress use loss inches, cough drops or sialagogues to keep your mouth moist

## 2021-05-10 NOTE — ASSESSMENT & PLAN NOTE
· Presented with left facial swelling  · CT soft tissue neck:  Acute left parotiditis associated significant left facial and neck soft tissue cellulitis with involvement of left carotid  parapharyngeal and left submandibular spaces  Inflamed left palatine tonsil  Airway is narrowed  No discrete fluid collection  Anterior subluxation of right temporomandibular joint  · WBC 22 68 on admission  · cont vancomycin and Flagyl, Solu-Medrol decreased to 40 mg IV b i d  monitor blood sugar, hydrate well with IV fluids  Heating pads and sialogogues  Patient encouraged to massage neck  · If noted to have abscess formation may need surgical intervention  · Clinically improving  Voice and swallowing improving  · Discharge home on prednisone 40 mg daily along with clindamycin for 5 days    · Avoid Bactrim use due to CKD stage 3

## 2021-05-10 NOTE — PROGRESS NOTES
Vancomycin Assessment    Gisell Sheppard is a 70 y o  female who is currently receiving vancomycin patient received 1250 mg iv on 5/8/21, random level on 5/9/21 = 19 2, pulse dose with 1250 mg iv on 5/9 for random level < 20 for skin-soft tissue infection     Relevant clinical data and objective history reviewed:  Creatinine   Date Value Ref Range Status   05/09/2021 1 89 (H) 0 60 - 1 30 mg/dL Final     Comment:     Standardized to IDMS reference method   05/08/2021 2 44 (H) 0 60 - 1 30 mg/dL Final     Comment:     Standardized to IDMS reference method   05/07/2021 2 61 (H) 0 60 - 1 30 mg/dL Final     Comment:     Standardized to IDMS reference method     Vancomycin Rm   Date Value Ref Range Status   05/09/2021 19 4 ug/mL Final     /66   Pulse 62   Temp (!) 96 9 °F (36 1 °C)   Resp 18   Ht 4' 10" (1 473 m)   Wt 63 4 kg (139 lb 12 8 oz)   SpO2 95%   BMI 29 22 kg/m²   I/O last 3 completed shifts: In: 3252 5 [P O :540; I V :2712 5]  Out: -   Lab Results   Component Value Date/Time    BUN 45 (H) 05/09/2021 11:24 AM    WBC 17 75 (H) 05/08/2021 05:19 AM    HGB 12 9 05/08/2021 05:19 AM    HCT 37 7 05/08/2021 05:19 AM    MCV 88 05/08/2021 05:19 AM     05/08/2021 05:19 AM     Temp Readings from Last 3 Encounters:   05/09/21 (!) 96 9 °F (36 1 °C)   04/07/21 98 5 °F (36 9 °C)   04/01/21 (!) 97 4 °F (36 3 °C) (Temporal)     Vancomycin Days of Therapy: 3    Assessment/Plan  The patient is currently on vancomycin utilizing pulse dosing  Baseline risks associated with therapy include: pre-existing renal impairment, concomitant nephrotoxic medications, advanced age, and dehydration    The patient received 1250 mg iv on 5/8/21, random level on 5/9/21 = 19 2, pulse dose with 1250 mg iv on 5/9 for random level < 20 with the most recent vancomycin level being at steady-state and therapeutic based on a goal of 15-20 (appropriate for most indications) ; therefore, the pulse dosing with 1250 mg iv prn for random level < 20 is clinically appropriate and will be continued   Pharmacy will continue to follow closely for s/sx of nephrotoxicity, infusion reactions, and appropriateness of therapy  BMP and CBC will be ordered per protocol  Plan for a random level to be drawn on 5/10/21 @ 2030  Pharmacy will continue to follow the patients culture results and clinical progress daily      Clary Hidalgo, Pharmacist

## 2021-05-10 NOTE — PROGRESS NOTES
[address]  Progress Note - Ifrah Turner 1945, 70 y o  female MRN: [id number]  Unit/Bed#: -01 Encounter: [id number]  Primary Care Provider: Mara Cornejo MD   Date and time admitted to hospital: 5/7/2021  7:43 PM    * Parotiditis  Assessment & Plan  · Presented with left facial swelling  · CT soft tissue neck:  Acute left parotiditis associated significant left facial and neck soft tissue cellulitis with involvement of left carotid  parapharyngeal and left submandibular spaces  Inflamed left palatine tonsil  Airway is narrowed  No discrete fluid collection  Anterior subluxation of right temporomandibular joint  · WBC 22 68 on admission  · cont vancomycin and Flagyl, Solu-Medrol decreased to 40 mg IV b i d  monitor blood sugar, hydrate well with IV fluids  Heating pads and sialogogues  Patient encouraged to massage neck  · If noted to have abscess formation may need surgical intervention  · Clinically improving  Voice and swallowing improving  · Discharge home on prednisone 40 mg daily along with clindamycin for 5 days  · Avoid Bactrim use due to CKD stage 3    High anion gap metabolic acidosis  Assessment & Plan  Secondary dehydration and acute kidney injury and poor oral intake  Patient had anion gap of 19 on admission with metabolic acidosis of CO2 of 16 on BMP  Continue IV fluid hydration  CO2 improved to 17  Placed on bicarb drip and only discharge once bicarb is more than 20  Also placed on oral bicarb    Acute kidney injury ([address] )  Assessment & Plan  · 2 61 with baseline of around 0 7-0 8  Suspect prerenal due to decreased oral intake, dry mucous membranes  · Recent stent placement in February for hydronephrosis  · Continue IV fluid hydration    Creatinine improved to 1 5 with hydration  · Daily metabolic panel and trend creatinine  · Avoid nephrotoxins and hypotension  · Renal ultrasound shows mild bilateral hydronephrosis with indwelling ureteral stents better compared to before    Hyponatremia  Assessment & Plan  Secondary dehydration poor oral intake  Continue gentle hydration  Now resolved    Hypokalemia  Assessment & Plan  · Potassium 2 8 on admission and now improved to 3 4  Potassium replacement ordered    Uncontrolled type 2 diabetes mellitus, without long-term current use of insulin [facility])  Assessment & Plan  Lab Results   Component Value Date    HGBA1C 6 1 (H) 04/03/2021       Recent Labs     05/09/21  1626 05/09/21  2052 05/10/21  0737 05/10/21  1115   POCGLU 154* 138 146* 169*       Blood Sugar Average: Last 72 hrs:  (P) 169 [id number]   · Blood sugars are elevated secondary to steroid use  · Fingerstick blood sugar with sliding scale coverage  · Hold Glucophage while in hospital due to acute kidney injury  · Continue insulin sliding scale  Continue to titrate down steroids    History of breast cancer  Assessment & Plan  · Status post left mastectomy  · Continue Femera  · Continue outpatient follow-up    Hyperthyroidism  Assessment & Plan  · normal TSH  · Continue Tapazole      VTE Pharmacologic Prophylaxis:   Pharmacologic: Heparin  Mechanical VTE Prophylaxis in Place: Yes    Patient Centered Rounds: I have performed bedside rounds with nursing staff today  Discussions with Specialists or Other Care Team Provider:  Discussed with ENT    Education and Discussions with Family / Patient:  Discussed with patient at bedside    Time Spent for Care: 30 minutes  More than 50% of total time spent on counseling and coordination of care as described above  Current Length of Stay: 3 day(s)    Current Patient Status: Inpatient   Certification Statement: The patient will continue to require additional inpatient hospital stay due to Acute parotitis    Discharge Plan:  Pending progress await 3:00 p m  BMP    Code Status: Level 3 - DNAR and DNI      Subjective:   Patient denies any chest pain shortness of breath or abdominal pain    Pain on the left side of the face is slowly improving and the swelling is slowly decreasing    Objective:     Vitals:   Temp (24hrs), Av 5 °F (36 4 °C), Min:96 9 °F (36 1 °C), Max:98 2 °F (36 8 °C)    Temp:  [96 9 °F (36 1 °C)-98 2 °F (36 8 °C)] 97 4 °F (36 3 °C)  HR:  [57-65] 57  Resp:  [18] 18  BP: (134-138)/(66-68) 138/67  SpO2:  [95 %-96 %] 96 %  Body mass index is 29 22 kg/m²  Input and Output Summary (last 24 hours): Intake/Output Summary (Last 24 hours) at 5/10/2021 1435  Last data filed at 5/10/2021 1218  Gross per 24 hour   Intake 710 ml   Output --   Net 710 ml       Physical Exam:     Physical Exam  Vitals signs and nursing note reviewed  Constitutional:       Appearance: Normal appearance  HENT:      Head: Normocephalic and atraumatic  Right Ear: External ear normal       Left Ear: External ear normal       Nose: Nose normal       Mouth/Throat:      Pharynx: Oropharynx is clear  Comments: Swelling on the left side of the face and tenderness on palpation  Neck:      Musculoskeletal: Normal range of motion and neck supple  Cardiovascular:      Rate and Rhythm: Normal rate and regular rhythm  Heart sounds: Normal heart sounds  Pulmonary:      Effort: Pulmonary effort is normal       Breath sounds: Normal breath sounds  Abdominal:      General: Bowel sounds are normal       Palpations: Abdomen is soft  Tenderness: There is no abdominal tenderness  Musculoskeletal: Normal range of motion  Skin:     General: Skin is warm and dry  Capillary Refill: Capillary refill takes less than 2 seconds  Neurological:      General: No focal deficit present  Mental Status: She is alert and oriented to person, place, and time     Psychiatric:         Mood and Affect: Mood normal            Additional Data:     Labs:    Results from last 7 days   Lab Units 21  0519 21   WBC Thousand/uL 17 75* 22 69*   HEMOGLOBIN g/dL 12 9 13 8   HEMATOCRIT % 37 7 41 1 PLATELETS Thousands/uL 281 327   NEUTROS PCT %  --  87*   LYMPHS PCT %  --  9*   LYMPHO PCT % 6*  --    MONOS PCT %  --  3*   MONO PCT % 0*  --    EOS PCT % 0 0     Results from last 7 days   Lab Units 05/10/21  0512  05/07/21  1955   SODIUM mmol/L 140   < > 135*   POTASSIUM mmol/L 3 4*   < > 2 8*   CHLORIDE mmol/L 111*   < > 100   CO2 mmol/L 17*   < > 16*   BUN mg/dL 44*   < > 58*   CREATININE mg/dL 1 55*   < > 2 61*   ANION GAP mmol/L 12   < > 19*   CALCIUM mg/dL 8 1*   < > 9 1   ALBUMIN g/dL  --   --  2 2*   TOTAL BILIRUBIN mg/dL  --   --  1 33*   ALK PHOS U/L  --   --  317*   ALT U/L  --   --  27   AST U/L  --   --  63*   GLUCOSE RANDOM mg/dL 156*   < > 149*    < > = values in this interval not displayed  Results from last 7 days   Lab Units 05/10/21  1115 05/10/21  0737 05/09/21  2052 05/09/21  1626 05/09/21  1120 05/09/21  0736 05/08/21  2125 05/08/21  1555 05/08/21  1104 05/08/21  0736 05/08/21  0001   POC GLUCOSE mg/dl 169* 146* 138 154* 139 158* 159* 171* 221* 197* 210*                   * I Have Reviewed All Lab Data Listed Above  * Additional Pertinent Lab Tests Reviewed:  [address] Admission Reviewed    Imaging:    Imaging Reports Reviewed Today Include:  None  Imaging Personally Reviewed by Myself Includes:  None    Recent Cultures (last 7 days):           Last 24 Hours Medication List:   Current Facility-Administered Medications   Medication Dose Route Frequency Provider Last Rate    acetaminophen  650 mg Oral Q6H PRN SIMON Haskins      aluminum-magnesium hydroxide-simethicone  30 mL Oral Q4H PRN Amaya Syed MD      calcium carbonate  500 mg Oral Daily PRN Amaya Syed MD      heparin (porcine)  5,000 Units Subcutaneous [facility] SIMON Haskins      HYDROcodone-acetaminophen  1 tablet Oral Q6H PRN SIMON Haskins      insulin lispro  1-5 Units Subcutaneous HS SIMON Haskins      insulin lispro  2-12 Units Subcutaneous TID AC Catherine Christianson, MD      insulin lispro  5 Units Subcutaneous TID With Meals Subhash Anand MD      letrozole  2 5 mg Oral Daily Maxyoung Navarro, AMYNP      methimazole  2 5 mg Oral Every Other Day Maxcine Spina, CRNP      methylPREDNISolone sodium succinate  40 mg Intravenous Q12H [facility] Subhash Anand MD      metroNIDAZOLE  500 mg Intravenous Q8H Maxcine Spina, CRNP Stopped (05/10/21 1410)    ondansetron  4 mg Intravenous Q6H PRN Maxcine Spina, CRNP      pilocarpine  5 mg Oral TID Maxcine Spina, CRNP      sodium bicarbonate infusion  100 mL/hr Intravenous Continuous Subhash Anand  mL/hr (05/10/21 1147)    sodium bicarbonate  1,300 mg Oral BID after meals Subhash Anand MD      vancomycin  1,250 mg Intravenous Once PRN Maxyoung Navarro, AMYNP          Today, Patient Was Seen By: Subhash Anand MD    ** Please Note: Dictation voice to text software may have been used in the creation of this document   **

## 2021-05-10 NOTE — ASSESSMENT & PLAN NOTE
· 2 61 with baseline of around 0 7-0 8  Suspect prerenal due to decreased oral intake, dry mucous membranes  · Recent stent placement in February for hydronephrosis  · Continue IV fluid hydration    Creatinine improved to 1 5 with hydration  · Daily metabolic panel and trend creatinine  · Avoid nephrotoxins and hypotension  · Renal ultrasound shows mild bilateral hydronephrosis with indwelling ureteral stents better compared to before

## 2021-05-10 NOTE — PROGRESS NOTES
Progress Note - Hong Hanson 70 y o  female MRN: [id number]    Unit/Bed#: -01 Encounter: [id number]      Assessment:  1  Improving left parotitis on anti-Staph anaerobic medication  2  Xerostomia rule out Sjogren syndrome or poor oral intake as cause    Plan:  She looks markedly improved today compared to her admission status  I think she can be discharged today on Bactrim DS twice daily for the next 7 days  I would also place her on Flagyl at 250 mg t i d for 7 days  I recommended that she massage the left parotid gland  I told to stay well hydrated  I have told to suck on sour foods such as lemon wedges or sugarless sour balls  She could also use concentrated lemon juice  I would like to see her in follow-up in 1 week in my office  If there is any worsening, she will notify me  Subjective:   Patient noting improvement in the left parotid swelling  It is still somewhat tender  She has been trying to drink more  Objective:     Vitals: Blood pressure 138/67, pulse 57, temperature (!) 97 4 °F (36 3 °C), resp  rate 18, height 4' 10" (1 473 m), weight 63 4 kg (139 lb 12 8 oz), SpO2 96 %  ,Body mass index is 29 22 kg/m²  Intake/Output Summary (Last 24 hours) at 5/10/2021 0700  Last data filed at 5/9/2021 2146  Gross per 24 hour   Intake 1780 ml   Output --   Net 1780 ml       Physical Exam:   Neck:  Moderately swollen left parotid gland  Tender to touch  There is no overlying erythema  Oral cavity/oropharynx:  Slight dryness of the oral mucosa  No pus could be seen at the opening of Stensen's duct  Invasive Devices     Peripheral Intravenous Line            Peripheral IV 05/09/21 Right Forearm less than 1 day                Lab, Imaging and other studies: I have personally reviewed pertinent reports      VTE Pharmacologic Prophylaxis: Reason for no pharmacologic prophylaxis Leaving it up to medicine  VTE Mechanical Prophylaxis: reason for no mechanical VTE prophylaxis Leaving it up to medicine

## 2021-05-10 NOTE — ASSESSMENT & PLAN NOTE
Secondary dehydration and acute kidney injury and poor oral intake  Patient had anion gap of 19 on admission with metabolic acidosis of CO2 of 16 on BMP  Continue IV fluid hydration  CO2 improved to 17  Placed on bicarb drip and only discharge once bicarb is more than 20    Also placed on oral bicarb

## 2021-05-10 NOTE — ASSESSMENT & PLAN NOTE
Lab Results   Component Value Date    HGBA1C 6 1 (H) 04/03/2021       Recent Labs     05/09/21  1626 05/09/21  2052 05/10/21  0737 05/10/21  1115   POCGLU 154* 138 146* 169*       Blood Sugar Average: Last 72 hrs:  (P) 169 [id number]   · Blood sugars are elevated secondary to steroid use  · Fingerstick blood sugar with sliding scale coverage  · Hold Glucophage while in hospital due to acute kidney injury  · Continue insulin sliding scale    Continue to titrate down steroids

## 2021-05-10 NOTE — PLAN OF CARE
Problem: Potential for Falls  Goal: Patient will remain free of falls  Description: INTERVENTIONS:  - Assess patient frequently for physical needs  -  Identify cognitive and physical deficits and behaviors that affect risk of falls    -  [place] fall precautions as indicated by assessment   - Educate patient/family on patient safety including physical limitations  - Instruct patient to call for assistance with activity based on assessment  - Modify environment to reduce risk of injury  - Consider OT/PT consult to assist with strengthening/mobility  Outcome: Progressing     Problem: Prexisting or High Potential for Compromised Skin Integrity  Goal: Skin integrity is maintained or improved  Description: INTERVENTIONS:  - Identify patients at risk for skin breakdown  - Assess and monitor skin integrity  - Assess and monitor nutrition and hydration status  - Monitor labs   - Assess for incontinence   - Turn and reposition patient  - Assist with mobility/ambulation  - Relieve pressure over bony prominences  - Avoid friction and shearing  - Provide appropriate hygiene as needed including keeping skin clean and dry  - Evaluate need for skin moisturizer/barrier cream  - Collaborate with interdisciplinary team   - Patient/family teaching  - Consider wound care consult   Outcome: Progressing     Problem: PAIN - ADULT  Goal: Verbalizes/displays adequate comfort level or baseline comfort level  Description: Interventions:  - Encourage patient to monitor pain and request assistance  - Assess pain using appropriate pain scale  - Administer analgesics based on type and severity of pain and evaluate response  - Implement non-pharmacological measures as appropriate and evaluate response  - Consider cultural and social influences on pain and pain management  - Notify physician/advanced practitioner if interventions unsuccessful or patient reports new pain  Outcome: Progressing     Problem: INFECTION - ADULT  Goal: Absence or prevention of progression during hospitalization  Description: INTERVENTIONS:  - Assess and monitor for signs and symptoms of infection  - Monitor lab/diagnostic results  - Monitor all insertion sites, i e  indwelling lines, tubes, and drains  - Monitor endotracheal if appropriate and nasal secretions for changes in amount and color  - [place] appropriate cooling/warming therapies per order  - Administer medications as ordered  - Instruct and encourage patient and family to use good hand hygiene technique  - Identify and instruct in appropriate isolation precautions for identified infection/condition  Outcome: Progressing  Goal: Absence of fever/infection during neutropenic period  Description: INTERVENTIONS:  - Monitor WBC    Outcome: Progressing     Problem: SAFETY ADULT  Goal: Patient will remain free of falls  Description: INTERVENTIONS:  - Assess patient frequently for physical needs  -  Identify cognitive and physical deficits and behaviors that affect risk of falls    -  [place] fall precautions as indicated by assessment   - Educate patient/family on patient safety including physical limitations  - Instruct patient to call for assistance with activity based on assessment  - Modify environment to reduce risk of injury  - Consider OT/PT consult to assist with strengthening/mobility  Outcome: Progressing  Goal: Maintain or return to baseline ADL function  Description: INTERVENTIONS:  -  Assess patient's ability to carry out ADLs; assess patient's baseline for ADL function and identify physical deficits which impact ability to perform ADLs (bathing, care of mouth/teeth, toileting, grooming, dressing, etc )  - Assess/evaluate cause of self-care deficits   - Assess range of motion  - Assess patient's mobility; develop plan if impaired  - Assess patient's need for assistive devices and provide as appropriate  - Encourage maximum independence but intervene and supervise when necessary  - Involve family in performance of ADLs  - Assess for home care needs following discharge   - Consider OT consult to assist with ADL evaluation and planning for discharge  - Provide patient education as appropriate  Outcome: Progressing  Goal: Maintain or return mobility status to optimal level  Description: INTERVENTIONS:  - Assess patient's baseline mobility status (ambulation, transfers, stairs, etc )    - Identify cognitive and physical deficits and behaviors that affect mobility  - Identify mobility aids required to assist with transfers and/or ambulation (gait belt, sit-to-stand, lift, walker, cane, etc )  - [place] fall precautions as indicated by assessment  - Record patient progress and toleration of activity level on Mobility SBAR; progress patient to next Phase/Stage  - Instruct patient to call for assistance with activity based on assessment  - Consider rehabilitation consult to assist with strengthening/weightbearing, etc   Outcome: Progressing     Problem: DISCHARGE PLANNING  Goal: Discharge to home or other facility with appropriate resources  Description: INTERVENTIONS:  - Identify barriers to discharge w/patient and caregiver  - Arrange for needed discharge resources and transportation as appropriate  - Identify discharge learning needs (meds, wound care, etc )  - Arrange for interpretive services to assist at discharge as needed  - Refer to Case Management Department for coordinating discharge planning if the patient needs post-hospital services based on physician/advanced practitioner order or complex needs related to functional status, cognitive ability, or social support system  Outcome: Progressing     Problem: Knowledge Deficit  Goal: Patient/family/caregiver demonstrates understanding of disease process, treatment plan, medications, and discharge instructions  Description: Complete learning assessment and assess knowledge base    Interventions:  - Provide teaching at level of understanding  - Provide teaching via preferred learning methods  Outcome: Progressing     Problem: METABOLIC, FLUID AND ELECTROLYTES - ADULT  Goal: Electrolytes maintained within normal limits  Description: INTERVENTIONS:  - Monitor labs and assess patient for signs and symptoms of electrolyte imbalances  - Administer electrolyte replacement as ordered  - Monitor response to electrolyte replacements, including repeat lab results as appropriate  - Instruct patient on fluid and nutrition as appropriate  Outcome: Progressing  Goal: Fluid balance maintained  Description: INTERVENTIONS:  - Monitor labs   - Monitor I/O and WT  - Instruct patient on fluid and nutrition as appropriate  - Assess for signs & symptoms of volume excess or deficit  Outcome: Progressing  Goal: Glucose maintained within target range  Description: INTERVENTIONS:  - Monitor Blood Glucose as ordered  - Assess for signs and symptoms of hyperglycemia and hypoglycemia  - Administer ordered medications to maintain glucose within target range  - Assess nutritional intake and initiate nutrition service referral as needed  Outcome: Progressing     Problem: SKIN/TISSUE INTEGRITY - ADULT  Goal: Skin integrity remains intact  Description: INTERVENTIONS  - Identify patients at risk for skin breakdown  - Assess and monitor skin integrity  - Assess and monitor nutrition and hydration status  - Monitor labs (i e  albumin)  - Assess for incontinence   - Turn and reposition patient  - Assist with mobility/ambulation  - Relieve pressure over bony prominences  - Avoid friction and shearing  - Provide appropriate hygiene as needed including keeping skin clean and dry  - Evaluate need for skin moisturizer/barrier cream  - Collaborate with interdisciplinary team (i e  Nutrition, Rehabilitation, etc )   - Patient/family teaching  Outcome: Progressing  Goal: Incision(s), wounds(s) or drain site(s) healing without S/S of infection  Description: INTERVENTIONS  - Assess and document risk factors for skin impairment   - Assess and document dressing, incision, wound bed, drain sites and surrounding tissue  - Consider nutrition services referral as needed  - Oral mucous membranes remain intact  - Provide patient/ family education  Outcome: Progressing  Goal: Oral mucous membranes remain intact  Description: INTERVENTIONS  - Assess oral mucosa and hygiene practices  - Implement preventative oral hygiene regimen  - Implement oral medicated treatments as ordered  - Initiate Nutrition services referral as needed  Outcome: Progressing     Problem: Nutrition/Hydration-ADULT  Goal: Nutrient/Hydration intake appropriate for improving, restoring or maintaining nutritional needs  Description: Monitor and assess patient's nutrition/hydration status for malnutrition  Collaborate with interdisciplinary team and initiate plan and interventions as ordered  Monitor patient's weight and dietary intake as ordered or per policy  Utilize nutrition screening tool and intervene as necessary  Determine patient's food preferences and provide high-protein, high-caloric foods as appropriate       INTERVENTIONS:  - Monitor oral intake, urinary output, labs, and treatment plans  - Assess nutrition and hydration status and recommend course of action  - Evaluate amount of meals eaten  - Assist patient with eating if necessary   - Allow adequate time for meals  - Recommend/ encourage appropriate diets, oral nutritional supplements, and vitamin/mineral supplements  - Order, calculate, and assess calorie counts as needed  - Recommend, monitor, and adjust tube feedings and TPN/PPN based on assessed needs  - Assess need for intravenous fluids  - Provide specific nutrition/hydration education as appropriate  - Include patient/family/caregiver in decisions related to nutrition  Outcome: Progressing

## 2021-05-10 NOTE — CONSULTS
Consultation - Nephrology   Nicole Meza 70 y o  female MRN: [id number]  Unit/Bed#: -01 Encounter: [id number]      A/P:  1  Acute kidney injury present on admission  Due to volume depletion as evidenced by improvement with IV fluids  Check urine studies and daily studies  Monitor I/O  She appears to be trending down to her  baseline    2  History of obstructive uropathy with acute kidney injury  Was hospitalized at [facility] with obstructive uropathy and a creatinine of 4 6  Her creatinine did trend down on  discharge however we have limited data as she signed out against medical advice  Last creatinine on 4/9/2021 was 2 69 mg/dL  Her baseline apparently was 0  7-0 8 mg/dL in February 2021    3  Left parotitis  Receiving vancomycin Flagyl and Solu-Medrol with improvement    4  DM 2- not on chronic insulin therapy  Being treated with insulin as an in-patient    5  Bradycardia  TSH and T4 were normal in April 2021  Consider a cardiology evaluation for an outpatient monitor    6  Anion gap metabolic acidosis  Placed on a bicarbonate containing drip and her gap has closed  Continue for another 24 hours prior to switching to another fluid           Thank you for allowing us to participate in the care of your patient  Please feel free to contact us regarding the care of this patient, or any other questions/concerns that may be applicable      Patient Active Problem List   Diagnosis    Acute kidney injury ([facility] )    Hyperthyroidism    History of breast cancer    Uncontrolled type 2 diabetes mellitus, without long-term current use of insulin ([address] )    Cirrhosis of liver (HCC)    Chest pain    Constipation    Hypokalemia    Hydronephrosis    Hypomagnesemia    Parotiditis    Hyponatremia    High anion gap metabolic acidosis       History of Present Illness   Physician Requesting Consult: Cecile Traylor MD  Reason for Consult / Principal Problem: Acute kidney injury POA  Hx and PE limited by:   HPI: Herb Ca is a 70y o  year old female who presents with acute kidney injury with a creatinine 2 61 mg/dL on admission  The patient relates that 1 week ago she developed fever up to 101-102  She developed swelling of the left parotid gland which was extremely tender  She was to see her urologist however she did not feel on she canceled the appointment  She presented on Thursday night with severe swelling was admitted and placed on IV antibiotics  She was started on IV fluids for trending down of her creatinine to 1 55 mg dL today  She was admitted to [facility] with acute kidney injury secondary to obstructive uropathy in April  At that time bilateral ureteral stents were placed  CT scan demonstrated obstructive uropathy without evidence of renal calculi as well as proximal hydroureter  The patient was voiding well via Way catheter and her creatinine did decline  She then signed out against medical of icing that she would follow up with her urologist in [facility]   She did have an appointment to see him however canceled it due to the fever  She has a history of type 2 diabetes mellitus not on insulin  She has a history of hyperthyroidism and takes methimazole every other day with good control  She is currently receiving vancomycin, Flagyl and Solu-Medrol  IV fluids are consisting of bicarbonate containing drip at 100 mils per hour    History obtained from chart review and the patient    Review of Systems - Negative except as mentioned above in HPI, more specifics as mentioned below    Review of Systems - General ROS: positive for  - chills, fatigue and fever  Ophthalmic ROS: negative  ENT ROS: positive for - left tender parotid swelling  Breast ROS: positive for - prior mastectomy  Respiratory ROS: no cough, shortness of breath, or wheezing  Cardiovascular ROS: no chest pain or dyspnea on exertion  Gastrointestinal ROS: positive for - appetite loss and nausea/vomiting  Genito-Urinary ROS: no dysuria, trouble voiding, or hematuria  Musculoskeletal ROS: negative  Neurological ROS: no TIA or stroke symptoms  Dermatological ROS: negative    Historical Information   Past Medical History:   Diagnosis Date    Diabetes mellitus ([address] )     Diverticulitis     Hyperthyroidism      Past Surgical History:   Procedure Laterality Date    APPENDECTOMY      COLOSTOMY      FL RETROGRADE PYELOGRAM  4/6/2021    MASTECTOMY      OR CYSTOURETHROSCOPY,URETER CATHETER Bilateral 4/6/2021    Procedure: CYSTOSCOPY RETROGRADE PYELOGRAM WITH INSERTION STENT URETERAL;  Surgeon: Antony High MD;  Location: [facility];  Service: Urology     Social History   Social History     Substance and Sexual Activity   Alcohol Use Not Currently     Social History     Substance and Sexual Activity   Drug Use Not Currently     Social History     Tobacco Use   Smoking Status Former Smoker   Smokeless Tobacco Never Used     History reviewed  No pertinent family history      Meds/Allergies   all current active meds have been reviewed, current meds:   Current Facility-Administered Medications   Medication Dose Route Frequency    acetaminophen (TYLENOL) tablet 650 mg  650 mg Oral Q6H PRN    aluminum-magnesium hydroxide-simethicone (MYLANTA) oral suspension 30 mL  30 mL Oral Q4H PRN    calcium carbonate (TUMS) chewable tablet 500 mg  500 mg Oral Daily PRN    heparin (porcine) subcutaneous injection 5,000 Units  5,000 Units Subcutaneous Q8H [address]    HYDROcodone-acetaminophen (NORCO) 5-325 mg per tablet 1 tablet  1 tablet Oral Q6H PRN    insulin lispro (HumaLOG) 100 units/mL subcutaneous injection 1-5 Units  1-5 Units Subcutaneous HS    insulin lispro (HumaLOG) 100 units/mL subcutaneous injection 2-12 Units  2-12 Units Subcutaneous TID AC    insulin lispro (HumaLOG) 100 units/mL subcutaneous injection 5 Units  5 Units Subcutaneous TID With Meals    letrozole (FEMARA) tablet 2 5 mg  2 5 mg Oral Daily    methimazole (TAPAZOLE) tablet 2 5 mg  2 5 mg Oral Every Other Day    methylPREDNISolone sodium succinate (Solu-MEDROL) injection 40 mg  40 mg Intravenous Q12H [facility]    metroNIDAZOLE (FLAGYL) IVPB (premix) 500 mg 100 mL  500 mg Intravenous Q8H    ondansetron (ZOFRAN) injection 4 mg  4 mg Intravenous Q6H PRN    pilocarpine (SALAGEN) tablet 5 mg  5 mg Oral TID    sodium bicarbonate 150 mEq in dextrose 5 % 1,000 mL infusion  100 mL/hr Intravenous Continuous    sodium bicarbonate tablet 1,300 mg  1,300 mg Oral BID after meals    vancomycin (VANCOCIN) 1,250 mg in sodium chloride 0 9 % 250 mL IVPB  1,250 mg Intravenous Once PRN    and PTA meds:    Medications Prior to Admission   Medication    calcium carbonate (Tums) 500 mg chewable tablet    glucosamine-chondroitin 500-400 MG tablet    letrozole (FEMARA) 2 5 mg tablet    loperamide (IMODIUM) 2 mg capsule    methimazole (TAPAZOLE) 5 mg tablet         Allergies   Allergen Reactions    Latex Itching    Nuts - Food Allergy Other (See Comments)     [place] allergy    Chlorhexidine Rash    Penicillins Rash       Objective     Intake/Output Summary (Last 24 hours) at 5/10/2021 1625  Last data filed at 5/10/2021 1540  Gross per 24 hour   Intake 710 ml   Output 230 ml   Net 480 ml       Invasive Devices:        Physical Exam      I/O last 3 completed shifts: In: 3142 5 [P O :480; I V :2312 5; IV Piggyback:350]  Out: -     Vitals:    05/10/21 1440   BP: 147/69   Pulse: (!) 50   Resp: 17   Temp: 97 6 °F (36 4 °C)   SpO2: 95%       Gen: in distress due to parotid swelling, Alert/Awake  HEENT: no sclerous icterus, MMM, neck supple  CV: +S1/S2, RRR  Lungs: CTA bilaterally  Abd: +BS, soft NT/ND  Ext: all four extremities are warm  Skin: no rashes noted  Neuro: CN II-XII intact    Current Weight: Weight - Scale: 63 4 kg (139 lb 12 8 oz)  First Weight: Weight - Scale: 65 8 kg (145 lb 1 oz)    Lab Results:  I have personally reviewed pertinent labs      CBC: No results found for: WBC, HGB, HCT, MCV, PLT, ADJUSTEDWBC, MCH, MCHC, RDW, MPV, NRBC  CMP:   Lab Results   Component Value Date    K 3 2 (L) 05/10/2021     (H) 05/10/2021    CO2 20 (L) 05/10/2021    BUN 42 (H) 05/10/2021    CREATININE 1 68 (H) 05/10/2021    CALCIUM 8 5 05/10/2021    EGFR 30 05/10/2021     Phosphorus: No results found for: PHOS  Magnesium: No results found for: MG  Urinalysis: No results found for: [place], SPECGRAV, PHUR, LEUKOCYTESUR, NITRITE, PROTEINUA, GLUCOSEU, KETONESU, BILIRUBINUR, BLOODU  Ionized Calcium: No results found for: CAION  Coagulation: No results found for: PT, INR, APTT  Troponin: No results found for: TROPONINI  ABG: No results found for: PHART, MVA0WWH, PO2ART, PHG8AYP, M9DCRWIJ, BEART, SOURCE    Results from last 7 days   Lab Units 05/10/21  1516 05/10/21  0512 05/09/21  1124  05/07/21  1955   POTASSIUM mmol/L 3 2* 3 4* 3 2*   < > 2 8*   CHLORIDE mmol/L 110* 111* 112*   < > 100   CO2 mmol/L 20* 17* 16*   < > 16*   BUN mg/dL 42* 44* 45*   < > 58*   CREATININE mg/dL 1 68* 1 55* 1 89*   < > 2 61*   CALCIUM mg/dL 8 5 8 1* 8 2*   < > 9 1   ALK PHOS U/L  --   --   --   --  317*   ALT U/L  --   --   --   --  27   AST U/L  --   --   --   --  63*    < > = values in this interval not displayed  Radiology review:  Procedure: Ct Facial Bones Without Contrast    Result Date: 5/7/2021  Narrative: CT FACIAL BONES WITHOUT INTRAVENOUS CONTRAST INDICATION:   left facial swelling  COMPARISON: None  TECHNIQUE:  Axial CT images were obtained through the facial bones with additional sagittal and coronal reconstructions  Radiation dose length product (DLP) for this visit:  520 mGy-cm   This examination, like all CT scans performed in the [facility], was performed utilizing techniques to minimize radiation dose exposure, including the use of iterative reconstruction and automated exposure control  IMAGE QUALITY:  Diagnostic   FINDINGS: FACIAL BONES:   No facial bone fracture identified  Anterior subluxation of right temporomandibular joint  No lytic or blastic lesion  ORBITS:  Orbital globes, optic nerves, and extraocular muscles appear symmetric and normal  There is no evidence of retrobulbar mass, abscess, or hematoma  SINUSES:  Normal  SOFT TISSUES:  Acute left parotiditis and associated significant left facial and neck soft tissue cellulitis with multi-spatial left neck inflammation  Impression: 1  Acute left parotiditis and associated significant left facial and neck soft tissue cellulitis with multi-spatial left neck inflammation  Please refer to the report of concurrent neck CT for details  2   Anteriorly subluxed right temporomandibular joint  Workstation performed: [id number]     Procedure: Ct Soft Tissue Neck Wo Contrast    Result Date: 5/7/2021  Narrative: CT SOFT TISSUE NECK WITHOUT CONTRAST INDICATION:   left facial swelling  COMPARISON:  None  TECHNIQUE:  Axial, sagittal, and coronal 2D reformatted images were created from the axial source data and submitted for interpretation  Radiation dose length product (DLP) for this visit:  557 mGy-cm   This examination, like all CT scans performed in the [facility], was performed utilizing techniques to minimize radiation dose exposure, including the use of iterative reconstruction and automated exposure control  IMAGE QUALITY:  Diagnostic  FINDINGS: VISUALIZED BRAIN PARENCHYMA:  Normal visualized brain parenchyma  VISUALIZED ORBITS AND PARANASAL SINUSES:  Normal  NASAL CAVITY AND NASOPHARYNX:  Unremarkable SUPRAHYOID NECK:  Limited assessment due to lack of IV contrast   Enlarged and inflamed left parotid gland and associated significant inflammation involving left facial and neck subcutaneous soft tissue extending inferiorly to the level of hyoid bone  There is involvement of left carotid, , parapharyngeal, and left submandibular spaces  Left palatine tonsil is inflamed  Airway is narrowed  INFRAHYOID NECK:  Aryepiglottic folds and piriform sinuses  Normal larynx and subglottic airway  THYROID GLAND:  Unremarkable  PAROTID AND SUBMANDIBULAR GLANDS: Enlarged left parotid gland as described above  Mild inflammatory involvement of left submandibular gland  Right parotid and submandibular glands are unremarkable  LYMPH NODES:  There is reactive mild left level IB and II lymphadenopathy  VASCULAR STRUCTURES:  Limited without contrast  THORACIC INLET:  Lung apices and upper mediastinum are unremarkable  BONY STRUCTURES:  No acute fracture  There is anterior subluxation of right temporomandibular joint  Impression: 1  Acute left parotiditis and associated significant left facial and neck soft tissue cellulitis with involvement of left carotid, , parapharyngeal, and left submandibular spaces  Inflamed left palatine tonsil  Airway is narrowed  No discrete fluid collection  2   Anterior subluxation of right temporomandibular joint  The study was marked in [facility] for immediate notification  Workstation performed: [id number]     Procedure: Us Kidney And Bladder    Result Date: 5/8/2021  Narrative: RENAL ULTRASOUND INDICATION:   veda  COMPARISON: April 4, 2021 TECHNIQUE:   Ultrasound of the retroperitoneum was performed with a curvilinear transducer utilizing volumetric sweeps and still imaging techniques  FINDINGS: KIDNEYS: Symmetric and normal size  Right kidney:  10 8 x 4 4 cm  Left kidney:  12 0 x 5 9 cm  Right kidney Normal echogenicity and contour  No suspicious masses detected  New right-sided indwelling stent present appearing appropriate in position  Persistent mild right hydronephrosis, diminished compared to the prior  No shadowing calculi  No perinephric fluid collections  Left kidney Normal echogenicity and contour  No suspicious masses detected  New right-sided indwelling stent present appearing appropriate in position    Persistent mild right hydronephrosis, diminished compared to the prior  No shadowing calculi  No perinephric fluid collections  URETERS: Nonvisualized  BLADDER: Bladder appears normally distended  No focal lesions evident  Prior Way removed in the interim  Impression: Persistent mild hydronephrosis bilaterally, slightly diminished compared to the prior, now with indwelling bilateral ureteral stents present  Workstation performed: [id number]         EKG, Pathology, and Other Studies:  I have reviewed occurrence studies, notes and labs  I reviewed her admission at [facility] in April      Counseling / Coordination of Care  Total ADDITIONAL floor / unit time spent today 40 minutes  Greater than 50% of total time was spent with the patient and / or family counseling and / or coordination of care  A description of the counseling / coordination of care: follows    Liyah Leavitt MD      This consultation note was produced in part using a dictation device which may document imprecise wording from author's original intent

## 2021-05-11 VITALS
BODY MASS INDEX: 29.34 KG/M2 | RESPIRATION RATE: 20 BRPM | DIASTOLIC BLOOD PRESSURE: 70 MMHG | OXYGEN SATURATION: 93 % | TEMPERATURE: 98.2 F | HEIGHT: 58 IN | HEART RATE: 50 BPM | SYSTOLIC BLOOD PRESSURE: 147 MMHG | WEIGHT: 139.8 LBS

## 2021-05-11 LAB
ANION GAP SERPL CALCULATED.3IONS-SCNC: 10 MMOL/L (ref 4–13)
BUN SERPL-MCNC: 39 MG/DL (ref 5–25)
CALCIUM SERPL-MCNC: 8.1 MG/DL (ref 8.3–10.1)
CHLORIDE SERPL-SCNC: 108 MMOL/L (ref 100–108)
CO2 SERPL-SCNC: 24 MMOL/L (ref 21–32)
CREAT SERPL-MCNC: 1.43 MG/DL (ref 0.6–1.3)
CREAT UR-MCNC: 22.2 MG/DL
GFR SERPL CREATININE-BSD FRML MDRD: 36 ML/MIN/1.73SQ M
GLUCOSE SERPL-MCNC: 146 MG/DL (ref 65–140)
GLUCOSE SERPL-MCNC: 155 MG/DL (ref 65–140)
GLUCOSE SERPL-MCNC: 170 MG/DL (ref 65–140)
MICROALBUMIN UR-MCNC: 21.2 MG/L (ref 0–20)
MICROALBUMIN/CREAT 24H UR: 95 MG/G CREATININE (ref 0–30)
POTASSIUM SERPL-SCNC: 3.6 MMOL/L (ref 3.5–5.3)
PROT UR-MCNC: 27 MG/DL
PROT/CREAT UR: 1.22 MG/G{CREAT} (ref 0–0.1)
SODIUM 24H UR-SCNC: 91 MOL/L
SODIUM SERPL-SCNC: 142 MMOL/L (ref 136–145)
VANCOMYCIN SERPL-MCNC: 22 UG/ML

## 2021-05-11 PROCEDURE — 82948 REAGENT STRIP/BLOOD GLUCOSE: CPT

## 2021-05-11 PROCEDURE — 80202 ASSAY OF VANCOMYCIN: CPT | Performed by: NURSE PRACTITIONER

## 2021-05-11 PROCEDURE — 99232 SBSQ HOSP IP/OBS MODERATE 35: CPT | Performed by: INTERNAL MEDICINE

## 2021-05-11 PROCEDURE — 99239 HOSP IP/OBS DSCHRG MGMT >30: CPT | Performed by: FAMILY MEDICINE

## 2021-05-11 PROCEDURE — 80048 BASIC METABOLIC PNL TOTAL CA: CPT | Performed by: FAMILY MEDICINE

## 2021-05-11 RX ORDER — OXYCODONE HYDROCHLORIDE AND ACETAMINOPHEN 5; 325 MG/1; MG/1
1 TABLET ORAL EVERY 4 HOURS PRN
Qty: 12 TABLET | Refills: 0 | Status: SHIPPED | OUTPATIENT
Start: 2021-05-11 | End: 2021-08-04 | Stop reason: HOSPADM

## 2021-05-11 RX ADMIN — METRONIDAZOLE 500 MG: 500 INJECTION, SOLUTION INTRAVENOUS at 00:17

## 2021-05-11 RX ADMIN — PILOCARPINE HYDROCHLORIDE 5 MG: 5 TABLET, FILM COATED ORAL at 08:20

## 2021-05-11 RX ADMIN — METHYLPREDNISOLONE SODIUM SUCCINATE 40 MG: 40 INJECTION, POWDER, FOR SOLUTION INTRAMUSCULAR; INTRAVENOUS at 00:16

## 2021-05-11 RX ADMIN — METRONIDAZOLE 500 MG: 500 INJECTION, SOLUTION INTRAVENOUS at 13:14

## 2021-05-11 RX ADMIN — INSULIN LISPRO 2 UNITS: 100 INJECTION, SOLUTION INTRAVENOUS; SUBCUTANEOUS at 11:38

## 2021-05-11 RX ADMIN — HEPARIN SODIUM 5000 UNITS: 5000 INJECTION INTRAVENOUS; SUBCUTANEOUS at 06:44

## 2021-05-11 RX ADMIN — INSULIN LISPRO 1 UNITS: 100 INJECTION, SOLUTION INTRAVENOUS; SUBCUTANEOUS at 00:17

## 2021-05-11 RX ADMIN — PILOCARPINE HYDROCHLORIDE 5 MG: 5 TABLET, FILM COATED ORAL at 00:16

## 2021-05-11 RX ADMIN — METHYLPREDNISOLONE SODIUM SUCCINATE 40 MG: 40 INJECTION, POWDER, FOR SOLUTION INTRAMUSCULAR; INTRAVENOUS at 08:20

## 2021-05-11 RX ADMIN — LETROZOLE 2.5 MG: 2.5 TABLET, FILM COATED ORAL at 08:23

## 2021-05-11 RX ADMIN — METRONIDAZOLE 500 MG: 500 INJECTION, SOLUTION INTRAVENOUS at 06:44

## 2021-05-11 RX ADMIN — SODIUM BICARBONATE 650 MG TABLET 1300 MG: at 08:20

## 2021-05-11 NOTE — DISCHARGE INSTRUCTIONS
Acute Kidney Injury   WHAT YOU NEED TO KNOW:   Acute kidney injury (ZOYA) is also called acute kidney failure, or acute renal failure  ZOYA happens when your kidneys suddenly stop working correctly  Normally, the kidneys remove fluid, chemicals, and waste from your blood  These wastes are turned into urine by your kidneys  ZOYA usually happens over hours or days  When you have ZOYA, your kidneys do not remove the waste, chemicals, or extra fluid from your body  A normal amount of urine is not produced  ZOYA is usually temporary, it can take days to months to recover  ZOYA can also become a chronic kidney condition  DISCHARGE INSTRUCTIONS:   Call 911 if:   · You have sudden chest pain or trouble breathing  Seek care immediately if:   · Your symptoms get worse  Contact your healthcare provider if:   · Your symptoms return  · Your blood sugar or blood pressure level is not within the range your healthcare provider recommends  · You have questions or concerns about your condition or care  Nutrition:  Your healthcare provider may tell you to eat food low in sodium (salt), potassium, phosphorus, or protein  A dietitian can help you plan your meals  Drink liquids as directed: Your healthcare provider may recommend that you drink a certain amount of liquids  This will help your kidneys work better and decrease your risk for dehydration  Ask how much liquid to drink each day and which liquids are best for you  Prevent acute kidney injury:   · Manage other health conditions  such as diabetes, high blood pressure, or heart disease  These conditions increase your risk for acute kidney injury  Take your medicines for these conditions as directed  Also, monitor your blood sugar and blood pressure levels as directed  Contact your healthcare provider if your levels are not in the range he or she says it should be  · Talk to your healthcare provider before you take over-the-counter-medicine    NSAIDs, stomach medicine, or laxatives may harm your kidneys and increase your risk for acute kidney injury  If it is okay to take the medicine, follow the directions on the package  Do not take more than directed  · Tell healthcare providers you have had acute kidney injury  before you get contrast liquid for an x-ray or CT scan  Your healthcare provider may give you medicine to prevent kidney problems caused by the liquid  Follow up with your healthcare provider as directed: You will need to return for more tests to make sure your kidneys are working properly  You may also be referred to a kidney specialist  Write down your questions so you remember to ask them during your visits  © Copyright [address] Information is for End User's use only and may not be sold, redistributed or otherwise used for commercial purposes  All illustrations and images included in CareNotes® are the copyrighted property of A D A M , Inc  or [facility] Emelia Draper   The above information is an  only  It is not intended as medical advice for individual conditions or treatments  Talk to your doctor, nurse or pharmacist before following any medical regimen to see if it is safe and effective for you  Sialoadenitis   AMBULATORY CARE:   Sialoadenitis  is an inflammation or infection of one or more of your salivary glands  A small stone can block the salivary gland and cause inflammation  Infection may be caused by a virus or bacteria  You can develop sialoadenitis on one or both sides of your face  Common symptoms include the following:   · Pain and swelling of a salivary gland, especially during or right after eating    · Bad breath or tooth pain    · Pus in your mouth    · Fever    Seek care immediately if:   · You have trouble breathing or swallowing because of swelling  Call your doctor if:   · You have trouble opening your mouth because of swelling      · Your salivary gland gets more red and hot or drains more pus     · The pain and swelling do not go away within 2 days, or they get worse  · Your mouth is very dry  · You lose movement on one side of your face  · You have questions about your condition or care  Treatment  may include any of the following:  · Medicines:      ? Antibiotics  may be given to treat a bacterial infection  ? Acetaminophen  decreases pain and fever  It is available without a doctor's order  Ask how much to give your child and how often to give it  Follow directions  Read the labels of all other medicines your child uses to see if they also contain acetaminophen, or ask your child's doctor or pharmacist  Acetaminophen can cause liver damage if not taken correctly  ? NSAIDs , such as ibuprofen, help decrease swelling, pain, and fever  This medicine is available with or without a doctor's order  NSAIDs can cause stomach bleeding or kidney problems in certain people  If you take blood thinner medicine, always ask your healthcare provider if NSAIDs are safe for you  Always read the medicine label and follow directions  ? Take your medicine as directed  Contact your healthcare provider if you think your medicine is not helping or if you have side effects  Tell him or her if you are allergic to any medicine  Keep a list of the medicines, vitamins, and herbs you take  Include the amounts, and when and why you take them  Bring the list or the pill bottles to follow-up visits  Carry your medicine list with you in case of an emergency  · Procedures:      ? Removal of one or more stones  may be needed if other treatments do not work  ? An incision and drainage  may be needed if there is an abscess (pocket of pus) that does not respond to other treatments  Manage or prevent sialoadenitis:   · Drink liquids as directed  You may need to drink more liquids than usual  Ask how much liquid to drink each day and which liquids are best for you   Good choices of liquids for most people include water, tea, soup, juice, or milk  · Practice good oral care  Brush your teeth 2 times a day, 1 time in the morning and 1 time in the evening  Use a fluoride toothpaste  Floss your teeth 1 time each day, usually in the evening  Use mouthwash after you floss  Swish it around in your mouth for 30 seconds and spit it out  · Keep your mouth moist   Suck on hard candy or chew sugarless gum to get your saliva flowing  Sour and tart flavors such as lemon and orange will help get saliva to flow  This will help keep your mouth moist and help push out a stone blocking your salivary duct  · Apply a warm, wet cloth and massage  the swollen area as directed  This may help relieve swelling and pain by pushing the pus out of the gland  Follow up with your doctor or dentist as directed:  Write down your questions so you remember to ask them during your visits  © Copyright [address] Information is for End User's use only and may not be sold, redistributed or otherwise used for commercial purposes  All illustrations and images included in CareNotes® are the copyrighted property of A D A M , Inc  or [facility] Emelia Draper   The above information is an  only  It is not intended as medical advice for individual conditions or treatments  Talk to your doctor, nurse or pharmacist before following any medical regimen to see if it is safe and effective for you

## 2021-05-11 NOTE — ASSESSMENT & PLAN NOTE
· Presented with left facial swelling  · CT soft tissue neck:  Acute left parotiditis associated significant left facial and neck soft tissue cellulitis with involvement of left carotid  parapharyngeal and left submandibular spaces  Inflamed left palatine tonsil  Airway is narrowed  No discrete fluid collection  Anterior subluxation of right temporomandibular joint  · WBC 22 68 on admission  · Patient received treatment with IV vancomycin and Flagyl along with IV Solu-Medrol  ·  Heating pads and sialogogues  Patient encouraged to massage neck  · Clinically improving  Voice and swallowing improving  · We will Discharge home on prednisone 40 mg daily along with clindamycin for 5 days    · Avoid Bactrim use due to CKD stage 3  · ENT recommendation appreciated

## 2021-05-11 NOTE — PROGRESS NOTES
Vancomycin Assessment    Domenic Dixon is a 70 y o  female who is currently receiving vancomycin 1250 mg iv prn random level < 20  Dose held on 5/10/21 due to Random level = 24 2, new Random level ordered for 5/11/21 @ 0700 for skin-soft tissue infection     Relevant clinical data and objective history reviewed:  Creatinine   Date Value Ref Range Status   05/10/2021 1 68 (H) 0 60 - 1 30 mg/dL Final     Comment:     Standardized to IDMS reference method   05/10/2021 1 55 (H) 0 60 - 1 30 mg/dL Final     Comment:     Standardized to IDMS reference method   05/09/2021 1 89 (H) 0 60 - 1 30 mg/dL Final     Comment:     Standardized to IDMS reference method     Vancomycin Rm   Date Value Ref Range Status   05/10/2021 24 2 ug/mL Final     /69   Pulse (!) 50   Temp 97 6 °F (36 4 °C)   Resp 17   Ht 4' 10" (1 473 m)   Wt 63 4 kg (139 lb 12 8 oz)   SpO2 95%   BMI 29 22 kg/m²   I/O last 3 completed shifts: In: 2020 [P O :720; I V :950; IV Piggyback:350]  Out: 455 [Urine:455]  Lab Results   Component Value Date/Time    BUN 42 (H) 05/10/2021 03:16 PM    WBC 17 75 (H) 05/08/2021 05:19 AM    HGB 12 9 05/08/2021 05:19 AM    HCT 37 7 05/08/2021 05:19 AM    MCV 88 05/08/2021 05:19 AM     05/08/2021 05:19 AM     Temp Readings from Last 3 Encounters:   05/10/21 97 6 °F (36 4 °C)   04/07/21 98 5 °F (36 9 °C)   04/01/21 (!) 97 4 °F (36 3 °C) (Temporal)     Vancomycin Days of Therapy: 4    Assessment/Plan  The patient is currently on vancomycin utilizing pulse dosing  Baseline risks associated with therapy include: pre-existing renal impairment, concomitant nephrotoxic medications, advanced age, and dehydration  The patient is receiving 1250 mg iv prn random level < 20    Dose held on 5/10/21 due to Random level = 24 2, new Random level ordered for 5/11/21 @ 0700 with the most recent vancomycin level being at steady-state and supratherapeutic based on a goal of 15-20 (appropriate for most indications) ; therefore, after clinical evaluation will be changed to 750 mg iv prn random level < 20   Pharmacy will continue to follow closely for s/sx of nephrotoxicity, infusion reactions, and appropriateness of therapy  BMP and CBC will be ordered per protocol  Plan is for a random level to be drawn @ 0700 on 5/11/21 and if < 20 pulse dose with 750 mg iv  Pharmacy will continue to follow the patients culture results and clinical progress daily      Marsha Mirza, Pharmacist

## 2021-05-11 NOTE — PROGRESS NOTES
Progress Note - Nephrology   Leigha Velarde 70 y o  female MRN: [id number]  Unit/Bed#: -01 Encounter: [id number]    A/P:  1  Acute kidney injury POA  Admitted with a creatinine of 2 61mg/dl  Treated with IVF -> 1 43 mg/dl today  Nonoliguric without dysuria: 240/1719  She is improving and able to take po  She will follow up with her physicians at [facility]    2  History of obstructive uropathy with acute kidney injury  Has ureteral stents which will be removed by [facility]rologist [facility]    3  Left parotitis  Will be d/c on oral abx    4  DM -2 not on chronic insulin therapy  Being treated with insulin as an outpatient    Will sign off  Thank you very much for the Consult        Follow up reason for today's visit: Acute kidney injury    Parotiditis    Patient Active Problem List   Diagnosis    Acute kidney injury ([facility] )    Hyperthyroidism    History of breast cancer    Uncontrolled type 2 diabetes mellitus, without long-term current use of insulin ([facility] )    Cirrhosis of liver ([facility] )    Chest pain    Constipation    Hypokalemia    Hydronephrosis    Hypomagnesemia    Parotiditis    Hyponatremia    High anion gap metabolic acidosis         Subjective:   A 10 point ROS is completely negative  She is much improved from yesterday with less tenderness over left parotid gland    Objective:     Vitals: Blood pressure 147/70, pulse (!) 50, temperature 98 2 °F (36 8 °C), resp  rate 20, height 4' 10" (1 473 m), weight 63 4 kg (139 lb 12 8 oz), SpO2 93 %  ,Body mass index is 29 22 kg/m²  Weight (last 2 days)     None            Intake/Output Summary (Last 24 hours) at 5/11/2021 1002  Last data filed at 5/11/2021 0926  Gross per 24 hour   Intake 240 ml   Output 2481 ml   Net -2241 ml     I/O last 3 completed shifts:   In: 590 [P O :240; IV Piggyback:350]  Out: 8530 [Urine:1719]         Physical Exam: /70   Pulse (!) 50   Temp 98 2 °F (36 8 °C)   Resp 20   Ht 4' 10" (1 473 m)   Wt 63 4 kg (139 lb 12 8 oz)   SpO2 93%   BMI 29 22 kg/m²     General Appearance:    Alert, cooperative, no distress, appears stated age   Head:    Normocephalic, swollen left parotid gland but softer than yesterday   Eyes:    Conjunctiva/corneas clear   Ears:    Normal external ears   Nose:   Nares normal, septum midline, mucosa normal, no drainage    or sinus tenderness   Throat:   Lips, mucosa, and tongue normal; teeth and gums normal   Neck:   Supple, symmetrical, trachea midline, no adenopathy;        thyroid:  No enlargement/tenderness/nodules; no carotid    bruit or JVD   Back:     Symmetric, no curvature, ROM normal, no CVA tenderness   Lungs:     Clear to auscultation bilaterally, respirations unlabored   Chest wall:    No tenderness or deformity   Heart:    Regular rate and rhythm, S1 and S2 normal, no murmur, rub   or gallop   Abdomen:     Soft, non-tender, bowel sounds active   Extremities:   Extremities normal, atraumatic, no cyanosis or edema   Skin:   Skin color, texture, turgor normal, no rashes or lesions   Lymph nodes:   Cervical normal   Neurologic:   CNII-XII intact            Lab, Imaging and other studies: I have personally reviewed pertinent labs  CBC: No results found for: WBC, HGB, HCT, MCV, PLT, ADJUSTEDWBC, MCH, MCHC, RDW, MPV, NRBC  CMP:   Lab Results   Component Value Date    K 3 6 05/11/2021     05/11/2021    CO2 24 05/11/2021    BUN 39 (H) 05/11/2021    CREATININE 1 43 (H) 05/11/2021    CALCIUM 8 1 (L) 05/11/2021    EGFR 36 05/11/2021           Results from last 7 days   Lab Units 05/11/21  0652 05/10/21  1516 05/10/21  0512  05/07/21 1955   POTASSIUM mmol/L 3 6 3 2* 3 4*   < > 2 8*   CHLORIDE mmol/L 108 110* 111*   < > 100   CO2 mmol/L 24 20* 17*   < > 16*   BUN mg/dL 39* 42* 44*   < > 58*   CREATININE mg/dL 1 43* 1 68* 1 55*   < > 2 61*   CALCIUM mg/dL 8 1* 8 5 8 1*   < > 9 1   ALK PHOS U/L  --   --   --   --  317*   ALT U/L  --   --   --   --  27   AST U/L  --   --   --   --  63* < > = values in this interval not displayed  Phosphorus: No results found for: PHOS  Magnesium: No results found for: MG  Urinalysis:   Lab Results   Component Value Date    COLORU Straw 05/10/2021    CLARITYU Turbid 05/10/2021    SPECGRAV 1 015 05/10/2021    PHUR 6 0 05/10/2021    LEUKOCYTESUR Large (A) 05/10/2021    NITRITE Negative 05/10/2021    GLUCOSEU Negative 05/10/2021    KETONESU Negative 05/10/2021    BILIRUBINUR Negative 05/10/2021    BLOODU Moderate (A) 05/10/2021     Ionized Calcium: No results found for: CAION  Coagulation: No results found for: PT, INR, APTT  Troponin: No results found for: TROPONINI  ABG: No results found for: PHART, PHN1QCY, PO2ART, CER8ZVD, L1QOAUHU, BEART, SOURCE  Radiology review:     IMAGING  Procedure: Us Kidney And Bladder    Result Date: 5/8/2021  Narrative: RENAL ULTRASOUND INDICATION:   veda  COMPARISON: April 4, 2021 TECHNIQUE:   Ultrasound of the retroperitoneum was performed with a curvilinear transducer utilizing volumetric sweeps and still imaging techniques  FINDINGS: KIDNEYS: Symmetric and normal size  Right kidney:  10 8 x 4 4 cm  Left kidney:  12 0 x 5 9 cm  Right kidney Normal echogenicity and contour  No suspicious masses detected  New right-sided indwelling stent present appearing appropriate in position  Persistent mild right hydronephrosis, diminished compared to the prior  No shadowing calculi  No perinephric fluid collections  Left kidney Normal echogenicity and contour  No suspicious masses detected  New right-sided indwelling stent present appearing appropriate in position  Persistent mild right hydronephrosis, diminished compared to the prior  No shadowing calculi  No perinephric fluid collections  URETERS: Nonvisualized  BLADDER: Bladder appears normally distended  No focal lesions evident  Prior Way removed in the interim       Impression: Persistent mild hydronephrosis bilaterally, slightly diminished compared to the prior, now with indwelling bilateral ureteral stents present   Workstation performed: [id number]       Current Facility-Administered Medications   Medication Dose Route Frequency    acetaminophen (TYLENOL) tablet 650 mg  650 mg Oral Q6H PRN    aluminum-magnesium hydroxide-simethicone (MYLANTA) oral suspension 30 mL  30 mL Oral Q4H PRN    calcium carbonate (TUMS) chewable tablet 500 mg  500 mg Oral Daily PRN    heparin (porcine) subcutaneous injection 5,000 Units  5,000 Units Subcutaneous Q8H [address]    HYDROcodone-acetaminophen (NORCO) 5-325 mg per tablet 1 tablet  1 tablet Oral Q6H PRN    insulin lispro (HumaLOG) 100 units/mL subcutaneous injection 1-5 Units  1-5 Units Subcutaneous HS    insulin lispro (HumaLOG) 100 units/mL subcutaneous injection 2-12 Units  2-12 Units Subcutaneous TID AC    insulin lispro (HumaLOG) 100 units/mL subcutaneous injection 5 Units  5 Units Subcutaneous TID With Meals    letrozole (FEMARA) tablet 2 5 mg  2 5 mg Oral Daily    methimazole (TAPAZOLE) tablet 2 5 mg  2 5 mg Oral Every Other Day    methylPREDNISolone sodium succinate (Solu-MEDROL) injection 40 mg  40 mg Intravenous Q12H [address]    metroNIDAZOLE (FLAGYL) IVPB (premix) 500 mg 100 mL  500 mg Intravenous Q8H    ondansetron (ZOFRAN) injection 4 mg  4 mg Intravenous Q6H PRN    pilocarpine (SALAGEN) tablet 5 mg  5 mg Oral TID    sodium bicarbonate tablet 1,300 mg  1,300 mg Oral BID after meals    vancomycin (VANCOCIN) IVPB (premix in dextrose) 750 mg 150 mL  750 mg Intravenous Once PRN     Medications Discontinued During This Encounter   Medication Reason    metroNIDAZOLE (FLAGYL) IVPB (premix) 500 mg 100 mL     vancomycin (VANCOCIN) IVPB (premix in dextrose) 1,000 mg 200 mL Dose adjustment    methylPREDNISolone sodium succinate (Solu-MEDROL) injection 40 mg     methylPREDNISolone sodium succinate (Solu-MEDROL) injection 125 mg     vancomycin (VANCOCIN) IVPB (premix in dextrose) 750 mg 150 mL     insulin lispro (HumaLOG) 100 units/mL subcutaneous injection 1-5 Units     methylPREDNISolone sodium succinate (Solu-MEDROL) injection 125 mg     vancomycin (VANCOCIN) IVPB (premix in dextrose) 750 mg 150 mL     vancomycin (VANCOCIN) IVPB (premix in dextrose) 1,000 mg 200 mL     methylPREDNISolone sodium succinate (Solu-MEDROL) injection 60 mg     sodium chloride 0 9 % infusion     potassium chloride 20 mEq IVPB (premix)     sodium bicarbonate 75 mEq in sodium chloride 0 45 % 1,000 mL infusion     insulin lispro (HumaLOG) 100 units/mL subcutaneous injection 4 Units     vancomycin (VANCOCIN) 1,250 mg in sodium chloride 0 9 % 250 mL IVPB Dose adjustment       Ashly Garcia MD      This progress note was produced in part using a dictation device which may document imprecise wording from author's original intent

## 2021-05-11 NOTE — INCIDENTAL FINDINGS
The following findings require follow up:  Radiographic finding   Finding: CT soft tissue neck wo contrast: 1  Acute left parotiditis and associated significant left facial and neck soft tissue cellulitis with involvement of left carotid, , parapharyngeal, and left submandibular spaces  Inflamed left palatine tonsil  Airway is narrowed    No , discrete fluid collection , 2   Anterior subluxation of right temporomandibular joint   Follow up required: yes   Follow up should be done within 1 week(s)    Please notify the following clinician to assist with the follow up:   Dr Julee Duran MD   Otolaryngology [address] [place] [address]  [address]  [address] [place] [address]     Next Steps: Go to      Instructions: you have an apppointment on 5/17/21 at 2:00pm      Alexys Mai MD  PCP - [address] [facility] [phone] [facility] 130   [address] [address],[address]     Next Steps: Call in 1 day(s)

## 2021-05-11 NOTE — PLAN OF CARE
Problem: Potential for Falls  Goal: Patient will remain free of falls  Description: INTERVENTIONS:  - Assess patient frequently for physical needs  -  Identify cognitive and physical deficits and behaviors that affect risk of falls    -  [place] fall precautions as indicated by assessment   - Educate patient/family on patient safety including physical limitations  - Instruct patient to call for assistance with activity based on assessment  - Modify environment to reduce risk of injury  - Consider OT/PT consult to assist with strengthening/mobility  Outcome: Progressing     Problem: Prexisting or High Potential for Compromised Skin Integrity  Goal: Skin integrity is maintained or improved  Description: INTERVENTIONS:  - Identify patients at risk for skin breakdown  - Assess and monitor skin integrity  - Assess and monitor nutrition and hydration status  - Monitor labs   - Assess for incontinence   - Turn and reposition patient  - Assist with mobility/ambulation  - Relieve pressure over bony prominences  - Avoid friction and shearing  - Provide appropriate hygiene as needed including keeping skin clean and dry  - Evaluate need for skin moisturizer/barrier cream  - Collaborate with interdisciplinary team   - Patient/family teaching  - Consider wound care consult   Outcome: Progressing     Problem: PAIN - ADULT  Goal: Verbalizes/displays adequate comfort level or baseline comfort level  Description: Interventions:  - Encourage patient to monitor pain and request assistance  - Assess pain using appropriate pain scale  - Administer analgesics based on type and severity of pain and evaluate response  - Implement non-pharmacological measures as appropriate and evaluate response  - Consider cultural and social influences on pain and pain management  - Notify physician/advanced practitioner if interventions unsuccessful or patient reports new pain  Outcome: Progressing     Problem: INFECTION - ADULT  Goal: Absence or prevention of progression during hospitalization  Description: INTERVENTIONS:  - Assess and monitor for signs and symptoms of infection  - Monitor lab/diagnostic results  - Monitor all insertion sites, i e  indwelling lines, tubes, and drains  - Monitor endotracheal if appropriate and nasal secretions for changes in amount and color  - [employer] appropriate cooling/warming therapies per order  - Administer medications as ordered  - Instruct and encourage patient and family to use good hand hygiene technique  - Identify and instruct in appropriate isolation precautions for identified infection/condition  Outcome: Progressing  Goal: Absence of fever/infection during neutropenic period  Description: INTERVENTIONS:  - Monitor WBC    Outcome: Progressing     Problem: SAFETY ADULT  Goal: Patient will remain free of falls  Description: INTERVENTIONS:  - Assess patient frequently for physical needs  -  Identify cognitive and physical deficits and behaviors that affect risk of falls    -  [employer] fall precautions as indicated by assessment   - Educate patient/family on patient safety including physical limitations  - Instruct patient to call for assistance with activity based on assessment  - Modify environment to reduce risk of injury  - Consider OT/PT consult to assist with strengthening/mobility  Outcome: Progressing  Goal: Maintain or return to baseline ADL function  Description: INTERVENTIONS:  -  Assess patient's ability to carry out ADLs; assess patient's baseline for ADL function and identify physical deficits which impact ability to perform ADLs (bathing, care of mouth/teeth, toileting, grooming, dressing, etc )  - Assess/evaluate cause of self-care deficits   - Assess range of motion  - Assess patient's mobility; develop plan if impaired  - Assess patient's need for assistive devices and provide as appropriate  - Encourage maximum independence but intervene and supervise when necessary  - Involve family in performance of ADLs  - Assess for home care needs following discharge   - Consider OT consult to assist with ADL evaluation and planning for discharge  - Provide patient education as appropriate  Outcome: Progressing  Goal: Maintain or return mobility status to optimal level  Description: INTERVENTIONS:  - Assess patient's baseline mobility status (ambulation, transfers, stairs, etc )    - Identify cognitive and physical deficits and behaviors that affect mobility  - Identify mobility aids required to assist with transfers and/or ambulation (gait belt, sit-to-stand, lift, walker, cane, etc )  - [place] fall precautions as indicated by assessment  - Record patient progress and toleration of activity level on Mobility SBAR; progress patient to next Phase/Stage  - Instruct patient to call for assistance with activity based on assessment  - Consider rehabilitation consult to assist with strengthening/weightbearing, etc   Outcome: Progressing     Problem: DISCHARGE PLANNING  Goal: Discharge to home or other facility with appropriate resources  Description: INTERVENTIONS:  - Identify barriers to discharge w/patient and caregiver  - Arrange for needed discharge resources and transportation as appropriate  - Identify discharge learning needs (meds, wound care, etc )  - Arrange for interpretive services to assist at discharge as needed  - Refer to Case Management Department for coordinating discharge planning if the patient needs post-hospital services based on physician/advanced practitioner order or complex needs related to functional status, cognitive ability, or social support system  Outcome: Progressing     Problem: Knowledge Deficit  Goal: Patient/family/caregiver demonstrates understanding of disease process, treatment plan, medications, and discharge instructions  Description: Complete learning assessment and assess knowledge base    Interventions:  - Provide teaching at level of understanding  - Provide teaching via preferred learning methods  Outcome: Progressing     Problem: METABOLIC, FLUID AND ELECTROLYTES - ADULT  Goal: Electrolytes maintained within normal limits  Description: INTERVENTIONS:  - Monitor labs and assess patient for signs and symptoms of electrolyte imbalances  - Administer electrolyte replacement as ordered  - Monitor response to electrolyte replacements, including repeat lab results as appropriate  - Instruct patient on fluid and nutrition as appropriate  Outcome: Progressing  Goal: Fluid balance maintained  Description: INTERVENTIONS:  - Monitor labs   - Monitor I/O and WT  - Instruct patient on fluid and nutrition as appropriate  - Assess for signs & symptoms of volume excess or deficit  Outcome: Progressing  Goal: Glucose maintained within target range  Description: INTERVENTIONS:  - Monitor Blood Glucose as ordered  - Assess for signs and symptoms of hyperglycemia and hypoglycemia  - Administer ordered medications to maintain glucose within target range  - Assess nutritional intake and initiate nutrition service referral as needed  Outcome: Progressing     Problem: SKIN/TISSUE INTEGRITY - ADULT  Goal: Skin integrity remains intact  Description: INTERVENTIONS  - Identify patients at risk for skin breakdown  - Assess and monitor skin integrity  - Assess and monitor nutrition and hydration status  - Monitor labs (i e  albumin)  - Assess for incontinence   - Turn and reposition patient  - Assist with mobility/ambulation  - Relieve pressure over bony prominences  - Avoid friction and shearing  - Provide appropriate hygiene as needed including keeping skin clean and dry  - Evaluate need for skin moisturizer/barrier cream  - Collaborate with interdisciplinary team (i e  Nutrition, Rehabilitation, etc )   - Patient/family teaching  Outcome: Progressing  Goal: Incision(s), wounds(s) or drain site(s) healing without S/S of infection  Description: INTERVENTIONS  - Assess and document risk factors for skin impairment   - Assess and document dressing, incision, wound bed, drain sites and surrounding tissue  - Consider nutrition services referral as needed  - Oral mucous membranes remain intact  - Provide patient/ family education  Outcome: Progressing  Goal: Oral mucous membranes remain intact  Description: INTERVENTIONS  - Assess oral mucosa and hygiene practices  - Implement preventative oral hygiene regimen  - Implement oral medicated treatments as ordered  - Initiate Nutrition services referral as needed  Outcome: Progressing     Problem: Nutrition/Hydration-ADULT  Goal: Nutrient/Hydration intake appropriate for improving, restoring or maintaining nutritional needs  Description: Monitor and assess patient's nutrition/hydration status for malnutrition  Collaborate with interdisciplinary team and initiate plan and interventions as ordered  Monitor patient's weight and dietary intake as ordered or per policy  Utilize nutrition screening tool and intervene as necessary  Determine patient's food preferences and provide high-protein, high-caloric foods as appropriate       INTERVENTIONS:  - Monitor oral intake, urinary output, labs, and treatment plans  - Assess nutrition and hydration status and recommend course of action  - Evaluate amount of meals eaten  - Assist patient with eating if necessary   - Allow adequate time for meals  - Recommend/ encourage appropriate diets, oral nutritional supplements, and vitamin/mineral supplements  - Order, calculate, and assess calorie counts as needed  - Recommend, monitor, and adjust tube feedings and TPN/PPN based on assessed needs  - Assess need for intravenous fluids  - Provide specific nutrition/hydration education as appropriate  - Include patient/family/caregiver in decisions related to nutrition  Outcome: Progressing

## 2021-05-11 NOTE — ASSESSMENT & PLAN NOTE
Secondary dehydration and acute kidney injury and poor oral intake  Patient had anion gap of 19 on admission with metabolic acidosis of CO2 of 16 on BMP  Patient received IV hydration, also received bicarb IV along with p o   With the treatment, patient's condition resolved

## 2021-05-11 NOTE — DISCHARGE SUMMARY
[address]  Discharge- [place] 1945, 70 y o  female MRN: [id number]  Unit/Bed#: -[facility] Encounter: [id number]  Primary Care Provider: Soheila Aguilar MD   Date and time admitted to hospital: 5/7/2021  7:43 PM    * Parotiditis  Assessment & Plan  · Presented with left facial swelling  · CT soft tissue neck:  Acute left parotiditis associated significant left facial and neck soft tissue cellulitis with involvement of left carotid  parapharyngeal and left submandibular spaces  Inflamed left palatine tonsil  Airway is narrowed  No discrete fluid collection  Anterior subluxation of right temporomandibular joint  · WBC 22 68 on admission  · Patient received treatment with IV vancomycin and Flagyl along with IV Solu-Medrol  ·  Heating pads and sialogogues  Patient encouraged to massage neck  · Clinically improving  Voice and swallowing improving  · We will Discharge home on prednisone 40 mg daily along with clindamycin for 5 days  · Avoid Bactrim use due to CKD stage 3  · ENT recommendation appreciated    High anion gap metabolic acidosis  Assessment & Plan  Secondary dehydration and acute kidney injury and poor oral intake  Patient had anion gap of 19 on admission with metabolic acidosis of CO2 of 16 on BMP  Patient received IV hydration, also received bicarb IV along with p o   With the treatment, patient's condition resolved  Hyponatremia  Assessment & Plan  Secondary dehydration poor oral intake  Continue gentle hydration    Now resolved    Hypokalemia  Assessment & Plan  · Resolved    Uncontrolled type 2 diabetes mellitus, without long-term current use of insulin [facility])  Assessment & Plan  Lab Results   Component Value Date    HGBA1C 6 1 (H) 04/03/2021       Recent Labs     05/10/21  1115 05/10/21  1558 05/10/21  2055 05/11/21  0656   POCGLU 169* 142* 177* 146*       Blood Sugar Average: Last 72 hrs:  (P) 837 [id number]   · Blood sugars are elevated secondary to steroid use  · Fingerstick blood sugar with sliding scale coverage  · Patient may resume home medication    History of breast cancer  Assessment & Plan  · Status post left mastectomy  · Continue Femera  · Continue outpatient follow-up    Hyperthyroidism  Assessment & Plan  · normal TSH  · Continue Tapazole    Acute kidney injury ([address] )  Assessment & Plan  · 2 61 with baseline of around 0 7-0 8  Suspect prerenal due to decreased oral intake, dry mucous membranes  · Recent stent placement in February for hydronephrosis  · Avoid nephrotoxins and hypotension  · Renal ultrasound shows mild bilateral hydronephrosis with indwelling ureteral stents better compared to before  · Condition improving, and recommends to follow up with urologist on outpatient basis  Patient's family member, daughter updated over the phone regarding the recommendation  Discharging Physician / Practitioner: Ian Cox MD  PCP: Maylin Gonzalez MD  Admission Date:   Admission Orders (From admission, onward)     Ordered        05/07/21 2207  Inpatient Admission  Once                   Discharge Date: 05/11/21    Resolved Problems  Date Reviewed: 5/10/2021    None          Consultations During Hospital Stay:  · ENT  · Nephrology    Procedures Performed:   US kidney and bladder   Final Result by Rose Marie Miller MD (05/08 1323)      Persistent mild hydronephrosis bilaterally, slightly diminished compared to the prior, now with indwelling bilateral ureteral stents present  Workstation performed: [id number]         CT facial bones without contrast   Final Result by Bouchra Price MD (05/07 2059)      1  Acute left parotiditis and associated significant left facial and neck soft tissue cellulitis with multi-spatial left neck inflammation  Please refer to the report of concurrent neck CT for details  2   Anteriorly subluxed right temporomandibular joint                 Workstation performed: [id number] CT soft tissue neck wo contrast   Final Result by Ross Laird MD (05/07 2056)      1  Acute left parotiditis and associated significant left facial and neck soft tissue cellulitis with involvement of left carotid, , parapharyngeal, and left submandibular spaces  Inflamed left palatine tonsil  Airway is narrowed  No    discrete fluid collection  2   Anterior subluxation of right temporomandibular joint  The study was marked in [facility] for immediate notification  Workstation performed: [id number]         ·   ·     Significant Findings / Test Results:   Lab Results   Component Value Date    WBC 17 75 (H) 05/08/2021    HGB 12 9 05/08/2021    HCT 37 7 05/08/2021    MCV 88 05/08/2021     05/08/2021   ·   Lab Results   Component Value Date    SODIUM 142 05/11/2021    K 3 6 05/11/2021     05/11/2021    CO2 24 05/11/2021    AGAP 10 05/11/2021    BUN 39 (H) 05/11/2021    CREATININE 1 43 (H) 05/11/2021    GLUC 170 (H) 05/11/2021    CALCIUM 8 1 (L) 05/11/2021    AST 63 (H) 05/07/2021    ALT 27 05/07/2021    ALKPHOS 317 (H) 05/07/2021    TP 8 3 (H) 05/07/2021    TBILI 1 33 (H) 05/07/2021    EGFR 36 05/11/2021   ·   ·     Incidental Findings:   · As mentioned above     Test Results Pending at Discharge (will require follow up): · None     Outpatient Tests Requested:  · None    Complications:  Metabolic acidosis    Reason for Admission:  Facial swelling    Hospital Course:     Pancho Daily is a 70 y o  female patient who originally presented to the hospital on 5/7/2021 due to facial swelling, patient admitted under the diagnosis of parotitis-received treatment with IV antibiotic with vancomycin and Flagyl as well as Solu-Medrol with IV hydration  ENT consulted, appreciate with recommendations  With the treatment, patient condition improved  Patient is able to tolerate p o    Patient also have acute kidney injury most likely secondary to dehydration, which improves with IV hydration  Patient also had bilateral renal stent placement  Nephrology consulted, appreciate recommendation, patient will need to follow up with urologist on outpatient basis  Discussed the treatment options with patient's daughter over the phone and verbalizes to understand  Patient also developed high anion gap metabolic acidosis, requiring IV hydration as well as bicarb drip  With treatment, patient's condition improved  Patient can resume all her home medication  Patient be discharged with tapering dose of steroid as well as clindamycin  Patient will need to follow-up with ENT, Dr Blue Edgar in 1 week  Patient also advised to follow-up with her urologist as soon as possible  Patient's daughter updated, verbalized understand and agrees  Patient also needs to follow-up with PCP as soon as possible  Please see above list of diagnoses and related plan for additional information  Condition at Discharge: stable     Discharge Day Visit / Exam:     Subjective:  Seen and evaluated during the round  Patient reports she is able to tolerate food and diet  But is still complaining of pain on the left jaw area  Vitals: Blood Pressure: 147/70 (05/11/21 0639)  Pulse: (!) 50 (05/11/21 0639)  Temperature: 98 2 °F (36 8 °C) (05/11/21 0639)  Temp Source: Oral (05/09/21 1454)  Respirations: 20 (05/11/21 0639)  Height: 4' 10" (147 3 cm) (05/07/21 2300)  Weight - Scale: 63 4 kg (139 lb 12 8 oz) (05/07/21 2300)  SpO2: 93 % (05/11/21 0744)  Exam:   Physical Exam  Vitals signs and nursing note reviewed  Exam conducted with a chaperone present  Constitutional:       Appearance: She is not diaphoretic  HENT:      Head:        Nose: No congestion  Mouth/Throat:      Mouth: Mucous membranes are moist    Eyes:      General: No scleral icterus  Conjunctiva/sclera: Conjunctivae normal       Pupils: Pupils are equal, round, and reactive to light     Neck:      Musculoskeletal: Normal range of motion  Cardiovascular:      Rate and Rhythm: Bradycardia present  Heart sounds: No friction rub  No gallop  Pulmonary:      Effort: Pulmonary effort is normal  No respiratory distress  Breath sounds: No stridor  No wheezing or rhonchi  Abdominal:      General: Abdomen is flat  Bowel sounds are normal  There is no distension  Palpations: There is no mass  Tenderness: There is no abdominal tenderness  Hernia: No hernia is present  Musculoskeletal: Normal range of motion  Right lower leg: No edema  Lymphadenopathy:      Cervical: No cervical adenopathy  Skin:     General: Skin is warm  Capillary Refill: Capillary refill takes less than 2 seconds  Findings: No lesion  Neurological:      General: No focal deficit present  Mental Status: She is alert and oriented to person, place, and time  Cranial Nerves: No cranial nerve deficit  Sensory: No sensory deficit  Motor: No weakness  Coordination: Coordination normal    Psychiatric:         Mood and Affect: Mood normal        Discussion with Family: Yes-with daughter    Discharge instructions/Information to patient and family:   See after visit summary for information provided to patient and family  Provisions for Follow-Up Care:  See after visit summary for information related to follow-up care and any pertinent home health orders  Disposition:     Home    For Discharges to [facility]:   · Not Applicable to this Patient - Not Applicable to this Patient    Planned Readmission:  If condition get worse     Discharge Statement:  I spent >35 minutes discharging the patient  This time was spent on the day of discharge  I had direct contact with the patient on the day of discharge   Greater than 50% of the total time was spent examining patient, answering all patient questions, arranging and discussing plan of care with patient as well as directly providing post-discharge instructions  Additional time then spent on discharge activities  Discharge Medications:  See after visit summary for reconciled discharge medications provided to patient and family        ** Please Note: This note has been constructed using a voice recognition system **

## 2021-05-11 NOTE — CASE MANAGEMENT
Cm met with the pt to review IMM and d/c plan, IMM reviewed with patient  patient agree with discharge determination, pt stated she understood and agrees with the d/c an d/c plan home with her care givers,  Patient requires a follow up appointment with their primary care physician post discharge  CM called patients primary care office to request a follow up appointment  See appointment details below   PCP Name: Dr Nancy Hernandez Appointment date and time: 5/17/2021 at 09:20 am  Cm placed a call to the pt's care giver Fernieanabelle Ladi at 10:53 to # [phone] and she will be able to transport the pt home at 2:30pm, pt and rn were made aware, cm then called her pt's daughter at 10:57 to # [phone] she was made aware of the d/c and/c plan, she is in agreement with the d/c ,pt;s daughter and care were made aware follow up appoints have been set up for this pt, d/c order written, pt and family are in agreement with the d/c an d/c plan home with care givers CM reviewed d/c planning process including the following: identifying help at home, patient preference for d/c planning needs, availability of treatment team to discuss questions or concerns patient and/or family may have regarding understanding medications and recognizing signs and symptoms once discharged  CM also encouraged patient to follow up with all recommended appointments after discharge  Patient advised of importance for patient and family to participate in managing patients medical well being

## 2021-05-11 NOTE — PROGRESS NOTES
Vancomycin Assessment    Molly High is a 70 y o  female who is currently receiving vancomycin 750 mg IV prn random level < 20  Dose held on 5/11/21 due to Random level = 22, new Random level ordered for 5/12/21 @ 0700 for skin-soft tissue infection  Relevant clinical data and objective history reviewed:  Creatinine   Date Value Ref Range Status   05/11/2021 1 43 (H) 0 60 - 1 30 mg/dL Final     Comment:     Standardized to IDMS reference method   05/10/2021 1 68 (H) 0 60 - 1 30 mg/dL Final     Comment:     Standardized to IDMS reference method   05/10/2021 1 55 (H) 0 60 - 1 30 mg/dL Final     Comment:     Standardized to IDMS reference method     Vancomycin Rm   Date Value Ref Range Status   05/11/2021 22 0 ug/mL Final     /70   Pulse (!) 50   Temp 98 2 °F (36 8 °C)   Resp 20   Ht 4' 10" (1 473 m)   Wt 63 4 kg (139 lb 12 8 oz)   SpO2 93%   BMI 29 22 kg/m²   I/O last 3 completed shifts: In: 590 [P O :240; IV Piggyback:350]  Out: 8779 [Urine:1719]  Lab Results   Component Value Date/Time    BUN 39 (H) 05/11/2021 06:52 AM    WBC 17 75 (H) 05/08/2021 05:19 AM    HGB 12 9 05/08/2021 05:19 AM    HCT 37 7 05/08/2021 05:19 AM    MCV 88 05/08/2021 05:19 AM     05/08/2021 05:19 AM     Temp Readings from Last 3 Encounters:   05/11/21 98 2 °F (36 8 °C)   04/07/21 98 5 °F (36 9 °C)   04/01/21 (!) 97 4 °F (36 3 °C) (Temporal)     Vancomycin Days of Therapy: 5    Assessment/Plan  The patient is currently on vancomycin utilizing pulse dosing  Baseline risks associated with therapy include: pre-existing renal impairment, concomitant nephrotoxic medications, advanced age, and dehydration  The patient is receiving  750 mg IV prn random level < 20 with the most recent vancomycin level being at steady-state and supratherapeutic based on a goal of 15-20 (appropriate for most indications)  Therefore, dose held on 5/11/21 due to Random level = 22    Pharmacy will continue to follow closely for s/sx of nephrotoxicity, infusion reactions, and appropriateness of therapy  BMP and CBC will be ordered per protocol  Plan for random level to be drawn @ 0700 on 5/12/21 and if <20 pulse dose with 750 mg IV  Pharmacy will continue to follow the patients culture results and clinical progress daily      Taya Thomson, Pharmacist

## 2021-05-11 NOTE — ASSESSMENT & PLAN NOTE
Lab Results   Component Value Date    HGBA1C 6 1 (H) 04/03/2021       Recent Labs     05/10/21  1115 05/10/21  1558 05/10/21  2055 05/11/21  0656   POCGLU 169* 142* 177* 146*       Blood Sugar Average: Last 72 hrs:  (P) 576 [id number]   · Blood sugars are elevated secondary to steroid use  · Fingerstick blood sugar with sliding scale coverage  · Patient may resume home medication

## 2021-05-11 NOTE — ASSESSMENT & PLAN NOTE
· 2 61 with baseline of around 0 7-0 8  Suspect prerenal due to decreased oral intake, dry mucous membranes  · Recent stent placement in February for hydronephrosis  · Avoid nephrotoxins and hypotension  · Renal ultrasound shows mild bilateral hydronephrosis with indwelling ureteral stents better compared to before  · Condition improving, and recommends to follow up with urologist on outpatient basis  Patient's family member, daughter updated over the phone regarding the recommendation

## 2021-05-11 NOTE — NURSING NOTE
Reviewed discharge instructions with patient  Verbalized understanding  Will review with caregiver upon arrival  Prescriptions sent to pharmacy

## 2021-06-29 ENCOUNTER — APPOINTMENT (EMERGENCY)
Dept: CT IMAGING | Facility: HOSPITAL | Age: 76
DRG: 872 | End: 2021-06-29
Payer: MEDICARE

## 2021-06-29 ENCOUNTER — HOSPITAL ENCOUNTER (INPATIENT)
Facility: HOSPITAL | Age: 76
LOS: 11 days | Discharge: HOME HEALTH | DRG: 872 | End: 2021-07-10
Attending: EMERGENCY MEDICINE | Admitting: FAMILY MEDICINE
Payer: MEDICARE

## 2021-06-29 ENCOUNTER — APPOINTMENT (EMERGENCY)
Dept: RADIOLOGY | Facility: HOSPITAL | Age: 76
DRG: 872 | End: 2021-06-29
Payer: MEDICARE

## 2021-06-29 ENCOUNTER — APPOINTMENT (INPATIENT)
Dept: CT IMAGING | Facility: HOSPITAL | Age: 76
DRG: 872 | End: 2021-06-29
Payer: MEDICARE

## 2021-06-29 DIAGNOSIS — E87.6 HYPOKALEMIA: ICD-10-CM

## 2021-06-29 DIAGNOSIS — E87.2 HIGH ANION GAP METABOLIC ACIDOSIS: ICD-10-CM

## 2021-06-29 DIAGNOSIS — R79.89 ELEVATED LACTIC ACID LEVEL: ICD-10-CM

## 2021-06-29 DIAGNOSIS — N17.9 ACUTE KIDNEY INJURY (HCC): ICD-10-CM

## 2021-06-29 DIAGNOSIS — K52.9 COLITIS, ACUTE: ICD-10-CM

## 2021-06-29 DIAGNOSIS — N17.9 AKI (ACUTE KIDNEY INJURY) (HCC): ICD-10-CM

## 2021-06-29 DIAGNOSIS — K56.609 BOWEL OBSTRUCTION (HCC): ICD-10-CM

## 2021-06-29 DIAGNOSIS — R41.82 ALTERED MENTAL STATUS: Primary | ICD-10-CM

## 2021-06-29 DIAGNOSIS — N39.0 UTI (URINARY TRACT INFECTION): ICD-10-CM

## 2021-06-29 DIAGNOSIS — K56.600 PARTIAL SMALL BOWEL OBSTRUCTION (HCC): ICD-10-CM

## 2021-06-29 DIAGNOSIS — K56.7 ILEUS (HCC): ICD-10-CM

## 2021-06-29 PROBLEM — N18.9 ACUTE ON CHRONIC KIDNEY FAILURE (HCC): Status: ACTIVE | Noted: 2021-04-01

## 2021-06-29 PROBLEM — R19.7 DIARRHEA: Status: ACTIVE | Noted: 2021-06-29

## 2021-06-29 PROBLEM — N30.01 ACUTE CYSTITIS WITH HEMATURIA: Status: ACTIVE | Noted: 2021-06-29

## 2021-06-29 PROBLEM — A41.9 SEPSIS (HCC): Status: ACTIVE | Noted: 2021-06-29

## 2021-06-29 LAB
ALBUMIN SERPL BCP-MCNC: 3.3 G/DL (ref 3.5–5)
ALP SERPL-CCNC: 234 U/L (ref 46–116)
ALT SERPL W P-5'-P-CCNC: 25 U/L (ref 12–78)
ANION GAP SERPL CALCULATED.3IONS-SCNC: 19 MMOL/L (ref 4–13)
AST SERPL W P-5'-P-CCNC: 39 U/L (ref 5–45)
BACTERIA UR QL AUTO: ABNORMAL /HPF
BASOPHILS # BLD AUTO: 0.12 THOUSANDS/ΜL (ref 0–0.1)
BASOPHILS NFR BLD AUTO: 1 % (ref 0–1)
BILIRUB SERPL-MCNC: 1.67 MG/DL (ref 0.2–1)
BILIRUB UR QL STRIP: ABNORMAL
BUN SERPL-MCNC: 46 MG/DL (ref 5–25)
CALCIUM ALBUM COR SERPL-MCNC: 10.6 MG/DL (ref 8.3–10.1)
CALCIUM SERPL-MCNC: 10 MG/DL (ref 8.3–10.1)
CHLORIDE SERPL-SCNC: 106 MMOL/L (ref 100–108)
CK SERPL-CCNC: 116 U/L (ref 26–192)
CLARITY UR: ABNORMAL
CO2 SERPL-SCNC: 14 MMOL/L (ref 21–32)
COLOR UR: YELLOW
CREAT SERPL-MCNC: 4.26 MG/DL (ref 0.6–1.3)
EOSINOPHIL # BLD AUTO: 0.12 THOUSAND/ΜL (ref 0–0.61)
EOSINOPHIL NFR BLD AUTO: 1 % (ref 0–6)
ERYTHROCYTE [DISTWIDTH] IN BLOOD BY AUTOMATED COUNT: 15.8 % (ref 11.6–15.1)
GFR SERPL CREATININE-BSD FRML MDRD: 10 ML/MIN/1.73SQ M
GLUCOSE SERPL-MCNC: 104 MG/DL (ref 65–140)
GLUCOSE SERPL-MCNC: 105 MG/DL (ref 65–140)
GLUCOSE UR STRIP-MCNC: NEGATIVE MG/DL
HCT VFR BLD AUTO: 41.1 % (ref 34.8–46.1)
HGB BLD-MCNC: 13.4 G/DL (ref 11.5–15.4)
HGB UR QL STRIP.AUTO: ABNORMAL
IMM GRANULOCYTES # BLD AUTO: 0.04 THOUSAND/UL (ref 0–0.2)
IMM GRANULOCYTES NFR BLD AUTO: 0 % (ref 0–2)
KETONES UR STRIP-MCNC: NEGATIVE MG/DL
LACTATE SERPL-SCNC: 2.1 MMOL/L (ref 0.5–2)
LACTATE SERPL-SCNC: 2.1 MMOL/L (ref 0.5–2)
LACTATE SERPL-SCNC: 2.7 MMOL/L (ref 0.5–2)
LEUKOCYTE ESTERASE UR QL STRIP: ABNORMAL
LYMPHOCYTES # BLD AUTO: 2.65 THOUSANDS/ΜL (ref 0.6–4.47)
LYMPHOCYTES NFR BLD AUTO: 21 % (ref 14–44)
MCH RBC QN AUTO: 30.4 PG (ref 26.8–34.3)
MCHC RBC AUTO-ENTMCNC: 32.6 G/DL (ref 31.4–37.4)
MCV RBC AUTO: 93 FL (ref 82–98)
MONOCYTES # BLD AUTO: 1.27 THOUSAND/ΜL (ref 0.17–1.22)
MONOCYTES NFR BLD AUTO: 10 % (ref 4–12)
NEUTROPHILS # BLD AUTO: 8.23 THOUSANDS/ΜL (ref 1.85–7.62)
NEUTS SEG NFR BLD AUTO: 67 % (ref 43–75)
NITRITE UR QL STRIP: NEGATIVE
NON-SQ EPI CELLS URNS QL MICRO: ABNORMAL /HPF
NRBC BLD AUTO-RTO: 0 /100 WBCS
PH UR STRIP.AUTO: 6 [PH]
PLATELET # BLD AUTO: 232 THOUSANDS/UL (ref 149–390)
PMV BLD AUTO: 11.7 FL (ref 8.9–12.7)
POTASSIUM SERPL-SCNC: 3.1 MMOL/L (ref 3.5–5.3)
PROT SERPL-MCNC: 9.2 G/DL (ref 6.4–8.2)
PROT UR STRIP-MCNC: >=300 MG/DL
RBC # BLD AUTO: 4.41 MILLION/UL (ref 3.81–5.12)
RBC #/AREA URNS AUTO: ABNORMAL /HPF
SODIUM SERPL-SCNC: 139 MMOL/L (ref 136–145)
SP GR UR STRIP.AUTO: >=1.03 (ref 1–1.03)
TROPONIN I SERPL-MCNC: <0.02 NG/ML
TSH SERPL DL<=0.05 MIU/L-ACNC: 2.58 UIU/ML (ref 0.36–3.74)
URINE COMMENT: ABNORMAL
UROBILINOGEN UR QL STRIP.AUTO: 0.2 E.U./DL
WBC # BLD AUTO: 12.43 THOUSAND/UL (ref 4.31–10.16)
WBC #/AREA URNS AUTO: ABNORMAL /HPF

## 2021-06-29 PROCEDURE — 99222 1ST HOSP IP/OBS MODERATE 55: CPT | Performed by: PHYSICIAN ASSISTANT

## 2021-06-29 PROCEDURE — 84145 PROCALCITONIN (PCT): CPT | Performed by: PHYSICIAN ASSISTANT

## 2021-06-29 PROCEDURE — G1004 CDSM NDSC: HCPCS

## 2021-06-29 PROCEDURE — 96360 HYDRATION IV INFUSION INIT: CPT

## 2021-06-29 PROCEDURE — 82948 REAGENT STRIP/BLOOD GLUCOSE: CPT

## 2021-06-29 PROCEDURE — 84484 ASSAY OF TROPONIN QUANT: CPT | Performed by: PHYSICIAN ASSISTANT

## 2021-06-29 PROCEDURE — 93005 ELECTROCARDIOGRAM TRACING: CPT

## 2021-06-29 PROCEDURE — 36415 COLL VENOUS BLD VENIPUNCTURE: CPT | Performed by: PHYSICIAN ASSISTANT

## 2021-06-29 PROCEDURE — 83605 ASSAY OF LACTIC ACID: CPT | Performed by: PHYSICIAN ASSISTANT

## 2021-06-29 PROCEDURE — 82550 ASSAY OF CK (CPK): CPT | Performed by: PHYSICIAN ASSISTANT

## 2021-06-29 PROCEDURE — 84443 ASSAY THYROID STIM HORMONE: CPT | Performed by: FAMILY MEDICINE

## 2021-06-29 PROCEDURE — 99284 EMERGENCY DEPT VISIT MOD MDM: CPT | Performed by: EMERGENCY MEDICINE

## 2021-06-29 PROCEDURE — 85025 COMPLETE CBC W/AUTO DIFF WBC: CPT | Performed by: PHYSICIAN ASSISTANT

## 2021-06-29 PROCEDURE — 87040 BLOOD CULTURE FOR BACTERIA: CPT | Performed by: PHYSICIAN ASSISTANT

## 2021-06-29 PROCEDURE — 71045 X-RAY EXAM CHEST 1 VIEW: CPT

## 2021-06-29 PROCEDURE — 81001 URINALYSIS AUTO W/SCOPE: CPT | Performed by: PHYSICIAN ASSISTANT

## 2021-06-29 PROCEDURE — 87086 URINE CULTURE/COLONY COUNT: CPT | Performed by: PHYSICIAN ASSISTANT

## 2021-06-29 PROCEDURE — 99285 EMERGENCY DEPT VISIT HI MDM: CPT

## 2021-06-29 PROCEDURE — 80053 COMPREHEN METABOLIC PANEL: CPT | Performed by: PHYSICIAN ASSISTANT

## 2021-06-29 PROCEDURE — 74176 CT ABD & PELVIS W/O CONTRAST: CPT

## 2021-06-29 PROCEDURE — 70450 CT HEAD/BRAIN W/O DYE: CPT

## 2021-06-29 RX ORDER — POTASSIUM CHLORIDE 14.9 MG/ML
20 INJECTION INTRAVENOUS ONCE
Status: COMPLETED | OUTPATIENT
Start: 2021-06-29 | End: 2021-06-29

## 2021-06-29 RX ORDER — CEFTRIAXONE 1 G/50ML
1000 INJECTION, SOLUTION INTRAVENOUS EVERY 24 HOURS
Status: DISCONTINUED | OUTPATIENT
Start: 2021-06-30 | End: 2021-07-02

## 2021-06-29 RX ORDER — HEPARIN SODIUM 5000 [USP'U]/ML
5000 INJECTION, SOLUTION INTRAVENOUS; SUBCUTANEOUS EVERY 8 HOURS SCHEDULED
Status: DISCONTINUED | OUTPATIENT
Start: 2021-06-29 | End: 2021-07-10 | Stop reason: HOSPADM

## 2021-06-29 RX ORDER — METHIMAZOLE 5 MG/1
2.5 TABLET ORAL EVERY OTHER DAY
Status: DISCONTINUED | OUTPATIENT
Start: 2021-06-30 | End: 2021-06-29

## 2021-06-29 RX ORDER — CEFTRIAXONE 1 G/50ML
1000 INJECTION, SOLUTION INTRAVENOUS ONCE
Status: COMPLETED | OUTPATIENT
Start: 2021-06-29 | End: 2021-06-29

## 2021-06-29 RX ORDER — SODIUM CHLORIDE 9 MG/ML
100 INJECTION, SOLUTION INTRAVENOUS CONTINUOUS
Status: DISCONTINUED | OUTPATIENT
Start: 2021-06-29 | End: 2021-06-30

## 2021-06-29 RX ORDER — POTASSIUM CHLORIDE 20 MEQ/1
40 TABLET, EXTENDED RELEASE ORAL ONCE
Status: DISCONTINUED | OUTPATIENT
Start: 2021-06-29 | End: 2021-06-29

## 2021-06-29 RX ORDER — LETROZOLE 2.5 MG/1
2.5 TABLET, FILM COATED ORAL DAILY
Status: DISCONTINUED | OUTPATIENT
Start: 2021-06-30 | End: 2021-06-29

## 2021-06-29 RX ADMIN — SODIUM CHLORIDE 100 ML/HR: 0.9 INJECTION, SOLUTION INTRAVENOUS at 22:38

## 2021-06-29 RX ADMIN — SODIUM CHLORIDE 1000 ML: 0.9 INJECTION, SOLUTION INTRAVENOUS at 19:18

## 2021-06-29 RX ADMIN — CEFTRIAXONE 1000 MG: 1 INJECTION, SOLUTION INTRAVENOUS at 19:16

## 2021-06-29 RX ADMIN — POTASSIUM CHLORIDE 20 MEQ: 14.9 INJECTION, SOLUTION INTRAVENOUS at 20:14

## 2021-06-29 RX ADMIN — SODIUM CHLORIDE 1000 ML: 0.9 INJECTION, SOLUTION INTRAVENOUS at 18:29

## 2021-06-29 NOTE — ED PROVIDER NOTES
History  Chief Complaint   Patient presents with    Altered Mental Status     normally a+ox3 pt found today by care taker covered in feces and not verbally responding to questions  care taker last at house on friday and pt was not like this then     21-year-old female presents emergency department with family friend for evaluation of altered mental status  Patient was last seen on Friday at that time was of normal health, awake, alert, oriented  Caretaker presented to the house today and patient was covered in feces  Was not responding to questions as she normally does  States she was tired  Friend notes air conditioning was not on  Only thing missing from the Fridge since Friday was noted to be small amount of watermelon  Patient states she did not eat today or yesterday  She denies any fall  Does have noted abrasion to the right leg  Patient denies any dysuria, hematuria, urinary frequency  She denies any fevers or chills  Patient denies any pain  Friend reports patient has history of dehydration and states she has presented similarly  Patient lives alone  Friend reports she is normally incontinent of urine and stools and wears briefs at home      History provided by:  Patient and friend  Altered Mental Status  Presenting symptoms: behavior changes, confusion, lethargy and partial responsiveness    Presenting symptoms: no combativeness, no disorientation, no memory loss and no unresponsiveness    Severity:  Moderate  Most recent episode:   Today  Episode history:  Continuous  Timing:  Constant  Progression:  Unchanged  Chronicity:  New  Context: not alcohol use, not drug use, not head injury, not homeless, taking medications as prescribed, not nursing home resident, not recent change in medication, not recent illness and not recent infection    Associated symptoms: no abdominal pain, normal movement, no agitation, no bladder incontinence, no decreased appetite, no depression, no difficulty breathing, no eye deviation, no fever, no hallucinations, no headaches, no light-headedness, no nausea, no palpitations, no rash, no seizures, no slurred speech, no suicidal behavior, no visual change, no vomiting and no weakness        Prior to Admission Medications   Prescriptions Last Dose Informant Patient Reported?  Taking?   acetaminophen (TYLENOL) 325 mg tablet More than a month at Unknown time  No No   Sig: Take 2 tablets (650 mg total) by mouth every 6 (six) hours as needed for mild pain   calcium carbonate (Tums) 500 mg chewable tablet Past Month at Unknown time  Yes Yes   Sig: Chew 2 tablets   glucosamine-chondroitin 500-400 MG tablet Not Taking at Unknown time  Yes No   Sig: Take 1 tablet by mouth   Patient not taking: Reported on 6/30/2021   letrozole [facility]) 2 5 mg tablet Past Week at Unknown time  Yes Yes   Sig: Take 2 5 mg by mouth daily   loperamide (IMODIUM) 2 mg capsule Not Taking at Unknown time  Yes No   Sig: Take 2 mg by mouth   Patient not taking: Reported on 6/29/2021   methimazole (TAPAZOLE) 5 mg tablet Past Week at Unknown time  Yes Yes   Sig: TAKE 1/2 TABLET BY MOUTH EVERY OTHER DAY   oxyCODONE-acetaminophen (PERCOCET) 5-325 mg per tablet Not Taking at Unknown time  No No   Sig: Take 1 tablet by mouth every 4 (four) hours as needed for moderate painMax Daily Amount: 6 tablets   Patient not taking: Reported on 6/29/2021   pilocarpine (SALAGEN) 5 mg tablet   No No   Sig: Take 1 tablet (5 mg total) by mouth 3 (three) times a day   potassium chloride (MICRO-K) 10 MEQ CR capsule   No No   Sig: Take 2 capsules (20 mEq total) by mouth daily      Facility-Administered Medications: None       Past Medical History:   Diagnosis Date    Diabetes mellitus ([facility] [address] )     Diverticulitis     Hyperthyroidism        Past Surgical History:   Procedure Laterality Date    APPENDECTOMY      COLOSTOMY      FL RETROGRADE PYELOGRAM  4/6/2021    MASTECTOMY      AK CYSTOURETHROSCOPY,URETER CATHETER Bilateral 4/6/2021 Procedure: CYSTOSCOPY RETROGRADE PYELOGRAM WITH INSERTION STENT URETERAL;  Surgeon: Dayan Covington MD;  Location: [facility];  Service: Urology       History reviewed  No pertinent family history  I have reviewed and agree with the history as documented  E-Cigarette/Vaping    E-Cigarette Use Never User      E-Cigarette/Vaping Substances     Social History     Tobacco Use    Smoking status: Former Smoker    Smokeless tobacco: Never Used   Vaping Use    Vaping Use: Never used   Substance Use Topics    Alcohol use: Not Currently    Drug use: Not Currently       Review of Systems   Constitutional: Negative for decreased appetite and fever  Cardiovascular: Negative for palpitations  Gastrointestinal: Negative for abdominal pain, nausea and vomiting  Genitourinary: Negative for bladder incontinence  Skin: Negative for rash  Neurological: Negative for seizures, weakness, light-headedness and headaches  Psychiatric/Behavioral: Positive for confusion  Negative for agitation, hallucinations and memory loss  Physical Exam  Physical Exam  Vitals and nursing note reviewed  Constitutional:       General: She is not in acute distress  Appearance: She is well-developed and normal weight  She is ill-appearing (chronically )  She is not toxic-appearing or diaphoretic  HENT:      Head: Normocephalic and atraumatic  Right Ear: Tympanic membrane, ear canal and external ear normal       Left Ear: Tympanic membrane, ear canal and external ear normal       Nose: Nose normal       Mouth/Throat:      Mouth: Mucous membranes are dry  Eyes:      Extraocular Movements: Extraocular movements intact  Conjunctiva/sclera: Conjunctivae normal       Pupils: Pupils are equal, round, and reactive to light  Cardiovascular:      Rate and Rhythm: Regular rhythm  Tachycardia present  Pulmonary:      Effort: Pulmonary effort is normal  No respiratory distress        Breath sounds: Normal breath sounds  No stridor  No wheezing, rhonchi or rales  Chest:      Chest wall: No tenderness  Abdominal:      General: Abdomen is flat  Bowel sounds are normal  There is no distension  Palpations: Abdomen is soft  Tenderness: There is no abdominal tenderness  There is no right CVA tenderness, left CVA tenderness or guarding  Musculoskeletal:         General: No swelling, tenderness or deformity  Normal range of motion  Cervical back: Normal range of motion  Skin:     General: Skin is warm and dry  Capillary Refill: Capillary refill takes less than 2 seconds  Coloration: Skin is not jaundiced or pale  Findings: No bruising or erythema  Comments: + superficial abrasion right thigh   Neurological:      General: No focal deficit present  Mental Status: She is alert  She is confused  GCS: GCS eye subscore is 4  GCS verbal subscore is 5  GCS motor subscore is 6  Cranial Nerves: Cranial nerves are intact  No dysarthria  Sensory: Sensation is intact  No sensory deficit  Motor: Weakness (Generalized) present  Coordination: Coordination is intact           Vital Signs  ED Triage Vitals   Temperature Pulse Respirations Blood Pressure SpO2   06/29/21 1743 06/29/21 1743 06/29/21 1743 06/29/21 1743 06/29/21 1743   98 1 °F (36 7 °C) (!) 110 20 131/62 98 %      Temp Source Heart Rate Source Patient Position - Orthostatic VS BP Location FiO2 (%)   06/29/21 1743 06/29/21 1743 06/29/21 1743 06/29/21 1743 --   Temporal Monitor Lying Right arm       Pain Score       06/30/21 0708       No Pain           Vitals:    06/30/21 0900 06/30/21 1006 06/30/21 1313 06/30/21 2337   BP: 128/58 119/57 133/71 134/68   Pulse: 88 67 66 65   Patient Position - Orthostatic VS:   Lying          Visual Acuity  Visual Acuity      Most Recent Value   L Pupil Size (mm)  3   R Pupil Size (mm)  3          ED Medications  Medications   cefTRIAXone (ROCEPHIN) IVPB (premix in dextrose) 1,000 mg 50 mL (1,000 mg Intravenous New Bag 6/30/21 1821)   heparin (porcine) subcutaneous injection 5,000 Units (5,000 Units Subcutaneous Refused 7/1/21 0609)   sodium bicarbonate 150 mEq in dextrose 5 % 1,000 mL infusion (125 mL/hr Intravenous New Bag 7/1/21 0337)   metroNIDAZOLE (FLAGYL) IVPB (premix) 500 mg 100 mL (500 mg Intravenous New Bag 7/1/21 0737)   potassium chloride 20 mEq IVPB (premix) (20 mEq Intravenous New Bag 7/1/21 0737)   magnesium sulfate 2 g/50 mL IVPB (premix) 2 g (2 g Intravenous New Bag 7/1/21 0922)   sodium chloride 0 9 % bolus 1,000 mL (0 mL Intravenous Stopped 6/29/21 2234)   sodium chloride 0 9 % bolus 1,000 mL (0 mL Intravenous Stopped 6/29/21 2234)   cefTRIAXone (ROCEPHIN) IVPB (premix in dextrose) 1,000 mg 50 mL (0 mg Intravenous Stopped 6/29/21 1959)   potassium chloride 20 mEq IVPB (premix) (0 mEq Intravenous Stopped 6/29/21 2235)   magnesium sulfate 2 g/50 mL IVPB (premix) 2 g (0 g Intravenous Stopped 6/30/21 1044)   potassium chloride 20 mEq IVPB (premix) (20 mEq Intravenous New Bag 6/30/21 1830)       Diagnostic Studies  Results Reviewed     Procedure Component Value Units Date/Time    Clostridium difficile toxin by PCR with EIA [[id number]]  (Normal) Collected: 06/30/21 2216    Lab Status: Final result Specimen: Stool from Per Rectum Updated: 07/01/21 1104      C difficile toxin by PCR  Negative    Narrative:           Procalcitonin Reflex [[id number]]  (Normal) Collected: 07/01/21 0607    Lab Status: Final result Specimen: Blood from Arm, Right Updated: 07/01/21 1033     Procalcitonin 0 12 ng/ml     Urine culture [[id number]] Collected: 06/29/21 1852    Lab Status: Final result Specimen: Urine, Straight Cath Updated: 07/01/21 0752     Urine Culture No Growth <1000 cfu/mL    Comprehensive metabolic panel [[id number]]  (Abnormal) Collected: 07/01/21 0607    Lab Status: Final result Specimen: Blood from Arm, Right Updated: 07/01/21 0654     Sodium 141 mmol/L      Potassium 2 7 mmol/L Chloride 106 mmol/L      CO2 22 mmol/L      ANION GAP 13 mmol/L      BUN 40 mg/dL      Creatinine 3 57 mg/dL      Glucose 90 mg/dL      Calcium 8 3 mg/dL      Corrected Calcium 9 6 mg/dL      AST 26 U/L      ALT 16 U/L      Alkaline Phosphatase 154 U/L      Total Protein 6 9 g/dL      Albumin 2 4 g/dL      Total Bilirubin 0 66 mg/dL      eGFR 12 ml/min/1 73sq m     Narrative:      [place] guidelines for Chronic Kidney Disease (CKD):     Stage 1 with normal or high GFR (GFR > 90 mL/min/1 73 square meters)    Stage 2 Mild CKD (GFR = 60-89 mL/min/1 73 square meters)    Stage 3A Moderate CKD (GFR = 45-59 mL/min/1 73 square meters)    Stage 3B Moderate CKD (GFR = 30-44 mL/min/1 73 square meters)    Stage 4 Severe CKD (GFR = 15-29 mL/min/1 73 square meters)    Stage 5 End Stage CKD (GFR <15 mL/min/1 73 square meters)  Note: GFR calculation is accurate only with a steady state creatinine    Phosphorus [[id number]]  (Normal) Collected: 07/01/21 0607    Lab Status: Final result Specimen: Blood from Arm, Right Updated: 07/01/21 0653     Phosphorus 3 7 mg/dL     Magnesium [[id number]]  (Normal) Collected: 07/01/21 0607    Lab Status: Final result Specimen: Blood from Arm, Right Updated: 07/01/21 0653     Magnesium 1 6 mg/dL     Ova and parasite examination [[id number]] Collected: 06/30/21 2216    Lab Status: In process Specimen: Stool from Rectum Updated: 06/30/21 2234    Fecal leukocytes [[id number]] Collected: 06/30/21 2215    Lab Status: In process Specimen: Stool from Per Rectum Updated: 06/30/21 2233    Blood culture #1 [[id number]] Collected: 06/29/21 1805    Lab Status: Preliminary result Specimen: Blood from Hand, Right Updated: 06/30/21 2201     Blood Culture No Growth at 24 hrs  Blood culture #2 [[id number]] Collected: 06/29/21 1810    Lab Status: Preliminary result Specimen: Blood from Line, Venous Updated: 06/30/21 2201     Blood Culture No Growth at 24 hrs      Sodium, urine, random [[id number]] Collected: 06/30/21 0842    Lab Status: Final result Specimen: Urine, Catheter Updated: 06/30/21 1406     Sodium, Ur 13    Creatinine, urine, random [[id number]] Collected: 06/30/21 0842    Lab Status: Final result Specimen: Urine, Catheter Updated: 06/30/21 1406     Creatinine, Ur 385 0 mg/dL     Stool Enteric Bacterial Panel by PCR [[id number]] Collected: 06/30/21 1002    Lab Status:  In process Specimen: Stool from Rectum Updated: 06/30/21 1007    Procalcitonin with AM Reflex [[id number]]  (Normal) Collected: 06/29/21 2240    Lab Status: Final result Specimen: Blood from Arm, Right Updated: 06/30/21 0958     Procalcitonin 0 12 ng/ml     Hemoglobin A1C w/ EAG Estimation [[id number]] Collected: 06/30/21 0518    Lab Status: Final result Specimen: Blood Updated: 06/30/21 0933     Hemoglobin A1C 5 1 %       mg/dl     Comprehensive metabolic panel [[id number]]  (Abnormal) Collected: 06/30/21 0518    Lab Status: Final result Specimen: Blood Updated: 06/30/21 0548     Sodium 141 mmol/L      Potassium 3 0 mmol/L      Chloride 109 mmol/L      CO2 16 mmol/L      ANION GAP 16 mmol/L      BUN 43 mg/dL      Creatinine 3 74 mg/dL      Glucose 72 mg/dL      Calcium 8 3 mg/dL      Corrected Calcium 9 6 mg/dL      AST 28 U/L      ALT 20 U/L      Alkaline Phosphatase 168 U/L      Total Protein 7 2 g/dL      Albumin 2 4 g/dL      Total Bilirubin 1 03 mg/dL      eGFR 11 ml/min/1 73sq m     Narrative:      [place] guidelines for Chronic Kidney Disease (CKD):     Stage 1 with normal or high GFR (GFR > 90 mL/min/1 73 square meters)    Stage 2 Mild CKD (GFR = 60-89 mL/min/1 73 square meters)    Stage 3A Moderate CKD (GFR = 45-59 mL/min/1 73 square meters)    Stage 3B Moderate CKD (GFR = 30-44 mL/min/1 73 square meters)    Stage 4 Severe CKD (GFR = 15-29 mL/min/1 73 square meters)    Stage 5 End Stage CKD (GFR <15 mL/min/1 73 square meters)  Note: GFR calculation is accurate only with a steady state creatinine    Magnesium [[id number]]  (Abnormal) Collected: 06/30/21 0518    Lab Status: Final result Specimen: Blood Updated: 06/30/21 0548     Magnesium 1 5 mg/dL     Phosphorus [[id number]]  (Abnormal) Collected: 06/30/21 0518    Lab Status: Final result Specimen: Blood Updated: 06/30/21 0548     Phosphorus 5 2 mg/dL     CBC and differential [[id number]]  (Abnormal) Collected: 06/30/21 0518    Lab Status: Final result Specimen: Blood Updated: 06/30/21 0534     WBC 9 70 Thousand/uL      RBC 3 60 Million/uL      Hemoglobin 10 8 g/dL      Hematocrit 34 3 %      MCV 95 fL      MCH 30 0 pg      MCHC 31 5 g/dL      RDW 15 8 %      MPV 12 0 fL      Platelets 623 Thousands/uL      nRBC 0 /100 WBCs      Neutrophils Relative 59 %      Immat GRANS % 1 %      Lymphocytes Relative 26 %      Monocytes Relative 11 %      Eosinophils Relative 2 %      Basophils Relative 1 %      Neutrophils Absolute 5 83 Thousands/µL      Immature Grans Absolute 0 07 Thousand/uL      Lymphocytes Absolute 2 52 Thousands/µL      Monocytes Absolute 1 02 Thousand/µL      Eosinophils Absolute 0 18 Thousand/µL      Basophils Absolute 0 08 Thousands/µL     Lactic acid 2 Hours [[id number]]  (Normal) Collected: 06/30/21 0057    Lab Status: Final result Specimen: Blood from Arm, Right Updated: 06/30/21 0132     LACTIC ACID 1 2 mmol/L     Narrative:      Result may be elevated if tourniquet was used during collection  Lactic Acid with Reflex STAT [[id number]]  (Abnormal) Collected: 06/29/21 2240    Lab Status: Final result Specimen: Blood from Arm, Right Updated: 06/29/21 2311     LACTIC ACID 2 1 mmol/L     Narrative:      Result may be elevated if tourniquet was used during collection  Lactic acid 2 Hours [[id number]]  (Abnormal) Collected: 06/29/21 2137    Lab Status: Final result Specimen: Blood from Arm, Right Updated: 06/29/21 2159     LACTIC ACID 2 1 mmol/L     Narrative:      Result may be elevated if tourniquet was used during collection  TSH, 3rd generation with Free T4 reflex [[id number]]  (Normal) Collected: 06/29/21 1810    Lab Status: Final result Specimen: Blood from Line, Venous Updated: 06/29/21 2018     TSH 3RD GENERATON 2 577 uIU/mL     Narrative:      Patients undergoing fluorescein dye angiography may retain small amounts of fluorescein in the body for 48-72 hours post procedure  Samples containing fluorescein can produce falsely depressed TSH values  If the patient had this procedure,a specimen should be resubmitted post fluorescein clearance  Urine Microscopic [[id number]]  (Abnormal) Collected: 06/29/21 1852    Lab Status: Final result Specimen: Urine, Straight Cath Updated: 06/29/21 1901     RBC, UA 2-4 /hpf      WBC, UA 10-20 /hpf      Epithelial Cells None Seen /hpf      Bacteria, UA Innumerable /hpf      URINE COMMENT Small volume <10mL    UA w Reflex to Microscopic w Reflex to Culture [[id number]]  (Abnormal) Collected: 06/29/21 1852    Lab Status: Final result Specimen: Urine, Straight Cath Updated: 06/29/21 1858     Color, UA Yellow     Clarity, UA Turbid     Specific Gravity, UA >=1 030     pH, UA 6 0     Leukocytes, UA Small     Nitrite, UA Negative     Protein, UA >=300 mg/dl      Glucose, UA Negative mg/dl      Ketones, UA Negative mg/dl      Urobilinogen, UA 0 2 E U /dl      Bilirubin, UA Small     Blood, UA Large    Lactic acid [[id number]]  (Abnormal) Collected: 06/29/21 1810    Lab Status: Final result Specimen: Blood from Line, Venous Updated: 06/29/21 1845     LACTIC ACID 2 7 mmol/L     Narrative:      Result may be elevated if tourniquet was used during collection      Troponin I [[id number]]  (Normal) Collected: 06/29/21 1810    Lab Status: Final result Specimen: Blood from Line, Venous Updated: 06/29/21 1843     Troponin I <0 02 ng/mL     Comprehensive metabolic panel [[id number]]  (Abnormal) Collected: 06/29/21 1810    Lab Status: Final result Specimen: Blood from Line, Venous Updated: 06/29/21 1840     Sodium 139 mmol/L      Potassium 3 1 mmol/L      Chloride 106 mmol/L      CO2 14 mmol/L      ANION GAP 19 mmol/L      BUN 46 mg/dL      Creatinine 4 26 mg/dL      Glucose 105 mg/dL      Calcium 10 0 mg/dL      Corrected Calcium 10 6 mg/dL      AST 39 U/L      ALT 25 U/L      Alkaline Phosphatase 234 U/L      Total Protein 9 2 g/dL      Albumin 3 3 g/dL      Total Bilirubin 1 67 mg/dL      eGFR 10 ml/min/1 73sq m     Narrative:      [employer] guidelines for Chronic Kidney Disease (CKD):     Stage 1 with normal or high GFR (GFR > 90 mL/min/1 73 square meters)    Stage 2 Mild CKD (GFR = 60-89 mL/min/1 73 square meters)    Stage 3A Moderate CKD (GFR = 45-59 mL/min/1 73 square meters)    Stage 3B Moderate CKD (GFR = 30-44 mL/min/1 73 square meters)    Stage 4 Severe CKD (GFR = 15-29 mL/min/1 73 square meters)    Stage 5 End Stage CKD (GFR <15 mL/min/1 73 square meters)  Note: GFR calculation is accurate only with a steady state creatinine    CK Total with Reflex CKMB [[id number]]  (Normal) Collected: 06/29/21 1810    Lab Status: Final result Specimen: Blood from Line, Venous Updated: 06/29/21 1840     Total  U/L     CBC and differential [[id number]]  (Abnormal) Collected: 06/29/21 1810    Lab Status: Final result Specimen: Blood from Line, Venous Updated: 06/29/21 1825     WBC 12 43 Thousand/uL      RBC 4 41 Million/uL      Hemoglobin 13 4 g/dL      Hematocrit 41 1 %      MCV 93 fL      MCH 30 4 pg      MCHC 32 6 g/dL      RDW 15 8 %      MPV 11 7 fL      Platelets 491 Thousands/uL      nRBC 0 /100 WBCs      Neutrophils Relative 67 %      Immat GRANS % 0 %      Lymphocytes Relative 21 %      Monocytes Relative 10 %      Eosinophils Relative 1 %      Basophils Relative 1 %      Neutrophils Absolute 8 23 Thousands/µL      Immature Grans Absolute 0 04 Thousand/uL      Lymphocytes Absolute 2 65 Thousands/µL      Monocytes Absolute 1 27 Thousand/µL      Eosinophils Absolute 0 12 Thousand/µL Basophils Absolute 0 12 Thousands/µL     Fingerstick Glucose (POCT) [[id number]]  (Normal) Collected: 06/29/21 1819    Lab Status: Final result Updated: 06/29/21 1820     POC Glucose 104 mg/dl                  XR abdomen obstruction series   Final Result by Mikal Colbert DO (07/01 0913)      Air-fluid levels within nondilated segments of bowel likely represents ileus  Partial small bowel obstruction not excluded  Workstation performed: [id number]         CT renal stone study abdomen pelvis wo contrast   Final Result by Beka Cueto MD (06/29 2038)      Compared to 4/1/2021, new small bowel dilatation suspicious for obstruction  Secondary signs of mild colitis  New ureteral stents without ureteral stones  Other chronic and nonemergent findings above  Workstation performed: [id number]         CT head without contrast   Final Result by Justin Da Silva DO (06/29 1941)      Advanced, confluent periventricular white matter decreased attenuation suggestive of diffuse chronic microangiopathic change  No mass, hemorrhage or definitive signs of acute ischemia  Workstation performed: [id number]         XR chest 1 view portable   ED Interpretation by Ilsa Guzmán MD (06/29 1908)   NAD      Final Result by Norma Cope MD (06/30 0648)      No acute cardiopulmonary disease        Findings are stable            Workstation performed: [id number]                    Procedures  ECG 12 Lead Documentation Only    Date/Time: 6/29/2021 6:05 PM  Performed by: Cherri Herzog PA-C  Authorized by: Cherri Herzog PA-C     Indications / Diagnosis:  Altered mental status  ECG reviewed by me, the ED Provider: no    Patient location:  ED  Interpretation:     Interpretation: non-specific    Rate:     ECG rate:  108    ECG rate assessment: tachycardic    Rhythm:     Rhythm: sinus tachycardia    Ectopy:     Ectopy: PVCs    QRS:     QRS axis:  Normal    QRS intervals: Normal  Conduction:     Conduction: abnormal      Abnormal conduction: incomplete RBBB    ST segments:     ST segments:  Normal  T waves:     T waves: normal               ED Course  ED Course as of Jul 01 1111   Tue Jun 29, 2021 1831 WBC(!): 12 43   1842 ZOYA   Creatinine(!): 4 26   1842 Potassium(!): 3 1   1846 LACTIC ACID(!!): 2 7   1858 UA noted for UTI will start on Rocephin       1900 I discussed results and findings with patient and patient's friend  We discussed inpatient admission and both verbalized understanding and agreement  Head CT pending however no focal deficit on exam       1909 Discussed with hospitalist Dr Dl Chavez who agreed with admission pending negative head CT      1944 Advanced, confluent periventricular white matter decreased attenuation suggestive of diffuse chronic microangiopathic change  No mass, hemorrhage or definitive signs of acute ischemia  SBIRT 22yo+      Most Recent Value   SBIRT (22 yo +)   In order to provide better care to our patients, we are screening all of our patients for alcohol and drug use  Would it be okay to ask you these screening questions? Yes Filed at: 06/29/2021 1836   Initial Alcohol Screen: US AUDIT-C    1  How often do you have a drink containing alcohol?  0 Filed at: 06/29/2021 1836   2  How many drinks containing alcohol do you have on a typical day you are drinking? 0 Filed at: 06/29/2021 1836   3a  Male UNDER 65: How often do you have five or more drinks on one occasion? 0 Filed at: 06/29/2021 1836   3b  FEMALE Any Age, or MALE 65+: How often do you have 4 or more drinks on one occassion? 0 Filed at: 06/29/2021 1836   Audit-C Score  0 Filed at: 06/29/2021 1836   ISSA: How many times in the past year have you    Used an illegal drug or used a prescription medication for non-medical reasons?   Never Filed at: 06/29/2021 1836                    MDM  Number of Diagnoses or Management Options  ZOYA (acute kidney injury) [facility]): new and requires workup  Altered mental status: new and requires workup  Elevated lactic acid level: new and requires workup  Hypokalemia: new and requires workup  UTI (urinary tract infection): new and requires workup  Diagnosis management comments: 27-year-old female presents to the emergency department with family friend for evaluation of altered mental status  Patient found at home covered in feces  No air conditioning  Vitals and medical record reviewed  Patient appears chronically ill-appearing  Dry mucous membranes  Patient was tachycardic  Head atraumatic however superficial abrasion on the right leg  Abdomen was soft and nontender  Lungs clear  Patient appeared weekend and confused  No focal neurologic deficit  Ultimately patient found have significant ZOYA, elevated lactic acid, hypokalemia an UTI  Scan of head was negative for acute findings  She was accepted to medicine service  Amount and/or Complexity of Data Reviewed  Clinical lab tests: ordered and reviewed  Tests in the radiology section of CPT®: ordered and reviewed  Review and summarize past medical records: yes  Discuss the patient with other providers: yes  Independent visualization of images, tracings, or specimens: yes        Disposition  Final diagnoses:    Altered mental status   ZOYA (acute kidney injury) ([facility] )   UTI (urinary tract infection)   Hypokalemia   Elevated lactic acid level     Time reflects when diagnosis was documented in both MDM as applicable and the Disposition within this note     Time User Action Codes Description Comment    6/29/2021  7:10 PM Jerilynn Woodbury Add [R41 82] Altered mental status     6/29/2021  7:10 PM Jerilynn Woodbury Add [N17 9] ZOYA (acute kidney injury) ([facility] [address] )     6/29/2021  7:10 PM Jerilynn Woodbury Add [N39 0] UTI (urinary tract infection)     6/29/2021  7:10 PM Jerilynn Woodbury Add [E87 6] Hypokalemia     6/29/2021  7:10 PM Jerilynn Woodbury Add [R79 89] Elevated lactic acid level     6/29/2021 10:28 PM Danni Busing Add [C26 137] Bowel obstruction ([address] )     6/29/2021 10:28 PM Danni Busing Add [N17 9] Acute kidney injury [facility])       ED Disposition     ED Disposition Condition Date/Time Comment    Admit Stable Tue Jun 29, 2021  7:10 PM Case was discussed with Dr Daniel Altman and the patient's admission status was agreed to be Admission Status: inpatient status to the service of Dr Daniel Altman   Follow-up Information    None         Current Discharge Medication List      CONTINUE these medications which have NOT CHANGED    Details   calcium carbonate (Tums) 500 mg chewable tablet Chew 2 tablets      letrozole (FEMARA) 2 5 mg tablet Take 2 5 mg by mouth daily      methimazole (TAPAZOLE) 5 mg tablet TAKE 1/2 TABLET BY MOUTH EVERY OTHER DAY      acetaminophen (TYLENOL) 325 mg tablet Take 2 tablets (650 mg total) by mouth every 6 (six) hours as needed for mild pain  Qty: 30 tablet, Refills: 0    Associated Diagnoses: Parotiditis      glucosamine-chondroitin 500-400 MG tablet Take 1 tablet by mouth      loperamide (IMODIUM) 2 mg capsule Take 2 mg by mouth      oxyCODONE-acetaminophen (PERCOCET) 5-325 mg per tablet Take 1 tablet by mouth every 4 (four) hours as needed for moderate painMax Daily Amount: 6 tablets  Qty: 12 tablet, Refills: 0    Associated Diagnoses: Parotiditis; History of breast cancer      pilocarpine (SALAGEN) 5 mg tablet Take 1 tablet (5 mg total) by mouth 3 (three) times a day  Qty: 90 tablet, Refills: 0    Associated Diagnoses: Parotiditis      potassium chloride (MICRO-K) 10 MEQ CR capsule Take 2 capsules (20 mEq total) by mouth daily  Qty: 60 capsule, Refills: 0    Associated Diagnoses: Acute kidney injury ([address] )           No discharge procedures on file      PDMP Review       Value Time User    PDMP Reviewed  Yes 5/11/2021 10:32 AM Thien Ochoa MD          ED Provider  Electronically Signed by           Travis Abarca PA-C  06/29/21 [address]  07/01/21 1111

## 2021-06-29 NOTE — ED NOTES
Brief removed from pt for straight cath, very large excoriated area noted under abdominal fold, on both sides of groin, in gluteal fold, purulent drainage noted from right side of groin, multiple open areas noted in excoriated area, family friend with pt states that "caregiver" comes to home on Tuesdays and Fridays only, states all other days pt is by herself with no assistance       Romario Villa RN  06/29/21 4740

## 2021-06-29 NOTE — ED NOTES
After straight cath procedure visitors observed taking pictures of patient's genitals and legs  Advised visitor that there are no pictures allowed in the ER and they needed to put their phone away  Visitor verbalized understanding        Juan Grider RN  06/29/21 4118

## 2021-06-29 NOTE — ED ATTENDING ATTESTATION
6/29/2021  I, Radha Perdomo MD, saw and evaluated the patient  I have discussed the patient with the resident/non-physician practitioner and agree with the resident's/non-physician practitioner's findings, Plan of Care, and MDM as documented in the resident's/non-physician practitioner's note, except where noted  All available labs and Radiology studies were reviewed  I was present for key portions of any procedure(s) performed by the resident/non-physician practitioner and I was immediately available to provide assistance  At this point I agree with the current assessment done in the Emergency Department  I have conducted an independent evaluation of this patient a history and physical is as follows:    ED Course     Lives by herself  Found in bed with stool and urine  Decreased appetite  On exam patient awake  Nonfocal neurologic exam   Lungs are clear to auscultation  Heart is a regular rate and rhythm  Abdomen soft nontender good bowel sounds      Critical Care Time  Procedures

## 2021-06-30 PROBLEM — R65.20 SEVERE SEPSIS (HCC): Status: ACTIVE | Noted: 2021-06-29

## 2021-06-30 LAB
ALBUMIN SERPL BCP-MCNC: 2.4 G/DL (ref 3.5–5)
ALP SERPL-CCNC: 168 U/L (ref 46–116)
ALT SERPL W P-5'-P-CCNC: 20 U/L (ref 12–78)
ANION GAP SERPL CALCULATED.3IONS-SCNC: 16 MMOL/L (ref 4–13)
AST SERPL W P-5'-P-CCNC: 28 U/L (ref 5–45)
ATRIAL RATE: 108 BPM
BASOPHILS # BLD AUTO: 0.08 THOUSANDS/ΜL (ref 0–0.1)
BASOPHILS NFR BLD AUTO: 1 % (ref 0–1)
BILIRUB SERPL-MCNC: 1.03 MG/DL (ref 0.2–1)
BUN SERPL-MCNC: 43 MG/DL (ref 5–25)
CALCIUM ALBUM COR SERPL-MCNC: 9.6 MG/DL (ref 8.3–10.1)
CALCIUM SERPL-MCNC: 8.3 MG/DL (ref 8.3–10.1)
CHLORIDE SERPL-SCNC: 109 MMOL/L (ref 100–108)
CO2 SERPL-SCNC: 16 MMOL/L (ref 21–32)
CREAT SERPL-MCNC: 3.74 MG/DL (ref 0.6–1.3)
CREAT UR-MCNC: 385 MG/DL
EOSINOPHIL # BLD AUTO: 0.18 THOUSAND/ΜL (ref 0–0.61)
EOSINOPHIL NFR BLD AUTO: 2 % (ref 0–6)
ERYTHROCYTE [DISTWIDTH] IN BLOOD BY AUTOMATED COUNT: 15.8 % (ref 11.6–15.1)
EST. AVERAGE GLUCOSE BLD GHB EST-MCNC: 100 MG/DL
GFR SERPL CREATININE-BSD FRML MDRD: 11 ML/MIN/1.73SQ M
GLUCOSE SERPL-MCNC: 72 MG/DL (ref 65–140)
HBA1C MFR BLD: 5.1 %
HCT VFR BLD AUTO: 34.3 % (ref 34.8–46.1)
HGB BLD-MCNC: 10.8 G/DL (ref 11.5–15.4)
IMM GRANULOCYTES # BLD AUTO: 0.07 THOUSAND/UL (ref 0–0.2)
IMM GRANULOCYTES NFR BLD AUTO: 1 % (ref 0–2)
LACTATE SERPL-SCNC: 1.2 MMOL/L (ref 0.5–2)
LYMPHOCYTES # BLD AUTO: 2.52 THOUSANDS/ΜL (ref 0.6–4.47)
LYMPHOCYTES NFR BLD AUTO: 26 % (ref 14–44)
MAGNESIUM SERPL-MCNC: 1.5 MG/DL (ref 1.6–2.6)
MCH RBC QN AUTO: 30 PG (ref 26.8–34.3)
MCHC RBC AUTO-ENTMCNC: 31.5 G/DL (ref 31.4–37.4)
MCV RBC AUTO: 95 FL (ref 82–98)
MONOCYTES # BLD AUTO: 1.02 THOUSAND/ΜL (ref 0.17–1.22)
MONOCYTES NFR BLD AUTO: 11 % (ref 4–12)
NEUTROPHILS # BLD AUTO: 5.83 THOUSANDS/ΜL (ref 1.85–7.62)
NEUTS SEG NFR BLD AUTO: 59 % (ref 43–75)
NRBC BLD AUTO-RTO: 0 /100 WBCS
P AXIS: 76 DEGREES
PHOSPHATE SERPL-MCNC: 5.2 MG/DL (ref 2.3–4.1)
PLATELET # BLD AUTO: 159 THOUSANDS/UL (ref 149–390)
PMV BLD AUTO: 12 FL (ref 8.9–12.7)
POTASSIUM SERPL-SCNC: 3 MMOL/L (ref 3.5–5.3)
PR INTERVAL: 140 MS
PROCALCITONIN SERPL-MCNC: 0.12 NG/ML
PROT SERPL-MCNC: 7.2 G/DL (ref 6.4–8.2)
QRS AXIS: -82 DEGREES
QRSD INTERVAL: 96 MS
QT INTERVAL: 362 MS
QTC INTERVAL: 485 MS
RBC # BLD AUTO: 3.6 MILLION/UL (ref 3.81–5.12)
SODIUM 24H UR-SCNC: 13 MOL/L
SODIUM SERPL-SCNC: 141 MMOL/L (ref 136–145)
T WAVE AXIS: 83 DEGREES
VENTRICULAR RATE: 108 BPM
WBC # BLD AUTO: 9.7 THOUSAND/UL (ref 4.31–10.16)

## 2021-06-30 PROCEDURE — 87177 OVA AND PARASITES SMEARS: CPT | Performed by: PHYSICIAN ASSISTANT

## 2021-06-30 PROCEDURE — 36415 COLL VENOUS BLD VENIPUNCTURE: CPT | Performed by: PHYSICIAN ASSISTANT

## 2021-06-30 PROCEDURE — 82570 ASSAY OF URINE CREATININE: CPT | Performed by: NURSE PRACTITIONER

## 2021-06-30 PROCEDURE — 85025 COMPLETE CBC W/AUTO DIFF WBC: CPT | Performed by: PHYSICIAN ASSISTANT

## 2021-06-30 PROCEDURE — 83605 ASSAY OF LACTIC ACID: CPT | Performed by: PHYSICIAN ASSISTANT

## 2021-06-30 PROCEDURE — 99223 1ST HOSP IP/OBS HIGH 75: CPT | Performed by: NURSE PRACTITIONER

## 2021-06-30 PROCEDURE — 87493 C DIFF AMPLIFIED PROBE: CPT | Performed by: PHYSICIAN ASSISTANT

## 2021-06-30 PROCEDURE — 89055 LEUKOCYTE ASSESSMENT FECAL: CPT | Performed by: PHYSICIAN ASSISTANT

## 2021-06-30 PROCEDURE — 84100 ASSAY OF PHOSPHORUS: CPT | Performed by: PHYSICIAN ASSISTANT

## 2021-06-30 PROCEDURE — 83036 HEMOGLOBIN GLYCOSYLATED A1C: CPT | Performed by: PHYSICIAN ASSISTANT

## 2021-06-30 PROCEDURE — 99232 SBSQ HOSP IP/OBS MODERATE 35: CPT | Performed by: FAMILY MEDICINE

## 2021-06-30 PROCEDURE — 99222 1ST HOSP IP/OBS MODERATE 55: CPT

## 2021-06-30 PROCEDURE — 87209 SMEAR COMPLEX STAIN: CPT | Performed by: PHYSICIAN ASSISTANT

## 2021-06-30 PROCEDURE — 84300 ASSAY OF URINE SODIUM: CPT | Performed by: NURSE PRACTITIONER

## 2021-06-30 PROCEDURE — 87505 NFCT AGENT DETECTION GI: CPT | Performed by: PHYSICIAN ASSISTANT

## 2021-06-30 PROCEDURE — 83735 ASSAY OF MAGNESIUM: CPT | Performed by: PHYSICIAN ASSISTANT

## 2021-06-30 PROCEDURE — 80053 COMPREHEN METABOLIC PANEL: CPT | Performed by: PHYSICIAN ASSISTANT

## 2021-06-30 RX ORDER — MAGNESIUM SULFATE HEPTAHYDRATE 40 MG/ML
2 INJECTION, SOLUTION INTRAVENOUS ONCE
Status: COMPLETED | OUTPATIENT
Start: 2021-06-30 | End: 2021-06-30

## 2021-06-30 RX ORDER — POTASSIUM CHLORIDE 14.9 MG/ML
20 INJECTION INTRAVENOUS EVERY 4 HOURS
Status: COMPLETED | OUTPATIENT
Start: 2021-06-30 | End: 2021-06-30

## 2021-06-30 RX ADMIN — METRONIDAZOLE 500 MG: 500 INJECTION, SOLUTION INTRAVENOUS at 17:14

## 2021-06-30 RX ADMIN — HEPARIN SODIUM 5000 UNITS: 5000 INJECTION INTRAVENOUS; SUBCUTANEOUS at 00:54

## 2021-06-30 RX ADMIN — SODIUM BICARBONATE 125 ML/HR: 84 INJECTION, SOLUTION INTRAVENOUS at 08:29

## 2021-06-30 RX ADMIN — METRONIDAZOLE 500 MG: 500 INJECTION, SOLUTION INTRAVENOUS at 23:47

## 2021-06-30 RX ADMIN — POTASSIUM CHLORIDE 20 MEQ: 14.9 INJECTION, SOLUTION INTRAVENOUS at 18:30

## 2021-06-30 RX ADMIN — SODIUM CHLORIDE 100 ML/HR: 0.9 INJECTION, SOLUTION INTRAVENOUS at 05:12

## 2021-06-30 RX ADMIN — HEPARIN SODIUM 5000 UNITS: 5000 INJECTION INTRAVENOUS; SUBCUTANEOUS at 06:58

## 2021-06-30 RX ADMIN — MAGNESIUM SULFATE HEPTAHYDRATE 2 G: 40 INJECTION, SOLUTION INTRAVENOUS at 09:12

## 2021-06-30 RX ADMIN — SODIUM BICARBONATE 125 ML/HR: 84 INJECTION, SOLUTION INTRAVENOUS at 16:00

## 2021-06-30 RX ADMIN — POTASSIUM CHLORIDE 20 MEQ: 14.9 INJECTION, SOLUTION INTRAVENOUS at 13:42

## 2021-06-30 RX ADMIN — POTASSIUM CHLORIDE 20 MEQ: 14.9 INJECTION, SOLUTION INTRAVENOUS at 10:47

## 2021-06-30 RX ADMIN — CEFTRIAXONE 1000 MG: 1 INJECTION, SOLUTION INTRAVENOUS at 18:21

## 2021-06-30 NOTE — PROGRESS NOTES
Chart reviewed-  Patient is going to be admitted, when bed is available  CM consulted as there are concerns that patient is unable to care for self  Admit with with mental status change and profuse diarrhea  Small-bowel obstruction versus possible infectious colitis-  IV fluids, Surgery and renal is consulted  Patients risk for unplanned readmission score is 20  Called and obtained baseline information from Daughter Tova Dahl        06/30/21 1213   Patient Information   Mental Status Alert   Primary Caregiver Self   Support System Immediate family;Friends; Neighbors   Activities of Daily Living Prior to Admission   Functional Status Minimum assistance   Assistive Device Walker   Living Arrangement Lives alone  (mobile home with ramp)   Income Information   Income Source Pension/prison   Means of Transportation   Means of Transport to Appts: Tashi Romano is the only child  Pt does not have a POA/ unsure about advance directive  PCP:    Amaya Grajeda        Pt has a prescription plan and uses [facility] pharmacy  Patient is Not a [place]:   Per Daughter,denies SA/MH history  Pt resides alone in a mobile home with a ramp  Pt uses a walker to ambulate and has a cane  Does not drive, relies on family/ friends for transport  Friends assist with obtaining patients medication  Pt has Waiver Services-- twice a week on Tuesday and Fridays, ( unsure number of hours)  Prior use of Home Care or STR: No HHC services, hx of being at [facility] about [facility] years ago  Transportation: family/ friends to transport prn  [employer]: [employer]  Per [employer] the air conditioner was not working and a new one is going to be installed in the next few days  CM will continue to follow-- therapy evaluation is pending at this time  Pt may need STR to be determined

## 2021-06-30 NOTE — ED NOTES
Return text from physician and will be checking the potassium medication and will notify of any changes       Nimo Sequeira RN  06/30/21 6956

## 2021-06-30 NOTE — ASSESSMENT & PLAN NOTE
· Present on admission   · Patient is currently afebrile   · Patient meets SIRS criteria with tachycardia, leukocytosis, elevated lactic acid on admission 2 7  · Suspect sepsis is secondary to acute UTI   · UTI: + blood, leukocytes, protein  · Culture: pending   · Patient was given 1 dose of IV Ceftriaxone in ER, will repeat dose again tomorrow   · IVF hydration   · Blood culture x2: pending   · Trend lactic acid   · Procalcitonin   · Monitor fever curve and hemodynamics

## 2021-06-30 NOTE — ASSESSMENT & PLAN NOTE
· Diagnosed 2007 s/p left mastectomy   · Hold Femara secondary to suspected bowel obstruction- can resume when no longer NPO  · Of note, alk phos was elevated on admission 234; CT abdomen/pelvis reveals chronic gallstones and chronic distended gallbladder   Alk phos was elevated prior admission 05/11/21 317   · Consideration of bone metastasis- patient should f/u with imaging as outpatient

## 2021-06-30 NOTE — ASSESSMENT & PLAN NOTE
· Hold methimazole secondary to suspected bowel obstruction- can resume when no longer NPO   · TSH checked and is euthyroid

## 2021-06-30 NOTE — ASSESSMENT & PLAN NOTE
· POA- patient admits to dysuria symptoms and is normally incontinent at home   · UA + leukocytes, blood, protein   · Pending urine cultures   · IV Ceftriaxone   · Management as stated above

## 2021-06-30 NOTE — CONSULTS
Consultation - General Surgery   Dameon Andrade 70 y o  female MRN: [id number]  Unit/Bed#: ED 02 Encounter: [id number]    Assessment/Plan     Assessment:  81-year-old white female admitted through the ED with mental status change and profuse diarrhea  Small-bowel obstruction versus possible infectious colitis  Possible secondary to intra-abdominal adhesions  Status post Iman's procedure for diverticulitis 4 years ago, [place] 2017  Subsequent colostomy reversal  CA left breast, status post modified radical mastectomy left breast 5 years ago  Bilateral renal stents, history of obstructive uropathy with bilateral hydronephrosis and hydroureter, stable appearance on CT scan imaging  Patient presents to the ED here with altered mental status and diarrhea  Hematuria  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, creatinine 4 26, pre renal azotemia  Hypokalemia, K 3 0  Lactic acidosis on admission 2 7, white count 9 7  She remains afebrile  Severe protein calorie malnutrition, BMI 27 78 (60 3 kg)    Plan:  Admit to medicine service  Conservative non operative management at this time  Dr Carbajal to examine the patient later this morning  Stool sent for C difficile, fecal like sites, ova and parasite  Nephrology consultation  Nothing by mouth, ice chips  Gentle IV fluids for hydration  Serial abdominal exams  Repeat CBC, metabolic profile, kidney function in a m  Analgesics/antiemetics p r n  Trend lactate level  Blood cultures x2 obtained  Monitor I&Os  NG tube decompression if the patient develops any nausea or vomiting, she would be at risk for aspiration  DNAR code status    History of Present Illness     HPI:  Dameon Andrade is a 70 y o  female who presents to the ED after caretaker found the patient covered in feces and not verbally responding to questions  The patient with altered mental status and has reports of perfuse diarrhea for the past several days        She had perforated diverticulitis and underwent Iman's procedure with subsequent reversal about 4 years ago in [place] by Dr Silas Aragon  She has never had admission for bowel obstruction since her surgery 4 years ago  At present she denies any nausea or vomiting  No fever  She has a rash in bilateral inguinal folds which has been worse since diarrhea started several days ago  The patient is not a detailed historian, mild confusion  She denies any abdominal pain  Past medical is significant for CA left breast   Patient has been experiencing multiple episodes of profuse non foul-smelling diarrhea for the past several days  She also was noted have an abrasion on her right leg  She has had very poor oral intake at home and has not been drinking much  Patient is difficult historian  She denies any dysuria or hematuria, she has an indwelling Way catheter  She denies any fever or chills  She has had decreased oral intake and has not really been drinking much  There has been no report of any nausea or vomiting  She denies any nausea or vomiting  She has an indwelling Way catheter, history of obstructive uropathy and she has bilateral renal stents  CT scan of the abdomen showed new small-bowel dilatation with obstruction versus colitis, ureteral stents without ureteral stones  Creatinine admission 4 26, no leukocytosis  Potassium 3 0  Lactic acidosis present on admission 2 7  Inpatient consult to Acute Care Surgery  Consult performed by: Stephanie Amaya PA-C  Consult ordered by: Jaja Avery PA-C          Review of Systems   Constitutional: Positive for activity change and appetite change  Negative for chills, diaphoresis, fatigue, fever and unexpected weight change  Eyes: Negative  Respiratory: Negative for cough, shortness of breath, wheezing and stridor  Cardiovascular: Negative for chest pain, palpitations and leg swelling  Gastrointestinal: Positive for diarrhea   Negative for abdominal pain, nausea, rectal pain and vomiting  Endocrine: Negative  Genitourinary: Positive for decreased urine volume  Negative for difficulty urinating  Indwelling Way catheter  She has large amount of blood present on urinalysis  Musculoskeletal: Negative  Skin:        Rash across both groins  Allergic/Immunologic: Negative  Neurological: Positive for weakness  Negative for dizziness, tremors, seizures, syncope, speech difficulty, light-headedness and numbness  Hematological: Negative  Psychiatric/Behavioral: Positive for confusion          Historical Information   Past Medical History:   Diagnosis Date    Diabetes mellitus ([address] )     Diverticulitis     Hyperthyroidism      Past Surgical History:   Procedure Laterality Date    APPENDECTOMY      COLOSTOMY      FL RETROGRADE PYELOGRAM  4/6/2021    MASTECTOMY      OK CYSTOURETHROSCOPY,URETER CATHETER Bilateral 4/6/2021    Procedure: CYSTOSCOPY RETROGRADE PYELOGRAM WITH INSERTION STENT URETERAL;  Surgeon: Tyrese Martinez MD;  Location: [facility];  Service: Urology     Social History   Social History     Substance and Sexual Activity   Alcohol Use Not Currently     Social History     Substance and Sexual Activity   Drug Use Not Currently     E-Cigarette/Vaping    E-Cigarette Use Never User      E-Cigarette/Vaping Substances     Social History     Tobacco Use   Smoking Status Former Smoker   Smokeless Tobacco Never Used     Family History: non-contributory    Meds/Allergies   current meds:   Current Facility-Administered Medications   Medication Dose Route Frequency    cefTRIAXone (ROCEPHIN) IVPB (premix in dextrose) 1,000 mg 50 mL  1,000 mg Intravenous Q24H    heparin (porcine) subcutaneous injection 5,000 Units  5,000 Units Subcutaneous Q8H [facility]    magnesium sulfate 2 g/50 mL IVPB (premix) 2 g  2 g Intravenous Once    potassium chloride 20 mEq IVPB (premix)  20 mEq Intravenous Q4H    sodium bicarbonate 150 mEq in dextrose 5 % 1,000 mL infusion 125 mL/hr Intravenous Continuous     Allergies   Allergen Reactions    Latex Itching    Nuts - Food Allergy Other (See Comments)     Lorena allergy    Chlorhexidine Rash    Penicillins Rash       Objective   First Vitals:   Blood Pressure: 131/62 (06/29/21 1743)  Pulse: (!) 110 (06/29/21 1743)  Temperature: 98 1 °F (36 7 °C) (06/29/21 1743)  Temp Source: Temporal (06/29/21 1743)  Respirations: 20 (06/29/21 1743)  Weight - Scale: 60 3 kg (132 lb 15 oz) (06/29/21 1743)  SpO2: 98 % (06/29/21 1743)    Current Vitals:   Blood Pressure: 123/65 (06/30/21 0837)  Pulse: 66 (06/30/21 0837)  Temperature: 98 °F (36 7 °C) (06/30/21 0837)  Temp Source: Temporal (06/30/21 0837)  Respirations: 16 (06/30/21 0837)  Weight - Scale: 60 3 kg (132 lb 15 oz) (06/30/21 0707)  SpO2: 96 % (06/30/21 0837)      Intake/Output Summary (Last 24 hours) at 6/30/2021 0846  Last data filed at 6/30/2021 0514  Gross per 24 hour   Intake 2050 ml   Output 110 ml   Net 1940 ml       Invasive Devices     Peripheral Intravenous Line            Peripheral IV 06/29/21 Right Antecubital <1 day    Peripheral IV 06/29/21 Right Hand <1 day          Drain            Urethral Catheter Double-lumen 16 Fr  <1 day                Physical Exam     Patient seen in the ED, she is awake  She is mildly confused and oriented to her name only  Skin decreased turgor  In general the patient appears older than stated age of 70  Orbits appear sunken  She is quite thin and muscle wasting noted  Oral mucosa dry, poor few teeth remain  Tongue midline  Neck without any deviation of the trachea  No goiter  No carotid bruit  Chest thin, left breast mastectomy scar  No chest wall tenderness  Heart regular rate and rhythm  Lungs clear to auscultation  Back kyphotic curvature  Nontender to palpation  Abdomen appears distended  Midline surgical scar  The patient has a 5 cm round incisional hernia just to the right of the umbilicus which is nontender    No abdominal masses  Bowel sounds are high-pitched infrequent, liquid in nature  Tympany present  There is no guarding  Abdomen with tenderness across both sides more prominent to the right of midline  Also there is a scar from previous colostomy in the left lower quadrant  Muscle wasting noted  No calf tenderness  She follows commands  She is disoriented  No tremor  Cranial nerves appear symmetrical   Equal  strength enhanced  Ambulation not observed  Skin exam finds that the patient has a rash across both inguinal folds and lower abdomen with nystatin powder covering the area  Indwelling Way catheter with perhaps 50 mL of darker colored urine noted  No blood or clot seen  Lab Results:   I have personally reviewed pertinent lab results    , CBC:   Lab Results   Component Value Date    WBC 9 70 06/30/2021    HGB 10 8 (L) 06/30/2021    HCT 34 3 (L) 06/30/2021    MCV 95 06/30/2021     06/30/2021    MCH 30 0 06/30/2021    MCHC 31 5 06/30/2021    RDW 15 8 (H) 06/30/2021    MPV 12 0 06/30/2021    NRBC 0 06/30/2021   , CMP:   Lab Results   Component Value Date    SODIUM 141 06/30/2021    K 3 0 (L) 06/30/2021     (H) 06/30/2021    CO2 16 (L) 06/30/2021    BUN 43 (H) 06/30/2021    CREATININE 3 74 (H) 06/30/2021    CALCIUM 8 3 06/30/2021    AST 28 06/30/2021    ALT 20 06/30/2021    ALKPHOS 168 (H) 06/30/2021    EGFR 11 06/30/2021   , Coagulation: No results found for: PT, INR, APTT, Urinalysis:   Lab Results   Component Value Date    COLORU Yellow 06/29/2021    CLARITYU Turbid 06/29/2021    SPECGRAV >=1 030 06/29/2021    PHUR 6 0 06/29/2021    LEUKOCYTESUR Small (A) 06/29/2021    NITRITE Negative 06/29/2021    GLUCOSEU Negative 06/29/2021    KETONESU Negative 06/29/2021    BILIRUBINUR Small (A) 06/29/2021    BLOODU Large (A) 06/29/2021   , Lipase: No results found for: LIPASE     Lactic acid  Order: [id number]  Status:  Final result   Visible to patient:  No (inaccessible in [address]) Next appt:  None   0 Result Notes      (important suggestion)  Newer results are available  Click to view them now  Ref Range & Units 6/29/21 1810 4/1/21 1551   LACTIC ACID 0 5 - 2 0 mmol/L 2 7High Panic   2 0       Narrative    Result may be elevated if tourniquet was used during collection  Specimen Collected: 06/29/21 18:10 Last Resulted: 06/29/21 18:45               Imaging: I have personally reviewed pertinent films in PACS     CT ABDOMEN AND PELVIS WITHOUT IV CONTRAST - LOW DOSE RENAL STONE      INDICATION:   Hematuria, unknown cause  veda/hematuria r/o obstructive uropathy  History of appendectomy, colostomy, mastectomy and ureteral stent  Acute kidney insufficiency     COMPARISON:  4/1/2021     TECHNIQUE:  Low dose thin section CT examination of the abdomen and pelvis was performed without intravenous or oral contrast according to a protocol specifically designed to evaluate for urinary tract calculus  Axial, sagittal, and coronal 2D   reformatted images were created from the source data and submitted for interpretation  Evaluation for pathology in the abdomen and pelvis that is unrelated to urinary tract calculi is limited       Radiation dose length product (DLP) for this visit:  342 mGy-cm   This examination, like all CT scans performed in the [facility], was performed utilizing techniques to minimize radiation dose exposure, including the use of iterative   reconstruction and automated exposure control       FINDINGS:     RIGHT KIDNEY AND URETER:  Right ureteral stent in standard position  Mild to moderate pelvocaliectasis is chronic  No ureteral stone  Nonobstructing 0 3 cm and 0 4 cm upper pole stones  Periureteral fatty stranding  No perinephric fluid  Normal cortical thickness and   attenuation      LEFT KIDNEY AND URETER:  Ureteral stent in standard position  Moderate pelvocaliectasis is chronic  No nephrolithiasis  Periureteral fatty stranding    No perinephric fluid   Normal cortical thickness and attenuation      URINARY BLADDER:   Collapsed around a Way catheter  Poorly evaluated      No acute findings at the lung bases      Limited low radiation dose noncontrast CT evaluation demonstrates no clinically significant abnormality of liver, spleen, pancreas, or adrenal glands  Gallstones  Distended gallbladder, otherwise no secondary signs of cholecystitis or bile duct dilatation         Similar fatty infiltrative change in the mesentery  No free air or new abnormal fluid  No suspicious lymphadenopathy         Visualized esophagus, and stomach are within normal limits  Proximal to mid small bowel dilatation with air-fluid levels, transition to collapsed bowel in the right lower quadrant, 2/85  No obvious obstructing lesion  Similar anterior abdominal wall   hernia partially containing bowel, without evidence of obstruction at this point  Mild distal small bowel wall thickening  Collapsed large bowel  No evidence of appendicitis      Calcified uterine fibroid  No adnexal masses      No acute osseous abnormalities  Vessels poorly evaluated without intravenous contrast   Normal caliber      The study was marked in EPIC for immediate notification      IMPRESSION:     Compared to 4/1/2021, new small bowel dilatation suspicious for obstruction  Secondary signs of mild colitis      New ureteral stents without ureteral stones      Other chronic and nonemergent findings above            EKG, Pathology, and Other Studies: I have personally reviewed pertinent reports  Counseling / Coordination of Care  Total floor / unit time spent today 40 minutes  Greater than 50% of total time was spent with the patient and / or family counseling and / or coordination of care    A description of the counseling / coordination of care:  Review of diagnostic imaging, laboratory studies, ED documentation and personal examination the patient all conducted by this provider in surgical evaluation at this time

## 2021-06-30 NOTE — ED NOTES
Checked patient for any incontinence  Patient's brief dry at this time        Danya Madsen, [address] [facility]  06/30/21 1965

## 2021-06-30 NOTE — ASSESSMENT & PLAN NOTE
69 y/o female with pmhx diverticulitis, breast CA s/p left mastectomy and on Femera, hyperthyroidism presented to the ER today after friend found her covered in urine and feces at home  She lives alone  States she has been having watery diarrhea x 5 days- denies any hematochezia or melena  No nausea/vomiting  Has not eaten any food for a few days  · Pt is currently afebrile with leukocytosis; denies any recent abx use, travel, uncooked meat, eating out   · CT abdomen/pelvis: new small bowel dilatation suspicious for obstruction and secondary signs of mild colitis   · IVF hydration   · Supportive care  · Stool studies ordered: pending   · R/o C diff  · Ova & parasites   · Fecal leukocytes   · PT/OT and CM eval: pending   · Discussed with surgery they thing most likely colitis than obstruction will repeated obstruction series tomorrow to evaluate as his with CT renal without p o   Contrast   Will place on Flagyl in addition to Rocephin

## 2021-06-30 NOTE — ASSESSMENT & PLAN NOTE
· Patient is still actively having diarrhea, no nausea/vomiting, hyperactive bowel sounds  · CT abdomen/pelvis: new small bowel dilatation suspicious for obstruction and secondary signs of mild colitis   · IVF hydration   · NPO with ice chips   · General surgery consult placed- appreciate input

## 2021-06-30 NOTE — ED NOTES
Attempt to move patient into inpatient bed  Patient grossly incontinent again  Patinet given hygeine care, room cleansed, pt returned to inpatient bed        Elsa Mckeon RN  06/29/21 4320

## 2021-06-30 NOTE — ED NOTES
Patient grossly incontinent of bowel  Changed all linens and provided hygiene care  Patient repositioned in litter  CT tech at bedside to take patient to CT        Myrna Mcclain RN  06/29/21 2000

## 2021-06-30 NOTE — ASSESSMENT & PLAN NOTE
69 y/o female with pmhx diverticulitis, breast CA s/p left mastectomy and on Femera, hyperthyroidism presented to the ER today after friend found her covered in urine and feces at home  She lives alone  States she has been having watery diarrhea x 5 days- denies any hematochezia or melena  No nausea/vomiting  Has not eaten any food for a few days       · Pt is currently afebrile with leukocytosis; denies any recent abx use, travel, uncooked meat, eating out   · CT abdomen/pelvis: new small bowel dilatation suspicious for obstruction and secondary signs of mild colitis   · IVF hydration   · Supportive care  · Stool studies ordered: pending   · R/o C diff  · Ova & parasites   · Fecal leukocytes   · PT/OT and CM eval: pending

## 2021-06-30 NOTE — H&P
[address] 1945, 70 y o  female MRN: [id number]  Unit/Bed#: ED 02 Encounter: [id number]  Primary Care Provider: Jason Barnard MD   Date and time admitted to hospital: 6/29/2021  5:35 PM    * Colitis, acute  Assessment & Plan  69 y/o female with pmhx diverticulitis, breast CA s/p left mastectomy and on Femera, hyperthyroidism presented to the ER today after friend found her covered in urine and feces at home  She lives alone  States she has been having watery diarrhea x 5 days- denies any hematochezia or melena  No nausea/vomiting  Has not eaten any food for a few days       · Pt is currently afebrile with leukocytosis; denies any recent abx use, travel, uncooked meat, eating out   · CT abdomen/pelvis: new small bowel dilatation suspicious for obstruction and secondary signs of mild colitis   · IVF hydration   · Supportive care  · Stool studies ordered: pending   · R/o C diff  · Ova & parasites   · Fecal leukocytes   · PT/OT and CM eval: pending     Sepsis without acute organ dysfunction ([address] )  Assessment & Plan  · Present on admission   · Patient is currently afebrile   · Patient meets SIRS criteria with tachycardia, leukocytosis, elevated lactic acid on admission 2 7  · Suspect sepsis is secondary to acute UTI   · UTI: + blood, leukocytes, protein  · Culture: pending   · Patient was given 1 dose of IV Ceftriaxone in ER, will repeat dose again tomorrow   · IVF hydration   · Blood culture x2: pending   · Trend lactic acid   · Procalcitonin   · Monitor fever curve and hemodynamics     Bowel obstruction (HCC)  Assessment & Plan  · Patient is still actively having diarrhea, no nausea/vomiting, hyperactive bowel sounds  · CT abdomen/pelvis: new small bowel dilatation suspicious for obstruction and secondary signs of mild colitis   · IVF hydration   · NPO with ice chips   · General surgery consult placed- appreciate input       Acute on chronic kidney failure [facility])  Assessment & Plan  Lab Results   Component Value Date    EGFR 10 06/29/2021    EGFR 36 05/11/2021    EGFR 30 05/10/2021    CREATININE 4 26 (H) 06/29/2021    CREATININE 1 43 (H) 05/11/2021    CREATININE 1 68 (H) 05/10/2021     · Present on admission   · Patient is s/p bilateral ureteral stent placement 04/06/21 for hydronephrosis  · Patient has hx of prior admissions with [facility] and leaving [facility] x2 on 04/07/21 and 05/11/21   · Baseline creatinine 1 3-1 4 today is 4 26  · Suspect pre-renal secondary to decreased PO intake and persistent diarrhea   · CT abdomen/pelvis no signs on obstruction in the kidneys/ureter   · Insert Way catheter for strict I/O over next 24 hours   · IVF hydration   · Avoid nephrotoxins/hypotension   · Nephrology consult placed- appreciate input       Acute cystitis with hematuria  Assessment & Plan  · POA- patient admits to dysuria symptoms and is normally incontinent at home   · UA + leukocytes, blood, protein   · Pending urine cultures   · IV Ceftriaxone   · Management as stated above     Hypokalemia  Assessment & Plan  · K 3 1 on admission   · Replete and recheck in AM   · Check Mg     History of breast cancer  Assessment & Plan  · Diagnosed 2007 s/p left mastectomy   · Hold Femara secondary to suspected bowel obstruction- can resume when no longer NPO  · Of note, alk phos was elevated on admission 234; CT abdomen/pelvis reveals chronic gallstones and chronic distended gallbladder  Alk phos was elevated prior admission 05/11/21 317   · Consideration of bone metastasis- patient should f/u with imaging as outpatient     Hyperthyroidism  Assessment & Plan  · Hold methimazole secondary to suspected bowel obstruction- can resume when no longer NPO   · TSH checked and is euthyroid     VTE Pharmacologic Prophylaxis: VTE Score: 6 High Risk (Score >/= 5) - Pharmacological DVT Prophylaxis Ordered: heparin  Sequential Compression Devices Ordered    Code Status: Level 3 - DNAR and DNI Anticipated Length of Stay: Patient will be admitted on an inpatient basis with an anticipated length of stay of greater than 2 midnights secondary to suspected bowel obstruction, colitis, and sepsis management   Total Time for Visit, including Counseling / Coordination of Care: 45 minutes Greater than 50% of this total time spent on direct patient counseling and coordination of care  Chief Complaint: "I am pooping and peeing everywhere"    History of Present Illness:  Chano Blackwell is a 70 y o  female with a PMH of diverticulitis, breast CA s/p left mastectomy on Femera, hyperthyroidism c/o persistent diarrhea x5 days  Patient was brought to the ER by her friend today who had found the patient in her house covered in urine and feces  Patient lives alone  She is normally incontinent of urine and stools and wears briefs at home  She states the diarrhea is watery without blood  She denies any abdominal pain  She notes that she hasn't eaten any food in a few days because of her diarrhea but she doesn't feel nauseous and hasn't had any episodes of vomiting  She admits to some dysuria symptoms  Patient is s/p bilateral ureter stents 04/06/2021  She denies any fever, chills, lightheadedness, dizziness, LOC, nausea/vomiting, constipation, melena, hematochezia  Review of Systems:  Review of Systems   Constitutional: Positive for appetite change  Negative for chills, diaphoresis, fatigue and fever  HENT: Negative for voice change  Eyes: Negative for visual disturbance  Respiratory: Negative for cough, choking, chest tightness, shortness of breath and wheezing  Cardiovascular: Negative for chest pain, palpitations and leg swelling  Gastrointestinal: Positive for diarrhea  Negative for abdominal distention, abdominal pain, blood in stool, constipation, nausea and vomiting  Endocrine: Negative for polyuria  Genitourinary: Positive for dysuria  Negative for difficulty urinating     Musculoskeletal: Negative for arthralgias and back pain  Skin: Negative for color change  Neurological: Negative for dizziness, tremors, syncope, speech difficulty, weakness and light-headedness  Psychiatric/Behavioral: Negative for agitation  Past Medical and Surgical History:   Past Medical History:   Diagnosis Date    Diabetes mellitus ([address] )     Diverticulitis     Hyperthyroidism        Past Surgical History:   Procedure Laterality Date    APPENDECTOMY      COLOSTOMY      FL RETROGRADE PYELOGRAM  4/6/2021    MASTECTOMY      NM CYSTOURETHROSCOPY,URETER CATHETER Bilateral 4/6/2021    Procedure: CYSTOSCOPY RETROGRADE PYELOGRAM WITH INSERTION STENT URETERAL;  Surgeon: Kellen Perez MD;  Location: [facility];  Service: Urology       Meds/Allergies:  Prior to Admission medications    Medication Sig Start Date End Date Taking?  Authorizing Provider   acetaminophen (TYLENOL) 325 mg tablet Take 2 tablets (650 mg total) by mouth every 6 (six) hours as needed for mild pain 5/10/21   Justin Juarez MD   calcium carbonate (Tums) 500 mg chewable tablet Chew 2 tablets    Historical Provider, MD   glucosamine-chondroitin 500-400 MG tablet Take 1 tablet by mouth  Patient not taking: Reported on 6/29/2021    Historical Provider, MD   letrozole [facility]) 2 5 mg tablet Take 2 5 mg by mouth daily    Historical Provider, MD   loperamide (IMODIUM) 2 mg capsule Take 2 mg by mouth  Patient not taking: Reported on 6/29/2021    Historical Provider, MD   methimazole (TAPAZOLE) 5 mg tablet TAKE 1/2 TABLET BY MOUTH EVERY OTHER DAY 10/12/20   Historical Provider, MD   oxyCODONE-acetaminophen (PERCOCET) 5-325 mg per tablet Take 1 tablet by mouth every 4 (four) hours as needed for moderate painMax Daily Amount: 6 tablets  Patient not taking: Reported on 6/29/2021 5/11/21   Hector Stephens MD   pilocarpine (SALAGEN) 5 mg tablet Take 1 tablet (5 mg total) by mouth 3 (three) times a day 5/10/21 6/9/21  Justin Juarez MD   potassium chloride (MICRO-K) 10 MEQ CR capsule Take 2 capsules (20 mEq total) by mouth daily 5/10/21 6/9/21  Vickie Narvaez MD     I have reviewed home medications with patient personally  Allergies: Allergies   Allergen Reactions    Latex Itching    Nuts - Food Allergy Other (See Comments)     [place] allergy    Chlorhexidine Rash    Penicillins Rash       Social History:  Marital Status: Single   Patient Pre-hospital Living Situation: Alone  Patient Pre-hospital Level of Mobility: walks with cane  Patient Pre-hospital Diet Restrictions: None   Substance Use History:   Social History     Substance and Sexual Activity   Alcohol Use Not Currently     Social History     Tobacco Use   Smoking Status Former Smoker   Smokeless Tobacco Never Used     Social History     Substance and Sexual Activity   Drug Use Not Currently       Family History:  History reviewed  No pertinent family history  Physical Exam:     Vitals:   Blood Pressure: 127/56 (06/29/21 2000)  Pulse: 100 (06/29/21 2000)  Temperature: 98 1 °F (36 7 °C) (06/29/21 1743)  Temp Source: Temporal (06/29/21 1743)  Respirations: 18 (06/29/21 2000)  Weight - Scale: 60 3 kg (132 lb 15 oz) (06/29/21 1743)  SpO2: 97 % (06/29/21 1945)    Physical Exam  Constitutional:       General: She is not in acute distress  Appearance: Normal appearance  She is not ill-appearing, toxic-appearing or diaphoretic  HENT:      Head: Normocephalic and atraumatic  Mouth/Throat:      Mouth: Mucous membranes are dry  Eyes:      General: No scleral icterus  Pupils: Pupils are equal, round, and reactive to light  Cardiovascular:      Rate and Rhythm: Regular rhythm  Tachycardia present  Heart sounds: Normal heart sounds  Pulmonary:      Effort: Pulmonary effort is normal       Breath sounds: Normal breath sounds  No wheezing, rhonchi or rales  Abdominal:      General: Abdomen is flat  Bowel sounds are increased  There is no distension  Palpations: Abdomen is soft  There is no mass  Tenderness: There is no abdominal tenderness  There is no right CVA tenderness, left CVA tenderness, guarding or rebound  Musculoskeletal:      Cervical back: Normal range of motion  Right lower leg: No edema  Left lower leg: No edema  Skin:     General: Skin is warm and dry  Capillary Refill: Capillary refill takes less than 2 seconds  Neurological:      General: No focal deficit present  Mental Status: She is alert and oriented to person, place, and time  Psychiatric:         Mood and Affect: Mood normal           Additional Data:     Lab Results:  Results from last 7 days   Lab Units 06/29/21  1810   WBC Thousand/uL 12 43*   HEMOGLOBIN g/dL 13 4   HEMATOCRIT % 41 1   PLATELETS Thousands/uL 232   NEUTROS PCT % 67   LYMPHS PCT % 21   MONOS PCT % 10   EOS PCT % 1     Results from last 7 days   Lab Units 06/29/21  1810   SODIUM mmol/L 139   POTASSIUM mmol/L 3 1*   CHLORIDE mmol/L 106   CO2 mmol/L 14*   BUN mg/dL 46*   CREATININE mg/dL 4 26*   ANION GAP mmol/L 19*   CALCIUM mg/dL 10 0   ALBUMIN g/dL 3 3*   TOTAL BILIRUBIN mg/dL 1 67*   ALK PHOS U/L 234*   ALT U/L 25   AST U/L 39   GLUCOSE RANDOM mg/dL 105         Results from last 7 days   Lab Units 06/29/21  1819   POC GLUCOSE mg/dl 104         Results from last 7 days   Lab Units 06/29/21  2240 06/29/21  2137 06/29/21  1810   LACTIC ACID mmol/L 2 1* 2 1* 2 7*       Imaging: Reviewed radiology reports from this admission including: chest xray, abdominal/pelvic CT and CT head  CT renal stone study abdomen pelvis wo contrast   Final Result by Osmani Couch MD (06/29 2038)      Compared to 4/1/2021, new small bowel dilatation suspicious for obstruction  Secondary signs of mild colitis  New ureteral stents without ureteral stones  Other chronic and nonemergent findings above            Workstation performed: [id number]         CT head without contrast   Final Result by Thuan España DO (06/29 1941) Advanced, confluent periventricular white matter decreased attenuation suggestive of diffuse chronic microangiopathic change  No mass, hemorrhage or definitive signs of acute ischemia  Workstation performed: [id number]         XR chest 1 view portable   ED Interpretation by Tello Pearson MD (06/29 1908)   NAD      no consolidation or infiltrates    EKG and Other Studies Reviewed on Admission:   · EKG: Sinus Tachycardia   with PVCs  ** Please Note: This note has been constructed using a voice recognition system   **

## 2021-06-30 NOTE — PROGRESS NOTES
[address]  Progress Note - Christi Deeds 1945, 70 y o  female MRN: [id number]  Unit/Bed#: -[facility] Encounter: [id number]  Primary Care Provider: Micaela Viera MD   Date and time admitted to hospital: 6/29/2021  5:35 PM    Severe sepsis [facility])  Assessment & Plan  · Present on admission   · Patient is currently afebrile   · Patient meets SIRS criteria with tachycardia, leukocytosis, elevated lactic acid on admission 2 7  · To sources of infection UTI and acute colitis  · UTI: + blood, leukocytes, protein  · Culture: pending   · Patient was given 1 dose of IV Ceftriaxone in ER, will repeat dose again tomorrow   · IVF hydration   · Blood culture x2: pending   · Trend lactic acid   · Procalcitonin   · Monitor fever curve and hemodynamics   · Improve  · rocephine and flagyl added  · Bicarb drip    Bowel obstruction ([address] )  Assessment & Plan  · Patient is still actively having diarrhea, no nausea/vomiting, hyperactive bowel sounds  · CT abdomen/pelvis: new small bowel dilatation suspicious for obstruction and secondary signs of mild colitis   · IVF hydration   · NPO with ice chips   · General surgery consult placed- appreciate input       Acute cystitis with hematuria  Assessment & Plan  · POA- patient admits to dysuria symptoms and is normally incontinent at home   · UA + leukocytes, blood, protein   · Pending urine cultures   · IV Ceftriaxone   · Management as stated above     High anion gap metabolic acidosis  Assessment & Plan  · 2/2 LA and uremia from diarrhea- placed on bicarb drip    Hypokalemia  Assessment & Plan  · K 3 1 on admission   · Replete and recheck in AM   · Check Mg - all repleated    History of breast cancer  Assessment & Plan  · Diagnosed 2007 s/p left mastectomy   · Hold Femara secondary to suspected bowel obstruction- can resume when no longer NPO  · Of note, alk phos was elevated on admission 234; CT abdomen/pelvis reveals chronic gallstones and chronic distended gallbladder  Alk phos was elevated prior admission 05/11/21 317   · Consideration of bone metastasis- patient should f/u with imaging as outpatient     Hyperthyroidism  Assessment & Plan  · Hold methimazole secondary to suspected bowel obstruction- can resume when no longer NPO   · TSH checked and is euthyroid     Acute on chronic kidney failure [facility])  Assessment & Plan  Lab Results   Component Value Date    EGFR 11 06/30/2021    EGFR 10 06/29/2021    EGFR 36 05/11/2021    CREATININE 3 74 (H) 06/30/2021    CREATININE 4 26 (H) 06/29/2021    CREATININE 1 43 (H) 05/11/2021     · Present on admission  On ckd 2 base 0 7-1  1-Renal function does not return to baseline since episode of obstructive uropathy earlier this year  Should follow with nephrologist as outpatient  · Patient is s/p bilateral ureteral stent placement 04/06/21 for hydronephrosis  · Patient has hx of prior admissions with [facility] and leaving [facility] x2 on 04/07/21 and 05/11/21   · Suspect pre-renal secondary to decreased PO intake and persistent diarrhea   · CT abdomen/pelvis no signs on obstruction in the kidneys/ureter   · Insert Way catheter for strict I/O over next 24 hours   · IVF hydration changed to bicarb infusion  · Avoid nephrotoxins/hypotension   · Nephrology consult placed- appreciate input       * Colitis, acute  Assessment & Plan  71 y/o female with pmhx diverticulitis, breast CA s/p left mastectomy and on Femera, hyperthyroidism presented to the ER today after friend found her covered in urine and feces at home  She lives alone  States she has been having watery diarrhea x 5 days- denies any hematochezia or melena  No nausea/vomiting  Has not eaten any food for a few days       · Pt is currently afebrile with leukocytosis; denies any recent abx use, travel, uncooked meat, eating out   · CT abdomen/pelvis: new small bowel dilatation suspicious for obstruction and secondary signs of mild colitis   · IVF hydration   · Supportive care  · Stool studies ordered: pending   · R/o C diff  · Ova & parasites   · Fecal leukocytes   · PT/OT and CM eval: pending   · Discussed with surgery they thing most likely colitis than obstruction will repeated obstruction series tomorrow to evaluate as his with CT renal without p o  Contrast   Will place on Flagyl in addition to Rocephin          VTE Pharmacologic Prophylaxis: VTE Score: 6 High Risk (Score >/= 5) - Pharmacological DVT Prophylaxis Ordered: heparin  Sequential Compression Devices Ordered  Patient Centered Rounds: I performed bedside rounds with nursing staff today  Discussions with Specialists or Other Care Team Provider: discussed with nephrology    Education and Discussions with Family / Patient: Updated  (daughter) via phone  Time Spent for Care: 30 minutes  More than 50% of total time spent on counseling and coordination of care as described above  Current Length of Stay: 1 day(s)  Current Patient Status: Inpatient   Certification Statement: The patient will continue to require additional inpatient hospital stay due to Secondary to severe sepsis acute kidney injury  Discharge Plan: Anticipate discharge in >72 hrs to discharge location to be determined pending rehab evaluations  Code Status: Level 3 - DNAR and DNI    Subjective:   Patient seen and examined, patient is complaining of abdominal pain but no chest pain or shortness of breath no diarrhea has been reported    Objective:     Vitals:   Temp (24hrs), Av 5 °F (36 4 °C), Min:96 9 °F (36 1 °C), Max:98 1 °F (36 7 °C)    Temp:  [96 9 °F (36 1 °C)-98 1 °F (36 7 °C)] 97 7 °F (36 5 °C)  HR:  [] 66  Resp:  [15-40] 18  BP: (114-135)/(56-98) 133/71  SpO2:  [96 %-100 %] 98 %  Body mass index is 27 43 kg/m²  Input and Output Summary (last 24 hours):      Intake/Output Summary (Last 24 hours) at 2021 1604  Last data filed at 2021 1044  Gross per 24 hour   Intake 2100 ml   Output 110 ml   Net 1990 ml       Physical Exam:   Physical Exam  Vitals and nursing note reviewed  Constitutional:       General: She is not in acute distress  Appearance: She is well-developed  HENT:      Head: Normocephalic and atraumatic  Eyes:      Conjunctiva/sclera: Conjunctivae normal    Cardiovascular:      Rate and Rhythm: Normal rate and regular rhythm  Heart sounds: No murmur heard  Pulmonary:      Effort: Pulmonary effort is normal  No respiratory distress  Breath sounds: Normal breath sounds  No wheezing or rales  Abdominal:      General: There is distension  Palpations: Abdomen is soft  Tenderness: There is abdominal tenderness  Comments: Decreased BS   Musculoskeletal:         General: No swelling  Cervical back: Neck supple  Skin:     General: Skin is warm and dry  Neurological:      General: No focal deficit present  Mental Status: She is alert and oriented to person, place, and time  Mental status is at baseline     Psychiatric:         Mood and Affect: Mood normal          Additional Data:     Labs:  Results from last 7 days   Lab Units 06/30/21  0518   WBC Thousand/uL 9 70   HEMOGLOBIN g/dL 10 8*   HEMATOCRIT % 34 3*   PLATELETS Thousands/uL 159   NEUTROS PCT % 59   LYMPHS PCT % 26   MONOS PCT % 11   EOS PCT % 2     Results from last 7 days   Lab Units 06/30/21  0518   SODIUM mmol/L 141   POTASSIUM mmol/L 3 0*   CHLORIDE mmol/L 109*   CO2 mmol/L 16*   BUN mg/dL 43*   CREATININE mg/dL 3 74*   ANION GAP mmol/L 16*   CALCIUM mg/dL 8 3   ALBUMIN g/dL 2 4*   TOTAL BILIRUBIN mg/dL 1 03*   ALK PHOS U/L 168*   ALT U/L 20   AST U/L 28   GLUCOSE RANDOM mg/dL 72         Results from last 7 days   Lab Units 06/29/21  1819   POC GLUCOSE mg/dl 104     Results from last 7 days   Lab Units 06/30/21  0518   HEMOGLOBIN A1C % 5 1     Results from last 7 days   Lab Units 06/30/21  0057 06/29/21  2240 06/29/21  2137 06/29/21  1810   LACTIC ACID mmol/L 1 2 2 1* 2 1* 2 7*   PROCALCITONIN ng/ml  --  0 12  -- --        Lines/Drains:  Invasive Devices     Peripheral Intravenous Line            Peripheral IV 06/29/21 Right Antecubital <1 day    Peripheral IV 06/29/21 Right Hand <1 day          Drain            Urethral Catheter Double-lumen 16 Fr  <1 day              Urinary Catheter:  Goal for removal: Remove after 48 hrs of I/O monitoring               Imaging: Reviewed radiology reports from this admission including: abdominal/pelvic CT    Recent Cultures (last 7 days):   Results from last 7 days   Lab Units 06/29/21  1810 06/29/21  1805   BLOOD CULTURE  Received in Microbiology Lab  Culture in Progress  Received in Microbiology Lab  Culture in Progress  Last 24 Hours Medication List:   Current Facility-Administered Medications   Medication Dose Route Frequency Provider Last Rate    cefTRIAXone  1,000 mg Intravenous Q24H Renee Jackson PA-C      heparin (porcine)  5,000 Units Subcutaneous [facility] Renee Jackson PA-C      metroNIDAZOLE  500 mg Intravenous Q8H Lin Kurtz MD      potassium chloride  20 mEq Intravenous Q4H Emy Alicia, CRNP 20 mEq (06/30/21 1342)    sodium bicarbonate infusion  125 mL/hr Intravenous Continuous Emy Ailcia, CRNP 125 mL/hr (06/30/21 1600)        Today, Patient Was Seen By: Lin Kurtz MD    **Please Note: This note may have been constructed using a voice recognition system  **

## 2021-06-30 NOTE — ASSESSMENT & PLAN NOTE
· Present on admission   · Patient is currently afebrile   · Patient meets SIRS criteria with tachycardia, leukocytosis, elevated lactic acid on admission 2 7  · To sources of infection UTI and acute colitis  · UTI: + blood, leukocytes, protein  · Culture: pending   · Patient was given 1 dose of IV Ceftriaxone in ER, will repeat dose again tomorrow   · IVF hydration   · Blood culture x2: pending   · Trend lactic acid   · Procalcitonin   · Monitor fever curve and hemodynamics   · Improve  · rocephine and flagyl added  · Bicarb drip

## 2021-06-30 NOTE — PLAN OF CARE
Problem: MOBILITY - ADULT  Goal: Maintain or return to baseline ADL function  Description: INTERVENTIONS:  -  Assess patient's ability to carry out ADLs; assess patient's baseline for ADL function and identify physical deficits which impact ability to perform ADLs (bathing, care of mouth/teeth, toileting, grooming, dressing, etc )  - Assess/evaluate cause of self-care deficits   - Assess range of motion  - Assess patient's mobility; develop plan if impaired  - Assess patient's need for assistive devices and provide as appropriate  - Encourage maximum independence but intervene and supervise when necessary  - Involve family in performance of ADLs  - Assess for home care needs following discharge   - Consider OT consult to assist with ADL evaluation and planning for discharge  - Provide patient education as appropriate  Outcome: Progressing     Problem: Prexisting or High Potential for Compromised Skin Integrity  Goal: Skin integrity is maintained or improved  Description: INTERVENTIONS:  - Identify patients at risk for skin breakdown  - Assess and monitor skin integrity  - Assess and monitor nutrition and hydration status  - Monitor labs   - Assess for incontinence   - Turn and reposition patient  - Assist with mobility/ambulation  - Relieve pressure over bony prominences  - Avoid friction and shearing  - Provide appropriate hygiene as needed including keeping skin clean and dry  - Evaluate need for skin moisturizer/barrier cream  - Collaborate with interdisciplinary team   - Patient/family teaching  - Consider wound care consult   Outcome: Progressing     Problem: INFECTION - ADULT  Goal: Absence or prevention of progression during hospitalization  Description: INTERVENTIONS:  - Assess and monitor for signs and symptoms of infection  - Monitor lab/diagnostic results  - Monitor all insertion sites, i e  indwelling lines, tubes, and drains  - Monitor endotracheal if appropriate and nasal secretions for changes in amount and color  - [place] appropriate cooling/warming therapies per order  - Administer medications as ordered  - Instruct and encourage patient and family to use good hand hygiene technique  - Identify and instruct in appropriate isolation precautions for identified infection/condition  Outcome: Progressing     Problem: DISCHARGE PLANNING  Goal: Discharge to home or other facility with appropriate resources  Description: INTERVENTIONS:  - Identify barriers to discharge w/patient and caregiver  - Arrange for needed discharge resources and transportation as appropriate  - Identify discharge learning needs (meds, wound care, etc )  - Arrange for interpretive services to assist at discharge as needed  - Refer to Case Management Department for coordinating discharge planning if the patient needs post-hospital services based on physician/advanced practitioner order or complex needs related to functional status, cognitive ability, or social support system  Outcome: Progressing     Problem: Knowledge Deficit  Goal: Patient/family/caregiver demonstrates understanding of disease process, treatment plan, medications, and discharge instructions  Description: Complete learning assessment and assess knowledge base    Interventions:  - Provide teaching at level of understanding  - Provide teaching via preferred learning methods  Outcome: Progressing     Problem: GASTROINTESTINAL - ADULT  Goal: Minimal or absence of nausea and/or vomiting  Description: INTERVENTIONS:  - Administer IV fluids if ordered to ensure adequate hydration  - Maintain NPO status until nausea and vomiting are resolved  - Nasogastric tube if ordered  - Administer ordered antiemetic medications as needed  - Provide nonpharmacologic comfort measures as appropriate  - Advance diet as tolerated, if ordered  - Consider nutrition services referral to assist patient with adequate nutrition and appropriate food choices  Outcome: Progressing     Problem: GASTROINTESTINAL - ADULT  Goal: Maintains or returns to baseline bowel function  Description: INTERVENTIONS:  - Assess bowel function  - Encourage oral fluids to ensure adequate hydration  - Administer IV fluids if ordered to ensure adequate hydration  - Administer ordered medications as needed  - Encourage mobilization and activity  - Consider nutritional services referral to assist patient with adequate nutrition and appropriate food choices  Outcome: Progressing     Problem: GASTROINTESTINAL - ADULT  Goal: Maintains adequate nutritional intake  Description: INTERVENTIONS:  - Monitor percentage of each meal consumed  - Identify factors contributing to decreased intake, treat as appropriate  - Assist with meals as needed  - Monitor I&O, weight, and lab values if indicated  - Obtain nutrition services referral as needed  Outcome: Progressing     Problem: GASTROINTESTINAL - ADULT  Goal: Establish and maintain optimal ostomy function  Description: INTERVENTIONS:  - Assess bowel function  - Encourage oral fluids to ensure adequate hydration  - Administer IV fluids if ordered to ensure adequate hydration   - Administer ordered medications as needed  - Encourage mobilization and activity  - Nutrition services referral to assist patient with appropriate food choices  - Assess stoma site  - Consider wound care consult   Outcome: Progressing     Problem: GASTROINTESTINAL - ADULT  Goal: Oral mucous membranes remain intact  Description: INTERVENTIONS  - Assess oral mucosa and hygiene practices  - Implement preventative oral hygiene regimen  - Implement oral medicated treatments as ordered  - Initiate Nutrition services referral as needed  Outcome: Progressing

## 2021-06-30 NOTE — CONSULTS
CONSULTATION-NEPHROLOGY   Murali Flores 70 y o  female MRN: [id number]  Unit/Bed#: ED 02 Encounter: [id number]        Assessment and Plan:    1  Acute kidney injury (POA) atop chronic kidney disease  · Present with a creatinine of 4 26 mg/dL -> 3 74 mg/dL today  Etiology likely severe prerenal azotemia in the setting of diarrhea  There was no obstruction on imaging  · Continue aggressive volume expansion, avoidance of nephrotoxins, control of diarrhea  · Will check urine chemistries, indwelling catheter placed  2  Suspected stage 2 chronic kidney disease baseline creatinine around 0 7-1 1 mg/dL  · Renal function does not return to baseline since episode of obstructive uropathy earlier this year  Should follow with nephrologist as outpatient  3  Elevated anion gap metabolic acidosis  · Lactic acidosis has resolved  This is likely in the setting of perfuse diarrhea which is still ongoing  Will place patient on isotonic sodium bicarbonate infusion and can discontinue when serum bicarbonate reaches around 20 millimoles per L  4  Hypomagnesemia  · Will provide 2 g IV magnesium now  5  Hyperphosphatemia  · No binder indicated  Simply monitor at this time  6  Hypokalemia  · Will require IV supplementation as she is NPO for suspected SBO  7  History of obstructive nephropathy with bilateral hydronephrosis and hydroureter status post bilateral ureteral stents  · Stable on imaging  8  Possible SBO versus colitis  · NPO, management per primary team           HPI:    Murali Flores is a 70 y o  female with an active problem list significant for rest scan diagnosis 2017 status post chemotherapy, history of perforated diverticulitis status post colostomy with reversal, type 2 diabetes mellitus, who presented to [address] [place] [address] emergency department 06/29/2021 for altered mental status  CT head significant for advanced white matter disease, no acute findings    Unenhanced CT significant for new small bowel dilatation obstruction versus colitis, ureteral stents without ureteral stones  Creatinine upon admission 4 26 mg/dL improved to 3 74 mg/dL today with associated anion gap metabolic acidosis and hypomagnesemia  A renal consultation was requested for acute kidney injury atop chronic kidney disease  Upon discussion with the patient, she relates HPI as above in addition to:  Severe profuse diarrhea for several days and no air conditioner in living area  She was not eating or drinking  She denies nausea vomiting  She denies previous issue with urination and now has an indwelling urinary catheter  A renal standpoint, baseline creatinine appears to be 0 7-0 8 mg/dL  Previously seen by Nephrology over the last few months during admissions  Denies seeing a nephrologist as an outpatient  The medical record, including Care Everywhere and media tabs were reviewed  Reason for Consult:  Acute kidney injury atop chronic kidney disease    Review of Systems:  A complete 10-point review of systems was performed  Aside from what was mentioned in the HPI, it is otherwise negative  Historical Information   Past Medical History:   Diagnosis Date    Diabetes mellitus ([facility] [address] )     Diverticulitis     Hyperthyroidism      Past Surgical History:   Procedure Laterality Date    APPENDECTOMY      COLOSTOMY      FL RETROGRADE PYELOGRAM  4/6/2021    MASTECTOMY      SD CYSTOURETHROSCOPY,URETER CATHETER Bilateral 4/6/2021    Procedure: CYSTOSCOPY RETROGRADE PYELOGRAM WITH INSERTION STENT URETERAL;  Surgeon: Tyrese Martinez MD;  Location: [facility];  Service: Urology     Social History   Social History     Substance and Sexual Activity   Alcohol Use Not Currently     Social History     Substance and Sexual Activity   Drug Use Not Currently     Social History     Tobacco Use   Smoking Status Former Smoker   Smokeless Tobacco Never Used       Family History:   History reviewed  No pertinent family history      Medications:  Pertinent medications were reviewed  Current Facility-Administered Medications   Medication Dose Route Frequency Provider Last Rate    cefTRIAXone  1,000 mg Intravenous Q24H Mauricio Bloom PA-C      heparin (porcine)  5,000 Units Subcutaneous Count includes the [facility] Mauricio Bloom PA-C      sodium chloride  100 mL/hr Intravenous Continuous Mauricio Bloom PA-C Stopped (06/30/21 7510)         Allergies   Allergen Reactions    Latex Itching    Nuts - Food Allergy Other (See Comments)     [place] allergy    Chlorhexidine Rash    Penicillins Rash         Vitals:   /98 (BP Location: Left arm)   Pulse 83   Temp (!) 97 °F (36 1 °C) (Temporal)   Resp 15   Wt 60 3 kg (132 lb 15 oz)   SpO2 99%   BMI 27 78 kg/m²   Body mass index is 27 78 kg/m²  SpO2: 99 %,   SpO2 Activity: At Rest,   O2 Device: None (Room air)      Intake/Output Summary (Last 24 hours) at 6/30/2021 0754  Last data filed at 6/30/2021 1209  Gross per 24 hour   Intake 2050 ml   Output 110 ml   Net 1940 ml     Invasive Devices     Peripheral Intravenous Line            Peripheral IV 06/29/21 Right Antecubital <1 day    Peripheral IV 06/29/21 Right Hand <1 day          Drain            Urethral Catheter Double-lumen 16 Fr  <1 day                Physical Exam:  General: conscious, cooperative, in no acute distress, chronically ill-appearing  Eyes: conjunctivae pink, anicteric sclerae  ENT: lips and mucous membranes moist  Neck: supple, no JVD, no masses  Chest: clear breath sounds bilateral, no crackles, ronchus or wheezings  CVS: distinct S1 & S2, normal rate, regular rhythm  Abdomen: soft, non-tender, non-distended, normoactive bowel sounds  Extremities: no edema of both legs  Skin: no rash  Neuro: awake, alert, oriented      Diagnostic Data:  Lab: I have personally reviewed pertinent lab results  ,   CBC:  Results from last 7 days   Lab Units 06/30/21  0518   WBC Thousand/uL 9 70   HEMOGLOBIN g/dL 10 8*   HEMATOCRIT % 34 3*   PLATELETS Thousands/uL 159      CMP: Lab Results   Component Value Date    SODIUM 141 06/30/2021    K 3 0 (L) 06/30/2021     (H) 06/30/2021    CO2 16 (L) 06/30/2021    BUN 43 (H) 06/30/2021    CREATININE 3 74 (H) 06/30/2021    CALCIUM 8 3 06/30/2021    AST 28 06/30/2021    ALT 20 06/30/2021    ALKPHOS 168 (H) 06/30/2021    EGFR 11 06/30/2021   ,   PT/INR: No results found for: PT, INR,   Magnesium: No components found for: MAG,  Phosphorous:   Lab Results   Component Value Date    PHOS 5 2 (H) 06/30/2021       Microbiology:  @LABDARLENE,(urinecx:7)@        SIMON Retana    Portions of the record may have been created with voice recognition software  Occasional wrong word or "sound a like" substitutions may have occurred due to the inherent limitations of voice recognition software  Read the chart carefully and recognize, using context, where substitutions have occurred

## 2021-06-30 NOTE — ED NOTES
Pt is refusing potassium   Shirley text sent to [facility] and [facility] Fischer which respond pending at this time       Marquis Reese RN  06/30/21 6519

## 2021-06-30 NOTE — ASSESSMENT & PLAN NOTE
Lab Results   Component Value Date    EGFR 10 06/29/2021    EGFR 36 05/11/2021    EGFR 30 05/10/2021    CREATININE 4 26 (H) 06/29/2021    CREATININE 1 43 (H) 05/11/2021    CREATININE 1 68 (H) 05/10/2021     · Present on admission   · Patient is s/p bilateral ureteral stent placement 04/06/21 for hydronephrosis  · Patient has hx of prior admissions with [facility] and leaving [facility] x2 on 04/07/21 and 05/11/21   · Baseline creatinine 1 3-1 4 today is 4 26  · Suspect pre-renal secondary to decreased PO intake and persistent diarrhea   · CT abdomen/pelvis no signs on obstruction in the kidneys/ureter   · Insert Way catheter for strict I/O over next 24 hours   · IVF hydration   · Avoid nephrotoxins/hypotension   · Nephrology consult placed- appreciate input

## 2021-06-30 NOTE — ASSESSMENT & PLAN NOTE
Lab Results   Component Value Date    EGFR 11 06/30/2021    EGFR 10 06/29/2021    EGFR 36 05/11/2021    CREATININE 3 74 (H) 06/30/2021    CREATININE 4 26 (H) 06/29/2021    CREATININE 1 43 (H) 05/11/2021     · Present on admission  On ckd 2 base 0 7-1  1-Renal function does not return to baseline since episode of obstructive uropathy earlier this year  Should follow with nephrologist as outpatient    · Patient is s/p bilateral ureteral stent placement 04/06/21 for hydronephrosis  · Patient has hx of prior admissions with [facility] and leaving [facility] x2 on 04/07/21 and 05/11/21   · Suspect pre-renal secondary to decreased PO intake and persistent diarrhea   · CT abdomen/pelvis no signs on obstruction in the kidneys/ureter   · Insert Way catheter for strict I/O over next 24 hours   · IVF hydration changed to bicarb infusion  · Avoid nephrotoxins/hypotension   · Nephrology consult placed- appreciate input

## 2021-07-01 ENCOUNTER — APPOINTMENT (INPATIENT)
Dept: RADIOLOGY | Facility: HOSPITAL | Age: 76
DRG: 872 | End: 2021-07-01
Payer: MEDICARE

## 2021-07-01 LAB
ALBUMIN SERPL BCP-MCNC: 2.4 G/DL (ref 3.5–5)
ALP SERPL-CCNC: 154 U/L (ref 46–116)
ALT SERPL W P-5'-P-CCNC: 16 U/L (ref 12–78)
ANION GAP SERPL CALCULATED.3IONS-SCNC: 12 MMOL/L (ref 4–13)
ANION GAP SERPL CALCULATED.3IONS-SCNC: 13 MMOL/L (ref 4–13)
AST SERPL W P-5'-P-CCNC: 26 U/L (ref 5–45)
BACTERIA UR CULT: NORMAL
BILIRUB SERPL-MCNC: 0.66 MG/DL (ref 0.2–1)
BUN SERPL-MCNC: 37 MG/DL (ref 5–25)
BUN SERPL-MCNC: 40 MG/DL (ref 5–25)
C DIFF TOX B TCDB STL QL NAA+PROBE: NEGATIVE
CALCIUM ALBUM COR SERPL-MCNC: 9.6 MG/DL (ref 8.3–10.1)
CALCIUM SERPL-MCNC: 8.3 MG/DL (ref 8.3–10.1)
CALCIUM SERPL-MCNC: 8.7 MG/DL (ref 8.3–10.1)
CAMPYLOBACTER DNA SPEC NAA+PROBE: NORMAL
CHLORIDE SERPL-SCNC: 106 MMOL/L (ref 100–108)
CHLORIDE SERPL-SCNC: 107 MMOL/L (ref 100–108)
CO2 SERPL-SCNC: 22 MMOL/L (ref 21–32)
CO2 SERPL-SCNC: 22 MMOL/L (ref 21–32)
CREAT SERPL-MCNC: 3.21 MG/DL (ref 0.6–1.3)
CREAT SERPL-MCNC: 3.57 MG/DL (ref 0.6–1.3)
GFR SERPL CREATININE-BSD FRML MDRD: 12 ML/MIN/1.73SQ M
GFR SERPL CREATININE-BSD FRML MDRD: 14 ML/MIN/1.73SQ M
GLUCOSE SERPL-MCNC: 83 MG/DL (ref 65–140)
GLUCOSE SERPL-MCNC: 90 MG/DL (ref 65–140)
MAGNESIUM SERPL-MCNC: 1.6 MG/DL (ref 1.6–2.6)
PHOSPHATE SERPL-MCNC: 3.7 MG/DL (ref 2.3–4.1)
POTASSIUM SERPL-SCNC: 2.7 MMOL/L (ref 3.5–5.3)
POTASSIUM SERPL-SCNC: 3.5 MMOL/L (ref 3.5–5.3)
PROCALCITONIN SERPL-MCNC: 0.12 NG/ML
PROT SERPL-MCNC: 6.9 G/DL (ref 6.4–8.2)
SALMONELLA DNA SPEC QL NAA+PROBE: NORMAL
SHIGA TOXIN STX GENE SPEC NAA+PROBE: NORMAL
SHIGELLA DNA SPEC QL NAA+PROBE: NORMAL
SODIUM SERPL-SCNC: 141 MMOL/L (ref 136–145)
SODIUM SERPL-SCNC: 141 MMOL/L (ref 136–145)

## 2021-07-01 PROCEDURE — 97535 SELF CARE MNGMENT TRAINING: CPT

## 2021-07-01 PROCEDURE — 97116 GAIT TRAINING THERAPY: CPT

## 2021-07-01 PROCEDURE — 80053 COMPREHEN METABOLIC PANEL: CPT | Performed by: NURSE PRACTITIONER

## 2021-07-01 PROCEDURE — 99232 SBSQ HOSP IP/OBS MODERATE 35: CPT | Performed by: PHYSICIAN ASSISTANT

## 2021-07-01 PROCEDURE — 99232 SBSQ HOSP IP/OBS MODERATE 35: CPT | Performed by: NURSE PRACTITIONER

## 2021-07-01 PROCEDURE — 97163 PT EVAL HIGH COMPLEX 45 MIN: CPT

## 2021-07-01 PROCEDURE — 84145 PROCALCITONIN (PCT): CPT | Performed by: PHYSICIAN ASSISTANT

## 2021-07-01 PROCEDURE — 99232 SBSQ HOSP IP/OBS MODERATE 35: CPT | Performed by: FAMILY MEDICINE

## 2021-07-01 PROCEDURE — 84100 ASSAY OF PHOSPHORUS: CPT | Performed by: NURSE PRACTITIONER

## 2021-07-01 PROCEDURE — 80048 BASIC METABOLIC PNL TOTAL CA: CPT | Performed by: FAMILY MEDICINE

## 2021-07-01 PROCEDURE — 97167 OT EVAL HIGH COMPLEX 60 MIN: CPT

## 2021-07-01 PROCEDURE — 74022 RADEX COMPL AQT ABD SERIES: CPT

## 2021-07-01 PROCEDURE — 83735 ASSAY OF MAGNESIUM: CPT | Performed by: NURSE PRACTITIONER

## 2021-07-01 RX ORDER — POTASSIUM CHLORIDE 14.9 MG/ML
20 INJECTION INTRAVENOUS
Status: COMPLETED | OUTPATIENT
Start: 2021-07-01 | End: 2021-07-01

## 2021-07-01 RX ORDER — METHIMAZOLE 5 MG/1
2.5 TABLET ORAL EVERY OTHER DAY
Status: DISCONTINUED | OUTPATIENT
Start: 2021-07-01 | End: 2021-07-01

## 2021-07-01 RX ORDER — SODIUM CHLORIDE, SODIUM GLUCONATE, SODIUM ACETATE, POTASSIUM CHLORIDE, MAGNESIUM CHLORIDE, SODIUM PHOSPHATE, DIBASIC, AND POTASSIUM PHOSPHATE .53; .5; .37; .037; .03; .012; .00082 G/100ML; G/100ML; G/100ML; G/100ML; G/100ML; G/100ML; G/100ML
75 INJECTION, SOLUTION INTRAVENOUS CONTINUOUS
Status: DISCONTINUED | OUTPATIENT
Start: 2021-07-01 | End: 2021-07-03

## 2021-07-01 RX ORDER — ONDANSETRON 2 MG/ML
4 INJECTION INTRAMUSCULAR; INTRAVENOUS EVERY 6 HOURS PRN
Status: DISCONTINUED | OUTPATIENT
Start: 2021-07-01 | End: 2021-07-10 | Stop reason: HOSPADM

## 2021-07-01 RX ORDER — MAGNESIUM SULFATE HEPTAHYDRATE 40 MG/ML
2 INJECTION, SOLUTION INTRAVENOUS ONCE
Status: COMPLETED | OUTPATIENT
Start: 2021-07-01 | End: 2021-07-01

## 2021-07-01 RX ORDER — POTASSIUM CHLORIDE 20 MEQ/1
40 TABLET, EXTENDED RELEASE ORAL ONCE
Status: COMPLETED | OUTPATIENT
Start: 2021-07-01 | End: 2021-07-01

## 2021-07-01 RX ADMIN — SODIUM CHLORIDE, SODIUM GLUCONATE, SODIUM ACETATE, POTASSIUM CHLORIDE, MAGNESIUM CHLORIDE, SODIUM PHOSPHATE, DIBASIC, AND POTASSIUM PHOSPHATE 100 ML/HR: .53; .5; .37; .037; .03; .012; .00082 INJECTION, SOLUTION INTRAVENOUS at 13:00

## 2021-07-01 RX ADMIN — ONDANSETRON 4 MG: 2 INJECTION INTRAMUSCULAR; INTRAVENOUS at 14:17

## 2021-07-01 RX ADMIN — METRONIDAZOLE 500 MG: 500 INJECTION, SOLUTION INTRAVENOUS at 19:32

## 2021-07-01 RX ADMIN — CEFTRIAXONE 1000 MG: 1 INJECTION, SOLUTION INTRAVENOUS at 20:06

## 2021-07-01 RX ADMIN — SODIUM CHLORIDE, SODIUM GLUCONATE, SODIUM ACETATE, POTASSIUM CHLORIDE, MAGNESIUM CHLORIDE, SODIUM PHOSPHATE, DIBASIC, AND POTASSIUM PHOSPHATE 100 ML/HR: .53; .5; .37; .037; .03; .012; .00082 INJECTION, SOLUTION INTRAVENOUS at 23:54

## 2021-07-01 RX ADMIN — SODIUM BICARBONATE 125 ML/HR: 84 INJECTION, SOLUTION INTRAVENOUS at 03:37

## 2021-07-01 RX ADMIN — POTASSIUM CHLORIDE 20 MEQ: 200 INJECTION, SOLUTION INTRAVENOUS at 14:08

## 2021-07-01 RX ADMIN — ONDANSETRON 4 MG: 2 INJECTION INTRAMUSCULAR; INTRAVENOUS at 21:42

## 2021-07-01 RX ADMIN — POTASSIUM CHLORIDE 20 MEQ: 200 INJECTION, SOLUTION INTRAVENOUS at 07:37

## 2021-07-01 RX ADMIN — MAGNESIUM SULFATE HEPTAHYDRATE 2 G: 40 INJECTION, SOLUTION INTRAVENOUS at 09:22

## 2021-07-01 RX ADMIN — METRONIDAZOLE 500 MG: 500 INJECTION, SOLUTION INTRAVENOUS at 23:57

## 2021-07-01 RX ADMIN — METHIMAZOLE 2.5 MG: 5 TABLET ORAL at 12:57

## 2021-07-01 RX ADMIN — METRONIDAZOLE 500 MG: 500 INJECTION, SOLUTION INTRAVENOUS at 07:37

## 2021-07-01 RX ADMIN — POTASSIUM CHLORIDE 40 MEQ: 1500 TABLET, EXTENDED RELEASE ORAL at 12:58

## 2021-07-01 NOTE — ASSESSMENT & PLAN NOTE
Lab Results   Component Value Date    EGFR 12 07/01/2021    EGFR 11 06/30/2021    EGFR 10 06/29/2021    CREATININE 3 57 (H) 07/01/2021    CREATININE 3 74 (H) 06/30/2021    CREATININE 4 26 (H) 06/29/2021     · Present on admission  On ckd 2 base 0 7-1  1-Renal function does not return to baseline since episode of obstructive uropathy earlier this year  Should follow with nephrologist as outpatient    · Patient is s/p bilateral ureteral stent placement 04/06/21 for hydronephrosis  · Patient has hx of prior admissions with [facility] and leaving [facility] x2 on 04/07/21 and 05/11/21   · Suspect pre-renal secondary to decreased PO intake and persistent diarrhea   · CT abdomen/pelvis no signs on obstruction in the kidneys/ureter   · Discontinue bicarb infusion as the bicarb is back to normal place on isolate 100 mL/hour continues to improve discontinue Way  · Labs in am

## 2021-07-01 NOTE — CASE MANAGEMENT
STR has been recommended for pt a time of discharge  CM discussing STR options with pt, pt sts she has been to [facility] and [facility] in the past and has not had good experiences  Pt is declining STR at this time  [facility], pt does not have a preference to any [place] [id number] agency  CM placing referral to [facility], awaiting reply     [facility] will accept pt at time of discharge  A post acute care recommendation was made by your care team for [place] [id number]  Discussed Freedom of Choice with patient  List of agencies given to patient via in person  patient aware the list is custom filtered for them by zip code location and that [facility] post acute providers are designated

## 2021-07-01 NOTE — PLAN OF CARE
Problem: OCCUPATIONAL THERAPY ADULT  Goal: Performs self-care activities at highest level of function for planned discharge setting  See evaluation for individualized goals  Description: Treatment Interventions: ADL retraining, Functional transfer training, UE strengthening/ROM, Endurance training, Cognitive reorientation, Patient/family training, Equipment evaluation/education, Neuromuscular reeducation, Compensatory technique education, Continued evaluation, Energy conservation, Activityengagement          See flowsheet documentation for full assessment, interventions and recommendations  Note: Limitation: Decreased ADL status, Decreased UE strength, Decreased Safe judgement during ADL, Decreased cognition, Decreased endurance, Decreased self-care trans, Decreased high-level ADLs  Prognosis: Good  Assessment: Pt is a 69 y/o F admitted to [address] [place] [address] 6/29/2021 d/t experiencing diarrhea and decreased responsiveness at home after being found covered in feces by her home health aide  Dx: colitis  Pt with PMHx impacting performance during functional tasks including: DM, diverticulitis, hyperthyroidism  Pt reports living independently at home with ramped entry  Pt ambulates with RW and SPC in the home and reports completing ADLs, light IADLs, and functional ambulation @ Mod I  Pt has a HH aide 2x/wk to assist with IADLs and community mobility  On evaluation, Pt A&Ox4  Pt completing supine>sit @ Mod I  UB Dressing and grooming tasks @ S  LB Dressing @ Steadying Assist  Pt competing functional transfers with use of RW for UB support @ SBA with increased time  Pt requiring cues for sequencing and thoroughness  Pt's BUE ROM and MS WFL   Pt's barriers to d/c include: decrease activity tolerance, decrease standing balance, decrease performance during ADL tasks, decrease cognition, decrease safety awareness , decrease UB MS, decrease generalized strength, decrease activity engagement and decrease performance during functional transfers  Pt would benefit from continued acute OT services to address deficits as well as post acute rehab upon d/c from [address] [place] [address] [place]    Recommendation:  (post acute rehab)  OT Discharge Recommendation: Post acute rehabilitation services

## 2021-07-01 NOTE — PLAN OF CARE
Problem: Prexisting or High Potential for Compromised Skin Integrity  Goal: Skin integrity is maintained or improved  Description: INTERVENTIONS:  - Identify patients at risk for skin breakdown  - Assess and monitor skin integrity  - Assess and monitor nutrition and hydration status  - Monitor labs   - Assess for incontinence   - Turn and reposition patient  - Assist with mobility/ambulation  - Relieve pressure over bony prominences  - Avoid friction and shearing  - Provide appropriate hygiene as needed including keeping skin clean and dry  - Evaluate need for skin moisturizer/barrier cream  - Collaborate with interdisciplinary team   - Patient/family teaching  - Consider wound care consult   Outcome: Progressing     Problem: PAIN - ADULT  Goal: Verbalizes/displays adequate comfort level or baseline comfort level  Description: Interventions:  - Encourage patient to monitor pain and request assistance  - Assess pain using appropriate pain scale  - Administer analgesics based on type and severity of pain and evaluate response  - Implement non-pharmacological measures as appropriate and evaluate response  - Consider cultural and social influences on pain and pain management  - Notify physician/advanced practitioner if interventions unsuccessful or patient reports new pain  Outcome: Progressing     Problem: INFECTION - ADULT  Goal: Absence or prevention of progression during hospitalization  Description: INTERVENTIONS:  - Assess and monitor for signs and symptoms of infection  - Monitor lab/diagnostic results  - Monitor all insertion sites, i e  indwelling lines, tubes, and drains  - Monitor endotracheal if appropriate and nasal secretions for changes in amount and color  - [place] appropriate cooling/warming therapies per order  - Administer medications as ordered  - Instruct and encourage patient and family to use good hand hygiene technique  - Identify and instruct in appropriate isolation precautions for identified infection/condition  Outcome: Progressing     Problem: DISCHARGE PLANNING  Goal: Discharge to home or other facility with appropriate resources  Description: INTERVENTIONS:  - Identify barriers to discharge w/patient and caregiver  - Arrange for needed discharge resources and transportation as appropriate  - Identify discharge learning needs (meds, wound care, etc )  - Arrange for interpretive services to assist at discharge as needed  - Refer to Case Management Department for coordinating discharge planning if the patient needs post-hospital services based on physician/advanced practitioner order or complex needs related to functional status, cognitive ability, or social support system  Outcome: Progressing     Problem: Knowledge Deficit  Goal: Patient/family/caregiver demonstrates understanding of disease process, treatment plan, medications, and discharge instructions  Description: Complete learning assessment and assess knowledge base    Interventions:  - Provide teaching at level of understanding  - Provide teaching via preferred learning methods  Outcome: Progressing     Problem: GASTROINTESTINAL - ADULT  Goal: Minimal or absence of nausea and/or vomiting  Description: INTERVENTIONS:  - Administer IV fluids if ordered to ensure adequate hydration  - Maintain NPO status until nausea and vomiting are resolved  - Nasogastric tube if ordered  - Administer ordered antiemetic medications as needed  - Provide nonpharmacologic comfort measures as appropriate  - Advance diet as tolerated, if ordered  - Consider nutrition services referral to assist patient with adequate nutrition and appropriate food choices  Outcome: Progressing     Problem: GASTROINTESTINAL - ADULT  Goal: Maintains or returns to baseline bowel function  Description: INTERVENTIONS:  - Assess bowel function  - Encourage oral fluids to ensure adequate hydration  - Administer IV fluids if ordered to ensure adequate hydration  - Administer ordered medications as needed  - Encourage mobilization and activity  - Consider nutritional services referral to assist patient with adequate nutrition and appropriate food choices  Outcome: Progressing     Problem: GASTROINTESTINAL - ADULT  Goal: Maintains adequate nutritional intake  Description: INTERVENTIONS:  - Monitor percentage of each meal consumed  - Identify factors contributing to decreased intake, treat as appropriate  - Assist with meals as needed  - Monitor I&O, weight, and lab values if indicated  - Obtain nutrition services referral as needed  Outcome: Progressing     Problem: GASTROINTESTINAL - ADULT  Goal: Oral mucous membranes remain intact  Description: INTERVENTIONS  - Assess oral mucosa and hygiene practices  - Implement preventative oral hygiene regimen  - Implement oral medicated treatments as ordered  - Initiate Nutrition services referral as needed  Outcome: Progressing

## 2021-07-01 NOTE — OCCUPATIONAL THERAPY NOTE
Occupational Therapy Evaluation     Evaluation: 2219-6629  Treatment: 2886-0642    Patient Name: Jose Tomas  Today's Date: 7/1/2021  Problem List  Principal Problem:    Colitis, acute  Active Problems:    Acute on chronic kidney failure ([facility] [address] )    Hyperthyroidism    History of breast cancer    Hypokalemia    High anion gap metabolic acidosis    Acute cystitis with hematuria    Bowel obstruction (HCC)    Severe sepsis [facility])    Past Medical History  Past Medical History:   Diagnosis Date    Diabetes mellitus ([facility] )     Diverticulitis     Hyperthyroidism      Past Surgical History  Past Surgical History:   Procedure Laterality Date    APPENDECTOMY      COLOSTOMY      FL RETROGRADE PYELOGRAM  4/6/2021    MASTECTOMY      [facility] CYSTOURETHROSCOPY,URETER CATHETER Bilateral 4/6/2021    Procedure: CYSTOSCOPY RETROGRADE PYELOGRAM WITH INSERTION STENT URETERAL;  Surgeon: Alonso Montiel MD;  Location: [facility];  Service: Urology           07/01/21 0134   Note Type   Note type Evaluation   Restrictions/Precautions   Other Precautions Limb alert;Multiple lines; Chair Alarm; Bed Alarm; Fall Risk   Pain Assessment   Pain Assessment Tool 0-10   Pain Score No Pain   Home Living   Type of Home Mobile home   Home Layout Ramped entrance; One level;Performs ADLs on one level; Able to live on main level with bedroom/bathroom   Bathroom Shower/Tub   ("my shower doesnt work, cristobal been sponge bathing")   [address] bars around toilet   [address] Walker;Cane  (uses RW)   Additional Comments Pt reports living in a 1 story home with ramped entry   Pt has a RW for ambulation but also utilizes [facility] PRN   Prior Function   Level of [place] Independent with ADLs and functional mobility   Lives With Alone   Receives Help From Personal care attendant   ADL Assistance Independent   IADLs Needs assistance   Falls in the last 6 months 0   Vocational Retired   Comments Pt reports completing ADLs and functional ambulation and light cooking @ Mod I with RW  [facility] on tuesdays for 2 hours and 3 hours on friday to assist with IADLs and community mobility   ADL   Where Assessed Edge of bed   Eating Assistance 5  Supervision/Setup   Eating Deficit Setup   Grooming Assistance 5  Supervision/Setup   Grooming Deficit Setup   UB Dressing Assistance 5  Supervision/Setup   UB Dressing Deficit Setup   LB Dressing Assistance   ([address] )   LB Dressing Deficit Steadying; Requires assistive device for steadying;Verbal cueing;Supervision/safety; Increased time to complete; Don/doff R sock; Don/doff L sock; Thread RLE into pants; Thread LLE into pants;Pull up over hips   Toileting Assistance    ([address] )   Toileting Deficit Steadying;Verbal cueing;Supervison/safety; Increased time to complete;Grab bar use;Clothing management up;Clothing management down;Perineal hygiene   Additional Comments Pt completing ADL tasks while seated at EOB  UB Dressing, grooming and self-feeding tasks @ S after set-up  Pt then completing LB Dressing @ [address]  for balance, safety, and sequencing while standing to complete CM around waist  Pt donning B socks @ S with increased time and effort at EOB  Please refer to treatment note for performance during toileting tasks   Bed Mobility   Supine to Sit 6  Modified independent   Additional items Increased time required;HOB elevated; Bedrails   Additional Comments Pt reports having a hospital bed at home   Transfers   Sit to Stand 5  Supervision   Additional items Increased time required;Verbal cues   Stand to Sit   (SBA)   Additional items Increased time required;Verbal cues   Stand pivot   (SBA)   Additional items Increased time required;Verbal cues   Additional Comments Pt completing functional transfers with use of RW for UB support  increased timea nd vc'ing for safety, technique,a nd sequencing      Balance   Static Sitting Good   Dynamic Sitting Fair +   Static Standing Fair   Dynamic Standing Fair -   Activity Tolerance   Activity Tolerance Patient limited by fatigue   Medical Staff Made Aware Spoke with PT, Marlo Sinclair   Nurse Made Aware Spoke with RN, Meeta Alvarado Assessment   RUE Assessment WFL   LUE Assessment   LUE Assessment WFL   Hand Function   Gross Motor Coordination Functional   Fine Motor Coordination Functional   Sensation   Light Touch No apparent deficits   Cognition   Arousal/Participation Alert; Responsive; Cooperative   Attention Attends with cues to redirect   Orientation Level Oriented X4   Memory Within functional limits   Following Commands Follows one step commands without difficulty   Comments Pt would benefit from continued cognitive assessment   Assessment   Limitation Decreased ADL status; Decreased UE strength;Decreased Safe judgement during ADL;Decreased cognition;Decreased endurance;Decreased self-care trans;Decreased high-level ADLs   Prognosis Good   Assessment Pt is a 69 y/o F admitted to [address] [place] [address] 6/29/2021 d/t experiencing diarrhea and decreased responsiveness at home after being found covered in feces by her home health aide  Dx: colitis  Pt with PMHx impacting performance during functional tasks including: DM, diverticulitis, hyperthyroidism  Pt reports living independently at home with ramped entry  Pt ambulates with RW and SPC in the home and reports completing ADLs, light IADLs, and functional ambulation @ Mod I  Pt has a HH aide 2x/wk to assist with IADLs and community mobility  On evaluation, Pt A&Ox4  Pt completing supine>sit @ Mod I  UB Dressing and grooming tasks @ S  LB Dressing @ Steadying Assist  Pt competing functional transfers with use of RW for UB support @ SBA with increased time  Pt requiring cues for sequencing and thoroughness  Pt's BUE ROM and MS WFL   Pt's barriers to d/c include: decrease activity tolerance, decrease standing balance, decrease performance during ADL tasks, decrease cognition, decrease safety awareness , decrease UB MS, decrease generalized strength, decrease activity engagement and decrease performance during functional transfers  Pt would benefit from continued acute OT services to address deficits as well as post acute rehab upon d/c from [address] [place] [address]  Plan   Treatment Interventions ADL retraining;Functional transfer training;UE strengthening/ROM; Endurance training;Cognitive reorientation;Patient/family training;Equipment evaluation/education; Neuromuscular reeducation; Compensatory technique education;Continued evaluation; Energy conservation; Activityengagement   Goal Expiration Date 07/11/21   OT Treatment Day 1   OT Frequency 3-5x/wk   Additional Treatment Session   Start Time 6210   End Time 1004   Treatment Assessment Pt seen for treatment session #1 this date  pt alert and agreeable to participate at this time  Pt completing short distance ambulation with use of RW for UB support @ SBA with increased time  Pt then completing toileting tasks with UB support for RW and grab bar  Pt completing pericare @ [employer] with vc'ing for safety and thoroughness  Pt then requiring [employer] for CM around waist while standing with 1 hand supported  Pt requirign vc'ing to sequence and for thoroughness  Pt slow to respond at times  Pt then ambulating back to recliner chair with RW @ SBA with increased time   Pt seated OOB at end of session with call bell in reach, chair alarm intact and all needs met at this time   Additional Treatment Day 1   Recommendation   Recommendation   (post acute rehab)   OT Discharge Recommendation Post acute rehabilitation services   AM-PAC Daily Activity Inpatient   Lower Body Dressing 3   Bathing 3   Toileting 3   Upper Body Dressing 4   Grooming 3   Eating 4   Daily Activity Raw Score 20   Daily Activity Standardized Score (Calc for Raw Score >=11) 42 03   AM-PAC Applied Cognition Inpatient   Following a Speech/Presentation 4   Understanding Ordinary Conversation 4   Taking Medications 3   Remembering Where Things Are Placed or Put Away 3   Remembering List of 4-5 Errands 3   Taking Care of Complicated Tasks 3   Applied Cognition Raw Score 20   Applied Cognition Standardized Score 41 76     The patient's raw score on the AM-PAC Daily Activity inpatient short form is 20, standardized score is 42 03, greater than 39 4  Patients at this level are likely to benefit from DC to home  Please refer to the recommendation of the Occupational Therapist for safe DC planning  Although [facility] recommends d/c home, Pt has minimal support at home and appears to be self-neglecting  Pt would benefit from post acute rehab services to ensure safety and independence  Pt goals to be met by 7/11/2021    1  Pt will demonstrate ability to complete LB dressing @ Mod I in order to increase safety and independence during meaningful tasks  2  Pt will demonstrate ability to aziza/doff socks/shoes while sitting EOB @ Mod I in order to increase safety and independence during meaningful tasks  3  Pt will demonstrate ability to complete toileting tasks including CM and pericare @ Mod I in order to increase safety and independence during meaningful tasks  4  Pt will demonstrate ability to complete EOB, chair, toilet/commode transfers @ Mod I in order to increase performance and participation during functional tasks  5  Pt will demonstrate ability to stand for 7-8 minutes while maintaining G balance with use of RW for UB support PRN  6  Pt will demonstrate ability to tolerate 30-35 minute OT session with no vc'ing for deep breathing or use of energy conservation techniques in order to increase activity tolerance during functional tasks  7  Pt will demonstrate Good carryover of use of energy conservation/compensatory strategies during ADLs and functional tasks in order to increase safety and reduce risk for falls     8  Pt will demonstrate Good attention and participation in continued evaluation of functional ambulation house hold distances in order to assist with safe d/c planning  9  Pt will attend to continued cognitive assessments 100% of the time in order to provide most appropriate d/c recommendations  10  Pt will follow 100% simple 2-step commands and be A&O x4 consistently with environmental cues to increase participation in functional activities  11  Pt will identify 3 areas of interest/hobbies and 1 intervention on how to incorporate into daily life in order to increase interaction with environment and peers as well as increase participation in meaningful tasks  12  Pt will demonstrate 100% carryover of BUE HEP in order to increase BUE MS and increase performance during functional tasks upon d/c home      Carlos Luz OTR/L

## 2021-07-01 NOTE — ASSESSMENT & PLAN NOTE
· Present on admission   · Patient is currently afebrile   · Patient meets SIRS criteria with tachycardia, leukocytosis, elevated lactic acid on admission 2 7  · Improved - 2/2 uti and colitis

## 2021-07-01 NOTE — PROGRESS NOTES
Patient vomited after clears  Continues to have diarrhea  Will place her back in p o  Discussed with surgery as well provide Zofran p r n

## 2021-07-01 NOTE — PLAN OF CARE
Problem: Potential for Falls  Goal: Patient will remain free of falls  Description: INTERVENTIONS:  - Educate patient/family on patient safety including physical limitations  - Instruct patient to call for assistance with activity   - Consult OT/PT to assist with strengthening/mobility   - Keep Call bell within reach  - Keep bed low and locked with side rails adjusted as appropriate  - Keep care items and personal belongings within reach  - Initiate and maintain comfort rounds  - Make Fall Risk Sign visible to staff  - Offer Toileting every 2 Hours, in advance of need  - Initiate/Maintain bed alarm  - Obtain necessary fall risk management equipment: bracelet, sign  - Apply yellow socks and bracelet for high fall risk patients  - Consider moving patient to room near nurses station  Outcome: Progressing     Problem: MOBILITY - ADULT  Goal: Maintain or return to baseline ADL function  Description: INTERVENTIONS:  -  Assess patient's ability to carry out ADLs; assess patient's baseline for ADL function and identify physical deficits which impact ability to perform ADLs (bathing, care of mouth/teeth, toileting, grooming, dressing, etc )  - Assess/evaluate cause of self-care deficits   - Assess range of motion  - Assess patient's mobility; develop plan if impaired  - Assess patient's need for assistive devices and provide as appropriate  - Encourage maximum independence but intervene and supervise when necessary  - Involve family in performance of ADLs  - Assess for home care needs following discharge   - Consider OT consult to assist with ADL evaluation and planning for discharge  - Provide patient education as appropriate  Outcome: Progressing  Goal: Maintains/Returns to pre admission functional level  Description: INTERVENTIONS:  - Perform BMAT or MOVE assessment daily    - Set and communicate daily mobility goal to care team and patient/family/caregiver     - Collaborate with rehabilitation services on mobility goals if consulted  - Perform Range of Motion 4 times a day  - Reposition patient every 2 hours    - Dangle patient 4 times a day  - Stand patient 4 times a day  - Ambulate patient 4 times a day  - Out of bed to chair 4 times a day   - Out of bed for meals 3 times a day  - Out of bed for toileting  - Record patient progress and toleration of activity level   Outcome: Progressing     Problem: Prexisting or High Potential for Compromised Skin Integrity  Goal: Skin integrity is maintained or improved  Description: INTERVENTIONS:  - Identify patients at risk for skin breakdown  - Assess and monitor skin integrity  - Assess and monitor nutrition and hydration status  - Monitor labs   - Assess for incontinence   - Turn and reposition patient  - Assist with mobility/ambulation  - Relieve pressure over bony prominences  - Avoid friction and shearing  - Provide appropriate hygiene as needed including keeping skin clean and dry  - Evaluate need for skin moisturizer/barrier cream  - Collaborate with interdisciplinary team   - Patient/family teaching  - Consider wound care consult   Outcome: Progressing     Problem: PAIN - ADULT  Goal: Verbalizes/displays adequate comfort level or baseline comfort level  Description: Interventions:  - Encourage patient to monitor pain and request assistance  - Assess pain using appropriate pain scale  - Administer analgesics based on type and severity of pain and evaluate response  - Implement non-pharmacological measures as appropriate and evaluate response  - Consider cultural and social influences on pain and pain management  - Notify physician/advanced practitioner if interventions unsuccessful or patient reports new pain  Outcome: Progressing     Problem: INFECTION - ADULT  Goal: Absence or prevention of progression during hospitalization  Description: INTERVENTIONS:  - Assess and monitor for signs and symptoms of infection  - Monitor lab/diagnostic results  - Monitor all insertion sites, i e  indwelling lines, tubes, and drains  - Monitor endotracheal if appropriate and nasal secretions for changes in amount and color  - [place] appropriate cooling/warming therapies per order  - Administer medications as ordered  - Instruct and encourage patient and family to use good hand hygiene technique  - Identify and instruct in appropriate isolation precautions for identified infection/condition  Outcome: Progressing     Problem: SAFETY ADULT  Goal: Patient will remain free of falls  Description: INTERVENTIONS:  - Educate patient/family on patient safety including physical limitations  - Instruct patient to call for assistance with activity   - Consult OT/PT to assist with strengthening/mobility   - Keep Call bell within reach  - Keep bed low and locked with side rails adjusted as appropriate  - Keep care items and personal belongings within reach  - Initiate and maintain comfort rounds  - Make Fall Risk Sign visible to staff  - Offer Toileting every 2 Hours, in advance of need  - Initiate/Maintain bed alarm  - Obtain necessary fall risk management equipment: sign, bracelet  - Apply yellow socks and bracelet for high fall risk patients  - Consider moving patient to room near nurses station  Outcome: Progressing  Goal: Maintain or return to baseline ADL function  Description: INTERVENTIONS:  -  Assess patient's ability to carry out ADLs; assess patient's baseline for ADL function and identify physical deficits which impact ability to perform ADLs (bathing, care of mouth/teeth, toileting, grooming, dressing, etc )  - Assess/evaluate cause of self-care deficits   - Assess range of motion  - Assess patient's mobility; develop plan if impaired  - Assess patient's need for assistive devices and provide as appropriate  - Encourage maximum independence but intervene and supervise when necessary  - Involve family in performance of ADLs  - Assess for home care needs following discharge   - Consider OT consult to assist with ADL evaluation and planning for discharge  - Provide patient education as appropriate  Outcome: Progressing  Goal: Maintains/Returns to pre admission functional level  Description: INTERVENTIONS:  - Perform BMAT or MOVE assessment daily    - Set and communicate daily mobility goal to care team and patient/family/caregiver  - Collaborate with rehabilitation services on mobility goals if consulted  - Perform Range of Motion 4 times a day  - Reposition patient every 2 hours  - Dangle patient 4 times a day  - Stand patient 4 times a day  - Ambulate patient 4 times a day  - Out of bed to chair 4 times a day   - Out of bed for meals 3 times a day  - Out of bed for toileting  - Record patient progress and toleration of activity level   Outcome: Progressing     Problem: DISCHARGE PLANNING  Goal: Discharge to home or other facility with appropriate resources  Description: INTERVENTIONS:  - Identify barriers to discharge w/patient and caregiver  - Arrange for needed discharge resources and transportation as appropriate  - Identify discharge learning needs (meds, wound care, etc )  - Arrange for interpretive services to assist at discharge as needed  - Refer to Case Management Department for coordinating discharge planning if the patient needs post-hospital services based on physician/advanced practitioner order or complex needs related to functional status, cognitive ability, or social support system  Outcome: Progressing     Problem: Knowledge Deficit  Goal: Patient/family/caregiver demonstrates understanding of disease process, treatment plan, medications, and discharge instructions  Description: Complete learning assessment and assess knowledge base    Interventions:  - Provide teaching at level of understanding  - Provide teaching via preferred learning methods  Outcome: Progressing     Problem: GASTROINTESTINAL - ADULT  Goal: Minimal or absence of nausea and/or vomiting  Description: INTERVENTIONS:  - Administer IV fluids if ordered to ensure adequate hydration  - Maintain NPO status until nausea and vomiting are resolved  - Nasogastric tube if ordered  - Administer ordered antiemetic medications as needed  - Provide nonpharmacologic comfort measures as appropriate  - Advance diet as tolerated, if ordered  - Consider nutrition services referral to assist patient with adequate nutrition and appropriate food choices  Outcome: Progressing  Goal: Maintains or returns to baseline bowel function  Description: INTERVENTIONS:  - Assess bowel function  - Encourage oral fluids to ensure adequate hydration  - Administer IV fluids if ordered to ensure adequate hydration  - Administer ordered medications as needed  - Encourage mobilization and activity  - Consider nutritional services referral to assist patient with adequate nutrition and appropriate food choices  Outcome: Progressing  Goal: Maintains adequate nutritional intake  Description: INTERVENTIONS:  - Monitor percentage of each meal consumed  - Identify factors contributing to decreased intake, treat as appropriate  - Assist with meals as needed  - Monitor I&O, weight, and lab values if indicated  - Obtain nutrition services referral as needed  Outcome: Progressing  Goal: Oral mucous membranes remain intact  Description: INTERVENTIONS  - Assess oral mucosa and hygiene practices  - Implement preventative oral hygiene regimen  - Implement oral medicated treatments as ordered  - Initiate Nutrition services referral as needed  Outcome: Progressing

## 2021-07-01 NOTE — ASSESSMENT & PLAN NOTE
· 2/2 LA and uremia from diarrhea- resolved discontinue bicarb drip placed on fluids still having diarrhea

## 2021-07-01 NOTE — PLAN OF CARE
Problem: PHYSICAL THERAPY ADULT  Goal: Performs mobility at highest level of function for planned discharge setting  See evaluation for individualized goals  Description: Treatment/Interventions: Functional transfer training, LE strengthening/ROM, Elevations, Therapeutic exercise, Endurance training, Patient/family training, Bed mobility, Equipment eval/education, Gait training, Compensatory technique education, Spoke to nursing, Spoke to case management, OT  Equipment Recommended:  (TBD by rehab)       See flowsheet documentation for full assessment, interventions and recommendations  3/[id number] by Vidhi Perez PT  Note: Prognosis: Good  Problem List: Decreased strength, Decreased endurance, Impaired balance, Decreased mobility, Decreased safety awareness, Decreased skin integrity   Pt requires increased time to complete mobility and increased verbal cues for safety with mobility  She was able to ambulate increased distance, but further ambulation limited by fatigue  She is further limited by decreased balance, strength and endurance  She will continue to benefit from PT services to maximize LOF  Barriers to Discharge: Decreased caregiver support  Barriers to Discharge Comments: lives alone with limited assistance from caregivers     PT Discharge Recommendation: Post acute rehabilitation services     PT - OK to Discharge:  (when medically cleared to rehab)    See flowsheet documentation for full assessment

## 2021-07-01 NOTE — PROGRESS NOTES
NEPHROLOGY PROGRESS NOTE   Carmen Becerra 70 y o  female MRN: [id number]  Unit/Bed#: -01 Encounter: [id number]    Assessment/Plan:    Carmen Becerra is a 70 y o  female with breast cancer, CKD 2, history of obstructive nephropathy with bilateral ureteral stent placement admitted 06/29/2021 with chief complaint of AMS being treated for SBO versus colitis, ZOYA  Renal following along for ZOYA  Plan outlined below  1  Acute kidney injury (POA) atop chronic kidney disease  ? Present with a creatinine of 4 26 mg/dL -> 3 57 mg/dL today  etiology prerenal azotemia in the setting of severe diarrhea, nausea, vomiting  Bicarbonate infusion transition to isolyte which is appropriate  ? Continue volume expansion, supportive care for diarrhea, SBO  Continue to avoid nephrotoxins, maximize hemodynamics and provide supportive care  2  Suspected stage 2 chronic kidney disease baseline creatinine around 0 7-1 1 mg/dL  3  Elevated anion gap metabolic acidosis  ? Resolved, transitioned off of sodium bicarbonate drip and on to balanced salt solution  4  Hypomagnesemia  ? Provide 2 g IV magnesium today  5  Hyperphosphatemia- resolved  6  Severe hypokalemia  ? Receiving aggressive IV replacement per primary team  7  History of bilateral hydroureteronephrosis status post bilateral stents, moderate left pelvocaliectasis  ? Stents intact on imaging  8   small bowel obstruction versus infectious colitis  ? Vomited up clear meal today  Flat plate significant for air-fluid levels within nondilated segments of bowel likely representing ileus, partial SBO not excluded  Management per primary team and General surgery  Will continue to maximize electrolytes         ROS  States she vomited after clear meal today  A complete 10 point review of systems have been performed and are otherwise negative         Historical Information   Past Medical History:   Diagnosis Date    Diabetes mellitus ([facility] [address] )     Diverticulitis     Hyperthyroidism Past Surgical History:   Procedure Laterality Date    APPENDECTOMY      COLOSTOMY      FL RETROGRADE PYELOGRAM  4/6/2021    MASTECTOMY      MT CYSTOURETHROSCOPY,URETER CATHETER Bilateral 4/6/2021    Procedure: CYSTOSCOPY RETROGRADE PYELOGRAM WITH INSERTION STENT URETERAL;  Surgeon: Raad Abdalla MD;  Location: [facility];  Service: Urology     Social History   Social History     Substance and Sexual Activity   Alcohol Use Not Currently     Social History     Substance and Sexual Activity   Drug Use Not Currently     Social History     Tobacco Use   Smoking Status Former Smoker   Smokeless Tobacco Never Used       Family History:   History reviewed  No pertinent family history  Medications:  Pertinent medications were reviewed  Current Facility-Administered Medications   Medication Dose Route Frequency Provider Last Rate    cefTRIAXone  1,000 mg Intravenous Q24H Jessica Sheth PA-C 1,000 mg (06/30/21 1821)    heparin (porcine)  5,000 Units Subcutaneous [facility] Jessica Sheth PA-C      metroNIDAZOLE  500 mg Intravenous Q8H Sandi Hernandez  mg (07/01/21 0737)   [employer] multi-electrolyte  100 mL/hr Intravenous Continuous Sandi Hernandez  mL/hr (07/01/21 1300)    ondansetron  4 mg Intravenous Q6H PRN Sandi Hernandez MD      potassium chloride  20 mEq Intravenous Q2H Shelia S Jana, CRNP 20 mEq (07/01/21 1408)         Allergies   Allergen Reactions    Latex Itching    Nuts - Food Allergy Other (See Comments)     [place] allergy    Chlorhexidine Rash    Penicillins Rash         Vitals:   /65   Pulse 73   Temp 97 6 °F (36 4 °C)   Resp 17   Ht 5' (1 524 m)   Wt 64 4 kg (141 lb 15 6 oz)   SpO2 98%   BMI 27 73 kg/m²   Body mass index is 27 73 kg/m²    SpO2: 98 %,   SpO2 Activity: At Rest,   O2 Device: None (Room air)      Intake/Output Summary (Last 24 hours) at 7/1/2021 1532  Last data filed at 7/1/2021 1400  Gross per 24 hour   Intake 400 ml   Output 100 ml   Net 300 ml     Invasive Devices     Peripheral Intravenous Line            Peripheral IV 06/29/21 Right Antecubital 1 day    Peripheral IV 06/29/21 Right Hand 1 day                Physical Exam  General: conscious, cooperative, in no acute distress chronically ill-appearing  Eyes: conjunctivae pink, anicteric sclerae  ENT: lips and mucous membranes moist  Neck: supple, no JVD, no masses  Chest: clear breath sounds bilaterally, no crackles, ronchus or wheezings  CVS: S1 & S2, normal rate, regular rhythm  Abdomen: soft, non-tender, non-distended, normoactive bowel sounds  Extremities: no edema of both legs  Skin: no rash  Neuro: awake, alert, oriented      Diagnostic Data:  Lab: I have personally reviewed pertinent lab results  ,   CBC:  Results from last 7 days   Lab Units 06/30/21  0518   WBC Thousand/uL 9 70   HEMOGLOBIN g/dL 10 8*   HEMATOCRIT % 34 3*   PLATELETS Thousands/uL 159      CMP:   Lab Results   Component Value Date    SODIUM 141 07/01/2021    K 2 7 (LL) 07/01/2021     07/01/2021    CO2 22 07/01/2021    BUN 40 (H) 07/01/2021    CREATININE 3 57 (H) 07/01/2021    CALCIUM 8 3 07/01/2021    AST 26 07/01/2021    ALT 16 07/01/2021    ALKPHOS 154 (H) 07/01/2021    EGFR 12 07/01/2021   ,   PT/INR: No results found for: PT, INR,   Magnesium: No components found for: MAG,  Phosphorous:   Lab Results   Component Value Date    PHOS 3 7 07/01/2021       Microbiology:  @LABNT,(urinecx:7)@        [facility]    Portions of the record may have been created with voice recognition software  Occasional wrong word or "sound a like" substitutions may have occurred due to the inherent limitations of voice recognition software  Read the chart carefully and recognize, using context, where substitutions have occurred

## 2021-07-01 NOTE — ASSESSMENT & PLAN NOTE
· Patient is still actively having diarrhea, no nausea/vomiting, hyperactive bowel sounds  · CT abdomen/pelvis: new small bowel dilatation suspicious for obstruction and secondary signs of mild colitis   · IVF hydration   · Discussed with surgery today he she still having diarrhea although no abdominal pain obstruction series possible partial SBO  · Ok to advace to clears discussed with surgery

## 2021-07-01 NOTE — ASSESSMENT & PLAN NOTE
· POA- patient admits to dysuria symptoms and is normally incontinent at home   · UA + leukocytes, blood, and also significant pyuria  · Pending urine cultures negative for significant pyuria with symptoms I will treat for 5 days  · IV Ceftriaxone   · Management as stated above

## 2021-07-01 NOTE — ASSESSMENT & PLAN NOTE
69 y/o female with pmhx diverticulitis, breast CA s/p left mastectomy and on Femera, hyperthyroidism presented to the ER today after friend found her covered in urine and feces at home  She lives alone  States she has been having watery diarrhea x 5 days- denies any hematochezia or melena  No nausea/vomiting  Has not eaten any food for a few days  · Pt is currently afebrile with leukocytosis; denies any recent abx use, travel, uncooked meat, eating out   · CT abdomen/pelvis: new small bowel dilatation suspicious for obstruction and secondary signs of mild colitis   · IVF hydration   · Supportive care  · Stool studies ordered: pending   · C diff negative  · Ova & parasites   · Fecal leukocytes   · PT/OT and CM eval:  Subacute  · Discussed with surgery they thing most likely colitis than obstruction she still having diarrhea denies abdominal pain    The abdomen is distended hypoactive bowel sounds nontender on exam but the distension is better than yesterday obstruction see reveals a possible partial obstruction discussed with surgery nonsurgical need at this time may advance to clears will do so continue antibiotics

## 2021-07-01 NOTE — PROGRESS NOTES
Progress Note - General Surgery   Orlin Corbett 70 y o  female MRN: [id number]  Unit/Bed#: -01 Encounter: [id number]    Assessment:  Profuse diarrhea  Small-bowel obstruction versus infectious colitis  S/P multiple abdominal surgeries, underwent Iman's procedure with subsequent reversal about 4 years ago in [place] by Dr Luis Silva for perforated diverticulitis  Underwent obstruction series this morning, results pending  Hypokalemia 2 7    Plan:  Continue non op management at this time  Advance to clear liquid diet this morning  NG tube decompression if the patient develops nausea/vomiting  Continue antibiotics as ordered  Arik JUAREZ hydration  Monitor I/Os  Follow qAM CBC and BMP  Follow Cdiff, stool panel  Follow blood cultures  Medicate PRN pain/nausea  OOB ambulating ad maye  Management of comorbidities per primary    Subjective/Objective     Subjective: No acute events  Has been NPO since admission  States that she is thirsty  She is passing gas and continues with liquid stools  Denies fevers/chills  She states that she has mild discomfort in her right mid/lower abdomin  Objective:   Blood pressure 134/68, pulse 65, temperature 97 7 °F (36 5 °C), resp  rate 18, height 5' (1 524 m), weight 64 4 kg (141 lb 15 6 oz), SpO2 99 %  ,Body mass index is 27 73 kg/m²  Intake/Output Summary (Last 24 hours) at 7/1/2021 0751  Last data filed at 6/30/2021 2347  Gross per 24 hour   Intake 150 ml   Output 100 ml   Net 50 ml       Invasive Devices     Peripheral Intravenous Line            Peripheral IV 06/29/21 Right Antecubital 1 day    Peripheral IV 06/29/21 Right Hand 1 day                Physical Exam:   Gen: AxOx3, no distress, pleasant and conversant  Abd: Mildly distendted, there is minimal diffuse discomfort that is slightly worse to right mid/lower quadrants, bowel sounds are present, no guarding or rebound  Lab, Imaging and other studies:  I have personally reviewed pertinent lab results    , CBC: No results found for: WBC, HGB, HCT, MCV, PLT, ADJUSTEDWBC, MCH, MCHC, RDW, MPV, NRBC, CMP:   Lab Results   Component Value Date    SODIUM 141 07/01/2021    K 2 7 (LL) 07/01/2021     07/01/2021    CO2 22 07/01/2021    BUN 40 (H) 07/01/2021    CREATININE 3 57 (H) 07/01/2021    CALCIUM 8 3 07/01/2021    AST 26 07/01/2021    ALT 16 07/01/2021    ALKPHOS 154 (H) 07/01/2021    EGFR 12 07/01/2021     VTE Pharmacologic Prophylaxis: Heparin  VTE Mechanical Prophylaxis: sequential compression device       Juanis Tadeo PA-C

## 2021-07-01 NOTE — PLAN OF CARE
Problem: PHYSICAL THERAPY ADULT  Goal: Performs mobility at highest level of function for planned discharge setting  See evaluation for individualized goals  Description: Treatment/Interventions: Functional transfer training, LE strengthening/ROM, Elevations, Therapeutic exercise, Endurance training, Patient/family training, Bed mobility, Equipment eval/education, Gait training, Compensatory technique education, Spoke to nursing, Spoke to case management, OT  Equipment Recommended:  (TBD by rehab)       See flowsheet documentation for full assessment, interventions and recommendations  Note: Prognosis: Good  Problem List: Decreased strength, Decreased endurance, Impaired balance, Decreased mobility, Decreased safety awareness, Decreased skin integrity  Assessment: Pt is a 70 y o  female seen for PT evaluation s/p admission to [address] [place] [address] on 6/29/2021 with Colitis, acute  Order placed for PT services  Upon evaluation: Pt is presenting with impaired functional mobility due to decreased strength, decreased endurance, impaired balance, gait deviations, decreased safety awareness, impaired judgment, fall risk and impaired skin integrity requiring stand by to steadying assistance for transfers and stand by to steadying assistance for ambulation with out AD   Pt's clinical presentation is currently unpredictable given the functional mobility deficits above, especially weakness, decreased skin integrity, decreased endurance, gait deviations, decreased activity tolerance, decreased functional mobility tolerance, decreased safety awareness, impaired judgement and fatigue, coupled with fall risks as indicated by AM-PAC 6-Clicks: 01/12 as well as impaired balance, polypharmacy, impaired judgement and decreased safety awareness and combined with medical complications of abnormal renal lab values, abnormal H&H, abnormal potassium values, abnormal CO2 values, multiple readmissions and need for input for mobility technique/safety, acute colitis, sepsis, partial small bowel obstruction versus colitis, acute cystitis  Pt's PMHx and comorbidities that may affect physical performance and progress include: CKD, DM and hyperthyroidism, h/o L breast CA s/p L mastectomy  Personal factors affecting pt at time of IE include: limited home support, inability to perform IADLs, inability to perform ADLs, inability to navigate level surfaces without external assistance, inability to ambulate household distances and limited insight into impairments  Pt will benefit from continued skilled PT services to address deficits as defined above and to maximize level of functional mobility to facilitate return toward PLOF and improved QOL  From PT/mobility standpoint, recommendation at time of d/c would be Short term rehab pending progress in order to reduce fall risk and maximize pt's functional independence and consistency with mobility in order to facilitate return to PLOF  Recommend ther ex next 1-2 sessions  Barriers to Discharge: Decreased caregiver support  Barriers to Discharge Comments: lives alone with limited assistance from caregivers     PT Discharge Recommendation: Post acute rehabilitation services     PT - OK to Discharge:  (when medically cleared to rehab)    See flowsheet documentation for full assessment

## 2021-07-01 NOTE — PROGRESS NOTES
[address]  Progress Note - Ganesh Che 1945, 70 y o  female MRN: [id number]  Unit/Bed#: MS [facility] Encounter: [id number]  Primary Care Provider: Kira Dang MD   Date and time admitted to hospital: 6/29/2021  5:35 PM    Severe sepsis [facility])  Assessment & Plan  · Present on admission   · Patient is currently afebrile   · Patient meets SIRS criteria with tachycardia, leukocytosis, elevated lactic acid on admission 2 7  · Improved - 2/2 uti and colitis    Bowel obstruction ([facility] [address] )  Assessment & Plan  · Patient is still actively having diarrhea, no nausea/vomiting, hyperactive bowel sounds  · CT abdomen/pelvis: new small bowel dilatation suspicious for obstruction and secondary signs of mild colitis   · IVF hydration   · Discussed with surgery today he she still having diarrhea although no abdominal pain obstruction series possible partial SBO  · Ok to advace to clears discussed with surgery      Acute cystitis with hematuria  Assessment & Plan  · POA- patient admits to dysuria symptoms and is normally incontinent at home   · UA + leukocytes, blood, and also significant pyuria  · Pending urine cultures negative for significant pyuria with symptoms I will treat for 5 days  · IV Ceftriaxone   · Management as stated above     High anion gap metabolic acidosis  Assessment & Plan  · 2/2 LA and uremia from diarrhea- resolved discontinue bicarb drip placed on fluids still having diarrhea    Hypokalemia  Assessment & Plan  · Repleted IV will also give p o  Secondary to ongoing diarrhea  Recheck a BMP at 3:00 p m  History of breast cancer  Assessment & Plan  · Diagnosed 2007 s/p left mastectomy   · Hold Femara secondary to suspected bowel obstruction- can resume when no longer NPO  · Of note, alk phos was elevated on admission 234; CT abdomen/pelvis reveals chronic gallstones and chronic distended gallbladder   Alk phos was elevated prior admission 05/11/21 317   · Consideration of bone metastasis- patient should f/u with imaging as outpatient     Hyperthyroidism  Assessment & Plan  · Resume methimazole as starting clears  · TSH checked and is euthyroid     Acute on chronic kidney failure [facility])  Assessment & Plan  Lab Results   Component Value Date    EGFR 12 07/01/2021    EGFR 11 06/30/2021    EGFR 10 06/29/2021    CREATININE 3 57 (H) 07/01/2021    CREATININE 3 74 (H) 06/30/2021    CREATININE 4 26 (H) 06/29/2021     · Present on admission  On ckd 2 base 0 7-1  1-Renal function does not return to baseline since episode of obstructive uropathy earlier this year  Should follow with nephrologist as outpatient  · Patient is s/p bilateral ureteral stent placement 04/06/21 for hydronephrosis  · Patient has hx of prior admissions with ZOYA and leaving AMA x2 on 04/07/21 and 05/11/21   · Suspect pre-renal secondary to decreased PO intake and persistent diarrhea   · CT abdomen/pelvis no signs on obstruction in the kidneys/ureter   · Discontinue bicarb infusion as the bicarb is back to normal place on isolate 100 mL/hour continues to improve discontinue Way  · Labs in am      * Colitis, acute  Assessment & Plan  71 y/o female with pmhx diverticulitis, breast CA s/p left mastectomy and on Femera, hyperthyroidism presented to the ER today after friend found her covered in urine and feces at home  She lives alone  States she has been having watery diarrhea x 5 days- denies any hematochezia or melena  No nausea/vomiting  Has not eaten any food for a few days       · Pt is currently afebrile with leukocytosis; denies any recent abx use, travel, uncooked meat, eating out   · CT abdomen/pelvis: new small bowel dilatation suspicious for obstruction and secondary signs of mild colitis   · IVF hydration   · Supportive care  · Stool studies ordered: pending   · C diff negative  · Ova & parasites   · Fecal leukocytes   · PT/OT and CM eval:  Subacute  · Discussed with surgery they thing most likely colitis than obstruction she still having diarrhea denies abdominal pain  The abdomen is distended hypoactive bowel sounds nontender on exam but the distension is better than yesterday obstruction see reveals a possible partial obstruction discussed with surgery nonsurgical need at this time may advance to clears will do so continue antibiotics          VTE Pharmacologic Prophylaxis: VTE Score: 6 High Risk (Score >/= 5) - Pharmacological DVT Prophylaxis Ordered: heparin  Sequential Compression Devices Ordered  Patient Centered Rounds: I performed bedside rounds with nursing staff today  Discussions with Specialists or Other Care Team Provider:  Will discuss with Nephrology as well discussed with surgery    Education and Discussions with Family / Patient: Updated  (daughter) via phone  Time Spent for Care: 30 minutes  More than 50% of total time spent on counseling and coordination of care as described above  Current Length of Stay: 2 day(s)  Current Patient Status: Inpatient   Certification Statement: The patient will continue to require additional inpatient hospital stay due to Secondary to acute kidney injury partial SBO colitis  Discharge Plan: Anticipate discharge in 48-72 hrs to rehab facility  Code Status: Level 3 - DNAR and DNI    Subjective:   Patient seen and examined no chest pain or shortness of breath still had watery diarrhea and abdominal pain    Objective:     Vitals:   Temp (24hrs), Av 7 °F (36 5 °C), Min:97 7 °F (36 5 °C), Max:97 7 °F (36 5 °C)    Temp:  [97 7 °F (36 5 °C)] 97 7 °F (36 5 °C)  HR:  [65-66] 65  Resp:  [18] 18  BP: (133-134)/(68-71) 134/68  SpO2:  [96 %-99 %] 98 %  Body mass index is 27 73 kg/m²  Input and Output Summary (last 24 hours): Intake/Output Summary (Last 24 hours) at 2021 1229  Last data filed at 2021 2347  Gross per 24 hour   Intake 100 ml   Output 100 ml   Net 0 ml       Physical Exam:   Physical Exam  Vitals and nursing note reviewed  Constitutional:       General: She is not in acute distress  Appearance: She is well-developed  HENT:      Head: Normocephalic and atraumatic  Eyes:      Conjunctiva/sclera: Conjunctivae normal    Cardiovascular:      Rate and Rhythm: Normal rate and regular rhythm  Heart sounds: No murmur heard  Pulmonary:      Effort: Pulmonary effort is normal  No respiratory distress  Breath sounds: Normal breath sounds  Abdominal:      General: There is distension (improved from yesterday)  Palpations: Abdomen is soft  Tenderness: There is no abdominal tenderness  Comments: Decreased bs   Musculoskeletal:         General: No swelling  Cervical back: Neck supple  Skin:     General: Skin is warm and dry  Neurological:      Mental Status: She is alert and oriented to person, place, and time  Mental status is at baseline           Additional Data:     Labs:  Results from last 7 days   Lab Units 06/30/21  0518   WBC Thousand/uL 9 70   HEMOGLOBIN g/dL 10 8*   HEMATOCRIT % 34 3*   PLATELETS Thousands/uL 159   NEUTROS PCT % 59   LYMPHS PCT % 26   MONOS PCT % 11   EOS PCT % 2     Results from last 7 days   Lab Units 07/01/21  0607   SODIUM mmol/L 141   POTASSIUM mmol/L 2 7*   CHLORIDE mmol/L 106   CO2 mmol/L 22   BUN mg/dL 40*   CREATININE mg/dL 3 57*   ANION GAP mmol/L 13   CALCIUM mg/dL 8 3   ALBUMIN g/dL 2 4*   TOTAL BILIRUBIN mg/dL 0 66   ALK PHOS U/L 154*   ALT U/L 16   AST U/L 26   GLUCOSE RANDOM mg/dL 90         Results from last 7 days   Lab Units 06/29/21  1819   POC GLUCOSE mg/dl 104     Results from last 7 days   Lab Units 06/30/21  0518   HEMOGLOBIN A1C % 5 1     Results from last 7 days   Lab Units 07/01/21  0607 06/30/21  0057 06/29/21  2240 06/29/21  2137 06/29/21  1810   LACTIC ACID mmol/L  --  1 2 2 1* 2 1* 2 7*   PROCALCITONIN ng/ml 0 12  --  0 12  --   --        Lines/Drains:  Invasive Devices     Peripheral Intravenous Line            Peripheral IV 06/29/21 Right Antecubital 1 day    Peripheral IV 06/29/21 Right Hand 1 day                      Imaging: Reviewed radiology reports from this admission including: abdominal/pelvic CT    Recent Cultures (last 7 days):   Results from last 7 days   Lab Units 06/30/21  2216 06/29/21  1852 06/29/21  1810 06/29/21  1805   BLOOD CULTURE   --   --  No Growth at 24 hrs  No Growth at 24 hrs  URINE CULTURE   --  No Growth <1000 cfu/mL  --   --    C DIFF TOXIN B BY PCR  Negative  --   --   --        Last 24 Hours Medication List:   Current Facility-Administered Medications   Medication Dose Route Frequency Provider Last Rate    cefTRIAXone  1,000 mg Intravenous Q24H Racquel Stewart PA-C 1,000 mg (06/30/21 1821)    heparin (porcine)  5,000 Units Subcutaneous [facility] Racquel Stewart PA-C      methimazole  2 5 mg Oral Every Other Day Keturah Templeton MD      metroNIDAZOLE  500 mg Intravenous Q8H Keturah Templeton  mg (07/01/21 0737)   [facility] multi-electrolyte  100 mL/hr Intravenous Continuous Keturah Templeton MD      potassium chloride  40 mEq Oral Once Keturah Templeton MD          Today, Patient Was Seen By: Keturah Templeton MD    **Please Note: This note may have been constructed using a voice recognition system  **

## 2021-07-01 NOTE — PHYSICAL THERAPY NOTE
PHYSICAL THERAPY EVALUATION  NAME:  Digna Campbell  DATE: 07/01/21    AGE:   70 y o  Mrn:   [id number]  ADMIT DX:  Hypokalemia [E87 6]  Bowel obstruction ([facility] ) [K56 609]  UTI (urinary tract infection) [N39 0]  Altered mental status [R41 82]  ZOYA (acute kidney injury) ([facility] ) [N17 9]  Acute kidney injury ([facility] ) [N17 9]  Elevated lactic acid level [R79 89]    Past Medical History:   Diagnosis Date    Diabetes mellitus ([facility] )     Diverticulitis     Hyperthyroidism      Length Of Stay: 2  Performed at least 2 patient identifiers during session: Name and Birthday  PHYSICAL THERAPY EVALUATION :      07/01/21 0939   PT Last Visit   PT Visit Date 07/01/21   Note Type   Note type Evaluation   Pain Assessment   Pain Assessment Tool 0-10   Pain Score No Pain   Home Living   Type of Home Mobile home   Home Layout Ramped entrance   Bathroom Shower/Tub   ("my shower doesnt work, cristobal been sponge bathing")   [address] bars around toilet   Home Equipment Walker;Cane  (uses RW mostly, cane at times)   Additional Comments Reports living in a mobile home with ramp to enter  Reports using cane at times, but mostly uses walker  Prior Function   Level of [place] Independent with ADLs and functional mobility   Lives With Alone   Receives Help From Personal care attendant   ADL Assistance Independent   IADLs Needs assistance   Falls in the last 6 months 0   Vocational Retired   Comments Reports being independent PTA with RW or spc, independent for ADLs and has caregivers 2x/week for 2 hours and then 3 hours for assistance with IADLs  Restrictions/Precautions   Other Precautions Limb alert;Multiple lines; Chair Alarm; Bed Alarm; Fall Risk  (L UE limb alert)   Cognition   Orientation Level Oriented X4   Following Commands Follows one step commands without difficulty   RLE Assessment   RLE Assessment WFL  (3+/5)   LLE Assessment   LLE Assessment WFL  (3+/5)   Coordination   Movements are Fluid and Coordinated 1   Light Touch   RLE Light Touch Grossly intact   LLE Light Touch Grossly intact   Bed Mobility   Supine to Sit 6  Modified independent   Additional items Increased time required;HOB elevated; Bedrails   Additional Comments Has hospital bed at home  bed mobility modified independent with HOB elevtaed > 30 degrees and use of bedrail  Transfers   Sit to Stand 5  Supervision   Additional items Increased time required;Verbal cues   Stand to Sit   (stansd by assistance)   Additional items Assist x 1; Increased time required;Verbal cues   Stand pivot   (steadying to stand by assistance)   Additional items Assist x 1; Increased time required;Verbal cues   Additional Comments use of RW  completed transfers with RW with min cues for hand placement for safety with RW  spt with RW with steadying to stand by assistance with min cues for turning completely prior to sitting   Ambulation/Elevation   Gait pattern Narrow GUNNER; Excessively slow; Short stride; Foward flexed   Gait Assistance   (steadying-->stand by assistance)   Additional items Assist x 1;Verbal cues   Assistive Device Rolling walker   Distance 12'x1 with RW with steadying-->stand by assistance with significantly slowed cadeence, decreased step length and verbal cues to stay within GUNNER of RW  Balance   Static Sitting Good   Dynamic Sitting Fair +   Static Standing Fair   Dynamic Standing Fair -   Ambulatory Fair -   Endurance Deficit   Endurance Deficit Yes   Endurance Deficit Description fatigue   Activity Tolerance   Activity Tolerance Patient limited by fatigue   Medical Staff Made Aware Neymar SALDIVAR   Nurse Made Aware Kenna CHRISTIAN   Assessment   Prognosis Good   Problem List Decreased strength;Decreased endurance; Impaired balance;Decreased mobility; Decreased safety awareness;Decreased skin integrity   Barriers to Discharge Decreased caregiver support   Barriers to Discharge Comments lives alone with limited assistance from caregivers   Goals   Patient Goals "Go home"   [facility] Expiration Date 07/11/21   PT Treatment Day 1   Plan   Treatment/Interventions Functional transfer training;LE strengthening/ROM; Elevations; Therapeutic exercise; Endurance training;Patient/family training;Bed mobility; Equipment eval/education;Gait training; Compensatory technique education;Spoke to nursing;Spoke to case management;OT   PT Frequency Other (Comment)  (3-5x/week)   Recommendation   PT Discharge Recommendation Post acute rehabilitation services   Equipment Recommended   (TBD by rehab)   PT - OK to Discharge   (when medically cleared to rehab)   Additional Comments pt sitting in recliner chair at end of session with all needs wihtin reach and posey alarm on   AM-PAC Basic Mobility Inpatient   Turning in Bed Without Bedrails 4   Lying on Back to Sitting on Edge of Flat Bed 3   Moving Bed to Chair 3   Standing Up From Chair 3   Walk in Room 3   Climb 3-5 Stairs 3   Basic Mobility Inpatient Raw Score 19   Basic Mobility Standardized Score 42 48     (Please find full objective findings from PT assessment regarding body systems outlined above)  Assessment: Pt is a 70 y o  female seen for PT evaluation s/p admission to [address] [place] [address] on 6/29/2021 with Colitis, acute  Order placed for PT services  Upon evaluation: Pt is presenting with impaired functional mobility due to decreased strength, decreased endurance, impaired balance, gait deviations, decreased safety awareness, impaired judgment, fall risk and impaired skin integrity requiring stand by to steadying assistance for transfers and stand by to steadying assistance for ambulation with out AD   Pt's clinical presentation is currently unpredictable given the functional mobility deficits above, especially weakness, decreased skin integrity, decreased endurance, gait deviations, decreased activity tolerance, decreased functional mobility tolerance, decreased safety awareness, impaired judgement and fatigue, coupled with fall risks as indicated by AM-PAC 6-Clicks: 23/33 as well as impaired balance, polypharmacy, impaired judgement and decreased safety awareness and combined with medical complications of abnormal renal lab values, abnormal H&H, abnormal potassium values, abnormal CO2 values, multiple readmissions and need for input for mobility technique/safety, acute colitis, sepsis, partial small bowel obstruction versus colitis, acute cystitis  Pt's PMHx and comorbidities that may affect physical performance and progress include: CKD, DM and hyperthyroidism, h/o L breast CA s/p L mastectomy  Personal factors affecting pt at time of IE include: limited home support, inability to perform IADLs, inability to perform ADLs, inability to navigate level surfaces without external assistance, inability to ambulate household distances and limited insight into impairments  Pt will benefit from continued skilled PT services to address deficits as defined above and to maximize level of functional mobility to facilitate return toward PLOF and improved QOL  From PT/mobility standpoint, recommendation at time of d/c would be Short term rehab pending progress in order to reduce fall risk and maximize pt's functional independence and consistency with mobility in order to facilitate return to PLOF  Recommend ther ex next 1-2 sessions  The patient's AM-PAC Basic Mobility Inpatient Short Form Raw Score is 19, Standardized Score is 42 48  A standardized score less than 42 9 suggests the patient may benefit from discharge to post-acute rehabilitation services  Please also refer to the recommendation of the Physical Therapist for safe discharge planning  Goals: Pt will: Pt will perform transfers with modified I to decrease burden of care and decrease risk for falls and prepare for ambulation   Pt will ambulate with LRAD for >/= 100' with  modified I  to decrease burden of care, decrease risk for falls, improve activity tolerance and improve gait quality and to access home environment  Pt will complete >/= 4 steps with with bilateral handrails with Supervision to improve activity tolerance  Pt will participate in objective balance assessment to determine baseline fall risk  Pt will increase B LE strength >/= 1/2 MMT grade to facilitate functional mobility  Brain Singleton PT,DPT     PHYSICAL THERAPY TREATMENT NOTE  Time ON:3687  Time Out:1002  Total Time: 8'      S:  "I am tired"  O:  Sit<>stand with RW with stand by assistance  Spt with RW with stand by assistance  Min cues for hand placement for safety with RW and min cues for turning completely prior to sitting  Dynamic standing balance with 1 UE support with steadying assistance to reach distally and anteriorly outside COG  Ambulated 25'x1 with RW with stand by assistance with slow milli with decreased step length, min cues to stay wihtin GUNNER of RW      A:  Pt requires increased time to complete mobility and increased verbal cues for safety with mobility  She was able to ambulate increased distance, but further ambulation limited by fatigue  She is further limited by decreased balance, strength and endurance  She will continue to benefit from PT services to maximize LOF  P:  Recommend LE strengthening, dynamic standing balance, endurance training, gait training with LRAD RW versus spc       Brain Singleton PT, DPT

## 2021-07-02 PROBLEM — K56.600 PARTIAL SMALL BOWEL OBSTRUCTION (HCC): Status: ACTIVE | Noted: 2021-06-29

## 2021-07-02 PROBLEM — K56.7 ILEUS (HCC): Status: ACTIVE | Noted: 2021-06-29

## 2021-07-02 LAB
ALBUMIN SERPL BCP-MCNC: 1.9 G/DL (ref 3.5–5)
ALP SERPL-CCNC: 153 U/L (ref 46–116)
ALT SERPL W P-5'-P-CCNC: 14 U/L (ref 12–78)
ANION GAP SERPL CALCULATED.3IONS-SCNC: 12 MMOL/L (ref 4–13)
AST SERPL W P-5'-P-CCNC: 32 U/L (ref 5–45)
BASOPHILS # BLD AUTO: 0.05 THOUSANDS/ΜL (ref 0–0.1)
BASOPHILS NFR BLD AUTO: 1 % (ref 0–1)
BILIRUB SERPL-MCNC: 0.57 MG/DL (ref 0.2–1)
BUN SERPL-MCNC: 35 MG/DL (ref 5–25)
CALCIUM ALBUM COR SERPL-MCNC: 10 MG/DL (ref 8.3–10.1)
CALCIUM SERPL-MCNC: 8.3 MG/DL (ref 8.3–10.1)
CHLORIDE SERPL-SCNC: 109 MMOL/L (ref 100–108)
CO2 SERPL-SCNC: 19 MMOL/L (ref 21–32)
CREAT SERPL-MCNC: 3.04 MG/DL (ref 0.6–1.3)
EOSINOPHIL # BLD AUTO: 0.19 THOUSAND/ΜL (ref 0–0.61)
EOSINOPHIL NFR BLD AUTO: 3 % (ref 0–6)
ERYTHROCYTE [DISTWIDTH] IN BLOOD BY AUTOMATED COUNT: 15.9 % (ref 11.6–15.1)
GFR SERPL CREATININE-BSD FRML MDRD: 15 ML/MIN/1.73SQ M
GLUCOSE SERPL-MCNC: 79 MG/DL (ref 65–140)
HCT VFR BLD AUTO: 37.1 % (ref 34.8–46.1)
HGB BLD-MCNC: 11.6 G/DL (ref 11.5–15.4)
IMM GRANULOCYTES # BLD AUTO: 0.02 THOUSAND/UL (ref 0–0.2)
IMM GRANULOCYTES NFR BLD AUTO: 0 % (ref 0–2)
LYMPHOCYTES # BLD AUTO: 1.3 THOUSANDS/ΜL (ref 0.6–4.47)
LYMPHOCYTES NFR BLD AUTO: 22 % (ref 14–44)
MAGNESIUM SERPL-MCNC: 2.2 MG/DL (ref 1.6–2.6)
MCH RBC QN AUTO: 30.4 PG (ref 26.8–34.3)
MCHC RBC AUTO-ENTMCNC: 31.3 G/DL (ref 31.4–37.4)
MCV RBC AUTO: 97 FL (ref 82–98)
MONOCYTES # BLD AUTO: 0.58 THOUSAND/ΜL (ref 0.17–1.22)
MONOCYTES NFR BLD AUTO: 10 % (ref 4–12)
NEUTROPHILS # BLD AUTO: 3.76 THOUSANDS/ΜL (ref 1.85–7.62)
NEUTS SEG NFR BLD AUTO: 64 % (ref 43–75)
NRBC BLD AUTO-RTO: 0 /100 WBCS
PLATELET # BLD AUTO: 144 THOUSANDS/UL (ref 149–390)
PMV BLD AUTO: 11.9 FL (ref 8.9–12.7)
POTASSIUM SERPL-SCNC: 3.9 MMOL/L (ref 3.5–5.3)
PROT SERPL-MCNC: 6.8 G/DL (ref 6.4–8.2)
RBC # BLD AUTO: 3.81 MILLION/UL (ref 3.81–5.12)
SODIUM SERPL-SCNC: 140 MMOL/L (ref 136–145)
WBC # BLD AUTO: 5.9 THOUSAND/UL (ref 4.31–10.16)

## 2021-07-02 PROCEDURE — 82272 OCCULT BLD FECES 1-3 TESTS: CPT

## 2021-07-02 PROCEDURE — 85025 COMPLETE CBC W/AUTO DIFF WBC: CPT | Performed by: PHYSICIAN ASSISTANT

## 2021-07-02 PROCEDURE — 99232 SBSQ HOSP IP/OBS MODERATE 35: CPT | Performed by: FAMILY MEDICINE

## 2021-07-02 PROCEDURE — 99232 SBSQ HOSP IP/OBS MODERATE 35: CPT

## 2021-07-02 PROCEDURE — 83735 ASSAY OF MAGNESIUM: CPT | Performed by: NURSE PRACTITIONER

## 2021-07-02 PROCEDURE — 99232 SBSQ HOSP IP/OBS MODERATE 35: CPT | Performed by: INTERNAL MEDICINE

## 2021-07-02 PROCEDURE — 80053 COMPREHEN METABOLIC PANEL: CPT | Performed by: PHYSICIAN ASSISTANT

## 2021-07-02 RX ADMIN — SODIUM BICARBONATE 100 ML/HR: 84 INJECTION, SOLUTION INTRAVENOUS at 11:56

## 2021-07-02 RX ADMIN — SODIUM CHLORIDE, SODIUM GLUCONATE, SODIUM ACETATE, POTASSIUM CHLORIDE, MAGNESIUM CHLORIDE, SODIUM PHOSPHATE, DIBASIC, AND POTASSIUM PHOSPHATE 100 ML/HR: .53; .5; .37; .037; .03; .012; .00082 INJECTION, SOLUTION INTRAVENOUS at 22:17

## 2021-07-02 RX ADMIN — SODIUM CHLORIDE, SODIUM GLUCONATE, SODIUM ACETATE, POTASSIUM CHLORIDE, MAGNESIUM CHLORIDE, SODIUM PHOSPHATE, DIBASIC, AND POTASSIUM PHOSPHATE 100 ML/HR: .53; .5; .37; .037; .03; .012; .00082 INJECTION, SOLUTION INTRAVENOUS at 11:53

## 2021-07-02 RX ADMIN — METRONIDAZOLE 500 MG: 500 INJECTION, SOLUTION INTRAVENOUS at 08:06

## 2021-07-02 NOTE — PLAN OF CARE
Problem: MOBILITY - ADULT  Goal: Maintain or return to baseline ADL function  Description: INTERVENTIONS:  -  Assess patient's ability to carry out ADLs; assess patient's baseline for ADL function and identify physical deficits which impact ability to perform ADLs (bathing, care of mouth/teeth, toileting, grooming, dressing, etc )  - Assess/evaluate cause of self-care deficits   - Assess range of motion  - Assess patient's mobility; develop plan if impaired  - Assess patient's need for assistive devices and provide as appropriate  - Encourage maximum independence but intervene and supervise when necessary  - Involve family in performance of ADLs  - Assess for home care needs following discharge   - Consider OT consult to assist with ADL evaluation and planning for discharge  - Provide patient education as appropriate  Outcome: Progressing     Problem: PAIN - ADULT  Goal: Verbalizes/displays adequate comfort level or baseline comfort level  Description: Interventions:  - Encourage patient to monitor pain and request assistance  - Assess pain using appropriate pain scale  - Administer analgesics based on type and severity of pain and evaluate response  - Implement non-pharmacological measures as appropriate and evaluate response  - Consider cultural and social influences on pain and pain management  - Notify physician/advanced practitioner if interventions unsuccessful or patient reports new pain  Outcome: Progressing     Problem: INFECTION - ADULT  Goal: Absence or prevention of progression during hospitalization  Description: INTERVENTIONS:  - Assess and monitor for signs and symptoms of infection  - Monitor lab/diagnostic results  - Monitor all insertion sites, i e  indwelling lines, tubes, and drains  - Monitor endotracheal if appropriate and nasal secretions for changes in amount and color  - [place] appropriate cooling/warming therapies per order  - Administer medications as ordered  - Instruct and encourage patient and family to use good hand hygiene technique  - Identify and instruct in appropriate isolation precautions for identified infection/condition  Outcome: Progressing     Problem: Knowledge Deficit  Goal: Patient/family/caregiver demonstrates understanding of disease process, treatment plan, medications, and discharge instructions  Description: Complete learning assessment and assess knowledge base    Interventions:  - Provide teaching at level of understanding  - Provide teaching via preferred learning methods  Outcome: Progressing     Problem: GASTROINTESTINAL - ADULT  Goal: Minimal or absence of nausea and/or vomiting  Description: INTERVENTIONS:  - Administer IV fluids if ordered to ensure adequate hydration  - Maintain NPO status until nausea and vomiting are resolved  - Nasogastric tube if ordered  - Administer ordered antiemetic medications as needed  - Provide nonpharmacologic comfort measures as appropriate  - Advance diet as tolerated, if ordered  - Consider nutrition services referral to assist patient with adequate nutrition and appropriate food choices  Outcome: Progressing     Problem: GASTROINTESTINAL - ADULT  Goal: Maintains or returns to baseline bowel function  Description: INTERVENTIONS:  - Assess bowel function  - Encourage oral fluids to ensure adequate hydration  - Administer IV fluids if ordered to ensure adequate hydration  - Administer ordered medications as needed  - Encourage mobilization and activity  - Consider nutritional services referral to assist patient with adequate nutrition and appropriate food choices  Outcome: Progressing

## 2021-07-02 NOTE — WOUND OSTOMY CARE
Consult Note - Wound   Addison Daniel 70 y o  female MRN: [id number]  Unit/Bed#: [facility] Encounter: [id number]        History and Present Illness:76 yo female admitted with acute colitis, severe sepsis, ileus  Pt is alert oriented, x4  Able to turn and reposition independently  Has Purwik in use for bladder control and rectal tube due to loose stools  Assessment Findings:   1) Bilateral heel and ankles intact without redness  Right lateral foot pink, blanchable  Pt refused Allevyn foam to heels, educated and agreed to off load feet to prevent breakdown  2) Bilateral Breast and Abdominal folds dark red brown hyperpimentation  , Pt states has had MASD for "years"  Areas cleansed, light powder and ABD applied to all areas  avoid skin to skin contact  3)Right buttock pink blanchable  Moisture barrier applied  4) Right lateral thigh abrasion, Pt states she has very dry skin, scratched  Dry intact  Nourishing cream applied  Skin care plans:  1-Hydraguard to bilateral sacrum, buttock  Right lateral thigh and heels BID and PRN  2-Elevate heels to offload pressure  3-Ehob cushion in chair when out of bed  4-Moisturize skin daily with skin nourishing cream   5-Turn/reposition q2h or when medically stable for pressure re-distribution on skin  6-Wash bilateral  Breast and groin skin folds with soap and water, pat dry  Apply light dusting of powder then ABD to folds  Wounds:  Wound 07/01/21 Abrasion(s) Thigh Outer;Right (Active)   Wound Image   07/02/21 1047   Wound Description Clean;Dry; Intact 07/02/21 1047   Britney-wound Assessment Clean;Dry; Intact 07/02/21 1047   Treatments Elevated 07/02/21 1047   Dressing Moisture barrier;Open to air 07/02/21 1047   Patient Tolerance Tolerated well 07/02/21 1047       Wound 07/01/21 MASD Buttocks Bilateral (Active)   Wound Image   07/02/21 1046   Wound Description Clean;Dry; Intact;Fragile;Pink 07/02/21 1046   Britney-wound Assessment Clean;Dry; Intact 07/02/21 1046   Treatments Cleansed;Site care 07/02/21 1046   Dressing Moisture barrier;Open to air 07/02/21 1046   Patient Tolerance Tolerated well 07/02/21 1046       Wound 07/02/21 Other (Comment) Breast Right; Lower (Active)   Wound Image   07/02/21 1050   Wound Description Dry; Intact; Beefy red;Fragile 07/02/21 1050   Britney-wound Assessment Rash;Scar Tissue 07/02/21 1050   Wound Length (cm) 3 cm 07/02/21 1050   Wound Width (cm) 4 cm 07/02/21 1050   Wound Surface Area (cm^2) 12 cm^2 07/02/21 1050   Treatments Cleansed;Site care 07/02/21 1050   Dressing ABD 07/02/21 1050   Dressing Changed New 07/02/21 1050   Patient Tolerance Tolerated well 07/02/21 1050   Dressing Status Clean;Dry; Intact 07/02/21 1050       Wound 07/02/21 Abdomen Right; Lower (Active)   Wound Image   07/02/21 1050   Wound Description Clean;Dry; Intact; Beefy red 07/02/21 1050   Britney-wound Assessment Clean;Dry; Intact; Brown;Fragile; Hyperpigmented 07/02/21 1050   Wound Length (cm) 4 cm 07/02/21 1050   Wound Width (cm) 14 cm 07/02/21 1050   Wound Surface Area (cm^2) 56 cm^2 07/02/21 1050   Treatments Cleansed;Site care 07/02/21 1050   Dressing ABD; Moisture barrier;Open to air 07/02/21 1050   Dressing Changed New 07/02/21 1050   Patient Tolerance Tolerated well 07/02/21 1050   Dressing Status Clean;Dry; Intact 07/02/21 1050       Wound 07/02/21 Thigh Anterior;Left;Proximal (Active)   Wound Image   07/02/21 1051   Wound Description Dry; Intact; Beefy red;Brown;Fragile 07/02/21 1051   Britney-wound Assessment Dry; Intact; Brown;Hyperpigmented;Pink 07/02/21 1051   Wound Width (cm) 14 cm 07/02/21 1051   Treatments Cleansed;Site care 07/02/21 1051   Dressing ABD; Open to air 07/02/21 1051   Dressing Changed New 07/02/21 1051   Patient Tolerance Tolerated well 07/02/21 1051     Call or tigertext with any questions  Wound Care will continue to follow    Walt Parrish RN

## 2021-07-02 NOTE — ASSESSMENT & PLAN NOTE
· 2/2 LA and uremia from diarrhea- bicarb stopped on 7/1 as bicarb became normal- today 19 nephro gave 2 doses of iv push

## 2021-07-02 NOTE — ASSESSMENT & PLAN NOTE
69 y/o female with pmhx diverticulitis, breast CA s/p left mastectomy and on Femera, hyperthyroidism presented to the ER today after friend found her covered in urine and feces at home  She lives alone  States she has been having watery diarrhea x 5 days- denies any hematochezia or melena  No nausea/vomiting  Has not eaten any food for a few days       · Pt is currently afebrile with leukocytosis; denies any recent abx use, travel, uncooked meat, eating out   · CT abdomen/pelvis: new small bowel dilatation suspicious for obstruction and secondary signs of mild colitis   · C diff melissa cx negative suspect more partial obstruction symptoms   · Will dc abx and observe - procal negative no wbc or fever s  · Manage partial sbo  · Appreciate gi input   · If sbo resolves and persistentr diarrhea will need [id number]   · Ova and parasites are pending   · Occult negative   · Iv fluids

## 2021-07-02 NOTE — CONSULTS
Consultation - Gayathri Mendoza 70 y o  female  [id number]  Unit/Bed:    [facility]    Physician Requesting Consult:  Jesus Manuel Samson MD   Reason for Consult: uncontrolled diarrhea    HPI:  Willow Rey is a 70 y o  female with history of diabetes, diverticulitis and hyperthyroidism who presented to [facility] 6/29/2021 for evaluation of altered mental status  Patient was found by her caregiver at her home while covered in her feces  She underwent a CT that demonstrated "Visualized esophagus, and stomach are within normal limits  Proximal to mid small bowel dilatation with air-fluid levels, transition to collapsed bowel in the right lower quadrant, 2/85  No obvious obstructing lesion  Similar anterior abdominal wall   hernia partially containing bowel, without evidence of obstruction at this point  Mild distal small bowel wall thickening  Collapsed large bowel  No evidence of appendicitis "  This was a non contrast study  She was seen by the surgery service and an obstruction series was ordered  Findings showed "Air-fluid levels within nondilated segments of bowel likely represents ileus  Partial small bowel obstruction not excluded "    She has been having multiple episodes of loose stools since admission        Past Medical History:    Past Medical History:   Diagnosis Date    Diabetes mellitus ([address] )     Diverticulitis     Hyperthyroidism         Past Surgical History:    Past Surgical History:   Procedure Laterality Date    APPENDECTOMY      COLOSTOMY      FL RETROGRADE PYELOGRAM  4/6/2021    MASTECTOMY      WA CYSTOURETHROSCOPY,URETER CATHETER Bilateral 4/6/2021    Procedure: CYSTOSCOPY RETROGRADE PYELOGRAM WITH INSERTION STENT URETERAL;  Surgeon: Jeferson Boland MD;  Location: [facility];  Service: Urology        Social History:    Social History     Socioeconomic History    Marital status: Single     Spouse name: Not on file    Number of children: Not on file    Years of education: Not on file    Highest education level: Not on file   Occupational History    Not on file   Tobacco Use    Smoking status: Former Smoker    Smokeless tobacco: Never Used   Vaping Use    Vaping Use: Never used   Substance and Sexual Activity    Alcohol use: Not Currently    Drug use: Not Currently    Sexual activity: Not on file   Other Topics Concern    Not on file   Social History Narrative    Not on file     Social Determinants of Health     Financial Resource Strain:     Difficulty of Paying Living Expenses:    Food Insecurity:     Worried About Running Out of Food in the Last Year:     [address] in the Last Year:    Transportation Needs:     Lack of Transportation (Medical):  Lack of Transportation (Non-Medical):    Physical Activity:     Days of Exercise per Week:     Minutes of Exercise per Session:    Stress:     Feeling of Stress :    Social Connections:     Frequency of Communication with Friends and Family:     Frequency of Social Gatherings with Friends and Family:     Attends Sikh Services:     Active Member of Clubs or Organizations:     Attends Club or Organization Meetings:     Marital Status:    Intimate Partner Violence:     Fear of Current or Ex-Partner:     Emotionally Abused:     Physically Abused:     Sexually Abused:        Family History:  History reviewed  No pertinent family history  Allergies: Allergies   Allergen Reactions    Latex Itching    Nuts - Food Allergy Other (See Comments)     [place] allergy    Chlorhexidine Rash    Penicillins Rash       Medications Prior to Admission:    No current facility-administered medications on file prior to encounter       Current Outpatient Medications on File Prior to Encounter   Medication Sig Dispense Refill    calcium carbonate (Tums) 500 mg chewable tablet Chew 2 tablets      letrozole (FEMARA) 2 5 mg tablet Take 2 5 mg by mouth daily      methimazole (TAPAZOLE) 5 mg tablet TAKE 1/2 TABLET BY MOUTH EVERY OTHER DAY      acetaminophen (TYLENOL) 325 mg tablet Take 2 tablets (650 mg total) by mouth every 6 (six) hours as needed for mild pain 30 tablet 0    glucosamine-chondroitin 500-400 MG tablet Take 1 tablet by mouth (Patient not taking: Reported on 6/30/2021)      loperamide (IMODIUM) 2 mg capsule Take 2 mg by mouth (Patient not taking: Reported on 6/29/2021)      oxyCODONE-acetaminophen (PERCOCET) 5-325 mg per tablet Take 1 tablet by mouth every 4 (four) hours as needed for moderate painMax Daily Amount: 6 tablets (Patient not taking: Reported on 6/29/2021) 12 tablet 0    pilocarpine (SALAGEN) 5 mg tablet Take 1 tablet (5 mg total) by mouth 3 (three) times a day 90 tablet 0    potassium chloride (MICRO-K) 10 MEQ CR capsule Take 2 capsules (20 mEq total) by mouth daily 60 capsule 0        Current Medications:    Current Facility-Administered Medications:     cefTRIAXone (ROCEPHIN) IVPB (premix in dextrose) 1,000 mg 50 mL, 1,000 mg, Intravenous, Q24H, Roger Anderson PA-C, Last Rate: 100 mL/hr at 07/01/21 2006, 1,000 mg at 07/01/21 2006    heparin (porcine) subcutaneous injection 5,000 Units, 5,000 Units, Subcutaneous, Q8H [address], 5,000 Units at 06/30/21 0658 **AND** [CANCELED] Platelet count, , , AM Draw, Roger Anderson PA-C    metroNIDAZOLE (FLAGYL) IVPB (premix) 500 mg 100 mL, 500 mg, Intravenous, Q8H, Tyesha Stephens MD, Last Rate: 200 mL/hr at 07/02/21 0806, 500 mg at 07/02/21 0806    multi-electrolyte (PLASMALYTE-A/ISOLYTE-S PH 7 4) IV solution, 100 mL/hr, Intravenous, Continuous, Tyesha Stephens MD, Last Rate: 100 mL/hr at 07/01/21 2354, 100 mL/hr at 07/01/21 2354    ondansetron (ZOFRAN) injection 4 mg, 4 mg, Intravenous, Q6H PRN, Tyesha Stephens MD, 4 mg at 07/01/21 2142    sodium bicarbonate 8 4 % injection 50 mEq, 50 mEq, Intravenous, Q6H, Charla Mayorga DO     Review of Systems: negative except for above HPI    Physical Exam:  Vitals:    07/02/21 0550   BP: 112/53 Pulse: 65   Resp: 20   Temp: (!) 97 2 °F (36 2 °C)   SpO2: 97%      HEENT:  Head: Normocephalic, no lesions, without obvious abnormality  CARDIAC:  regular rate and rhythm, S1, S2 normal, no murmur, click, rub or gallop  LUNGS:  normal air entry, lungs clear to auscultation  ABDOMEN:  soft, tender in periumbilical area  Bowel sounds hyperactive   No masses, no organomegaly  EXTREMITIES:  extremities normal, warm and well-perfused; no cyanosis, clubbing, or edema    Labs:  Component      Latest Ref Rng & Units 7/1/2021 7/1/2021 7/2/2021           6:07 AM  8:12 PM    WBC      4 31 - 10 16 Thousand/uL   5 90   Red Blood Cell Count      3 81 - 5 12 Million/uL   3 81   Hemoglobin      11 5 - 15 4 g/dL   11 6   HCT      34 8 - 46 1 %   37 1   MCV      82 - 98 fL   97   MCH      26 8 - 34 3 pg   30 4   MCHC      31 4 - 37 4 g/dL   31 3 (L)   RDW      11 6 - 15 1 %   15 9 (H)   MPV      8 9 - 12 7 fL   11 9   Platelet Count      595 - 390 Thousands/uL   144 (L)   nRBC      /100 WBCs   0   Neutrophils %      43 - 75 %   64   Immat GRANS %      0 - 2 %   0   Lymphocytes Relative      14 - 44 %   22   Monocytes Relative      4 - 12 %   10   Eosinophils      0 - 6 %   3   Basophils Relative      0 - 1 %   1   Absolute Neutrophils      1 85 - 7 62 Thousands/µL   3 76   Immature Grans Absolute      0 00 - 0 20 Thousand/uL   0 02   Lymphocytes Absolute      0 60 - 4 47 Thousands/µL   1 30   Absolute Monocytes      0 17 - 1 22 Thousand/µL   0 58   Absolute Eosinophils      0 00 - 0 61 Thousand/µL   0 19   Basophils Absolute      0 00 - 0 10 Thousands/µL   0 05   Sodium      136 - 145 mmol/L 141 141 140   Potassium      3 5 - 5 3 mmol/L 2 7 (LL) 3 5 3 9   Chloride      100 - 108 mmol/L 106 107 109 (H)   CO2      21 - 32 mmol/L 22 22 19 (L)   Anion Gap      4 - 13 mmol/L 13 12 12   BUN      5 - 25 mg/dL 40 (H) 37 (H) 35 (H)   Creatinine      0 60 - 1 30 mg/dL 3 57 (H) 3 21 (H) 3 04 (H)   Glucose, Random      65 - 140 mg/dL 90 83 79 Calcium      8 3 - 10 1 mg/dL 8 3 8 7 8 3   CORRECTED CALCIUM      8 3 - 10 1 mg/dL 9 6  10 0   AST      5 - 45 U/L 26  32   ALT      12 - 78 U/L 16  14   Alkaline Phosphatase      46 - 116 U/L 154 (H)  153 (H)   Total Protein      6 4 - 8 2 g/dL 6 9  6 8   Albumin      3 5 - 5 0 g/dL 2 4 (L)  1 9 (L)   TOTAL BILIRUBIN      0 20 - 1 00 mg/dL 0 66  0 57   eGFR      ml/min/1 73sq m 12 14 15   Procalcitonin      <=0 25 ng/ml 0 12     Phosphorus      2 3 - 4 1 mg/dL 3 7     Magnesium      1 6 - 2 6 mg/dL 1 6  2 2     Imaging:  Obstruction series:  IMPRESSION:     Air-fluid levels within nondilated segments of bowel likely represents ileus  Partial small bowel obstruction not excluded  CT:     IMPRESSION:     Compared to 4/1/2021, new small bowel dilatation suspicious for obstruction  Secondary signs of mild colitis      New ureteral stents without ureteral stones      Other chronic and nonemergent findings above  Impression:  Ganesh krishnan is a 70 y o  female who presents with diarrhea and abnormal CT demonstrating possible partial SBO versus ileus  Plan:  1  Follow up remainder of stool studies  2  Serial abdominal exams  3   Would avoid antidiarrheals at this time due to possible pSBO  4   Surgical service following  5   Consider colonoscopy inpatient versus outpatient once obstruction resolves if diarrhea has not improved  Thank you for this consultation  We will continue to follow along with you during the patient's hospital course

## 2021-07-02 NOTE — DISCHARGE INSTR - OTHER ORDERS
Skin care plans:  1-Hydraguard to bilateral sacrum, buttock  and  heels BID and PRN  2-Elevate heels to offload pressure  3-Ehob cushion in chair when out of bed    4-Moisturize skin daily with skin nourishing cream   5-Turn/reposition q2h or when medically stable for pressure re-distribution on skin

## 2021-07-02 NOTE — ASSESSMENT & PLAN NOTE
· POA- patient admits to dysuria symptoms and is normally incontinent at home   · Symptoms resolved- sufficient 3 days of rocephine ucx was negative tx due to significant pyuria

## 2021-07-02 NOTE — PROGRESS NOTES
Progress Note - General Surgery   Saroj Ren 70 y o  female MRN: [id number]  Unit/Bed#: -01 Encounter: [id number]    Assessment:  -Patient with multiple episodes of loose bowel movements  -Stool for enteric pathogens including C difficile negative, low suspicion for infectious colitis  -Patient unable to tolerate advanced clear liquid diet yesterday, 2 episodes of vomiting   -This morning nausea but improved  Abdomen remains distended and tympanic  Frequent -high-pitched watery bowel sounds throughout the abdomen  Rectal tube was placed yesterday because of perianal irritation with multiple loose watery bowel movements     -Obstruction series yesterday showed air-fluid levels with nondilated segments of bowel likely representing ileus, partial small bowel obstruction not excluded  Acute kidney injury on chronic kidney disease stage 2, improved Cr 3 04 (3 21, 3 57)  Cystitis with hematuria, the patient is normally incontinent home  K improved 3 9    Status post Iman's procedure for diverticulitis 4 years ago, [place] 2017  Subsequent colostomy reversal  CA left breast, status post modified radical mastectomy left breast 5 years ago  Bilateral renal stents, history of obstructive uropathy with bilateral hydronephrosis and hydroureter, stable appearance on CT scan imaging  Patient presents to the ED here with altered mental status and diarrhea  Hematuria, the pure wick urinary catheter in place because of incontinence  Severe protein calorie malnutrition, BMI 27 81   Hyperthyroidism     Plan:  Patient needs NG tube for decompression, attached to intermittent low wall suction  Continue conservative ,non-operative management at this time  Dr Carbajal to examine the patient later this morning  Nothing by mouth  Gentle IV fluids for hydration  Serial abdominal exams  If the patient does not improve by tomorrow, Saturday, then will obtain a small-bowel follow-through    Repeat CBC, metabolic profile, kidney function in a m  Stool for occult blood, may specimen obtain from rectal tubing  Consider Gastroenterology consultation  Analgesics/antiemetics p r n  Monitor I&Os  DNAR code status     Subjective/Objective   Chief Complaint:  Patient was unable tolerate clear liquids yesterday, 2 episodes of vomiting  She continues with nausea though improved this morning  Subjective:  72-year-old white female admitted with small-bowel obstruction likely secondary to intra-abdominal he shin  She has had multiple abdominal surgeries including Iman's procedure for diverticulitis 4 years ago with subsequent colostomy reversal   Extensive history of CA of the breast on the left  She presented with altered mental status and diarrhea 2 days ago  Infectious workup for possible colitis has been negative including C difficile  Yesterday the patient was advanced clear liquids, she was unable to tolerate had 2 episodes of vomiting  Hospitalist physician order an obstruction series which suggested possible ileus though small-bowel obstruction could not be excluded  No fever or chills  A rectal tube was inserted yesterday because the patient had been having diffuse watery greenish colored diarrhea with perianal irritation  Also the patient has a pure wick urinary catheter because of incontinence in place        Scheduled Meds:  Current Facility-Administered Medications   Medication Dose Route Frequency Provider Last Rate    cefTRIAXone  1,000 mg Intravenous Q24H Racquel Stewart PA-C 1,000 mg (07/01/21 2006)    heparin (porcine)  5,000 Units Subcutaneous [facility] Racquel Stewart PA-C      metroNIDAZOLE  500 mg Intravenous Q8H Keturah Templeton  mg (07/01/21 0219)    multi-electrolyte  100 mL/hr Intravenous Continuous Keturah Templeton  mL/hr (07/01/21 0654)    ondansetron  4 mg Intravenous Q6H PRN Keturah Templeton MD       Continuous Infusions:multi-electrolyte, 100 mL/hr, Last Rate: 100 mL/hr (07/01/21 7647)      PRN Meds: ondansetron    Objective:     Blood pressure 112/53, pulse 65, temperature (!) 97 2 °F (36 2 °C), resp  rate 20, height 5' (1 524 m), weight 64 6 kg (142 lb 6 7 oz), SpO2 97 %  ,Body mass index is 27 81 kg/m²  Intake/Output Summary (Last 24 hours) at 7/2/2021 0801  Last data filed at 7/2/2021 0501  Gross per 24 hour   Intake 1390 ml   Output 650 ml   Net 740 ml       Invasive Devices     Peripheral Intravenous Line            Peripheral IV 07/01/21 Proximal;Right;Ventral (anterior) Forearm <1 day    Peripheral IV 07/01/21 Right;Ventral (anterior) Forearm <1 day          Drain            External Urinary Catheter <1 day    Rectal Tube With balloon <1 day                Physical Exam:   Thin, frail  Muscle wasting noted  Patient is alert, response to questions  She appears chronically ill  Skin decreased turgor  No jaundice  ENT oral mucosa is dry, poor dentition  Neck supple  Heart regular rate and rhythm  No tachycardia  No murmur is heard  Lungs clear, no respiratory distress  No adventitious sounds  Back mild kyphotic curvature  No CVA tenderness along the flanks to percussion  Abdomen appears round, distended  Midline surgical scar  No hernia  Tympanic to percussion throughout  Abdomen is not particularly tender  Bowel sounds are heard throughout, high-pitched and with fluid rushes noted  Pure wick catheter in place draining yellow urine  Rectal tube with  mL of greenish liquid stool  Neurological exam finds the patient oriented  No tremor  Moves all 4 extremities  Ambulation could not be observed, she is bed ridden  No obvious coordination problems  Equal  strength enhanced, cranial nerves appear symmetrical   Mental status is baseline for the patient  Lab, Imaging and other studies:  I have personally reviewed pertinent lab results    , CBC:   Lab Results   Component Value Date    WBC 5 90 07/02/2021    HGB 11 6 07/02/2021    HCT 37 1 07/02/2021    MCV 97 07/02/2021     (L) 07/02/2021    MCH 30 4 07/02/2021    MCHC 31 3 (L) 07/02/2021    RDW 15 9 (H) 07/02/2021    MPV 11 9 07/02/2021    NRBC 0 07/02/2021   , CMP:   Lab Results   Component Value Date    SODIUM 140 07/02/2021    K 3 9 07/02/2021     (H) 07/02/2021    CO2 19 (L) 07/02/2021    BUN 35 (H) 07/02/2021    CREATININE 3 04 (H) 07/02/2021    CALCIUM 8 3 07/02/2021    AST 32 07/02/2021    ALT 14 07/02/2021    ALKPHOS 153 (H) 07/02/2021    EGFR 15 07/02/2021     OBSTRUCTION SERIES     INDICATION:   eval for sbo improvemenet      COMPARISON:  None     EXAM PERFORMED/VIEWS:  XR ABDOMEN OBSTRUCTION SERIES        FINDINGS:     Scattered air-fluid levels within nondilated segments of bowel suggest ileus  Partial small bowel obstruction not excluded  Calcified uterine fibroid  Bilateral double-J nephroureteral stents      No free air beneath the hemidiaphragms       No pathologic calcifications or soft tissue masses evident       Osseous structures are unremarkable      Examination of the chest reveals a normal cardiomediastinal silhouette  Lungs are clear      IMPRESSION:     Air-fluid levels within nondilated segments of bowel likely represents ileus    Partial small bowel obstruction not excluded        VTE Pharmacologic Prophylaxis: Heparin  VTE Mechanical Prophylaxis: sequential compression device     Juan Russo PA-C

## 2021-07-02 NOTE — ASSESSMENT & PLAN NOTE
· Present on admission   · Patient is currently afebrile   · Patient meets SIRS criteria with tachycardia, leukocytosis, elevated lactic acid on admission 2 7  · Resolved  - 2/2 uti and colitis

## 2021-07-02 NOTE — PROGRESS NOTES
[address]  Progress Note - Enrrique Mcdowell 1945, 70 y o  female MRN: [id number]  Unit/Bed#: -[facility] Encounter: [id number]  Primary Care Provider: Grecia Paniagua MD   Date and time admitted to hospital: 6/29/2021  5:35 PM    Severe sepsis [facility])  Assessment & Plan  · Present on admission   · Patient is currently afebrile   · Patient meets SIRS criteria with tachycardia, leukocytosis, elevated lactic acid on admission 2 7  · Resolved  - 2/2 uti and colitis    Partial small bowel obstruction (HCC)  Assessment & Plan  Vs ileus still with diarrhea , vomited on 7/1 after clears refused ngt today but has not been active n/v if recurrs will have to discuss again  Discussed with surgery still diarrhea - will keep npo /iv fluids if persist nakul will need small bowel follow through   Labs in am       Acute cystitis with hematuria  Assessment & Plan  · POA- patient admits to dysuria symptoms and is normally incontinent at home   · Symptoms resolved- sufficient 3 days of rocephine ucx was negative tx due to significant pyuria     High anion gap metabolic acidosis  Assessment & Plan  · 2/2 LA and uremia from diarrhea- bicarb stopped on 7/1 as bicarb became normal- today 19 nephro gave 2 doses of iv push     Hypokalemia  Assessment & Plan  · Resolved with supplementation    History of breast cancer  Assessment & Plan  · Diagnosed 2007 s/p left mastectomy   · Hold Femara secondary to suspected bowel obstruction- can resume when no longer NPO  · Of note, alk phos was elevated on admission 234; CT abdomen/pelvis reveals chronic gallstones and chronic distended gallbladder   Alk phos was elevated prior admission 05/11/21 317   · Consideration of bone metastasis- patient should f/u with imaging as outpatient     Hyperthyroidism  Assessment & Plan  · tsh normal methimazole has to be held due to npo vomiting with clears     Acute on chronic kidney failure [facility])  Assessment & Plan  Lab Results   Component Value Date    EGFR 15 07/02/2021    EGFR 14 07/01/2021    EGFR 12 07/01/2021    CREATININE 3 04 (H) 07/02/2021    CREATININE 3 21 (H) 07/01/2021    CREATININE 3 57 (H) 07/01/2021     · Present on admission  On ckd 2 base 0 7-1  1-Renal function does not return to baseline since episode of obstructive uropathy earlier this year  Should follow with nephrologist as outpatient  · Patient is s/p bilateral ureteral stent placement 04/06/21 for hydronephrosis  · Patient has hx of prior admissions with [facility] and leaving [facility] x2 on 04/07/21 and 05/11/21   · Suspect pre-renal secondary to decreased PO intake and persistent diarrhea   · CT abdomen/pelvis no signs on obstruction in the kidneys/ureter   · Improving - but still having diarrhea- will keep isolyte at 100 ml/hr on       * Colitis, acute  Assessment & Plan  69 y/o female with pmhx diverticulitis, breast CA s/p left mastectomy and on Femera, hyperthyroidism presented to the ER today after friend found her covered in urine and feces at home  She lives alone  States she has been having watery diarrhea x 5 days- denies any hematochezia or melena  No nausea/vomiting  Has not eaten any food for a few days  · Pt is currently afebrile with leukocytosis; denies any recent abx use, travel, uncooked meat, eating out   · CT abdomen/pelvis: new small bowel dilatation suspicious for obstruction and secondary signs of mild colitis   · C diff melissa cx negative suspect more partial obstruction symptoms   · Will dc abx and observe - procal negative no wbc or fever s  · Manage partial sbo  · Appreciate gi input   · If sbo resolves and persistentr diarrhea will need [id number]   · Ova and parasites are pending   · Occult negative   · Iv fluids         VTE Pharmacologic Prophylaxis:   Pharmacologic: Heparin  Mechanical VTE Prophylaxis in Place: Yes    Patient Centered Rounds: I have performed bedside rounds with nursing staff today      Discussions with Specialists or Other Care Team Provider: discussed with surgery     Education and Discussions with Family / Patient: patient and daughter     Time Spent for Care: 30 minutes  More than 50% of total time spent on counseling and coordination of care as described above  Current Length of Stay: 3 day(s)    Current Patient Status: Inpatient   Certification Statement: The patient will continue to require additional inpatient hospital stay due to sbo    Discharge Plan: >3 days     Code Status: Level 3 - DNAR and DNI      Subjective:   Patient seen and examined earlier today overnight had a lot of diarrhea rectal tube was placed  She denies any nausea vomiting  No abdominal pain  No dysuria    Objective:     Vitals:   Temp (24hrs), Av 7 °F (36 5 °C), Min:97 2 °F (36 2 °C), Max:98 4 °F (36 9 °C)    Temp:  [97 2 °F (36 2 °C)-98 4 °F (36 9 °C)] 97 2 °F (36 2 °C)  HR:  [65-73] 65  Resp:  [17-20] 20  BP: (112-119)/(53-65) 112/53  SpO2:  [96 %-98 %] 97 %  Body mass index is 27 81 kg/m²  Input and Output Summary (last 24 hours): Intake/Output Summary (Last 24 hours) at 2021 1355  Last data filed at 2021 1133  Gross per 24 hour   Intake 1390 ml   Output 800 ml   Net 590 ml       Physical Exam:     Physical Exam  Vitals and nursing note reviewed  Constitutional:       General: She is not in acute distress  Appearance: She is well-developed  HENT:      Head: Normocephalic and atraumatic  Eyes:      Conjunctiva/sclera: Conjunctivae normal    Cardiovascular:      Rate and Rhythm: Normal rate and regular rhythm  Heart sounds: No murmur heard  Pulmonary:      Effort: Pulmonary effort is normal  No respiratory distress  Breath sounds: Normal breath sounds  Abdominal:      General: There is distension  Palpations: Abdomen is soft  Tenderness: There is no abdominal tenderness  Comments: Hyperactive bs  Ventral hernia reducible   Musculoskeletal:         General: No swelling        Cervical back: Neck supple  Skin:     General: Skin is warm and dry  Neurological:      General: No focal deficit present  Mental Status: She is alert  Mental status is at baseline  Additional Data:     Labs:    Results from last 7 days   Lab Units 07/02/21  0550   WBC Thousand/uL 5 90   HEMOGLOBIN g/dL 11 6   HEMATOCRIT % 37 1   PLATELETS Thousands/uL 144*   NEUTROS PCT % 64   LYMPHS PCT % 22   MONOS PCT % 10   EOS PCT % 3     Results from last 7 days   Lab Units 07/02/21  0545   SODIUM mmol/L 140   POTASSIUM mmol/L 3 9   CHLORIDE mmol/L 109*   CO2 mmol/L 19*   BUN mg/dL 35*   CREATININE mg/dL 3 04*   ANION GAP mmol/L 12   CALCIUM mg/dL 8 3   ALBUMIN g/dL 1 9*   TOTAL BILIRUBIN mg/dL 0 57   ALK PHOS U/L 153*   ALT U/L 14   AST U/L 32   GLUCOSE RANDOM mg/dL 79         Results from last 7 days   Lab Units 06/29/21  1819   POC GLUCOSE mg/dl 104     Results from last 7 days   Lab Units 06/30/21  0518   HEMOGLOBIN A1C % 5 1     Results from last 7 days   Lab Units 07/01/21  0607 06/30/21  0057 06/29/21  2240 06/29/21  2137 06/29/21  1810   LACTIC ACID mmol/L  --  1 2 2 1* 2 1* 2 7*   PROCALCITONIN ng/ml 0 12  --  0 12  --   --            * I Have Reviewed All Lab Data Listed Above  * Additional Pertinent Lab Tests Reviewed: All Labs Within Last 24 Hours Reviewed    Imaging:    Imaging Reports Reviewed Today Include: none today  Imaging Personally Reviewed by Myself Includes:      Recent Cultures (last 7 days):     Results from last 7 days   Lab Units 06/30/21  2216 06/29/21  1852 06/29/21  1810 06/29/21  1805   BLOOD CULTURE   --   --  No Growth at 48 hrs  No Growth at 48 hrs     URINE CULTURE   --  No Growth <1000 cfu/mL  --   --    C DIFF TOXIN B BY PCR  Negative  --   --   --        Last 24 Hours Medication List:   Current Facility-Administered Medications   Medication Dose Route Frequency Provider Last Rate    heparin (porcine)  5,000 Units Subcutaneous Q8H [facility]      multi-electrolyte  100 mL/hr Intravenous Continuous Jean Rogers  mL/hr (07/02/21 1153)    ondansetron  4 mg Intravenous Q6H PRN Jean Rogers MD          Today, Patient Was Seen By: Jean Rogers MD    ** Please Note: Dictation voice to text software may have been used in the creation of this document   **

## 2021-07-02 NOTE — ASSESSMENT & PLAN NOTE
Lab Results   Component Value Date    EGFR 15 07/02/2021    EGFR 14 07/01/2021    EGFR 12 07/01/2021    CREATININE 3 04 (H) 07/02/2021    CREATININE 3 21 (H) 07/01/2021    CREATININE 3 57 (H) 07/01/2021     · Present on admission  On ckd 2 base 0 7-1  1-Renal function does not return to baseline since episode of obstructive uropathy earlier this year  Should follow with nephrologist as outpatient    · Patient is s/p bilateral ureteral stent placement 04/06/21 for hydronephrosis  · Patient has hx of prior admissions with [facility] and leaving [facility] x2 on 04/07/21 and 05/11/21   · Suspect pre-renal secondary to decreased PO intake and persistent diarrhea   · CT abdomen/pelvis no signs on obstruction in the kidneys/ureter   · Improving - but still having diarrhea- will keep isolyte at 100 ml/hr on

## 2021-07-02 NOTE — ASSESSMENT & PLAN NOTE
Vs ileus still with diarrhea , vomited on 7/1 after clears refused ngt today but has not been active n/v if recurrs will have to discuss again  Discussed with surgery still diarrhea - will keep npo /iv fluids if persist nakul will need small bowel follow through   Labs in am

## 2021-07-02 NOTE — PLAN OF CARE
Problem: Potential for Falls  Goal: Patient will remain free of falls  Description: INTERVENTIONS:  - Educate patient/family on patient safety including physical limitations  - Instruct patient to call for assistance with activity   - Consult OT/PT to assist with strengthening/mobility   - Keep Call bell within reach  - Keep bed low and locked with side rails adjusted as appropriate  - Keep care items and personal belongings within reach  - Initiate and maintain comfort rounds  - Make Fall Risk Sign visible to staff  - Offer Toileting every 2 Hours, in advance of need  - Initiate/Maintain bedalarm  - Obtain necessary fall risk management equipment: bed alarm  - Apply yellow socks and bracelet for high fall risk patients  - Consider moving patient to room near nurses station  Outcome: Progressing     Problem: MOBILITY - ADULT  Goal: Maintain or return to baseline ADL function  Description: INTERVENTIONS:  -  Assess patient's ability to carry out ADLs; assess patient's baseline for ADL function and identify physical deficits which impact ability to perform ADLs (bathing, care of mouth/teeth, toileting, grooming, dressing, etc )  - Assess/evaluate cause of self-care deficits   - Assess range of motion  - Assess patient's mobility; develop plan if impaired  - Assess patient's need for assistive devices and provide as appropriate  - Encourage maximum independence but intervene and supervise when necessary  - Involve family in performance of ADLs  - Assess for home care needs following discharge   - Consider OT consult to assist with ADL evaluation and planning for discharge  - Provide patient education as appropriate  Outcome: Progressing       Problem: Prexisting or High Potential for Compromised Skin Integrity  Goal: Skin integrity is maintained or improved  Description: INTERVENTIONS:  - Identify patients at risk for skin breakdown  - Assess and monitor skin integrity  - Assess and monitor nutrition and hydration status  - Monitor labs   - Assess for incontinence   - Turn and reposition patient  - Assist with mobility/ambulation  - Relieve pressure over bony prominences  - Avoid friction and shearing  - Provide appropriate hygiene as needed including keeping skin clean and dry  - Evaluate need for skin moisturizer/barrier cream  - Collaborate with interdisciplinary team   - Patient/family teaching  - Consider wound care consult   Outcome: Progressing     Problem: PAIN - ADULT  Goal: Verbalizes/displays adequate comfort level or baseline comfort level  Description: Interventions:  - Encourage patient to monitor pain and request assistance  - Assess pain using appropriate pain scale  - Administer analgesics based on type and severity of pain and evaluate response  - Implement non-pharmacological measures as appropriate and evaluate response  - Consider cultural and social influences on pain and pain management  - Notify physician/advanced practitioner if interventions unsuccessful or patient reports new pain  Outcome: Progressing     Problem: INFECTION - ADULT  Goal: Absence or prevention of progression during hospitalization  Description: INTERVENTIONS:  - Assess and monitor for signs and symptoms of infection  - Monitor lab/diagnostic results  - Monitor all insertion sites, i e  indwelling lines, tubes, and drains  - Monitor endotracheal if appropriate and nasal secretions for changes in amount and color  - [place] appropriate cooling/warming therapies per order  - Administer medications as ordered  - Instruct and encourage patient and family to use good hand hygiene technique  - Identify and instruct in appropriate isolation precautions for identified infection/condition  Outcome: Progressing     Problem: SAFETY ADULT  Goal: Patient will remain free of falls  Description: INTERVENTIONS:  - Educate patient/family on patient safety including physical limitations  - Instruct patient to call for assistance with activity   - Consult OT/PT to assist with strengthening/mobility   - Keep Call bell within reach  - Keep bed low and locked with side rails adjusted as appropriate  - Keep care items and personal belongings within reach  - Initiate and maintain comfort rounds  - Make Fall Risk Sign visible to staff  - Offer Toileting every 2 Hours, in advance of need  - Initiate/Maintain bedalarm  - Obtain necessary fall risk management equipment: bed alarm  - Apply yellow socks and bracelet for high fall risk patients  - Consider moving patient to room near nurses station  Outcome: Progressing  Goal: Maintain or return to baseline ADL function  Description: INTERVENTIONS:  -  Assess patient's ability to carry out ADLs; assess patient's baseline for ADL function and identify physical deficits which impact ability to perform ADLs (bathing, care of mouth/teeth, toileting, grooming, dressing, etc )  - Assess/evaluate cause of self-care deficits   - Assess range of motion  - Assess patient's mobility; develop plan if impaired  - Assess patient's need for assistive devices and provide as appropriate  - Encourage maximum independence but intervene and supervise when necessary  - Involve family in performance of ADLs  - Assess for home care needs following discharge   - Consider OT consult to assist with ADL evaluation and planning for discharge  - Provide patient education as appropriate  Outcome: Progressing     Problem: DISCHARGE PLANNING  Goal: Discharge to home or other facility with appropriate resources  Description: INTERVENTIONS:  - Identify barriers to discharge w/patient and caregiver  - Arrange for needed discharge resources and transportation as appropriate  - Identify discharge learning needs (meds, wound care, etc )  - Arrange for interpretive services to assist at discharge as needed  - Refer to Case Management Department for coordinating discharge planning if the patient needs post-hospital services based on physician/advanced practitioner order or complex needs related to functional status, cognitive ability, or social support system  Outcome: Progressing     Problem: Knowledge Deficit  Goal: Patient/family/caregiver demonstrates understanding of disease process, treatment plan, medications, and discharge instructions  Description: Complete learning assessment and assess knowledge base    Interventions:  - Provide teaching at level of understanding  - Provide teaching via preferred learning methods  Outcome: Progressing     Problem: GASTROINTESTINAL - ADULT  Goal: Minimal or absence of nausea and/or vomiting  Description: INTERVENTIONS:  - Administer IV fluids if ordered to ensure adequate hydration  - Maintain NPO status until nausea and vomiting are resolved  - Nasogastric tube if ordered  - Administer ordered antiemetic medications as needed  - Provide nonpharmacologic comfort measures as appropriate  - Advance diet as tolerated, if ordered  - Consider nutrition services referral to assist patient with adequate nutrition and appropriate food choices  Outcome: Progressing  Goal: Maintains or returns to baseline bowel function  Description: INTERVENTIONS:  - Assess bowel function  - Encourage oral fluids to ensure adequate hydration  - Administer IV fluids if ordered to ensure adequate hydration  - Administer ordered medications as needed  - Encourage mobilization and activity  - Consider nutritional services referral to assist patient with adequate nutrition and appropriate food choices  Outcome: Progressing  Goal: Maintains adequate nutritional intake  Description: INTERVENTIONS:  - Monitor percentage of each meal consumed  - Identify factors contributing to decreased intake, treat as appropriate  - Assist with meals as needed  - Monitor I&O, weight, and lab values if indicated  - Obtain nutrition services referral as needed  Outcome: Progressing  Goal: Oral mucous membranes remain intact  Description: INTERVENTIONS  - Assess oral mucosa and hygiene practices  - Implement preventative oral hygiene regimen  - Implement oral medicated treatments as ordered  - Initiate Nutrition services referral as needed  Outcome: Progressing     Problem: Nutrition/Hydration-ADULT  Goal: Nutrient/Hydration intake appropriate for improving, restoring or maintaining nutritional needs  Description: Monitor and assess patient's nutrition/hydration status for malnutrition  Collaborate with interdisciplinary team and initiate plan and interventions as ordered  Monitor patient's weight and dietary intake as ordered or per policy  Utilize nutrition screening tool and intervene as necessary  Determine patient's food preferences and provide high-protein, high-caloric foods as appropriate       INTERVENTIONS:  - Monitor oral intake, urinary output, labs, and treatment plans  - Assess nutrition and hydration status and recommend course of action  - Evaluate amount of meals eaten  - Assist patient with eating if necessary   - Allow adequate time for meals  - Recommend/ encourage appropriate diets, oral nutritional supplements, and vitamin/mineral supplements  - Order, calculate, and assess calorie counts as needed  - Recommend, monitor, and adjust tube feedings and TPN/PPN based on assessed needs  - Assess need for intravenous fluids  - Provide specific nutrition/hydration education as appropriate  - Include patient/family/caregiver in decisions related to nutrition  Outcome: Progressing

## 2021-07-02 NOTE — PROGRESS NOTES
Progress Note - Nephrology   Pansy Client 70 y o  female MRN: [id number]  Unit/Bed#: -01 Encounter: [id number]    A/P:  1  Acute kidney injury on top of chronic kidney disease   Creatinine continues to improve with volume expansion  Continue to offer supportive care from a volume standpoint  Avoid nephrotoxins  2  Chronic kidney disease stage 3 with baseline creatinine likely around 1 2 mg/dL  That being said the lowest the patient's creatinine has achieved the last calendar year was 0 81 mg/dL from July of 2020  3  Metabolic acidosis due to GI losses   Patient was transitioned off of bicarbonate drip yesterday, will add a bicarb injection this morning as well as this afternoon, continue monitor acidosis  Hopefully be initiating a bicarbonate drip will not be indicated but will monitor closely  4  Hypokalemia   Continue supplement potassium as indicated  Agree with supplementation is morning  5  History of bilateral hydroureteronephrosis status post bilateral stents   Continue monitor kidney function, as mentioned above patient's probable baseline creatinine is around 1 2 mg/dL  Unclear she will be able to achieve optimal kidney function when a creatinine was closer to around 0 8 mg/dL  Etiology likely to be chronic tubulointerstitial disease is consequence of obstructive uropathy  6  Small-bowel obstruction/ileus   Appreciate surgical input, continue care according to their guidance      Follow up reason for today's visit:  Acute kidney injury/chronic kidney disease/focal acidosis/hypokalemia    Colitis, acute    Patient Active Problem List   Diagnosis    Acute on chronic kidney failure ([facility] [address] )    Hyperthyroidism    History of breast cancer    Uncontrolled type 2 diabetes mellitus, without long-term current use of insulin (HCC)    Cirrhosis of liver (HCC)    Chest pain    Constipation    Hypokalemia    Hydronephrosis    Hypomagnesemia    Parotiditis    Hyponatremia    High anion gap metabolic acidosis    Colitis, acute    Acute cystitis with hematuria    Ileus (HCC)    Severe sepsis (HCC)         Subjective:   No Acute events overnight, patient is complaining of neck pain which is new  Abdominal pain has significantly improved since admission  Objective:     Vitals: Blood pressure 112/53, pulse 65, temperature (!) 97 2 °F (36 2 °C), resp  rate 20, height 5' (1 524 m), weight 64 6 kg (142 lb 6 7 oz), SpO2 97 %  ,Body mass index is 27 81 kg/m²  Weight (last 2 days)     Date/Time   Weight    07/02/21 0550   64 6 (142 42)    07/01/21 0600   64 4 (141 98)    06/30/21 1314   63 7 (140 43)    06/30/21 0707   60 3 (132 94)                Intake/Output Summary (Last 24 hours) at 7/2/2021 0935  Last data filed at 7/2/2021 0501  Gross per 24 hour   Intake 1390 ml   Output 650 ml   Net 740 ml     I/O last 3 completed shifts: In: 8207 [P O :300;  I V :1090; IV Piggyback:100]  Out: 650 [Stool:650]         Physical Exam: /53   Pulse 65   Temp (!) 97 2 °F (36 2 °C)   Resp 20   Ht 5' (1 524 m)   Wt 64 6 kg (142 lb 6 7 oz)   SpO2 97%   BMI 27 81 kg/m²     General Appearance:    Alert, cooperative, no distress, appears stated age   Head:    Normocephalic, without obvious abnormality, atraumatic   Eyes:    Conjunctiva/corneas clear   Ears:    Normal external ears   Nose:   Nares normal, septum midline, mucosa normal, no drainage    or sinus tenderness   Throat:   Lips, mucosa, and tongue normal; teeth and gums normal   Neck:   Supple   Back:     Symmetric, no curvature, ROM normal, no CVA tenderness   Lungs:     Clear to auscultation bilaterally, respirations unlabored   Chest wall:    No tenderness or deformity   Heart:    Regular rate and rhythm, S1 and S2 normal, no murmur, rub   or gallop   Abdomen:     Soft, non-tender, bowel sounds active   Extremities:   Extremities normal, atraumatic, no cyanosis or edema   Skin:   Skin color, texture, turgor normal, no rashes or lesions   Lymph nodes:   Cervical normal   Neurologic:   CNII-XII intact            Lab, Imaging and other studies: I have personally reviewed pertinent labs  CBC:   Lab Results   Component Value Date    WBC 5 90 07/02/2021    HGB 11 6 07/02/2021    HCT 37 1 07/02/2021    MCV 97 07/02/2021     (L) 07/02/2021    MCH 30 4 07/02/2021    MCHC 31 3 (L) 07/02/2021    RDW 15 9 (H) 07/02/2021    MPV 11 9 07/02/2021    NRBC 0 07/02/2021     CMP:   Lab Results   Component Value Date    K 3 9 07/02/2021     (H) 07/02/2021    CO2 19 (L) 07/02/2021    BUN 35 (H) 07/02/2021    CREATININE 3 04 (H) 07/02/2021    CALCIUM 8 3 07/02/2021    AST 32 07/02/2021    ALT 14 07/02/2021    ALKPHOS 153 (H) 07/02/2021    EGFR 15 07/02/2021         Results from last 7 days   Lab Units 07/02/21  0545 07/01/21 2012 07/01/21  0607 06/30/21  0518   POTASSIUM mmol/L 3 9 3 5 2 7* 3 0*   CHLORIDE mmol/L 109* 107 106 109*   CO2 mmol/L 19* 22 22 16*   BUN mg/dL 35* 37* 40* 43*   CREATININE mg/dL 3 04* 3 21* 3 57* 3 74*   CALCIUM mg/dL 8 3 8 7 8 3 8 3   ALK PHOS U/L 153*  --  154* 168*   ALT U/L 14  --  16 20   AST U/L 32  --  26 28         Phosphorus: No results found for: PHOS  Magnesium:   Lab Results   Component Value Date    MG 2 2 07/02/2021     Urinalysis: No results found for: COLORU, CLARITYU, SPECGRAV, PHUR, LEUKOCYTESUR, NITRITE, PROTEINUA, GLUCOSEU, KETONESU, BILIRUBINUR, BLOODU  Ionized Calcium: No results found for: CAION  Coagulation: No results found for: PT, INR, APTT  Troponin: No results found for: TROPONINI  ABG: No results found for: PHART, YHV5BRJ, PO2ART, TSD1VDD, T5ORSIOZ, BEART, SOURCE  Radiology review:     IMAGING  Procedure: XR chest 1 view portable    Result Date: 6/30/2021  Narrative: CHEST INDICATION:   AMS  COMPARISON:  4/1/2021 EXAM PERFORMED/VIEWS:  XR CHEST PORTABLE Single view FINDINGS: Cardiomediastinal silhouette appears unremarkable  The lungs are clear  No pneumothorax or pleural effusion   Osseous structures appear within normal limits for patient age  Impression: No acute cardiopulmonary disease  Findings are stable Workstation performed: [id number]     Procedure: XR abdomen obstruction series    Result Date: 7/1/2021  Narrative: OBSTRUCTION SERIES INDICATION:   eval for sbo improvemenet  COMPARISON:  None EXAM PERFORMED/VIEWS:  XR ABDOMEN OBSTRUCTION SERIES FINDINGS: Scattered air-fluid levels within nondilated segments of bowel suggest ileus  Partial small bowel obstruction not excluded  Calcified uterine fibroid  Bilateral double-J nephroureteral stents  No free air beneath the hemidiaphragms  No pathologic calcifications or soft tissue masses evident  Osseous structures are unremarkable  Examination of the chest reveals a normal cardiomediastinal silhouette  Lungs are clear  Impression: Air-fluid levels within nondilated segments of bowel likely represents ileus  Partial small bowel obstruction not excluded  Workstation performed: [id number]     Procedure: CT head without contrast    Result Date: 6/29/2021  Narrative: CT BRAIN - WITHOUT CONTRAST INDICATION:   AMS  COMPARISON:  None  TECHNIQUE:  CT examination of the brain was performed  In addition to axial images, sagittal and coronal 2D reformatted images were created and submitted for interpretation  Radiation dose length product (DLP) for this visit:  849 mGy-cm   This examination, like all CT scans performed in the [place], was performed utilizing techniques to minimize radiation dose exposure, including the use of iterative reconstruction and automated exposure control  IMAGE QUALITY:  Diagnostic  FINDINGS: PARENCHYMA: Decreased attenuation, primarily in a confluent manner, is noted in periventricular and subcortical white matter demonstrating an appearance that is statistically most likely to represent advanced microangiopathic change  No CT signs of acute infarction  No intracranial mass, mass effect or midline shift    No acute parenchymal hemorrhage  VENTRICLES AND EXTRA-AXIAL SPACES:  Normal for the patient's age  VISUALIZED ORBITS AND PARANASAL SINUSES:  Unremarkable  CALVARIUM AND EXTRACRANIAL SOFT TISSUES:  Normal      Impression: Advanced, confluent periventricular white matter decreased attenuation suggestive of diffuse chronic microangiopathic change  No mass, hemorrhage or definitive signs of acute ischemia  Workstation performed: [id number]     Procedure: CT renal stone study abdomen pelvis wo contrast    Result Date: 6/29/2021  Narrative: CT ABDOMEN AND PELVIS WITHOUT IV CONTRAST - LOW DOSE RENAL STONE INDICATION:   Hematuria, unknown cause veda/hematuria r/o obstructive uropathy  History of appendectomy, colostomy, mastectomy and ureteral stent  Acute kidney insufficiency COMPARISON:  4/1/2021 TECHNIQUE:  Low dose thin section CT examination of the abdomen and pelvis was performed without intravenous or oral contrast according to a protocol specifically designed to evaluate for urinary tract calculus  Axial, sagittal, and coronal 2D reformatted images were created from the source data and submitted for interpretation  Evaluation for pathology in the abdomen and pelvis that is unrelated to urinary tract calculi is limited  Radiation dose length product (DLP) for this visit:  342 mGy-cm   This examination, like all CT scans performed in the [facility], was performed utilizing techniques to minimize radiation dose exposure, including the use of iterative reconstruction and automated exposure control  FINDINGS: RIGHT KIDNEY AND URETER: Right ureteral stent in standard position  Mild to moderate pelvocaliectasis is chronic  No ureteral stone  Nonobstructing 0 3 cm and 0 4 cm upper pole stones  Periureteral fatty stranding  No perinephric fluid  Normal cortical thickness and attenuation  LEFT KIDNEY AND URETER: Ureteral stent in standard position  Moderate pelvocaliectasis is chronic  No nephrolithiasis  Periureteral fatty stranding  No perinephric fluid  Normal cortical thickness and attenuation  URINARY BLADDER: Collapsed around a Way catheter  Poorly evaluated  No acute findings at the lung bases  Limited low radiation dose noncontrast CT evaluation demonstrates no clinically significant abnormality of liver, spleen, pancreas, or adrenal glands  Gallstones  Distended gallbladder, otherwise no secondary signs of cholecystitis or bile duct dilatation  Similar fatty infiltrative change in the mesentery  No free air or new abnormal fluid  No suspicious lymphadenopathy  Visualized esophagus, and stomach are within normal limits  Proximal to mid small bowel dilatation with air-fluid levels, transition to collapsed bowel in the right lower quadrant, 2/85  No obvious obstructing lesion  Similar anterior abdominal wall hernia partially containing bowel, without evidence of obstruction at this point  Mild distal small bowel wall thickening  Collapsed large bowel  No evidence of appendicitis  Calcified uterine fibroid  No adnexal masses  No acute osseous abnormalities  Vessels poorly evaluated without intravenous contrast   Normal caliber  The study was marked in [facility] for immediate notification  Impression: Compared to 4/1/2021, new small bowel dilatation suspicious for obstruction  Secondary signs of mild colitis  New ureteral stents without ureteral stones  Other chronic and nonemergent findings above   Workstation performed: [id number]       Current Facility-Administered Medications   Medication Dose Route Frequency    cefTRIAXone (ROCEPHIN) IVPB (premix in dextrose) 1,000 mg 50 mL  1,000 mg Intravenous Q24H    heparin (porcine) subcutaneous injection 5,000 Units  5,000 Units Subcutaneous Q8H [address]    metroNIDAZOLE (FLAGYL) IVPB (premix) 500 mg 100 mL  500 mg Intravenous Q8H    multi-electrolyte (PLASMALYTE-A/ISOLYTE-S PH 7 4) IV solution  100 mL/hr Intravenous Continuous    ondansetron (ZOFRAN) injection 4 mg  4 mg Intravenous Q6H PRN     Medications Discontinued During This Encounter   Medication Reason    potassium chloride (K-DUR,KLOR-CON) CR tablet 40 mEq     letrozole [facility]) tablet 2 5 mg     methimazole (TAPAZOLE) tablet 2 5 mg     sodium chloride 0 9 % infusion     sodium bicarbonate 150 mEq in dextrose 5 % 1,000 mL infusion     methimazole (TAPAZOLE) tablet 2 5 mg        Dajuan Esquivel DO      This progress note was produced in part using a dictation device which may document imprecise wording from author's original intent

## 2021-07-03 LAB
ANION GAP SERPL CALCULATED.3IONS-SCNC: 8 MMOL/L (ref 4–13)
BASOPHILS # BLD AUTO: 0.06 THOUSANDS/ΜL (ref 0–0.1)
BASOPHILS NFR BLD AUTO: 1 % (ref 0–1)
BUN SERPL-MCNC: 31 MG/DL (ref 5–25)
CALCIUM SERPL-MCNC: 8.2 MG/DL (ref 8.3–10.1)
CHLORIDE SERPL-SCNC: 106 MMOL/L (ref 100–108)
CO2 SERPL-SCNC: 29 MMOL/L (ref 21–32)
CREAT SERPL-MCNC: 2.96 MG/DL (ref 0.6–1.3)
EOSINOPHIL # BLD AUTO: 0.28 THOUSAND/ΜL (ref 0–0.61)
EOSINOPHIL NFR BLD AUTO: 4 % (ref 0–6)
ERYTHROCYTE [DISTWIDTH] IN BLOOD BY AUTOMATED COUNT: 15.6 % (ref 11.6–15.1)
GFR SERPL CREATININE-BSD FRML MDRD: 15 ML/MIN/1.73SQ M
GLUCOSE SERPL-MCNC: 74 MG/DL (ref 65–140)
HCT VFR BLD AUTO: 35 % (ref 34.8–46.1)
HGB BLD-MCNC: 11 G/DL (ref 11.5–15.4)
IMM GRANULOCYTES # BLD AUTO: 0.03 THOUSAND/UL (ref 0–0.2)
IMM GRANULOCYTES NFR BLD AUTO: 1 % (ref 0–2)
LYMPHOCYTES # BLD AUTO: 1.3 THOUSANDS/ΜL (ref 0.6–4.47)
LYMPHOCYTES NFR BLD AUTO: 21 % (ref 14–44)
MCH RBC QN AUTO: 30.1 PG (ref 26.8–34.3)
MCHC RBC AUTO-ENTMCNC: 31.4 G/DL (ref 31.4–37.4)
MCV RBC AUTO: 96 FL (ref 82–98)
MONOCYTES # BLD AUTO: 0.78 THOUSAND/ΜL (ref 0.17–1.22)
MONOCYTES NFR BLD AUTO: 12 % (ref 4–12)
NEUTROPHILS # BLD AUTO: 3.89 THOUSANDS/ΜL (ref 1.85–7.62)
NEUTS SEG NFR BLD AUTO: 61 % (ref 43–75)
NRBC BLD AUTO-RTO: 0 /100 WBCS
PLATELET # BLD AUTO: 141 THOUSANDS/UL (ref 149–390)
PMV BLD AUTO: 11.7 FL (ref 8.9–12.7)
POTASSIUM SERPL-SCNC: 3.5 MMOL/L (ref 3.5–5.3)
RBC # BLD AUTO: 3.65 MILLION/UL (ref 3.81–5.12)
SODIUM SERPL-SCNC: 143 MMOL/L (ref 136–145)
WBC # BLD AUTO: 6.34 THOUSAND/UL (ref 4.31–10.16)

## 2021-07-03 PROCEDURE — 99232 SBSQ HOSP IP/OBS MODERATE 35: CPT | Performed by: FAMILY MEDICINE

## 2021-07-03 PROCEDURE — 99232 SBSQ HOSP IP/OBS MODERATE 35: CPT | Performed by: INTERNAL MEDICINE

## 2021-07-03 PROCEDURE — 80048 BASIC METABOLIC PNL TOTAL CA: CPT

## 2021-07-03 PROCEDURE — 85025 COMPLETE CBC W/AUTO DIFF WBC: CPT

## 2021-07-03 RX ORDER — LETROZOLE 2.5 MG/1
2.5 TABLET, FILM COATED ORAL DAILY
Status: DISCONTINUED | OUTPATIENT
Start: 2021-07-03 | End: 2021-07-10 | Stop reason: HOSPADM

## 2021-07-03 RX ORDER — POTASSIUM CHLORIDE 14.9 MG/ML
20 INJECTION INTRAVENOUS ONCE
Status: COMPLETED | OUTPATIENT
Start: 2021-07-03 | End: 2021-07-03

## 2021-07-03 RX ORDER — SODIUM CHLORIDE 450 MG/100ML
75 INJECTION, SOLUTION INTRAVENOUS CONTINUOUS
Status: DISCONTINUED | OUTPATIENT
Start: 2021-07-03 | End: 2021-07-03 | Stop reason: SDUPTHER

## 2021-07-03 RX ORDER — METHIMAZOLE 5 MG/1
2.5 TABLET ORAL EVERY OTHER DAY
Status: DISCONTINUED | OUTPATIENT
Start: 2021-07-03 | End: 2021-07-10 | Stop reason: HOSPADM

## 2021-07-03 RX ADMIN — LETROZOLE 2.5 MG: 2.5 TABLET, FILM COATED ORAL at 13:14

## 2021-07-03 RX ADMIN — SODIUM CHLORIDE 75 ML/HR: 0.45 INJECTION, SOLUTION INTRAVENOUS at 14:06

## 2021-07-03 RX ADMIN — POTASSIUM CHLORIDE 20 MEQ: 14.9 INJECTION, SOLUTION INTRAVENOUS at 14:06

## 2021-07-03 RX ADMIN — POTASSIUM CHLORIDE: 2 INJECTION, SOLUTION, CONCENTRATE INTRAVENOUS at 17:51

## 2021-07-03 RX ADMIN — METHIMAZOLE 2.5 MG: 5 TABLET ORAL at 13:14

## 2021-07-03 RX ADMIN — SODIUM CHLORIDE, SODIUM GLUCONATE, SODIUM ACETATE, POTASSIUM CHLORIDE, MAGNESIUM CHLORIDE, SODIUM PHOSPHATE, DIBASIC, AND POTASSIUM PHOSPHATE 100 ML/HR: .53; .5; .37; .037; .03; .012; .00082 INJECTION, SOLUTION INTRAVENOUS at 08:36

## 2021-07-03 NOTE — ASSESSMENT & PLAN NOTE
· Diagnosed 2007 s/p left mastectomy   · Resume [employer] as patient is going to test taking some sips of clears  · Of note, alk phos was elevated on admission 234; CT abdomen/pelvis reveals chronic gallstones and chronic distended gallbladder   Alk phos was elevated prior admission 05/11/21 317   · Consideration of bone metastasis- patient should f/u with imaging as outpatient

## 2021-07-03 NOTE — ASSESSMENT & PLAN NOTE
Vs ileus still with diarrhea- slowly improving rectal tube removed diarrhea has improved bowel sounds are actually not hyperactive abdomen is some distension no minimal tenderness but no nausea no vomiting    Discussed with surgery NPO but advanced to sips of clears will repeat an obstruction series tomorrow

## 2021-07-03 NOTE — PLAN OF CARE
Problem: Potential for Falls  Goal: Patient will remain free of falls  Description: INTERVENTIONS:  - Educate patient/family on patient safety including physical limitations  - Instruct patient to call for assistance with activity   - Consult OT/PT to assist with strengthening/mobility   - Keep Call bell within reach  - Keep bed low and locked with side rails adjusted as appropriate  - Keep care items and personal belongings within reach  - Initiate and maintain comfort rounds  - Make Fall Risk Sign visible to staff  - Offer Toileting every 2 Hours, in advance of need  - Initiate/Maintain bed/chair alarm  - Obtain necessary fall risk management equipment:   - Apply yellow socks and bracelet for high fall risk patients  - Consider moving patient to room near nurses station  Outcome: Progressing     Problem: MOBILITY - ADULT  Goal: Maintain or return to baseline ADL function  Description: INTERVENTIONS:  -  Assess patient's ability to carry out ADLs; assess patient's baseline for ADL function and identify physical deficits which impact ability to perform ADLs (bathing, care of mouth/teeth, toileting, grooming, dressing, etc )  - Assess/evaluate cause of self-care deficits   - Assess range of motion  - Assess patient's mobility; develop plan if impaired  - Assess patient's need for assistive devices and provide as appropriate  - Encourage maximum independence but intervene and supervise when necessary  - Involve family in performance of ADLs  - Assess for home care needs following discharge   - Consider OT consult to assist with ADL evaluation and planning for discharge  - Provide patient education as appropriate  Outcome: Progressing  Goal: Maintains/Returns to pre admission functional level  Description: INTERVENTIONS:  - Perform BMAT or MOVE assessment daily    - Set and communicate daily mobility goal to care team and patient/family/caregiver     - Collaborate with rehabilitation services on mobility goals if consulted  - Perform Range of Motion 3 times a day  - Reposition patient every 2 hours    - Dangle patient 3 times a day  - Stand patient 3 times a day  - Ambulate patient 3 times a day  - Out of bed to chair 3 times a day   - Out of bed for meals 3 times a day  - Out of bed for toileting  - Record patient progress and toleration of activity level   Outcome: Progressing     Problem: Prexisting or High Potential for Compromised Skin Integrity  Goal: Skin integrity is maintained or improved  Description: INTERVENTIONS:  - Identify patients at risk for skin breakdown  - Assess and monitor skin integrity  - Assess and monitor nutrition and hydration status  - Monitor labs   - Assess for incontinence   - Turn and reposition patient  - Assist with mobility/ambulation  - Relieve pressure over bony prominences  - Avoid friction and shearing  - Provide appropriate hygiene as needed including keeping skin clean and dry  - Evaluate need for skin moisturizer/barrier cream  - Collaborate with interdisciplinary team   - Patient/family teaching  - Consider wound care consult   Outcome: Progressing     Problem: PAIN - ADULT  Goal: Verbalizes/displays adequate comfort level or baseline comfort level  Description: Interventions:  - Encourage patient to monitor pain and request assistance  - Assess pain using appropriate pain scale  - Administer analgesics based on type and severity of pain and evaluate response  - Implement non-pharmacological measures as appropriate and evaluate response  - Consider cultural and social influences on pain and pain management  - Notify physician/advanced practitioner if interventions unsuccessful or patient reports new pain  Outcome: Progressing     Problem: INFECTION - ADULT  Goal: Absence or prevention of progression during hospitalization  Description: INTERVENTIONS:  - Assess and monitor for signs and symptoms of infection  - Monitor lab/diagnostic results  - Monitor all insertion sites, i e  indwelling lines, tubes, and drains  - Monitor endotracheal if appropriate and nasal secretions for changes in amount and color  - [place] appropriate cooling/warming therapies per order  - Administer medications as ordered  - Instruct and encourage patient and family to use good hand hygiene technique  - Identify and instruct in appropriate isolation precautions for identified infection/condition  Outcome: Progressing     Problem: SAFETY ADULT  Goal: Patient will remain free of falls  Description: INTERVENTIONS:  - Educate patient/family on patient safety including physical limitations  - Instruct patient to call for assistance with activity   - Consult OT/PT to assist with strengthening/mobility   - Keep Call bell within reach  - Keep bed low and locked with side rails adjusted as appropriate  - Keep care items and personal belongings within reach  - Initiate and maintain comfort rounds  - Make Fall Risk Sign visible to staff  - Offer Toileting every 2 Hours, in advance of need  - Initiate/Maintain bed/chair alarm  - Obtain necessary fall risk management equipment:   - Apply yellow socks and bracelet for high fall risk patients  - Consider moving patient to room near nurses station  Outcome: Progressing  Goal: Maintain or return to baseline ADL function  Description: INTERVENTIONS:  -  Assess patient's ability to carry out ADLs; assess patient's baseline for ADL function and identify physical deficits which impact ability to perform ADLs (bathing, care of mouth/teeth, toileting, grooming, dressing, etc )  - Assess/evaluate cause of self-care deficits   - Assess range of motion  - Assess patient's mobility; develop plan if impaired  - Assess patient's need for assistive devices and provide as appropriate  - Encourage maximum independence but intervene and supervise when necessary  - Involve family in performance of ADLs  - Assess for home care needs following discharge   - Consider OT consult to assist with ADL evaluation and planning for discharge  - Provide patient education as appropriate  Outcome: Progressing  Goal: Maintains/Returns to pre admission functional level  Description: INTERVENTIONS:  - Perform BMAT or MOVE assessment daily    - Set and communicate daily mobility goal to care team and patient/family/caregiver  - Collaborate with rehabilitation services on mobility goals if consulted  - Perform Range of Motion 3 times a day  - Reposition patient every 2 hours  - Dangle patient 3 times a day  - Stand patient 3 times a day  - Ambulate patient 3 times a day  - Out of bed to chair 3 times a day   - Out of bed for meals 3 times a day  - Out of bed for toileting  - Record patient progress and toleration of activity level   Outcome: Progressing     Problem: DISCHARGE PLANNING  Goal: Discharge to home or other facility with appropriate resources  Description: INTERVENTIONS:  - Identify barriers to discharge w/patient and caregiver  - Arrange for needed discharge resources and transportation as appropriate  - Identify discharge learning needs (meds, wound care, etc )  - Arrange for interpretive services to assist at discharge as needed  - Refer to Case Management Department for coordinating discharge planning if the patient needs post-hospital services based on physician/advanced practitioner order or complex needs related to functional status, cognitive ability, or social support system  Outcome: Progressing     Problem: Knowledge Deficit  Goal: Patient/family/caregiver demonstrates understanding of disease process, treatment plan, medications, and discharge instructions  Description: Complete learning assessment and assess knowledge base    Interventions:  - Provide teaching at level of understanding  - Provide teaching via preferred learning methods  Outcome: Progressing     Problem: GASTROINTESTINAL - ADULT  Goal: Minimal or absence of nausea and/or vomiting  Description: INTERVENTIONS:  - Administer IV fluids if ordered to ensure adequate hydration  - Maintain NPO status until nausea and vomiting are resolved  - Nasogastric tube if ordered  - Administer ordered antiemetic medications as needed  - Provide nonpharmacologic comfort measures as appropriate  - Advance diet as tolerated, if ordered  - Consider nutrition services referral to assist patient with adequate nutrition and appropriate food choices  Outcome: Progressing  Goal: Maintains or returns to baseline bowel function  Description: INTERVENTIONS:  - Assess bowel function  - Encourage oral fluids to ensure adequate hydration  - Administer IV fluids if ordered to ensure adequate hydration  - Administer ordered medications as needed  - Encourage mobilization and activity  - Consider nutritional services referral to assist patient with adequate nutrition and appropriate food choices  Outcome: Progressing  Goal: Maintains adequate nutritional intake  Description: INTERVENTIONS:  - Monitor percentage of each meal consumed  - Identify factors contributing to decreased intake, treat as appropriate  - Assist with meals as needed  - Monitor I&O, weight, and lab values if indicated  - Obtain nutrition services referral as needed  Outcome: Progressing  Goal: Oral mucous membranes remain intact  Description: INTERVENTIONS  - Assess oral mucosa and hygiene practices  - Implement preventative oral hygiene regimen  - Implement oral medicated treatments as ordered  - Initiate Nutrition services referral as needed  Outcome: Progressing     Problem: Nutrition/Hydration-ADULT  Goal: Nutrient/Hydration intake appropriate for improving, restoring or maintaining nutritional needs  Description: Monitor and assess patient's nutrition/hydration status for malnutrition  Collaborate with interdisciplinary team and initiate plan and interventions as ordered  Monitor patient's weight and dietary intake as ordered or per policy  Utilize nutrition screening tool and intervene as necessary  Determine patient's food preferences and provide high-protein, high-caloric foods as appropriate       INTERVENTIONS:  - Monitor oral intake, urinary output, labs, and treatment plans  - Assess nutrition and hydration status and recommend course of action  - Evaluate amount of meals eaten  - Assist patient with eating if necessary   - Allow adequate time for meals  - Recommend/ encourage appropriate diets, oral nutritional supplements, and vitamin/mineral supplements  - Order, calculate, and assess calorie counts as needed  - Recommend, monitor, and adjust tube feedings and TPN/PPN based on assessed needs  - Assess need for intravenous fluids  - Provide specific nutrition/hydration education as appropriate  - Include patient/family/caregiver in decisions related to nutrition  Outcome: Progressing

## 2021-07-03 NOTE — PROGRESS NOTES
Progress Note - Nephrology   Saroj Ren 70 y o  female MRN: [id number]  Unit/Bed#: -01 Encounter: [id number]    A/P:  1  Acute kidney injury on top of chronic kidney disease               kidney function continues to slowly improve, will transition the patient from current IV fluids over to half-normal saline with 40 mEq per L of potassium chloride  2  Chronic kidney disease stage 3 with baseline creatinine likely around 1 2 mg/dL  That being said the lowest the patient's creatinine has achieved the last calendar year was 0 81 mg/dL from July of 2020  3  Metabolic acidosis due to GI losses               patient's serum bicarbonate appropriate this morning, no additional alkaline indicated at this time  4  Hypokalemia               will give the patient 20 mEq of IV potassium chloride, unless already given, and as mentioned above patient will have half-normal saline with 40 mEq per L of potassium chloride  5  History of bilateral hydroureteronephrosis status post bilateral stents               continue closely monitor the patient's overall kidney function  Previously baseline creatinine was around 1 2 mg/dL, however, since her recent episode of bilateral hydroureteronephrosis, her creatinine has not come down that far  Continue to closely monitor  6  Small-bowel obstruction/ileus               patient's diet is being slowly advanced, overall clinical condition is slowly improving        Follow up reason for today's visit:  Acute kidney injury/chronic kidney disease/acidosis/electrolyte disorders    Colitis, acute    Patient Active Problem List   Diagnosis    Acute on chronic kidney failure ([address] )    Hyperthyroidism    History of breast cancer    Uncontrolled type 2 diabetes mellitus, without long-term current use of insulin (HCC)    Cirrhosis of liver (HCC)    Chest pain    Constipation    Hypokalemia    Hydronephrosis    Hypomagnesemia    Parotiditis    Hyponatremia    High anion gap metabolic acidosis    Colitis, acute    Acute cystitis with hematuria    Partial small bowel obstruction (HCC)    Severe sepsis (HCC)         Subjective:   Patient continues to feel improved, she is having ice chips and sips of liquids and is tolerating very well  She has an appetite this morning  Objective:     Vitals: Blood pressure 130/62, pulse 64, temperature 98 4 °F (36 9 °C), resp  rate 18, height 5' (1 524 m), weight 65 7 kg (144 lb 12 8 oz), SpO2 95 %  ,Body mass index is 28 28 kg/m²  Weight (last 2 days)     Date/Time   Weight    07/03/21 0556   65 7 (144 8)    07/02/21 0550   64 6 (142 42)    07/01/21 0600   64 4 (141 98)                Intake/Output Summary (Last 24 hours) at 7/3/2021 1321  Last data filed at 7/3/2021 0836  Gross per 24 hour   Intake 4166 67 ml   Output 475 ml   Net 3691 67 ml     I/O last 3 completed shifts:   In: 4306 7 [I V :4306 7]  Out: 1275 [Urine:475; Stool:800]         Physical Exam: /62   Pulse 64   Temp 98 4 °F (36 9 °C)   Resp 18   Ht 5' (1 524 m)   Wt 65 7 kg (144 lb 12 8 oz)   SpO2 95%   BMI 28 28 kg/m²     General Appearance:    Alert, cooperative, no distress, appears stated age   Head:    Normocephalic, without obvious abnormality, atraumatic   Eyes:    Conjunctiva/corneas clear   Ears:    Normal external ears   Nose:   Nares normal, septum midline, mucosa normal, no drainage    or sinus tenderness   Throat:   Lips, mucosa, and tongue normal; teeth and gums normal   Neck:   Supple   Back:     Symmetric, no curvature, ROM normal, no CVA tenderness   Lungs:     Clear to auscultation bilaterally, respirations unlabored   Chest wall:    No tenderness or deformity   Heart:    Regular rate and rhythm, S1 and S2 normal, no murmur, rub   or gallop   Abdomen:     Soft, non-tender, bowel sounds active   Extremities:   Extremities normal, atraumatic, no cyanosis or edema   Skin:   Skin color, texture, turgor normal, no rashes or lesions   Lymph nodes: Cervical normal   Neurologic:   CNII-XII intact            Lab, Imaging and other studies: I have personally reviewed pertinent labs  CBC:   Lab Results   Component Value Date    WBC 6 34 07/03/2021    HGB 11 0 (L) 07/03/2021    HCT 35 0 07/03/2021    MCV 96 07/03/2021     (L) 07/03/2021    MCH 30 1 07/03/2021    MCHC 31 4 07/03/2021    RDW 15 6 (H) 07/03/2021    MPV 11 7 07/03/2021    NRBC 0 07/03/2021     CMP:   Lab Results   Component Value Date    K 3 5 07/03/2021     07/03/2021    CO2 29 07/03/2021    BUN 31 (H) 07/03/2021    CREATININE 2 96 (H) 07/03/2021    CALCIUM 8 2 (L) 07/03/2021    EGFR 15 07/03/2021         Results from last 7 days   Lab Units 07/03/21  0544 07/02/21  0545 07/01/21 2012 07/01/21  0607 06/30/21  0518   POTASSIUM mmol/L 3 5 3 9 3 5 2 7* 3 0*   CHLORIDE mmol/L 106 109* 107 106 109*   CO2 mmol/L 29 19* 22 22 16*   BUN mg/dL 31* 35* 37* 40* 43*   CREATININE mg/dL 2 96* 3 04* 3 21* 3 57* 3 74*   CALCIUM mg/dL 8 2* 8 3 8 7 8 3 8 3   ALK PHOS U/L  --  153*  --  154* 168*   ALT U/L  --  14  --  16 20   AST U/L  --  32  --  26 28         Phosphorus: No results found for: PHOS  Magnesium: No results found for: MG  Urinalysis: No results found for: COLORU, CLARITYU, SPECGRAV, PHUR, LEUKOCYTESUR, NITRITE, PROTEINUA, GLUCOSEU, KETONESU, BILIRUBINUR, BLOODU  Ionized Calcium: No results found for: CAION  Coagulation: No results found for: PT, INR, APTT  Troponin: No results found for: TROPONINI  ABG: No results found for: PHART, VSH8BHM, PO2ART, SFS5MOV, E9JHQDGG, BEART, SOURCE  Radiology review:     IMAGING  Procedure: XR abdomen obstruction series    Result Date: 7/1/2021  Narrative: OBSTRUCTION SERIES INDICATION:   eval for sbo improvemenet  COMPARISON:  None EXAM PERFORMED/VIEWS:  XR ABDOMEN OBSTRUCTION SERIES FINDINGS: Scattered air-fluid levels within nondilated segments of bowel suggest ileus  Partial small bowel obstruction not excluded  Calcified uterine fibroid    Bilateral double-J nephroureteral stents  No free air beneath the hemidiaphragms  No pathologic calcifications or soft tissue masses evident  Osseous structures are unremarkable  Examination of the chest reveals a normal cardiomediastinal silhouette  Lungs are clear  Impression: Air-fluid levels within nondilated segments of bowel likely represents ileus  Partial small bowel obstruction not excluded  Workstation performed: [id number]       Current Facility-Administered Medications   Medication Dose Route Frequency    heparin (porcine) subcutaneous injection 5,000 Units  5,000 Units Subcutaneous Q8H [address]    letrozole (FEMARA) tablet 2 5 mg  2 5 mg Oral Daily    methimazole (TAPAZOLE) tablet 2 5 mg  2 5 mg Oral Every Other Day    multi-electrolyte (PLASMALYTE-A/ISOLYTE-S PH 7 4) IV solution  75 mL/hr Intravenous Continuous    ondansetron (ZOFRAN) injection 4 mg  4 mg Intravenous Q6H PRN     Medications Discontinued During This Encounter   Medication Reason    potassium chloride (K-DUR,KLOR-CON) CR tablet 40 mEq     letrozole (FEMARA) tablet 2 5 mg     methimazole (TAPAZOLE) tablet 2 5 mg     sodium chloride 0 9 % infusion     sodium bicarbonate 150 mEq in dextrose 5 % 1,000 mL infusion     methimazole (TAPAZOLE) tablet 2 5 mg     sodium bicarbonate 8 4 % injection 50 mEq Formulary change    cefTRIAXone (ROCEPHIN) IVPB (premix in dextrose) 1,000 mg 50 mL     metroNIDAZOLE (FLAGYL) IVPB (premix) 500 mg 100 mL        Gloriajean Cable, DO      This progress note was produced in part using a dictation device which may document imprecise wording from author's original intent

## 2021-07-03 NOTE — NURSING NOTE
Patient tearful and complaining of pain/discomfort at rectum, due to rectal tube  Ebbie Kirill MONTES notified, awaiting response  Offered incontinence cream with little to no relief

## 2021-07-03 NOTE — ASSESSMENT & PLAN NOTE
71 y/o female with pmhx diverticulitis, breast CA s/p left mastectomy and on Femera, hyperthyroidism presented to the ER today after friend found her covered in urine and feces at home  She lives alone  States she has been having watery diarrhea x 5 days- denies any hematochezia or melena  No nausea/vomiting  Has not eaten any food for a few days       · Pt is currently afebrile with leukocytosis; denies any recent abx use, travel, uncooked meat, eating out   · CT abdomen/pelvis: new small bowel dilatation suspicious for obstruction and secondary signs of mild colitis   · C diff melissa cx negative suspect more partial obstruction symptoms   · Will dc abx and observe - procal negative no wbc or fever s doing well without antibiotics  · Manage partial sbo  · Appreciate gi input   · If sbo resolves and persistentr diarrhea will need [id number]   · Ova and parasites are pending   · Occult negative   · Iv fluids decreased diarrhea improving

## 2021-07-03 NOTE — PROGRESS NOTES
[address]  Progress Note - Reid Menon 1945, 70 y o  female MRN: [id number]  Unit/Bed#: MS [facility] Encounter: [id number]  Primary Care Provider: Orlena Olszewski, MD   Date and time admitted to hospital: 6/29/2021  5:35 PM    Severe sepsis [facility])  Assessment & Plan  · Present on admission   · Patient is currently afebrile   · Patient meets SIRS criteria with tachycardia, leukocytosis, elevated lactic acid on admission 2 7  · Resolved  - 2/2 uti and colitis    Partial small bowel obstruction (HCC)  Assessment & Plan  Vs ileus still with diarrhea- slowly improving rectal tube removed diarrhea has improved bowel sounds are actually not hyperactive abdomen is some distension no minimal tenderness but no nausea no vomiting  Discussed with surgery NPO but advanced to sips of clears will repeat an obstruction series tomorrow    Acute cystitis with hematuria  Assessment & Plan  · POA- patient admits to dysuria symptoms and is normally incontinent at home   · Symptoms resolved- sufficient 3 days of rocephine ucx was negative tx due to significant pyuria     High anion gap metabolic acidosis  Assessment & Plan  · 2/2 LA and uremia from diarrhea- resolved status post initial bicarb IV infusion and then intermittent injections     Hypokalemia  Assessment & Plan  · Resolved with supplementation    History of breast cancer  Assessment & Plan  · Diagnosed 2007 s/p left mastectomy   · Resume Femara as patient is going to test taking some sips of clears  · Of note, alk phos was elevated on admission 234; CT abdomen/pelvis reveals chronic gallstones and chronic distended gallbladder   Alk phos was elevated prior admission 05/11/21 317   · Consideration of bone metastasis- patient should f/u with imaging as outpatient     Hyperthyroidism  Assessment & Plan  · TSH is normal restart her methimazole as she is being advanced to sips of clears    Acute on chronic kidney failure [facility])  Assessment & Plan  Lab Results   Component Value Date    EGFR 15 07/03/2021    EGFR 15 07/02/2021    EGFR 14 07/01/2021    CREATININE 2 96 (H) 07/03/2021    CREATININE 3 04 (H) 07/02/2021    CREATININE 3 21 (H) 07/01/2021     · Present on admission  On ckd 2 base 0 7-1  1-Renal function does not return to baseline since episode of obstructive uropathy earlier this year  Should follow with nephrologist as outpatient  · Patient is s/p bilateral ureteral stent placement 04/06/21 for hydronephrosis  · Patient has hx of prior admissions with [facility] and leaving [facility] x2 on 04/07/21 and 05/11/21   · Suspect pre-renal secondary to decreased PO intake and persistent diarrhea   · CT abdomen/pelvis no signs on obstruction in the kidneys/ureter   · Improving, diarrhea improved rectal tube has been removed decreased isolate 75 cc/hour  Diet is only sips of clears labs tomorrow      * Colitis, acute  Assessment & Plan  71 y/o female with pmhx diverticulitis, breast CA s/p left mastectomy and on Femera, hyperthyroidism presented to the ER today after friend found her covered in urine and feces at home  She lives alone  States she has been having watery diarrhea x 5 days- denies any hematochezia or melena  No nausea/vomiting  Has not eaten any food for a few days       · Pt is currently afebrile with leukocytosis; denies any recent abx use, travel, uncooked meat, eating out   · CT abdomen/pelvis: new small bowel dilatation suspicious for obstruction and secondary signs of mild colitis   · C diff melissa cx negative suspect more partial obstruction symptoms   · Will dc abx and observe - procal negative no wbc or fever s doing well without antibiotics  · Manage partial sbo  · Appreciate gi input   · If sbo resolves and persistentr diarrhea will need [id number]   · Ova and parasites are pending   · Occult negative   · Iv fluids decreased diarrhea improving          VTE Pharmacologic Prophylaxis: VTE Score: 6 High Risk (Score >/= 5) - Pharmacological DVT Prophylaxis Ordered: heparin  Sequential Compression Devices Ordered  Patient Centered Rounds: I performed bedside rounds with nursing staff today  Discussions with Specialists or Other Care Team Provider:  Discussed with General surgery    Education and Discussions with Family / Patient: Updated  (daughter) via phone  Time Spent for Care: 30 minutes  More than 50% of total time spent on counseling and coordination of care as described above  Current Length of Stay: 4 day(s)  Current Patient Status: Inpatient   Certification Statement: The patient will continue to require additional inpatient hospital stay due to Partial small-bowel obstruction  Discharge Plan: Anticipate discharge in 48-72 hrs to rehab facility  Code Status: Level 3 - DNAR and DNI    Subjective:   Patient seen and examined no nausea no vomiting diarrhea has improved up abdominal pain is improved    Objective:     Vitals:   Temp (24hrs), Av 4 °F (36 9 °C), Min:98 4 °F (36 9 °C), Max:98 5 °F (36 9 °C)    Temp:  [98 4 °F (36 9 °C)-98 5 °F (36 9 °C)] 98 4 °F (36 9 °C)  HR:  [61-71] 64  Resp:  [17-18] 18  BP: (130-132)/(60-62) 130/62  SpO2:  [95 %-97 %] 95 %  Body mass index is 28 28 kg/m²  Input and Output Summary (last 24 hours): Intake/Output Summary (Last 24 hours) at 7/3/2021 1242  Last data filed at 7/3/2021 0836  Gross per 24 hour   Intake 4166 67 ml   Output 475 ml   Net 3691 67 ml       Physical Exam:   Physical Exam  Vitals and nursing note reviewed  Constitutional:       General: She is not in acute distress  Appearance: She is well-developed  HENT:      Head: Normocephalic and atraumatic  Eyes:      Conjunctiva/sclera: Conjunctivae normal    Cardiovascular:      Rate and Rhythm: Normal rate and regular rhythm  Heart sounds: No murmur heard  Pulmonary:      Effort: Pulmonary effort is normal  No respiratory distress  Breath sounds: Normal breath sounds  No wheezing or rales  Abdominal:      General: Bowel sounds are normal  There is distension  Palpations: Abdomen is soft  Tenderness: There is abdominal tenderness (Minimal)  Musculoskeletal:         General: No swelling  Cervical back: Neck supple  Skin:     General: Skin is warm and dry  Neurological:      General: No focal deficit present  Mental Status: She is alert and oriented to person, place, and time  Mental status is at baseline     Psychiatric:         Mood and Affect: Mood normal           Additional Data:     Labs:  Results from last 7 days   Lab Units 07/03/21  0544   WBC Thousand/uL 6 34   HEMOGLOBIN g/dL 11 0*   HEMATOCRIT % 35 0   PLATELETS Thousands/uL 141*   NEUTROS PCT % 61   LYMPHS PCT % 21   MONOS PCT % 12   EOS PCT % 4     Results from last 7 days   Lab Units 07/03/21  0544 07/02/21  0545   SODIUM mmol/L 143 140   POTASSIUM mmol/L 3 5 3 9   CHLORIDE mmol/L 106 109*   CO2 mmol/L 29 19*   BUN mg/dL 31* 35*   CREATININE mg/dL 2 96* 3 04*   ANION GAP mmol/L 8 12   CALCIUM mg/dL 8 2* 8 3   ALBUMIN g/dL  --  1 9*   TOTAL BILIRUBIN mg/dL  --  0 57   ALK PHOS U/L  --  153*   ALT U/L  --  14   AST U/L  --  32   GLUCOSE RANDOM mg/dL 74 79         Results from last 7 days   Lab Units 06/29/21  1819   POC GLUCOSE mg/dl 104     Results from last 7 days   Lab Units 06/30/21  0518   HEMOGLOBIN A1C % 5 1     Results from last 7 days   Lab Units 07/01/21  0607 06/30/21  0057 06/29/21  2240 06/29/21  2137 06/29/21  1810   LACTIC ACID mmol/L  --  1 2 2 1* 2 1* 2 7*   PROCALCITONIN ng/ml 0 12  --  0 12  --   --        Lines/Drains:  Invasive Devices     Peripheral Intravenous Line            Peripheral IV 07/01/21 Proximal;Right;Ventral (anterior) Forearm 1 day    Peripheral IV 07/01/21 Right;Ventral (anterior) Forearm 1 day          Drain            External Urinary Catheter 1 day                      Imaging: Reviewed radiology reports from this admission including: abdominal/pelvic CT    Recent Cultures (last 7 days):   Results from last 7 days   Lab Units 06/30/21  2216 06/29/21  1852 06/29/21  1810 06/29/21  1805   BLOOD CULTURE   --   --  No Growth at 72 hrs  No Growth at 72 hrs  URINE CULTURE   --  No Growth <1000 cfu/mL  --   --    C DIFF TOXIN B BY PCR  Negative  --   --   --        Last 24 Hours Medication List:   Current Facility-Administered Medications   Medication Dose Route Frequency Provider Last Rate    heparin (porcine)  5,000 Units Subcutaneous Q8H SUSIE Fishman PA-C      letrozole  2 5 mg Oral Daily Kirill Reyes MD      methimazole  2 5 mg Oral Every Other Day Kirill Reyes MD      multi-electrolyte  75 mL/hr Intravenous Continuous Kirill Reyes  mL/hr (07/03/21 6920)    ondansetron  4 mg Intravenous Q6H PRN Kirill Reyes MD          Today, Patient Was Seen By: Kirill Reyes MD    **Please Note: This note may have been constructed using a voice recognition system  **

## 2021-07-03 NOTE — ASSESSMENT & PLAN NOTE
· 2/2 LA and uremia from diarrhea- resolved status post initial bicarb IV infusion and then intermittent injections

## 2021-07-03 NOTE — PROGRESS NOTES
Progress Note - General Surgery   Kiki Liz 70 y o  female MRN: [id number]  Unit/Bed#: MS [facility] Encounter: [id number]    Assessment:  Diarrhea and partial small bowel obstruction/ slowly resolving  Stool for enteric bacteria and C difficile is negative  Plan: Will start with sips of clear liquids  The rectal tube has been removed and the patient states only a small amount of loose bowel movement since  Subjective/Objective   Chief Complaint:  Diarrhea and abdominal pain    Subjective: The patient states her abdominal pain is improving and diarrhea is slowly subsiding  She denies any further nausea or vomiting  Objective:     Blood pressure 130/62, pulse 64, temperature 98 4 °F (36 9 °C), resp  rate 18, height 5' (1 524 m), weight 65 7 kg (144 lb 12 8 oz), SpO2 95 %  ,Body mass index is 28 28 kg/m²  Intake/Output Summary (Last 24 hours) at 7/3/2021 0850  Last data filed at 7/3/2021 0836  Gross per 24 hour   Intake 4166 67 ml   Output 625 ml   Net 3541 67 ml       Invasive Devices     Peripheral Intravenous Line            Peripheral IV 07/01/21 Proximal;Right;Ventral (anterior) Forearm 1 day    Peripheral IV 07/01/21 Right;Ventral (anterior) Forearm 1 day          Drain            External Urinary Catheter 1 day                Physical Exam:  Patient is awake and alert in no distress  Abdomen is soft with a reducible hernia at the periumbilical area  No rigidity or guarding or rebound is noted      Lab, Imaging and other studies:  CBC:   Lab Results   Component Value Date    WBC 6 34 07/03/2021    HGB 11 0 (L) 07/03/2021    HCT 35 0 07/03/2021    MCV 96 07/03/2021     (L) 07/03/2021    MCH 30 1 07/03/2021    MCHC 31 4 07/03/2021    RDW 15 6 (H) 07/03/2021    MPV 11 7 07/03/2021    NRBC 0 07/03/2021   , CMP:   Lab Results   Component Value Date    SODIUM 143 07/03/2021    K 3 5 07/03/2021     07/03/2021    CO2 29 07/03/2021    BUN 31 (H) 07/03/2021    CREATININE 2 96 (H) 07/03/2021 CALCIUM 8 2 (L) 07/03/2021    EGFR 15 07/03/2021     VTE Pharmacologic Prophylaxis: Heparin  VTE Mechanical Prophylaxis: reason for no mechanical VTE prophylaxis pt is ambulating

## 2021-07-03 NOTE — ASSESSMENT & PLAN NOTE
Lab Results   Component Value Date    EGFR 15 07/03/2021    EGFR 15 07/02/2021    EGFR 14 07/01/2021    CREATININE 2 96 (H) 07/03/2021    CREATININE 3 04 (H) 07/02/2021    CREATININE 3 21 (H) 07/01/2021     · Present on admission  On ckd 2 base 0 7-1  1-Renal function does not return to baseline since episode of obstructive uropathy earlier this year  Should follow with nephrologist as outpatient  · Patient is s/p bilateral ureteral stent placement 04/06/21 for hydronephrosis  · Patient has hx of prior admissions with [facility] and leaving [facility] x2 on 04/07/21 and 05/11/21   · Suspect pre-renal secondary to decreased PO intake and persistent diarrhea   · CT abdomen/pelvis no signs on obstruction in the kidneys/ureter   · Improving, diarrhea improved rectal tube has been removed decreased isolate 75 cc/hour    Diet is only sips of clears labs tomorrow

## 2021-07-04 ENCOUNTER — APPOINTMENT (INPATIENT)
Dept: RADIOLOGY | Facility: HOSPITAL | Age: 76
DRG: 872 | End: 2021-07-04
Payer: MEDICARE

## 2021-07-04 PROBLEM — A41.9 SEVERE SEPSIS (HCC): Status: RESOLVED | Noted: 2021-06-29 | Resolved: 2021-07-04

## 2021-07-04 PROBLEM — R65.20 SEVERE SEPSIS (HCC): Status: RESOLVED | Noted: 2021-06-29 | Resolved: 2021-07-04

## 2021-07-04 PROBLEM — N30.01 ACUTE CYSTITIS WITH HEMATURIA: Status: RESOLVED | Noted: 2021-06-29 | Resolved: 2021-07-04

## 2021-07-04 PROBLEM — E87.6 HYPOKALEMIA: Status: RESOLVED | Noted: 2021-04-02 | Resolved: 2021-07-04

## 2021-07-04 PROBLEM — E87.2 HIGH ANION GAP METABOLIC ACIDOSIS: Status: RESOLVED | Noted: 2021-05-09 | Resolved: 2021-07-04

## 2021-07-04 LAB
ANION GAP SERPL CALCULATED.3IONS-SCNC: 11 MMOL/L (ref 4–13)
BACTERIA BLD CULT: NORMAL
BACTERIA BLD CULT: NORMAL
BASOPHILS # BLD AUTO: 0.05 THOUSANDS/ΜL (ref 0–0.1)
BASOPHILS NFR BLD AUTO: 1 % (ref 0–1)
BUN SERPL-MCNC: 28 MG/DL (ref 5–25)
CALCIUM SERPL-MCNC: 8.5 MG/DL (ref 8.3–10.1)
CHLORIDE SERPL-SCNC: 102 MMOL/L (ref 100–108)
CO2 SERPL-SCNC: 25 MMOL/L (ref 21–32)
CREAT SERPL-MCNC: 2.84 MG/DL (ref 0.6–1.3)
EOSINOPHIL # BLD AUTO: 0.23 THOUSAND/ΜL (ref 0–0.61)
EOSINOPHIL NFR BLD AUTO: 2 % (ref 0–6)
ERYTHROCYTE [DISTWIDTH] IN BLOOD BY AUTOMATED COUNT: 15.3 % (ref 11.6–15.1)
GFR SERPL CREATININE-BSD FRML MDRD: 16 ML/MIN/1.73SQ M
GLUCOSE SERPL-MCNC: 50 MG/DL (ref 65–140)
HCT VFR BLD AUTO: 37.2 % (ref 34.8–46.1)
HGB BLD-MCNC: 12 G/DL (ref 11.5–15.4)
IMM GRANULOCYTES # BLD AUTO: 0.04 THOUSAND/UL (ref 0–0.2)
IMM GRANULOCYTES NFR BLD AUTO: 0 % (ref 0–2)
LYMPHOCYTES # BLD AUTO: 1.46 THOUSANDS/ΜL (ref 0.6–4.47)
LYMPHOCYTES NFR BLD AUTO: 15 % (ref 14–44)
MCH RBC QN AUTO: 30.9 PG (ref 26.8–34.3)
MCHC RBC AUTO-ENTMCNC: 32.3 G/DL (ref 31.4–37.4)
MCV RBC AUTO: 96 FL (ref 82–98)
MONOCYTES # BLD AUTO: 0.86 THOUSAND/ΜL (ref 0.17–1.22)
MONOCYTES NFR BLD AUTO: 9 % (ref 4–12)
NEUTROPHILS # BLD AUTO: 6.86 THOUSANDS/ΜL (ref 1.85–7.62)
NEUTS SEG NFR BLD AUTO: 73 % (ref 43–75)
NRBC BLD AUTO-RTO: 0 /100 WBCS
PLATELET # BLD AUTO: 146 THOUSANDS/UL (ref 149–390)
PMV BLD AUTO: 11.4 FL (ref 8.9–12.7)
POTASSIUM SERPL-SCNC: 3.8 MMOL/L (ref 3.5–5.3)
RBC # BLD AUTO: 3.88 MILLION/UL (ref 3.81–5.12)
SODIUM SERPL-SCNC: 138 MMOL/L (ref 136–145)
WBC # BLD AUTO: 9.5 THOUSAND/UL (ref 4.31–10.16)

## 2021-07-04 PROCEDURE — 99232 SBSQ HOSP IP/OBS MODERATE 35: CPT | Performed by: INTERNAL MEDICINE

## 2021-07-04 PROCEDURE — 85025 COMPLETE CBC W/AUTO DIFF WBC: CPT | Performed by: FAMILY MEDICINE

## 2021-07-04 PROCEDURE — 74022 RADEX COMPL AQT ABD SERIES: CPT

## 2021-07-04 PROCEDURE — 99232 SBSQ HOSP IP/OBS MODERATE 35: CPT | Performed by: NURSE PRACTITIONER

## 2021-07-04 PROCEDURE — 80048 BASIC METABOLIC PNL TOTAL CA: CPT | Performed by: FAMILY MEDICINE

## 2021-07-04 RX ORDER — NYSTATIN 100000 [USP'U]/G
POWDER TOPICAL 2 TIMES DAILY
Status: DISCONTINUED | OUTPATIENT
Start: 2021-07-04 | End: 2021-07-10 | Stop reason: HOSPADM

## 2021-07-04 RX ADMIN — ONDANSETRON 4 MG: 2 INJECTION INTRAMUSCULAR; INTRAVENOUS at 18:05

## 2021-07-04 RX ADMIN — LETROZOLE 2.5 MG: 2.5 TABLET, FILM COATED ORAL at 08:42

## 2021-07-04 RX ADMIN — NYSTATIN: 100000 POWDER TOPICAL at 18:05

## 2021-07-04 RX ADMIN — NYSTATIN: 100000 POWDER TOPICAL at 10:14

## 2021-07-04 RX ADMIN — POTASSIUM CHLORIDE: 2 INJECTION, SOLUTION, CONCENTRATE INTRAVENOUS at 06:50

## 2021-07-04 RX ADMIN — POTASSIUM CHLORIDE: 2 INJECTION, SOLUTION, CONCENTRATE INTRAVENOUS at 20:23

## 2021-07-04 NOTE — ASSESSMENT & PLAN NOTE
· Secondary to lactic acidosis and uremia from diarrhea  · Resolved status post initial bicarb IV infusion and then intermittent injections   · Nephrology following; input appreciated

## 2021-07-04 NOTE — ASSESSMENT & PLAN NOTE
· TSH is normal restart her methimazole as she is being advanced to clear liquid diet per surgical team

## 2021-07-04 NOTE — ASSESSMENT & PLAN NOTE
· Present on admission as evidence by dysuria symptoms however is normally incontinent at home   · Symptoms resolved; status post 3 days of rocephine ucx was negative tx due to significant pyuria

## 2021-07-04 NOTE — PROGRESS NOTES
Progress Note - General Surgery   Bashir Saenz 70 y o  female MRN: [id number]  Unit/Bed#: -01 Encounter: [id number]    Assessment:  Incomplete small bowel obstruction, improving,   diarrhea    Plan:  Clear liquid diet  Discussed with pt's daughter  Subjective/Objective   Chief Complaint:     Subjective: diarrhea   Pt states no abdominal pain and no emesis and no new complaints  Still with liquidy bowel movements  Objective:     Blood pressure 130/63, pulse 68, temperature 98 4 °F (36 9 °C), resp  rate 18, height 5' (1 524 m), weight 65 6 kg (144 lb 10 oz), SpO2 94 %  ,Body mass index is 28 24 kg/m²  Intake/Output Summary (Last 24 hours) at 7/4/2021 1018  Last data filed at 7/3/2021 2101  Gross per 24 hour   Intake 517 5 ml   Output --   Net 517 5 ml       Invasive Devices     Peripheral Intravenous Line            Peripheral IV 07/01/21 Proximal;Right;Ventral (anterior) Forearm 2 days    Peripheral IV 07/01/21 Right;Ventral (anterior) Forearm 2 days                Physical Exam: abd:  Soft and reducible hernia noted   And non tender ,

## 2021-07-04 NOTE — PROGRESS NOTES
Progress Note - Nephrology   Enrrique Mcdowell 70 y o  female MRN: [id number]  Unit/Bed#: -01 Encounter: [id number]    A/P:  1  Acute kidney injury on top of chronic kidney disease                creatinine continues to slowly improve, continue supportive care at this time  Continue avoid potential nephrotoxins  Will maintain the patient's IV fluids but will reduce rate down to 50 mL/hour  2  Chronic kidney disease stage 3 with baseline creatinine likely around 1 2 mg/dL  That being said the lowest the patient's creatinine has achieved the last calendar year was 0 81 mg/dL from July of 2020  3  Metabolic acidosis due to GI losses                bicarbonate levels appropriate, continue monitor for now  4  Hypokalemia                potassium level appropriate continue closely monitor  5  History of bilateral hydroureteronephrosis status post bilateral stents                kidney function continues to slowly improved  6  Small-bowel obstruction/ileus                patient's diet slowly advanced, continue according recommendations from our surgeon      Follow up reason for today's visit:  Acute kidney injury/chronic kidney disease/electrolyte abnormalities    Colitis, acute    Patient Active Problem List   Diagnosis    Acute on chronic kidney failure ([facility] [address] )    Hyperthyroidism    History of breast cancer    Uncontrolled type 2 diabetes mellitus, without long-term current use of insulin ([facility] [address] )    Cirrhosis of liver (HCC)    Chest pain    Constipation    Hypokalemia    Hydronephrosis    Hypomagnesemia    Parotiditis    Hyponatremia    High anion gap metabolic acidosis    Colitis, acute    Acute cystitis with hematuria    Partial small bowel obstruction (HCC)    Severe sepsis (HCC)         Subjective:   No Acute events overnight, patient has been slowly eating Jell-O and appears to be keeping this time  No abdominal pain at this time      Objective:     Vitals: Blood pressure 130/63, pulse 68, temperature 98 4 °F (36 9 °C), resp  rate 18, height 5' (1 524 m), weight 65 6 kg (144 lb 10 oz), SpO2 95 %  ,Body mass index is 28 24 kg/m²  Weight (last 2 days)     Date/Time   Weight    07/04/21 0500   65 6 (144 62)    07/03/21 0556   65 7 (144 8)    07/02/21 0550   64 6 (142 42)                Intake/Output Summary (Last 24 hours) at 7/4/2021 1352  Last data filed at 7/3/2021 2101  Gross per 24 hour   Intake 517 5 ml   Output --   Net 517 5 ml     I/O last 3 completed shifts: In: 4684 2 [I V :4684 2]  Out: -          Physical Exam: /63   Pulse 68   Temp 98 4 °F (36 9 °C)   Resp 18   Ht 5' (1 524 m)   Wt 65 6 kg (144 lb 10 oz)   SpO2 95%   BMI 28 24 kg/m²     General Appearance:    Alert, cooperative, no distress, appears stated age   Head:    Normocephalic, without obvious abnormality, atraumatic   Eyes:    Conjunctiva/corneas clear   Ears:    Normal external ears   Nose:   Nares normal, septum midline, mucosa normal, no drainage    or sinus tenderness   Throat:   Lips, mucosa, and tongue normal; teeth and gums normal   Neck:   Supple   Back:     Symmetric, no curvature, ROM normal, no CVA tenderness   Lungs:     Clear to auscultation bilaterally, respirations unlabored   Chest wall:    No tenderness or deformity   Heart:    Regular rate and rhythm, S1 and S2 normal, no murmur, rub   or gallop   Abdomen:     Soft, non-tender, bowel sounds active   Extremities:   Extremities normal, atraumatic, no cyanosis or edema   Skin:   Skin color, texture, turgor normal, no rashes or lesions   Lymph nodes:   Cervical normal   Neurologic:   CNII-XII intact            Lab, Imaging and other studies: I have personally reviewed pertinent labs    CBC:   Lab Results   Component Value Date    WBC 9 50 07/04/2021    HGB 12 0 07/04/2021    HCT 37 2 07/04/2021    MCV 96 07/04/2021     (L) 07/04/2021    MCH 30 9 07/04/2021    MCHC 32 3 07/04/2021    RDW 15 3 (H) 07/04/2021    MPV 11 4 07/04/2021    NRBC 0 07/04/2021     CMP:   Lab Results   Component Value Date    K 3 8 07/04/2021     07/04/2021    CO2 25 07/04/2021    BUN 28 (H) 07/04/2021    CREATININE 2 84 (H) 07/04/2021    CALCIUM 8 5 07/04/2021    EGFR 16 07/04/2021         Results from last 7 days   Lab Units 07/04/21  0537 07/03/21  0544 07/02/21  0545 07/01/21  0607 06/30/21  0518   POTASSIUM mmol/L 3 8 3 5 3 9 2 7* 3 0*   CHLORIDE mmol/L 102 106 109* 106 109*   CO2 mmol/L 25 29 19* 22 16*   BUN mg/dL 28* 31* 35* 40* 43*   CREATININE mg/dL 2 84* 2 96* 3 04* 3 57* 3 74*   CALCIUM mg/dL 8 5 8 2* 8 3 8 3 8 3   ALK PHOS U/L  --   --  153* 154* 168*   ALT U/L  --   --  14 16 20   AST U/L  --   --  32 26 28         Phosphorus: No results found for: PHOS  Magnesium: No results found for: MG  Urinalysis: No results found for: COLORU, CLARITYU, SPECGRAV, PHUR, LEUKOCYTESUR, NITRITE, PROTEINUA, GLUCOSEU, KETONESU, BILIRUBINUR, BLOODU  Ionized Calcium: No results found for: CAION  Coagulation: No results found for: PT, INR, APTT  Troponin: No results found for: TROPONINI  ABG: No results found for: PHART, LZQ1HHY, PO2ART, TCD6TBT, G1EHQQQW, BEART, SOURCE  Radiology review:     IMAGING  No results found      Current Facility-Administered Medications   Medication Dose Route Frequency    heparin (porcine) subcutaneous injection 5,000 Units  5,000 Units Subcutaneous Q8H [address]    letrozole (FEMARA) tablet 2 5 mg  2 5 mg Oral Daily    methimazole (TAPAZOLE) tablet 2 5 mg  2 5 mg Oral Every Other Day    nystatin (MYCOSTATIN) powder   Topical BID    ondansetron (ZOFRAN) injection 4 mg  4 mg Intravenous Q6H PRN    potassium chloride 40 mEq in sodium chloride 0 45 % 1,000 mL infusion   Intravenous Continuous     Medications Discontinued During This Encounter   Medication Reason    potassium chloride (K-DUR,KLOR-CON) CR tablet 40 mEq     letrozole [facility]) tablet 2 5 mg     methimazole (TAPAZOLE) tablet 2 5 mg     sodium chloride 0 9 % infusion     sodium bicarbonate 150 mEq in dextrose 5 % 1,000 mL infusion     methimazole (TAPAZOLE) tablet 2 5 mg     sodium bicarbonate 8 4 % injection 50 mEq Formulary change    cefTRIAXone (ROCEPHIN) IVPB (premix in dextrose) 1,000 mg 50 mL     metroNIDAZOLE (FLAGYL) IVPB (premix) 500 mg 100 mL     multi-electrolyte (PLASMALYTE-A/ISOLYTE-S PH 7 4) IV solution     sodium chloride infusion 9 07 % Duplicate order       Harvey Crisostomo, DO      This progress note was produced in part using a dictation device which may document imprecise wording from author's original intent

## 2021-07-04 NOTE — ASSESSMENT & PLAN NOTE
· Improving; Consistent with incomplete small-bowel obstruction  · Surgery following; input as noted below  · Status post rectal to rectal tube; since removed removed  · Repeat obstruction series reviewed  · Diet advanced to clear liquid diet on 07/04

## 2021-07-04 NOTE — ASSESSMENT & PLAN NOTE
Lab Results   Component Value Date    EGFR 16 07/04/2021    EGFR 15 07/03/2021    EGFR 15 07/02/2021    CREATININE 2 84 (H) 07/04/2021    CREATININE 2 96 (H) 07/03/2021    CREATININE 3 04 (H) 07/02/2021     · Present on admission; superimposed on CKD 2   · Baseline appears to be 0 7-1 1  · Renal function has not returned to baseline since episode of obstructive uropathy earlier this year  · Follow with nephrologist as outpatient   · S/P bilateral ureteral stent placement 04/06/21 for hydronephrosis  · Hx of prior admissions with [facility] and leaving [facility] x2 on 04/07/21 and 05/11/21   · Suspect pre-renal secondary to decreased PO intake and persistent diarrhea   · CT abdomen/pelvis no signs on obstruction in the kidneys/ureter   · Avoid nephrotoxins, relative hypotension, and NSAIDs if possible  · Nephrology following; input as noted below  · Continue to maintain IVF but will reduce rate to 50mL/hr on 07/04  · Monitor kidney function with morning labs

## 2021-07-04 NOTE — ASSESSMENT & PLAN NOTE
· Resolved; Present on admission as evidence by tachycardia, leukocytosis, and lactic acidosis  · Suspected source multifactorial in the setting of colitis and UTI  · Remains afebrile

## 2021-07-04 NOTE — PROGRESS NOTES
[address]  Progress Note - Carmen Becerra 1945, 70 y o  female MRN: [id number]  Unit/Bed#: -[facility] Encounter: [id number]  Primary Care Provider: Tee Morton MD   Date and time admitted to hospital: 6/29/2021  5:35 PM    * Colitis, acute  Assessment & Plan  Background: Presented to the ED after friend found her covered in urine and feces at home  · She lives alone  States she has been having watery diarrhea x 5 days    · Remains afebrile with leukocytosis; denies any recent abx use, travel, uncooked meat, eating out   · CT abdomen/pelvis: new small bowel dilatation suspicious for obstruction and secondary signs of mild colitis   · C diff melissa cx negative suspect more partial obstruction symptoms   · Monitoring off of antibiotics  · Surgery consulted; input appreciated in regards to partial bowel obstruction  · Gastroenterology evaluated; input appreciated  · If SBO resolves and persistent diarrhea will need colonoscopy   · Ova and parasites are pending   · Occult negative   · IVF per Nephrology in the setting of ARF     Acute on chronic kidney failure [facility])  Assessment & Plan  Lab Results   Component Value Date    EGFR 16 07/04/2021    EGFR 15 07/03/2021    EGFR 15 07/02/2021    CREATININE 2 84 (H) 07/04/2021    CREATININE 2 96 (H) 07/03/2021    CREATININE 3 04 (H) 07/02/2021     · Present on admission; superimposed on CKD 2   · Baseline appears to be 0 7-1 1  · Renal function has not returned to baseline since episode of obstructive uropathy earlier this year  · Follow with nephrologist as outpatient   · S/P bilateral ureteral stent placement 04/06/21 for hydronephrosis  · Hx of prior admissions with [facility] and leaving [facility] x2 on 04/07/21 and 05/11/21   · Suspect pre-renal secondary to decreased PO intake and persistent diarrhea   · CT abdomen/pelvis no signs on obstruction in the kidneys/ureter   · Avoid nephrotoxins, relative hypotension, and NSAIDs if possible  · Nephrology following; input as noted below  · Continue to maintain IVF but will reduce rate to 50mL/hr on 07/04  · Monitor kidney function with morning labs      Severe sepsis (HCC)-resolved as of 7/4/2021  Assessment & Plan  · Resolved; Present on admission as evidence by tachycardia, leukocytosis, and lactic acidosis  · Suspected source multifactorial in the setting of colitis and UTI  · Remains afebrile    Partial small bowel obstruction (HCC)  Assessment & Plan  · Improving; Consistent with incomplete small-bowel obstruction  · Surgery following; input as noted below  · Status post rectal to rectal tube; since removed removed  · Repeat obstruction series reviewed  · Diet advanced to clear liquid diet on 07/04    Acute cystitis with hematuria-resolved as of 7/4/2021  Assessment & Plan  · Present on admission as evidence by dysuria symptoms however is normally incontinent at home   · Symptoms resolved; status post 3 days of rocephine ucx was negative tx due to significant pyuria     High anion gap metabolic acidosis-resolved as of 7/4/2021  Assessment & Plan  · Secondary to lactic acidosis and uremia from diarrhea  · Resolved status post initial bicarb IV infusion and then intermittent injections   · Nephrology following; input appreciated    Hyperthyroidism  Assessment & Plan  · TSH is normal restart her methimazole as she is being advanced to clear liquid diet per surgical team    History of breast cancer  Assessment & Plan  · Diagnosed 2007 s/p left mastectomy   · Resume Femara as patient is going to test taking some sips of clears  · Of note, alk phos was elevated on admission 234; CT abdomen/pelvis reveals chronic gallstones and chronic distended gallbladder   Alk phos was elevated prior admission 05/11/21 317   · Consideration of bone metastasis- patient should f/u with imaging as outpatient     Hypokalemia-resolved as of 7/4/2021  Assessment & Plan  · Resolved with supplementation      VTE Pharmacologic Prophylaxis: VTE Score: 6 High Risk (Score >/= 5) - Pharmacological DVT Prophylaxis Ordered: heparin  Sequential Compression Devices Ordered  Patient Centered Rounds: I performed bedside rounds with nursing staff today  Discussions with Specialists or Other Care Team Provider:  Nursing staff, and review of Nephrology and surgical documentation    Education and Discussions with Family / Patient: Updated  (daughter) via phone  Time Spent for Care: 30 minutes  More than 50% of total time spent on counseling and coordination of care as described above  Current Length of Stay: 5 day(s)  Current Patient Status: Inpatient   Certification Statement: The patient will continue to require additional inpatient hospital stay due to IV fluids and monitoring of oral diet toleration as well as kidney function  Discharge Plan: Anticipate discharge in 48-72 hrs to Pending PT and OT evaluations    Code Status: Level 3 - DNAR and DNI    Subjective:   Patient overall reporting that she would really like to go home however understands that this is a slow process  Denies any overt abdominal pain, nausea, or vomiting  Objective:     Vitals:   Temp (24hrs), Av 5 °F (36 9 °C), Min:98 4 °F (36 9 °C), Max:98 6 °F (37 °C)    Temp:  [98 4 °F (36 9 °C)-98 6 °F (37 °C)] 98 5 °F (36 9 °C)  HR:  [65-72] 72  Resp:  [18-19] 19  BP: (130-131)/(61-64) 130/64  SpO2:  [93 %-97 %] 97 %  Body mass index is 28 24 kg/m²  Input and Output Summary (last 24 hours): Intake/Output Summary (Last 24 hours) at 2021 1544  Last data filed at 7/3/2021 2101  Gross per 24 hour   Intake 517 5 ml   Output --   Net 517 5 ml       Physical Exam:   Physical Exam  Constitutional:       Appearance: She is not ill-appearing or diaphoretic  Cardiovascular:      Rate and Rhythm: Normal rate and regular rhythm  Pulses: Normal pulses  Heart sounds: No gallop      Pulmonary:      Effort: Pulmonary effort is normal       Breath sounds: Normal breath sounds  Abdominal:      General: Bowel sounds are normal       Tenderness: There is no abdominal tenderness  Skin:     General: Skin is warm and dry  Capillary Refill: Capillary refill takes less than 2 seconds  Neurological:      Mental Status: She is alert and oriented to person, place, and time  Mental status is at baseline  Psychiatric:         Mood and Affect: Mood normal          Behavior: Behavior normal           Additional Data:     Labs:  Results from last 7 days   Lab Units 07/04/21  0537   WBC Thousand/uL 9 50   HEMOGLOBIN g/dL 12 0   HEMATOCRIT % 37 2   PLATELETS Thousands/uL 146*   NEUTROS PCT % 73   LYMPHS PCT % 15   MONOS PCT % 9   EOS PCT % 2     Results from last 7 days   Lab Units 07/04/21  0537 07/02/21  0545   SODIUM mmol/L 138 140   POTASSIUM mmol/L 3 8 3 9   CHLORIDE mmol/L 102 109*   CO2 mmol/L 25 19*   BUN mg/dL 28* 35*   CREATININE mg/dL 2 84* 3 04*   ANION GAP mmol/L 11 12   CALCIUM mg/dL 8 5 8 3   ALBUMIN g/dL  --  1 9*   TOTAL BILIRUBIN mg/dL  --  0 57   ALK PHOS U/L  --  153*   ALT U/L  --  14   AST U/L  --  32   GLUCOSE RANDOM mg/dL 50* 79         Results from last 7 days   Lab Units 06/29/21  1819   POC GLUCOSE mg/dl 104     Results from last 7 days   Lab Units 06/30/21  0518   HEMOGLOBIN A1C % 5 1     Results from last 7 days   Lab Units 07/01/21  0607 06/30/21  0057 06/29/21  2240 06/29/21  2137 06/29/21  1810   LACTIC ACID mmol/L  --  1 2 2 1* 2 1* 2 7*   PROCALCITONIN ng/ml 0 12  --  0 12  --   --        Lines/Drains:  Invasive Devices     Peripheral Intravenous Line            Peripheral IV 07/01/21 Right;Ventral (anterior) Forearm 2 days              Imaging: Reviewed radiology reports from this admission including: abdominal/pelvic CT and xray(s)    Recent Cultures (last 7 days):   Results from last 7 days   Lab Units 06/30/21  2216 06/29/21  1852 06/29/21  1810 06/29/21  1805   BLOOD CULTURE   --   --  No Growth After 4 Days  No Growth After 4 Days     URINE CULTURE   --  No Growth <1000 cfu/mL  --   --    C DIFF TOXIN B BY PCR  Negative  --   --   --        Last 24 Hours Medication List:   Current Facility-Administered Medications   Medication Dose Route Frequency Provider Last Rate    heparin (porcine)  5,000 Units Subcutaneous [facility] Carin Barcenas PA-C      letrozole  2 5 mg Oral Daily Anabell Stewart MD      methimazole  2 5 mg Oral Every Other Day Anabell Stewart MD      nystatin   Topical BID Anabell Stewart MD      ondansetron  4 mg Intravenous Q6H PRN Anabell Stewart MD      IV infusion builder   Intravenous Continuous Nay Newman, DO 75 mL/hr at 07/04/21 5937        Today, Patient Was Seen By: Beverlie Halsted, CRNP    **Please Note: This note may have been constructed using a voice recognition system  **

## 2021-07-04 NOTE — ASSESSMENT & PLAN NOTE
Background: Presented to the ED after friend found her covered in urine and feces at home  · She lives alone  States she has been having watery diarrhea x 5 days    · Remains afebrile with leukocytosis; denies any recent abx use, travel, uncooked meat, eating out   · CT abdomen/pelvis: new small bowel dilatation suspicious for obstruction and secondary signs of mild colitis   · C diff melissa cx negative suspect more partial obstruction symptoms   · Monitoring off of antibiotics  · Surgery consulted; input appreciated in regards to partial bowel obstruction  · Gastroenterology evaluated; input appreciated  · If SBO resolves and persistent diarrhea will need colonoscopy   · Ova and parasites are pending   · Occult negative   · IVF per Nephrology in the setting of ARF

## 2021-07-04 NOTE — PLAN OF CARE
Problem: Prexisting or High Potential for Compromised Skin Integrity  Goal: Skin integrity is maintained or improved  Description: INTERVENTIONS:  - Identify patients at risk for skin breakdown  - Assess and monitor skin integrity  - Assess and monitor nutrition and hydration status  - Monitor labs   - Assess for incontinence   - Turn and reposition patient  - Assist with mobility/ambulation  - Relieve pressure over bony prominences  - Avoid friction and shearing  - Provide appropriate hygiene as needed including keeping skin clean and dry  - Evaluate need for skin moisturizer/barrier cream  - Collaborate with interdisciplinary team   - Patient/family teaching  - Consider wound care consult   Outcome: Progressing     Problem: PAIN - ADULT  Goal: Verbalizes/displays adequate comfort level or baseline comfort level  Description: Interventions:  - Encourage patient to monitor pain and request assistance  - Assess pain using appropriate pain scale  - Administer analgesics based on type and severity of pain and evaluate response  - Implement non-pharmacological measures as appropriate and evaluate response  - Consider cultural and social influences on pain and pain management  - Notify physician/advanced practitioner if interventions unsuccessful or patient reports new pain  Outcome: Progressing     Problem: INFECTION - ADULT  Goal: Absence or prevention of progression during hospitalization  Description: INTERVENTIONS:  - Assess and monitor for signs and symptoms of infection  - Monitor lab/diagnostic results  - Monitor all insertion sites, i e  indwelling lines, tubes, and drains  - Monitor endotracheal if appropriate and nasal secretions for changes in amount and color  - [place] appropriate cooling/warming therapies per order  - Administer medications as ordered  - Instruct and encourage patient and family to use good hand hygiene technique  - Identify and instruct in appropriate isolation precautions for identified infection/condition  Outcome: Progressing     Problem: DISCHARGE PLANNING  Goal: Discharge to home or other facility with appropriate resources  Description: INTERVENTIONS:  - Identify barriers to discharge w/patient and caregiver  - Arrange for needed discharge resources and transportation as appropriate  - Identify discharge learning needs (meds, wound care, etc )  - Arrange for interpretive services to assist at discharge as needed  - Refer to Case Management Department for coordinating discharge planning if the patient needs post-hospital services based on physician/advanced practitioner order or complex needs related to functional status, cognitive ability, or social support system  Outcome: Progressing     Problem: Knowledge Deficit  Goal: Patient/family/caregiver demonstrates understanding of disease process, treatment plan, medications, and discharge instructions  Description: Complete learning assessment and assess knowledge base    Interventions:  - Provide teaching at level of understanding  - Provide teaching via preferred learning methods  Outcome: Progressing     Problem: GASTROINTESTINAL - ADULT  Goal: Minimal or absence of nausea and/or vomiting  Description: INTERVENTIONS:  - Administer IV fluids if ordered to ensure adequate hydration  - Maintain NPO status until nausea and vomiting are resolved  - Nasogastric tube if ordered  - Administer ordered antiemetic medications as needed  - Provide nonpharmacologic comfort measures as appropriate  - Advance diet as tolerated, if ordered  - Consider nutrition services referral to assist patient with adequate nutrition and appropriate food choices  Outcome: Progressing     Problem: GASTROINTESTINAL - ADULT  Goal: Maintains or returns to baseline bowel function  Description: INTERVENTIONS:  - Assess bowel function  - Encourage oral fluids to ensure adequate hydration  - Administer IV fluids if ordered to ensure adequate hydration  - Administer ordered medications as needed  - Encourage mobilization and activity  - Consider nutritional services referral to assist patient with adequate nutrition and appropriate food choices  Outcome: Progressing     Problem: GASTROINTESTINAL - ADULT  Goal: Maintains adequate nutritional intake  Description: INTERVENTIONS:  - Monitor percentage of each meal consumed  - Identify factors contributing to decreased intake, treat as appropriate  - Assist with meals as needed  - Monitor I&O, weight, and lab values if indicated  - Obtain nutrition services referral as needed  Outcome: Progressing     Problem: GASTROINTESTINAL - ADULT  Goal: Oral mucous membranes remain intact  Description: INTERVENTIONS  - Assess oral mucosa and hygiene practices  - Implement preventative oral hygiene regimen  - Implement oral medicated treatments as ordered  - Initiate Nutrition services referral as needed  Outcome: Progressing     Problem: Nutrition/Hydration-ADULT  Goal: Nutrient/Hydration intake appropriate for improving, restoring or maintaining nutritional needs  Description: Monitor and assess patient's nutrition/hydration status for malnutrition  Collaborate with interdisciplinary team and initiate plan and interventions as ordered  Monitor patient's weight and dietary intake as ordered or per policy  Utilize nutrition screening tool and intervene as necessary  Determine patient's food preferences and provide high-protein, high-caloric foods as appropriate       INTERVENTIONS:  - Monitor oral intake, urinary output, labs, and treatment plans  - Assess nutrition and hydration status and recommend course of action  - Evaluate amount of meals eaten  - Assist patient with eating if necessary   - Allow adequate time for meals  - Recommend/ encourage appropriate diets, oral nutritional supplements, and vitamin/mineral supplements  - Order, calculate, and assess calorie counts as needed  - Recommend, monitor, and adjust tube feedings and TPN/PPN based on assessed needs  - Assess need for intravenous fluids  - Provide specific nutrition/hydration education as appropriate  - Include patient/family/caregiver in decisions related to nutrition  Outcome: Progressing

## 2021-07-05 LAB
ANION GAP SERPL CALCULATED.3IONS-SCNC: 10 MMOL/L (ref 4–13)
BASOPHILS # BLD AUTO: 0.05 THOUSANDS/ΜL (ref 0–0.1)
BASOPHILS NFR BLD AUTO: 1 % (ref 0–1)
BUN SERPL-MCNC: 27 MG/DL (ref 5–25)
CALCIUM SERPL-MCNC: 8.8 MG/DL (ref 8.3–10.1)
CHLORIDE SERPL-SCNC: 103 MMOL/L (ref 100–108)
CO2 SERPL-SCNC: 23 MMOL/L (ref 21–32)
CREAT SERPL-MCNC: 2.85 MG/DL (ref 0.6–1.3)
EOSINOPHIL # BLD AUTO: 0.31 THOUSAND/ΜL (ref 0–0.61)
EOSINOPHIL NFR BLD AUTO: 3 % (ref 0–6)
ERYTHROCYTE [DISTWIDTH] IN BLOOD BY AUTOMATED COUNT: 15 % (ref 11.6–15.1)
GFR SERPL CREATININE-BSD FRML MDRD: 16 ML/MIN/1.73SQ M
GLUCOSE SERPL-MCNC: 60 MG/DL (ref 65–140)
HCT VFR BLD AUTO: 41.1 % (ref 34.8–46.1)
HGB BLD-MCNC: 13.1 G/DL (ref 11.5–15.4)
IMM GRANULOCYTES # BLD AUTO: 0.05 THOUSAND/UL (ref 0–0.2)
IMM GRANULOCYTES NFR BLD AUTO: 1 % (ref 0–2)
LYMPHOCYTES # BLD AUTO: 2.03 THOUSANDS/ΜL (ref 0.6–4.47)
LYMPHOCYTES NFR BLD AUTO: 21 % (ref 14–44)
MCH RBC QN AUTO: 30.5 PG (ref 26.8–34.3)
MCHC RBC AUTO-ENTMCNC: 31.9 G/DL (ref 31.4–37.4)
MCV RBC AUTO: 96 FL (ref 82–98)
MONOCYTES # BLD AUTO: 0.67 THOUSAND/ΜL (ref 0.17–1.22)
MONOCYTES NFR BLD AUTO: 7 % (ref 4–12)
NEUTROPHILS # BLD AUTO: 6.43 THOUSANDS/ΜL (ref 1.85–7.62)
NEUTS SEG NFR BLD AUTO: 67 % (ref 43–75)
NRBC BLD AUTO-RTO: 0 /100 WBCS
PLATELET # BLD AUTO: 172 THOUSANDS/UL (ref 149–390)
PMV BLD AUTO: 11.7 FL (ref 8.9–12.7)
POTASSIUM SERPL-SCNC: 4.5 MMOL/L (ref 3.5–5.3)
RBC # BLD AUTO: 4.3 MILLION/UL (ref 3.81–5.12)
SODIUM SERPL-SCNC: 136 MMOL/L (ref 136–145)
WBC # BLD AUTO: 9.54 THOUSAND/UL (ref 4.31–10.16)

## 2021-07-05 PROCEDURE — 99232 SBSQ HOSP IP/OBS MODERATE 35: CPT | Performed by: NURSE PRACTITIONER

## 2021-07-05 PROCEDURE — 85025 COMPLETE CBC W/AUTO DIFF WBC: CPT | Performed by: NURSE PRACTITIONER

## 2021-07-05 PROCEDURE — 99232 SBSQ HOSP IP/OBS MODERATE 35: CPT | Performed by: INTERNAL MEDICINE

## 2021-07-05 PROCEDURE — 80048 BASIC METABOLIC PNL TOTAL CA: CPT | Performed by: NURSE PRACTITIONER

## 2021-07-05 PROCEDURE — 99232 SBSQ HOSP IP/OBS MODERATE 35: CPT

## 2021-07-05 RX ADMIN — ONDANSETRON 4 MG: 2 INJECTION INTRAMUSCULAR; INTRAVENOUS at 19:45

## 2021-07-05 RX ADMIN — POTASSIUM CHLORIDE: 2 INJECTION, SOLUTION, CONCENTRATE INTRAVENOUS at 09:28

## 2021-07-05 RX ADMIN — NYSTATIN: 100000 POWDER TOPICAL at 17:13

## 2021-07-05 RX ADMIN — POTASSIUM CHLORIDE: 2 INJECTION, SOLUTION, CONCENTRATE INTRAVENOUS at 22:56

## 2021-07-05 RX ADMIN — NYSTATIN: 100000 POWDER TOPICAL at 08:38

## 2021-07-05 RX ADMIN — LETROZOLE 2.5 MG: 2.5 TABLET, FILM COATED ORAL at 08:37

## 2021-07-05 RX ADMIN — METHIMAZOLE 2.5 MG: 5 TABLET ORAL at 08:37

## 2021-07-05 NOTE — PLAN OF CARE
Problem: Potential for Falls  Goal: Patient will remain free of falls  Description: INTERVENTIONS:  - Educate patient/family on patient safety including physical limitations  - Instruct patient to call for assistance with activity   - Consult OT/PT to assist with strengthening/mobility   - Keep Call bell within reach  - Keep bed low and locked with side rails adjusted as appropriate  - Keep care items and personal belongings within reach  - Initiate and maintain comfort rounds  - Make Fall Risk Sign visible to staff  - Offer Toileting every  Hours, in advance of need  - Initiate/Maintain alarm  - Obtain necessary fall risk management equipment:   - Apply yellow socks and bracelet for high fall risk patients  - Consider moving patient to room near nurses station  Outcome: Progressing     Problem: MOBILITY - ADULT  Goal: Maintain or return to baseline ADL function  Description: INTERVENTIONS:  -  Assess patient's ability to carry out ADLs; assess patient's baseline for ADL function and identify physical deficits which impact ability to perform ADLs (bathing, care of mouth/teeth, toileting, grooming, dressing, etc )  - Assess/evaluate cause of self-care deficits   - Assess range of motion  - Assess patient's mobility; develop plan if impaired  - Assess patient's need for assistive devices and provide as appropriate  - Encourage maximum independence but intervene and supervise when necessary  - Involve family in performance of ADLs  - Assess for home care needs following discharge   - Consider OT consult to assist with ADL evaluation and planning for discharge  - Provide patient education as appropriate  Outcome: Progressing  Goal: Maintains/Returns to pre admission functional level  Description: INTERVENTIONS:  - Perform BMAT or MOVE assessment daily    - Set and communicate daily mobility goal to care team and patient/family/caregiver     - Collaborate with rehabilitation services on mobility goals if consulted  - Perform Range of Motion  times a day  - Reposition patient every  hours    - Dangle patient  times a day  - Stand patient  times a day  - Ambulate patient  times a day  - Out of bed to chair  times a day   - Out of bed for meals  times a day  - Out of bed for toileting  - Record patient progress and toleration of activity level   Outcome: Progressing     Problem: Prexisting or High Potential for Compromised Skin Integrity  Goal: Skin integrity is maintained or improved  Description: INTERVENTIONS:  - Identify patients at risk for skin breakdown  - Assess and monitor skin integrity  - Assess and monitor nutrition and hydration status  - Monitor labs   - Assess for incontinence   - Turn and reposition patient  - Assist with mobility/ambulation  - Relieve pressure over bony prominences  - Avoid friction and shearing  - Provide appropriate hygiene as needed including keeping skin clean and dry  - Evaluate need for skin moisturizer/barrier cream  - Collaborate with interdisciplinary team   - Patient/family teaching  - Consider wound care consult   Outcome: Progressing     Problem: PAIN - ADULT  Goal: Verbalizes/displays adequate comfort level or baseline comfort level  Description: Interventions:  - Encourage patient to monitor pain and request assistance  - Assess pain using appropriate pain scale  - Administer analgesics based on type and severity of pain and evaluate response  - Implement non-pharmacological measures as appropriate and evaluate response  - Consider cultural and social influences on pain and pain management  - Notify physician/advanced practitioner if interventions unsuccessful or patient reports new pain  Outcome: Progressing     Problem: INFECTION - ADULT  Goal: Absence or prevention of progression during hospitalization  Description: INTERVENTIONS:  - Assess and monitor for signs and symptoms of infection  - Monitor lab/diagnostic results  - Monitor all insertion sites, i e  indwelling lines, tubes, and drains  - Monitor endotracheal if appropriate and nasal secretions for changes in amount and color  - Hope appropriate cooling/warming therapies per order  - Administer medications as ordered  - Instruct and encourage patient and family to use good hand hygiene technique  - Identify and instruct in appropriate isolation precautions for identified infection/condition  Outcome: Progressing     Problem: SAFETY ADULT  Goal: Patient will remain free of falls  Description: INTERVENTIONS:  - Educate patient/family on patient safety including physical limitations  - Instruct patient to call for assistance with activity   - Consult OT/PT to assist with strengthening/mobility   - Keep Call bell within reach  - Keep bed low and locked with side rails adjusted as appropriate  - Keep care items and personal belongings within reach  - Initiate and maintain comfort rounds  - Make Fall Risk Sign visible to staff  - Offer Toileting every  Hours, in advance of need  - Initiate/Maintain alarm  - Obtain necessary fall risk management equipment:   - Apply yellow socks and bracelet for high fall risk patients  - Consider moving patient to room near nurses station  Outcome: Progressing  Goal: Maintain or return to baseline ADL function  Description: INTERVENTIONS:  -  Assess patient's ability to carry out ADLs; assess patient's baseline for ADL function and identify physical deficits which impact ability to perform ADLs (bathing, care of mouth/teeth, toileting, grooming, dressing, etc )  - Assess/evaluate cause of self-care deficits   - Assess range of motion  - Assess patient's mobility; develop plan if impaired  - Assess patient's need for assistive devices and provide as appropriate  - Encourage maximum independence but intervene and supervise when necessary  - Involve family in performance of ADLs  - Assess for home care needs following discharge   - Consider OT consult to assist with ADL evaluation and planning for discharge  - Provide patient education as appropriate  Outcome: Progressing  Goal: Maintains/Returns to pre admission functional level  Description: INTERVENTIONS:  - Perform BMAT or MOVE assessment daily    - Set and communicate daily mobility goal to care team and patient/family/caregiver  - Collaborate with rehabilitation services on mobility goals if consulted  - Perform Range of Motion  times a day  - Reposition patient every  hours  - Dangle patient  times a day  - Stand patient  times a day  - Ambulate patient  times a day  - Out of bed to chair  times a day   - Out of bed for meals times a day  - Out of bed for toileting  - Record patient progress and toleration of activity level   Outcome: Progressing     Problem: DISCHARGE PLANNING  Goal: Discharge to home or other facility with appropriate resources  Description: INTERVENTIONS:  - Identify barriers to discharge w/patient and caregiver  - Arrange for needed discharge resources and transportation as appropriate  - Identify discharge learning needs (meds, wound care, etc )  - Arrange for interpretive services to assist at discharge as needed  - Refer to Case Management Department for coordinating discharge planning if the patient needs post-hospital services based on physician/advanced practitioner order or complex needs related to functional status, cognitive ability, or social support system  Outcome: Progressing     Problem: Knowledge Deficit  Goal: Patient/family/caregiver demonstrates understanding of disease process, treatment plan, medications, and discharge instructions  Description: Complete learning assessment and assess knowledge base    Interventions:  - Provide teaching at level of understanding  - Provide teaching via preferred learning methods  Outcome: Progressing     Problem: GASTROINTESTINAL - ADULT  Goal: Minimal or absence of nausea and/or vomiting  Description: INTERVENTIONS:  - Administer IV fluids if ordered to ensure adequate hydration  - Maintain NPO status until nausea and vomiting are resolved  - Nasogastric tube if ordered  - Administer ordered antiemetic medications as needed  - Provide nonpharmacologic comfort measures as appropriate  - Advance diet as tolerated, if ordered  - Consider nutrition services referral to assist patient with adequate nutrition and appropriate food choices  Outcome: Progressing  Goal: Maintains or returns to baseline bowel function  Description: INTERVENTIONS:  - Assess bowel function  - Encourage oral fluids to ensure adequate hydration  - Administer IV fluids if ordered to ensure adequate hydration  - Administer ordered medications as needed  - Encourage mobilization and activity  - Consider nutritional services referral to assist patient with adequate nutrition and appropriate food choices  Outcome: Progressing  Goal: Maintains adequate nutritional intake  Description: INTERVENTIONS:  - Monitor percentage of each meal consumed  - Identify factors contributing to decreased intake, treat as appropriate  - Assist with meals as needed  - Monitor I&O, weight, and lab values if indicated  - Obtain nutrition services referral as needed  Outcome: Progressing  Goal: Oral mucous membranes remain intact  Description: INTERVENTIONS  - Assess oral mucosa and hygiene practices  - Implement preventative oral hygiene regimen  - Implement oral medicated treatments as ordered  - Initiate Nutrition services referral as needed  Outcome: Progressing     Problem: Nutrition/Hydration-ADULT  Goal: Nutrient/Hydration intake appropriate for improving, restoring or maintaining nutritional needs  Description: Monitor and assess patient's nutrition/hydration status for malnutrition  Collaborate with interdisciplinary team and initiate plan and interventions as ordered  Monitor patient's weight and dietary intake as ordered or per policy  Utilize nutrition screening tool and intervene as necessary   Determine patient's food preferences and provide high-protein, high-caloric foods as appropriate       INTERVENTIONS:  - Monitor oral intake, urinary output, labs, and treatment plans  - Assess nutrition and hydration status and recommend course of action  - Evaluate amount of meals eaten  - Assist patient with eating if necessary   - Allow adequate time for meals  - Recommend/ encourage appropriate diets, oral nutritional supplements, and vitamin/mineral supplements  - Order, calculate, and assess calorie counts as needed  - Recommend, monitor, and adjust tube feedings and TPN/PPN based on assessed needs  - Assess need for intravenous fluids  - Provide specific nutrition/hydration education as appropriate  - Include patient/family/caregiver in decisions related to nutrition  Outcome: Progressing

## 2021-07-05 NOTE — PROGRESS NOTES
[address]  Progress Note - Murali Flores 1945, 70 y o  female MRN: [id number]  Unit/Bed#: [facility] Encounter: [id number]  Primary Care Provider: Elmira Bonilla MD   Date and time admitted to hospital: 6/29/2021  5:35 PM    * Colitis, acute  Assessment & Plan  Background: Presented to the ED after friend found her covered in urine and feces at home  · She lives alone  States she has been having watery diarrhea x 5 days    · Remains afebrile with leukocytosis; denies any recent abx use, travel, uncooked meat, eating out   · CT abdomen/pelvis: new small bowel dilatation suspicious for obstruction and secondary signs of mild colitis   · C diff melissa cx negative suspect more partial obstruction symptoms   · Monitoring off of antibiotics  · Surgery consulted; input appreciated in regards to partial bowel obstruction  · Gastroenterology evaluated; input appreciated  · If SBO resolves and persistent diarrhea will need colonoscopy   · Ova and parasites are pending   · Occult negative   · IVF per Nephrology in the setting of ARF     Acute on chronic kidney failure [facility])  Assessment & Plan  Lab Results   Component Value Date    EGFR 16 07/05/2021    EGFR 16 07/04/2021    EGFR 15 07/03/2021    CREATININE 2 85 (H) 07/05/2021    CREATININE 2 84 (H) 07/04/2021    CREATININE 2 96 (H) 07/03/2021     · Present on admission; superimposed on CKD 2   · Baseline appears to be 0 7-1 1  · Renal function has not returned to baseline since episode of obstructive uropathy earlier this year  · Will need to follow with nephrologist as outpatient   · S/P bilateral ureteral stent placement 04/06/21 for hydronephrosis  · Hx of prior admissions with [facility] and leaving [facility] x2 on 04/07/21 and 05/11/21   · Suspect pre-renal secondary to decreased PO intake and persistent diarrhea   · CT abdomen/pelvis no signs on obstruction in the kidneys/ureter   · Avoid nephrotoxins, relative hypotension, and NSAIDs if possible  · Nephrology following; IV fluid titration per Nephrology team  · Monitor kidney function with morning labs      Partial small bowel obstruction (HCC)  Assessment & Plan  · Persistent; Consistent with incomplete small-bowel obstruction  · On 07/05 with persistent nausea overnight as well as abdominal tenderness and distention  · Surgery following; input as noted below  · Status post rectal to rectal tube; since removed removed  · Repeat obstruction series from 7/4 with unchanged multiple dilated small-bowel loops consistent with small-bowel obstruction  · Diet advanced to clear liquid diet on 07/04 per surgery team; defer any diet modification to surgical team  · IF doesn't tolerate FL on 7/5 will need a small bowel FL on 7/6    Hyperthyroidism  Assessment & Plan  · TSH is normal restart her methimazole as she is being advanced to clear liquid diet per surgical team    History of breast cancer  Assessment & Plan  · Diagnosed 2007 s/p left mastectomy   · Resume Femara as patient is going to test taking some sips of clears  · Of note, alk phos was elevated on admission 234; CT abdomen/pelvis reveals chronic gallstones and chronic distended gallbladder  Alk phos was elevated prior admission 05/11/21 317   · Consideration of bone metastasis- patient should f/u with imaging as outpatient     VTE Pharmacologic Prophylaxis: VTE Score: 6 High Risk (Score >/= 5) - Pharmacological DVT Prophylaxis Ordered: heparin  Sequential Compression Devices Ordered  Patient Centered Rounds: I performed bedside rounds with nursing staff today  Discussions with Specialists or Other Care Team Provider:  Staff, case management, and surgical team    Education and Discussions with Family / Patient: Updated  (daughter) via phone  Time Spent for Care: 30 minutes  More than 50% of total time spent on counseling and coordination of care as described above      Current Length of Stay: 6 day(s)  Current Patient Status: Inpatient   Certification Statement: The patient will continue to require additional inpatient hospital stay due to Ongoing need for monitoring of persistent bowel obstruction  Discharge Plan: Anticipate discharge in 48 hrs to Pending PT and OT evaluations as well as surgical and nephrology clearance    Code Status: Level 3 - DNAR and DNI    Subjective:   Patient overnight did experience some nausea and endorses abdominal tenderness and distension    Objective:     Vitals:   Temp (24hrs), Av 2 °F (36 8 °C), Min:97 9 °F (36 6 °C), Max:98 5 °F (36 9 °C)    Temp:  [97 9 °F (36 6 °C)-98 5 °F (36 9 °C)] 98 3 °F (36 8 °C)  HR:  [71-76] 76  Resp:  [18-19] 19  BP: (115-130)/(43-64) 115/43  SpO2:  [95 %-97 %] 95 %  Body mass index is 27 19 kg/m²  Input and Output Summary (last 24 hours): Intake/Output Summary (Last 24 hours) at 2021 1252  Last data filed at 2021 1109  Gross per 24 hour   Intake 2880 ml   Output 450 ml   Net 2430 ml       Physical Exam:   Physical Exam  Constitutional:       Appearance: She is not ill-appearing or diaphoretic  Cardiovascular:      Rate and Rhythm: Normal rate and regular rhythm  Pulses: Normal pulses  Heart sounds: No gallop  Pulmonary:      Effort: Pulmonary effort is normal       Breath sounds: Normal breath sounds  Abdominal:      General: Bowel sounds are normal  There is distension  Tenderness: There is abdominal tenderness  Skin:     General: Skin is warm and dry  Capillary Refill: Capillary refill takes less than 2 seconds  Neurological:      Mental Status: She is alert and oriented to person, place, and time  Mental status is at baseline     Psychiatric:         Mood and Affect: Mood normal          Behavior: Behavior normal          Additional Data:     Labs:  Results from last 7 days   Lab Units 21  0507   WBC Thousand/uL 9 54   HEMOGLOBIN g/dL 13 1   HEMATOCRIT % 41 1   PLATELETS Thousands/uL 172   NEUTROS PCT % 67   LYMPHS PCT % 21   MONOS PCT % 7   EOS PCT % 3     Results from last 7 days   Lab Units 07/05/21  0507 07/02/21  0545   SODIUM mmol/L 136 140   POTASSIUM mmol/L 4 5 3 9   CHLORIDE mmol/L 103 109*   CO2 mmol/L 23 19*   BUN mg/dL 27* 35*   CREATININE mg/dL 2 85* 3 04*   ANION GAP mmol/L 10 12   CALCIUM mg/dL 8 8 8 3   ALBUMIN g/dL  --  1 9*   TOTAL BILIRUBIN mg/dL  --  0 57   ALK PHOS U/L  --  153*   ALT U/L  --  14   AST U/L  --  32   GLUCOSE RANDOM mg/dL 60* 79         Results from last 7 days   Lab Units 06/29/21  1819   POC GLUCOSE mg/dl 104     Results from last 7 days   Lab Units 06/30/21  0518   HEMOGLOBIN A1C % 5 1     Results from last 7 days   Lab Units 07/01/21  0607 06/30/21  0057 06/29/21  2240 06/29/21  2137 06/29/21  1810   LACTIC ACID mmol/L  --  1 2 2 1* 2 1* 2 7*   PROCALCITONIN ng/ml 0 12  --  0 12  --   --        Lines/Drains:  Invasive Devices     Peripheral Intravenous Line            Peripheral IV 07/05/21 Distal;Dorsal (posterior); Left Forearm <1 day                      Imaging: Reviewed radiology reports from this admission including: xray(s)    Recent Cultures (last 7 days):   Results from last 7 days   Lab Units 06/30/21  2216 06/29/21  1852 06/29/21  1810 06/29/21  1805   BLOOD CULTURE   --   --  No Growth After 5 Days  No Growth After 5 Days     URINE CULTURE   --  No Growth <1000 cfu/mL  --   --    C DIFF TOXIN B BY PCR  Negative  --   --   --        Last 24 Hours Medication List:   Current Facility-Administered Medications   Medication Dose Route Frequency Provider Last Rate    heparin (porcine)  5,000 Units Subcutaneous [facility] Bernardo Schulz PA-C      letrozole  2 5 mg Oral Daily Larry Godoy MD      methimazole  2 5 mg Oral Every Other Day Larry Godoy MD      nystatin   Topical BID Larry Godoy MD      ondansetron  4 mg Intravenous Q6H PRN Larry Godoy MD      IV infusion builder   Intravenous Continuous Salina Haines DO 75 mL/hr at 07/05/21 6921 Today, Patient Was Seen By: SIMON Xiong    **Please Note: This note may have been constructed using a voice recognition system  **

## 2021-07-05 NOTE — PROGRESS NOTES
Progress Note - Nephrology   Reid Menon 70 y o  female MRN: [id number]  Unit/Bed#: -01 Encounter: [id number]    A/P:  1  Acute kidney injury on top of chronic kidney disease               Creatinine unchanged over last 24 hours, she is currently on half-normal saline with 40 mEq per L of potassium chloride  Will discontinue this IV fluid now  If needed, consider volume expansion with isotonic saline  Continue avoid potential nephrotoxins  Looks optimize overall care including anatomic issues  Please refer below regarding history of bilateral hydro ureteral nephrosis  2  Chronic kidney disease stage 3 with baseline creatinine likely around 1 2 mg/dL  That being said the lowest the patient's creatinine has achieved the last calendar year was 0 81 mg/dL from July of 2020  3  Metabolic acidosis due to GI losses                 continue current care, bicarb levels appropriate  4  Hypokalemia                 potassium level stable, continue with monitoring for now  5  History of bilateral hydroureteronephrosis status post bilateral stents               Patient is approximately 3 months out from stent placement, hospitalist will contact Urology regarding further care if indicated in the inpatient setting  6  Small-bowel obstruction/ileus   Patient continues to tolerate diet, continue with recommendations per surgery      Follow up reason for today's visit:  Acute kidney injury/chronic kidney disease/acidosis/electrolyte disorders    Colitis, acute    Patient Active Problem List   Diagnosis    Acute on chronic kidney failure ([facility] [address] )    Hyperthyroidism    History of breast cancer    Uncontrolled type 2 diabetes mellitus, without long-term current use of insulin (HCC)    Cirrhosis of liver (HCC)    Chest pain    Constipation    Hydronephrosis    Hypomagnesemia    Parotiditis    Hyponatremia    Colitis, acute    Partial small bowel obstruction (HCC)    Ileus (HCC)         Subjective:   No acute events overnight, patient claims to be eating and drinking with no episodes of nausea vomiting or abdominal pain  Objective:     Vitals: Blood pressure (!) 115/43, pulse 76, temperature 98 3 °F (36 8 °C), resp  rate 19, height 5' (1 524 m), weight 63 1 kg (139 lb 3 2 oz), SpO2 95 %  ,Body mass index is 27 19 kg/m²  Weight (last 2 days)     Date/Time   Weight    07/05/21 0545   63 1 (139 2)    07/04/21 0500   65 6 (144 62)    07/03/21 0556   65 7 (144 8)                Intake/Output Summary (Last 24 hours) at 7/5/2021 1333  Last data filed at 7/5/2021 1109  Gross per 24 hour   Intake 2880 ml   Output 450 ml   Net 2430 ml     I/O last 3 completed shifts: In: 3277 5 [P O :360; I V :2917 5]  Out: 450 [Urine:250; Emesis/NG output:200]         Physical Exam: BP (!) 115/43   Pulse 76   Temp 98 3 °F (36 8 °C)   Resp 19   Ht 5' (1 524 m)   Wt 63 1 kg (139 lb 3 2 oz)   SpO2 95%   BMI 27 19 kg/m²     General Appearance:    Alert, cooperative, no distress, appears stated age   Head:    Normocephalic, without obvious abnormality, atraumatic   Eyes:    Conjunctiva/corneas clear   Ears:    Normal external ears   Nose:   Nares normal, septum midline, mucosa normal, no drainage    or sinus tenderness   Throat:   Lips, mucosa, and tongue normal; teeth and gums normal   Neck:   Supple   Back:     Symmetric, no curvature, ROM normal, no CVA tenderness   Lungs:     Clear to auscultation bilaterally, respirations unlabored   Chest wall:    No tenderness or deformity   Heart:    Regular rate and rhythm, S1 and S2 normal, no murmur, rub   or gallop   Abdomen:     Soft, non-tender, bowel sounds active   Extremities:   Extremities normal, atraumatic, no cyanosis or edema   Skin:   Skin color, texture, turgor normal, no rashes or lesions   Lymph nodes:   Cervical normal   Neurologic:   CNII-XII intact            Lab, Imaging and other studies: I have personally reviewed pertinent labs    CBC:   Lab Results   Component Value Date WBC 9 54 07/05/2021    HGB 13 1 07/05/2021    HCT 41 1 07/05/2021    MCV 96 07/05/2021     07/05/2021    MCH 30 5 07/05/2021    MCHC 31 9 07/05/2021    RDW 15 0 07/05/2021    MPV 11 7 07/05/2021    NRBC 0 07/05/2021     CMP:   Lab Results   Component Value Date    K 4 5 07/05/2021     07/05/2021    CO2 23 07/05/2021    BUN 27 (H) 07/05/2021    CREATININE 2 85 (H) 07/05/2021    CALCIUM 8 8 07/05/2021    EGFR 16 07/05/2021         Results from last 7 days   Lab Units 07/05/21  0507 07/04/21  0537 07/03/21  0544 07/02/21  0545 07/01/21  0607 06/30/21  0518   POTASSIUM mmol/L 4 5 3 8 3 5 3 9 2 7* 3 0*   CHLORIDE mmol/L 103 102 106 109* 106 109*   CO2 mmol/L 23 25 29 19* 22 16*   BUN mg/dL 27* 28* 31* 35* 40* 43*   CREATININE mg/dL 2 85* 2 84* 2 96* 3 04* 3 57* 3 74*   CALCIUM mg/dL 8 8 8 5 8 2* 8 3 8 3 8 3   ALK PHOS U/L  --   --   --  153* 154* 168*   ALT U/L  --   --   --  14 16 20   AST U/L  --   --   --  32 26 28         Phosphorus: No results found for: PHOS  Magnesium: No results found for: MG  Urinalysis: No results found for: COLORU, CLARITYU, SPECGRAV, PHUR, LEUKOCYTESUR, NITRITE, PROTEINUA, GLUCOSEU, KETONESU, BILIRUBINUR, BLOODU  Ionized Calcium: No results found for: CAION  Coagulation: No results found for: PT, INR, APTT  Troponin: No results found for: TROPONINI  ABG: No results found for: PHART, BCR9QHL, PO2ART, QQJ0IXK, F3TZCHQN, BEART, SOURCE  Radiology review:     IMAGING  Procedure: XR abdomen obstruction series    Result Date: 7/5/2021  Narrative: OBSTRUCTION SERIES INDICATION:   eval sbo  COMPARISON:  Obstruction plain film series from 7/1/2021  EXAM PERFORMED/VIEWS:  XR ABDOMEN OBSTRUCTION SERIES FINDINGS: Unchanged multiple dilated small bowel loops consistent with small bowel obstruction  No free air beneath the hemidiaphragms  No pathologic calcifications or soft tissue masses evident  Bilateral ureteral stents  Calcified fibroids again noted   Osseous structures are unremarkable  Examination of the chest reveals a normal cardiomediastinal silhouette  Lungs are clear  Impression: Unchanged multiple dilated small bowel loops consistent with small bowel obstruction  Workstation performed: [id number]       Current Facility-Administered Medications   Medication Dose Route Frequency    heparin (porcine) subcutaneous injection 5,000 Units  5,000 Units Subcutaneous Q8H [address]    letrozole (FEMARA) tablet 2 5 mg  2 5 mg Oral Daily    methimazole (TAPAZOLE) tablet 2 5 mg  2 5 mg Oral Every Other Day    nystatin (MYCOSTATIN) powder   Topical BID    ondansetron (ZOFRAN) injection 4 mg  4 mg Intravenous Q6H PRN    potassium chloride 40 mEq in sodium chloride 0 45 % 1,000 mL infusion   Intravenous Continuous     Medications Discontinued During This Encounter   Medication Reason    potassium chloride (K-DUR,KLOR-CON) CR tablet 40 mEq     letrozole [facility]) tablet 2 5 mg     methimazole (TAPAZOLE) tablet 2 5 mg     sodium chloride 0 9 % infusion     sodium bicarbonate 150 mEq in dextrose 5 % 1,000 mL infusion     methimazole (TAPAZOLE) tablet 2 5 mg     sodium bicarbonate 8 4 % injection 50 mEq Formulary change    cefTRIAXone (ROCEPHIN) IVPB (premix in dextrose) 1,000 mg 50 mL     metroNIDAZOLE (FLAGYL) IVPB (premix) 500 mg 100 mL     multi-electrolyte (PLASMALYTE-A/ISOLYTE-S PH 7 4) IV solution     sodium chloride infusion 1 67 % Duplicate order       Maranda Davis, DO      This progress note was produced in part using a dictation device which may document imprecise wording from author's original intent

## 2021-07-05 NOTE — ASSESSMENT & PLAN NOTE
· Persistent; Consistent with incomplete small-bowel obstruction  · On 07/05 with persistent nausea overnight as well as abdominal tenderness and distention  · Surgery following; input as noted below  · Status post rectal to rectal tube; since removed removed  · Repeat obstruction series from 7/4 with unchanged multiple dilated small-bowel loops consistent with small-bowel obstruction    · Diet advanced to clear liquid diet on 07/04 per surgery team; defer any diet modification to surgical team  · IF doesn't tolerate FL on 7/5 will need a small bowel FL on 7/6

## 2021-07-05 NOTE — PROGRESS NOTES
Pt advanced to full liquids diet  Has been NPO or on clear liquids since 6/29 (x7 days)  Had clear liquids at lunch today, says she consumed majority of the meal  Per surgery note, to advance pt to full liquids with toast/crax  Only full liquids ordered at this time, per NP this is due to distension, nauseous overnight and complaining of abdominal pain, holding off on toast and crax at this time  Pt has previously tried nutrition supplements, does not like them, will not order at this time  Per pt, "good, I wouldn't have drank them anyway " Advance diet as medically appropriate to low fiber  Pt reports continued diarrhea, not agreeable to banatrol supplementation  If unable to advance diet further, may need to consider nutrition support

## 2021-07-05 NOTE — ASSESSMENT & PLAN NOTE
Lab Results   Component Value Date    EGFR 16 07/05/2021    EGFR 16 07/04/2021    EGFR 15 07/03/2021    CREATININE 2 85 (H) 07/05/2021    CREATININE 2 84 (H) 07/04/2021    CREATININE 2 96 (H) 07/03/2021     · Present on admission; superimposed on CKD 2   · Baseline appears to be 0 7-1 1  · Renal function has not returned to baseline since episode of obstructive uropathy earlier this year  · Will need to follow with nephrologist as outpatient   · S/P bilateral ureteral stent placement 04/06/21 for hydronephrosis  · Hx of prior admissions with [facility] and leaving [facility] x2 on 04/07/21 and 05/11/21   · Suspect pre-renal secondary to decreased PO intake and persistent diarrhea   · CT abdomen/pelvis no signs on obstruction in the kidneys/ureter   · Avoid nephrotoxins, relative hypotension, and NSAIDs if possible  · Nephrology following; IV fluid titration per Nephrology team  · Monitor kidney function with morning labs

## 2021-07-05 NOTE — PROGRESS NOTES
Progress Note - General Surgery   Bashir Saenz 70 y o  female MRN: [id number]  Unit/Bed#: -01 Encounter: [id number]    Assessment:  Incomplete small-bowel obstruction  Obstruction series performed yesterday show unchanged multiple dilated loops of small bowel consistent with small-bowel obstruction  Diarrhea x5 days, patient continues with multiple loose stools  C difficile, enteric stool panel, stool for ova and parasites all negative  Patient has an incisional hernia to the right of the umbilicus, soft and nontender  Patient tolerating clear liquids, some nausea, no vomiting  Status post Iman's procedure for diverticulitis 4 years ago in [place], 2017 and subsequent colostomy reversal  History of CA left breast, status post modified radical mastectomy 5 years ago  Bilateral renal stents for obstructive uropathy, stable appearance on CT scan image from admission  Acute kidney injury on chronic kidney disease stage 2, creatinine 2 85  Severe protein calorie malnutrition, BMI 27 19  Hyperthyroidism    Plan:  Discussed with Dr Carbajal  Will advance full liquid diet, toast and crackers see if she tolerates  Continue conservative non operative management  If unable to tolerate advanced diet, then probably proceed with small-bowel obstruction tomorrow  Patient would not allow for NG tube placement on admission  Dr Leos Jachin discussed management via telephone with the patient's daughter yesterday  GI recommendations listed below, consider colonoscopy inpatient versus outpatient once obstruction resolves if diarrhea has not improved  Out of bed to chair today  Medical management per the attending hospitalist provider    Clear liquids  Subjective/Objective   Chief Complaint:  Patient still with several loose watery bowel movements daily, no blood in stool  She has been tolerating clear liquids  Minimal nausea  No vomiting  Still some mild right-sided abdominal pain    She has a known incisional hernia which is soft and reducible  Subjective:  74-year-old frail white female with history of CA left breast and prior ruptured diverticulitis underwent Iman's procedure 4 years ago in [place]  She presented with a small-bowel obstruction  Patient did not want NG tube placement  Conservative non operative management  She was advanced clear liquids which she is now tolerating  She admits minimal nausea, no vomiting  No fever or chills  She has been mostly confined to her bed  She still is having frequent loose watery bowel movements  Enteric panel, C difficile and stool for ova and parasites all negative  This morning she feels about the same  Still having diarrhea, she wears diaper  She was agreeable to try advance diet to full liquids today  Patient's daughter was contacted yesterday by Dr hSaron Wynne, patient also was seen in GI evaluation yesterday by Dr Susan Mai, note reviewed on the medical record  GI recommended follow-up remainder of stool studies which appear all negative  Serial abdominal exams, avoid anti diarrhea medications due to possible partial small-bowel obstruction, consider colonoscopy inpatient versus outpatient once obstruction resolved if diarrhea has not improved      Scheduled Meds:  Current Facility-Administered Medications   Medication Dose Route Frequency Provider Last Rate    heparin (porcine)  5,000 Units Subcutaneous Q8H SUSIE Jackson PA-C      letrozole  2 5 mg Oral Daily Lin Kurtz MD      methimazole  2 5 mg Oral Every Other Day Lin Kurtz MD      nystatin   Topical BID Lin Kurtz MD      ondansetron  4 mg Intravenous Q6H PRN Lin Kurtz MD      IV infusion builder   Intravenous Continuous Umesh Madhav, DO 75 mL/hr at 07/05/21 0928     Continuous Infusions:IV infusion builder, , Last Rate: 75 mL/hr at 07/05/21 0928      PRN Meds: ondansetron    Objective:     Blood pressure (!) 115/43, pulse 76, temperature 98 3 °F (36 8 °C), resp  rate 19, height 5' (1 524 m), weight 63 1 kg (139 lb 3 2 oz), SpO2 95 %  ,Body mass index is 27 19 kg/m²  I/O       07/03 0701 - 07/04 0700 07/04 0701 - 07/05 0700 07/05 0701 - 07/06 0700    P  O   360 120    I V  (mL/kg) 1467 5 (22 4) 2400 (38)     Total Intake(mL/kg) 1467 5 (22 4) 2760 (43 7) 120 (1 9)    Urine (mL/kg/hr)  250 (0 2)     Emesis/NG output  200     Stool  0     Total Output  450     Net +1467 5 +2310 +120           Unmeasured Urine Occurrence 1 x      Unmeasured Stool Occurrence 1 x 3 x             Invasive Devices     Peripheral Intravenous Line            Peripheral IV 07/05/21 Distal;Dorsal (posterior); Left Forearm <1 day                Physical Exam:    Thin, frail  Awake  She appears older than stated age of 70 years  Skin decreased turgor  No pallor or jaundice  Oral mucosa pink and moist   Chest symmetric  Heart regular rate and rhythm, no murmur is heard  Lungs clear to auscultation  Back nontender, appears kyphotic  Abdomen positive bowel sounds heard, hyperactive in liquid in nature  Midline surgical scar  There is a hernia to the right of the incision which is nontender and soft  There is no guarding or rebound  No ascites, no hepatomegaly palpated  Inguinal sites are free of hernia  Neurological at baseline for the patient  Normal mental status  No tremor  Moves all 4 extremities  No calf tenderness or peripheral edema  Ambulation not observed  Lab, Imaging and other studies:  I have personally reviewed pertinent lab results    , CBC:   Lab Results   Component Value Date    WBC 9 54 07/05/2021    HGB 13 1 07/05/2021    HCT 41 1 07/05/2021    MCV 96 07/05/2021     07/05/2021    MCH 30 5 07/05/2021    MCHC 31 9 07/05/2021    RDW 15 0 07/05/2021    MPV 11 7 07/05/2021    NRBC 0 07/05/2021   , CMP:   Lab Results   Component Value Date    SODIUM 136 07/05/2021    K 4 5 07/05/2021     07/05/2021    CO2 23 07/05/2021    BUN 27 (H) 07/05/2021 CREATININE 2 85 (H) 07/05/2021    CALCIUM 8 8 07/05/2021    EGFR 16 07/05/2021     VTE Pharmacologic Prophylaxis: Heparin  VTE Mechanical Prophylaxis: sequential compression device    Antonino Mott PA-C

## 2021-07-05 NOTE — QUICK NOTE
Spoke to Urology as patient is due for stent exchange  No need for transfer or exchange during hospitalization  Can follow up promptly after discharge with Urology for OP arrangements         [place], [address]

## 2021-07-05 NOTE — PLAN OF CARE
Problem: MOBILITY - ADULT  Goal: Maintain or return to baseline ADL function  Description: INTERVENTIONS:  -  Assess patient's ability to carry out ADLs; assess patient's baseline for ADL function and identify physical deficits which impact ability to perform ADLs (bathing, care of mouth/teeth, toileting, grooming, dressing, etc )  - Assess/evaluate cause of self-care deficits   - Assess range of motion  - Assess patient's mobility; develop plan if impaired  - Assess patient's need for assistive devices and provide as appropriate  - Encourage maximum independence but intervene and supervise when necessary  - Involve family in performance of ADLs  - Assess for home care needs following discharge   - Consider OT consult to assist with ADL evaluation and planning for discharge  - Provide patient education as appropriate  Outcome: Progressing     Problem: Prexisting or High Potential for Compromised Skin Integrity  Goal: Skin integrity is maintained or improved  Description: INTERVENTIONS:  - Identify patients at risk for skin breakdown  - Assess and monitor skin integrity  - Assess and monitor nutrition and hydration status  - Monitor labs   - Assess for incontinence   - Turn and reposition patient  - Assist with mobility/ambulation  - Relieve pressure over bony prominences  - Avoid friction and shearing  - Provide appropriate hygiene as needed including keeping skin clean and dry  - Evaluate need for skin moisturizer/barrier cream  - Collaborate with interdisciplinary team   - Patient/family teaching  - Consider wound care consult   Outcome: Progressing     Problem: PAIN - ADULT  Goal: Verbalizes/displays adequate comfort level or baseline comfort level  Description: Interventions:  - Encourage patient to monitor pain and request assistance  - Assess pain using appropriate pain scale  - Administer analgesics based on type and severity of pain and evaluate response  - Implement non-pharmacological measures as appropriate and evaluate response  - Consider cultural and social influences on pain and pain management  - Notify physician/advanced practitioner if interventions unsuccessful or patient reports new pain  Outcome: Progressing     Problem: INFECTION - ADULT  Goal: Absence or prevention of progression during hospitalization  Description: INTERVENTIONS:  - Assess and monitor for signs and symptoms of infection  - Monitor lab/diagnostic results  - Monitor all insertion sites, i e  indwelling lines, tubes, and drains  - Monitor endotracheal if appropriate and nasal secretions for changes in amount and color  - [place] appropriate cooling/warming therapies per order  - Administer medications as ordered  - Instruct and encourage patient and family to use good hand hygiene technique  - Identify and instruct in appropriate isolation precautions for identified infection/condition  Outcome: Progressing     Problem: DISCHARGE PLANNING  Goal: Discharge to home or other facility with appropriate resources  Description: INTERVENTIONS:  - Identify barriers to discharge w/patient and caregiver  - Arrange for needed discharge resources and transportation as appropriate  - Identify discharge learning needs (meds, wound care, etc )  - Arrange for interpretive services to assist at discharge as needed  - Refer to Case Management Department for coordinating discharge planning if the patient needs post-hospital services based on physician/advanced practitioner order or complex needs related to functional status, cognitive ability, or social support system  Outcome: Progressing     Problem: Knowledge Deficit  Goal: Patient/family/caregiver demonstrates understanding of disease process, treatment plan, medications, and discharge instructions  Description: Complete learning assessment and assess knowledge base    Interventions:  - Provide teaching at level of understanding  - Provide teaching via preferred learning methods  Outcome: Progressing     Problem: GASTROINTESTINAL - ADULT  Goal: Minimal or absence of nausea and/or vomiting  Description: INTERVENTIONS:  - Administer IV fluids if ordered to ensure adequate hydration  - Maintain NPO status until nausea and vomiting are resolved  - Nasogastric tube if ordered  - Administer ordered antiemetic medications as needed  - Provide nonpharmacologic comfort measures as appropriate  - Advance diet as tolerated, if ordered  - Consider nutrition services referral to assist patient with adequate nutrition and appropriate food choices  Outcome: Progressing     Problem: GASTROINTESTINAL - ADULT  Goal: Maintains or returns to baseline bowel function  Description: INTERVENTIONS:  - Assess bowel function  - Encourage oral fluids to ensure adequate hydration  - Administer IV fluids if ordered to ensure adequate hydration  - Administer ordered medications as needed  - Encourage mobilization and activity  - Consider nutritional services referral to assist patient with adequate nutrition and appropriate food choices  Outcome: Progressing     Problem: GASTROINTESTINAL - ADULT  Goal: Maintains adequate nutritional intake  Description: INTERVENTIONS:  - Monitor percentage of each meal consumed  - Identify factors contributing to decreased intake, treat as appropriate  - Assist with meals as needed  - Monitor I&O, weight, and lab values if indicated  - Obtain nutrition services referral as needed  Outcome: Progressing     Problem: GASTROINTESTINAL - ADULT  Goal: Oral mucous membranes remain intact  Description: INTERVENTIONS  - Assess oral mucosa and hygiene practices  - Implement preventative oral hygiene regimen  - Implement oral medicated treatments as ordered  - Initiate Nutrition services referral as needed  Outcome: Progressing     Problem: Nutrition/Hydration-ADULT  Goal: Nutrient/Hydration intake appropriate for improving, restoring or maintaining nutritional needs  Description: Monitor and assess patient's nutrition/hydration status for malnutrition  Collaborate with interdisciplinary team and initiate plan and interventions as ordered  Monitor patient's weight and dietary intake as ordered or per policy  Utilize nutrition screening tool and intervene as necessary  Determine patient's food preferences and provide high-protein, high-caloric foods as appropriate       INTERVENTIONS:  - Monitor oral intake, urinary output, labs, and treatment plans  - Assess nutrition and hydration status and recommend course of action  - Evaluate amount of meals eaten  - Assist patient with eating if necessary   - Allow adequate time for meals  - Recommend/ encourage appropriate diets, oral nutritional supplements, and vitamin/mineral supplements  - Order, calculate, and assess calorie counts as needed  - Recommend, monitor, and adjust tube feedings and TPN/PPN based on assessed needs  - Assess need for intravenous fluids  - Provide specific nutrition/hydration education as appropriate  - Include patient/family/caregiver in decisions related to nutrition  Outcome: Progressing

## 2021-07-06 ENCOUNTER — APPOINTMENT (INPATIENT)
Dept: RADIOLOGY | Facility: HOSPITAL | Age: 76
DRG: 872 | End: 2021-07-06
Payer: MEDICARE

## 2021-07-06 LAB
ANION GAP SERPL CALCULATED.3IONS-SCNC: 7 MMOL/L (ref 4–13)
BUN SERPL-MCNC: 23 MG/DL (ref 5–25)
CALCIUM SERPL-MCNC: 9.2 MG/DL (ref 8.3–10.1)
CHLORIDE SERPL-SCNC: 106 MMOL/L (ref 100–108)
CO2 SERPL-SCNC: 24 MMOL/L (ref 21–32)
CREAT SERPL-MCNC: 2.84 MG/DL (ref 0.6–1.3)
GFR SERPL CREATININE-BSD FRML MDRD: 16 ML/MIN/1.73SQ M
GLUCOSE SERPL-MCNC: 76 MG/DL (ref 65–140)
HEMOCCULT STL QL: NEGATIVE
POTASSIUM SERPL-SCNC: 5 MMOL/L (ref 3.5–5.3)
SODIUM SERPL-SCNC: 137 MMOL/L (ref 136–145)

## 2021-07-06 PROCEDURE — 80048 BASIC METABOLIC PNL TOTAL CA: CPT | Performed by: NURSE PRACTITIONER

## 2021-07-06 PROCEDURE — 99232 SBSQ HOSP IP/OBS MODERATE 35: CPT | Performed by: NURSE PRACTITIONER

## 2021-07-06 PROCEDURE — 74250 X-RAY XM SM INT 1CNTRST STD: CPT

## 2021-07-06 PROCEDURE — 99232 SBSQ HOSP IP/OBS MODERATE 35: CPT | Performed by: INTERNAL MEDICINE

## 2021-07-06 RX ORDER — LIDOCAINE HYDROCHLORIDE 20 MG/ML
1 JELLY TOPICAL ONCE
Status: COMPLETED | OUTPATIENT
Start: 2021-07-06 | End: 2021-07-06

## 2021-07-06 RX ORDER — SODIUM CHLORIDE 9 MG/ML
50 INJECTION, SOLUTION INTRAVENOUS CONTINUOUS
Status: DISCONTINUED | OUTPATIENT
Start: 2021-07-06 | End: 2021-07-08

## 2021-07-06 RX ADMIN — ONDANSETRON 4 MG: 2 INJECTION INTRAMUSCULAR; INTRAVENOUS at 09:58

## 2021-07-06 RX ADMIN — Medication 1 SPRAY: at 18:38

## 2021-07-06 RX ADMIN — LETROZOLE 2.5 MG: 2.5 TABLET, FILM COATED ORAL at 09:12

## 2021-07-06 RX ADMIN — NYSTATIN: 100000 POWDER TOPICAL at 09:12

## 2021-07-06 RX ADMIN — SODIUM CHLORIDE 50 ML/HR: 0.9 INJECTION, SOLUTION INTRAVENOUS at 16:29

## 2021-07-06 RX ADMIN — NYSTATIN: 100000 POWDER TOPICAL at 18:38

## 2021-07-06 RX ADMIN — IOHEXOL 200 ML: 350 INJECTION, SOLUTION INTRAVENOUS at 17:02

## 2021-07-06 RX ADMIN — LIDOCAINE HYDROCHLORIDE 1 APPLICATION: 20 JELLY TOPICAL at 15:27

## 2021-07-06 RX ADMIN — Medication 1 SPRAY: at 21:15

## 2021-07-06 NOTE — ASSESSMENT & PLAN NOTE
Lab Results   Component Value Date    EGFR 16 07/06/2021    EGFR 16 07/05/2021    EGFR 16 07/04/2021    CREATININE 2 84 (H) 07/06/2021    CREATININE 2 85 (H) 07/05/2021    CREATININE 2 84 (H) 07/04/2021     · Stable; Present on admission; superimposed on CKD 2   · Baseline appears to be 0 7-1 1  · Renal function has not returned to baseline since episode of obstructive uropathy earlier this year  · Will need to follow with nephrologist as outpatient   · S/P bilateral ureteral stent placement 04/06/21 for hydronephrosis  · Hx of prior admissions with [facility] and leaving [facility] x2 on 04/07/21 and 05/11/21   · Suspect pre-renal secondary to decreased PO intake and persistent diarrhea   · CT abdomen/pelvis no signs on obstruction in the kidneys/ureter   · Avoid nephrotoxins, relative hypotension, and NSAIDs if possible  · Nephrology following; IV fluid titration per Nephrology team  · Monitor kidney function with morning labs

## 2021-07-06 NOTE — ASSESSMENT & PLAN NOTE
· Persistent; Consistent with incomplete small-bowel obstruction  · On 7/6 throwing up barium for follow-through study  · Surgery following; input as noted below  · Status post rectal to rectal tube; since removed removed  · Repeat obstruction series from 7/4 with unchanged multiple dilated small-bowel loops consistent with small-bowel obstruction    · Reduced back to NPO on 07/06  · Follow-through small-bowel study ordered; patient with episode of emesis of barium  · NG tube ordered

## 2021-07-06 NOTE — PROGRESS NOTES
Progress Note - General Surgery   Andres Lennon 70 y o  female MRN: [id number]  Unit/Bed#: [facility]01 Encounter: [id number]    Assessment:  - Incomplete SBO  - Pt had vomiting last night around 9pm  - Continues to have loose BMs  - Stool studies negative  - Likely lactose intolerant  - ZOYA on CKD, followed by urology    Plan:  - Small bowel follow through  - Will reduce to clear liquid diet  When advanced again in the future, would recommend lactose-free diet  - Nausea control prn  - I/O  - Continue to follow urology recommendations  - Medical management as per hospitalist    Subjective/Objective     Subjective: Pt reports last night she vomited around 9pm  She says her appetite is fair and she is able to tolerate small amounts of liquids  Reports her thick soup caused her nausea and subsequent vomiting  Though she reports that had she been in her normal state of health, she would have vomited from it regardless  Had multiple loose BMs last night into this AM  Pt reports she may have lactose intolerance, as she often has diarrhea with milk and ice cream, but not cheese or butter  Reports no nausea or vomiting this AM  No abdominal pain or bloating  Denies fever, chills, CP, or SOB  Pt reports that if she is to have surgery, she prefers to see her surgeon in [place]  Objective:     Blood pressure 123/64, pulse 80, temperature 97 8 °F (36 6 °C), resp  rate 17, height 5' (1 524 m), weight 62 8 kg (138 lb 6 4 oz), SpO2 96 %  ,Body mass index is 27 03 kg/m²        Intake/Output Summary (Last 24 hours) at 7/6/2021 0812  Last data filed at 7/5/2021 2256  Gross per 24 hour   Intake 2103 75 ml   Output --   Net 2103 75 ml       Invasive Devices     Peripheral Intravenous Line            Peripheral IV 07/05/21 Right;Ventral (anterior) Forearm 1 day                Physical Exam: /64   Pulse 80   Temp 97 8 °F (36 6 °C)   Resp 17   Ht 5' (1 524 m)   Wt 62 8 kg (138 lb 6 4 oz)   SpO2 96%   BMI 27 03 kg/m²   General appearance: alert and oriented, in no acute distress  Lungs: Mild expiratory rhonchi right lung in upper field  Otherwise lungs are CTA bilaterally  Heart: regular rate and rhythm, S1, S2 normal, no murmur, click, rub or gallop  Abdomen: Distended abdomen with tympanic percussion  BS heard throughout  No guarding or rebound tenderness  Incisional hernia noted at umbilicus  Lab, Imaging and other studies:  I have personally reviewed pertinent lab results      CMP:   Lab Results   Component Value Date    SODIUM 137 07/06/2021    K 5 0 07/06/2021     07/06/2021    CO2 24 07/06/2021    BUN 23 07/06/2021    CREATININE 2 84 (H) 07/06/2021    CALCIUM 9 2 07/06/2021    EGFR 16 07/06/2021     VTE Pharmacologic Prophylaxis: Heparin  VTE Mechanical Prophylaxis: sequential compression device   Alfonso Sanhcez PA-C

## 2021-07-06 NOTE — PROGRESS NOTES
Progress Note - Nephrology   Wendy Moore 70 y o  female MRN: [id number]  Unit/Bed#: -01 Encounter: [id number]    A/P:  1  Acute kidney injury on top of chronic kidney disease               Patient remains unchanged in the last 48 - 72 hours  Continue to provide supportive care and avoid potential nephrotoxins  Patient's last HD urine studies noted a prerenal state, patient has been on volume expansion for last 72 hours  Will reassess urine studies, patient may have slipped into acute tubular necrosis will simply require longer monitoring improvement in kidney function  2  Chronic kidney disease stage 3 with baseline creatinine likely around 1 2 mg/dL  That being said the lowest the patient's creatinine has achieved the last calendar year was 0 81 mg/dL from July of 2020  3  Metabolic acidosis due to GI losses                  stable continue monitor from time to time  4  Hypokalemia                  stable  5  History of bilateral hydroureteronephrosis status post bilateral stents               Patient is now out about 3 months from her bilateral ureteral stent placements  Will likely need to have the stents exchanged at some point in near future, most likely in the outpatient setting  6  Small-bowel obstruction/ileus   Patient's diet continues to be advanced, last night she was unable to tolerate her soup as it upset her stomach but also claims that if she had eaten the soup in her usual state health she probably would not have been able to tolerate      Follow up reason for today's visit:  Acute kidney injury/chronic kidney disease/electrolyte disorders/acid-base disorder    Colitis, acute    Patient Active Problem List   Diagnosis    Acute on chronic kidney failure ([facility] [address] )    Hyperthyroidism    History of breast cancer    Uncontrolled type 2 diabetes mellitus, without long-term current use of insulin ([address] )    Cirrhosis of liver ([address] )    Chest pain    Constipation    Hydronephrosis    Hypomagnesemia    Parotiditis    Hyponatremia    Colitis, acute    Partial small bowel obstruction (HCC)    Ileus (HCC)         Subjective:   No Acute events overnight    Objective:     Vitals: Blood pressure 123/64, pulse 80, temperature 97 8 °F (36 6 °C), resp  rate 17, height 5' (1 524 m), weight 62 8 kg (138 lb 6 4 oz), SpO2 96 %  ,Body mass index is 27 03 kg/m²  Weight (last 2 days)     Date/Time   Weight    07/06/21 0600   62 8 (138 4)    07/05/21 0545   63 1 (139 2)    07/04/21 0500   65 6 (144 62)                Intake/Output Summary (Last 24 hours) at 7/6/2021 0805  Last data filed at 7/5/2021 2256  Gross per 24 hour   Intake 2103 75 ml   Output --   Net 2103 75 ml     I/O last 3 completed shifts: In: 4503 8 [P O :760; I V :3743 8]  Out: 250 [Urine:250]         Physical Exam: /64   Pulse 80   Temp 97 8 °F (36 6 °C)   Resp 17   Ht 5' (1 524 m)   Wt 62 8 kg (138 lb 6 4 oz)   SpO2 96%   BMI 27 03 kg/m²     General Appearance:    Alert, cooperative, no distress, appears stated age   Head:    Normocephalic, without obvious abnormality, atraumatic   Eyes:    Conjunctiva/corneas clear   Ears:    Normal external ears   Nose:   Nares normal, septum midline, mucosa normal, no drainage    or sinus tenderness   Throat:   Lips, mucosa, and tongue normal; teeth and gums normal   Neck:   Supple   Back:     Symmetric, no curvature, ROM normal, no CVA tenderness   Lungs:     Clear to auscultation bilaterally, respirations unlabored   Chest wall:    No tenderness or deformity   Heart:    Regular rate and rhythm, S1 and S2 normal, no murmur, rub   or gallop   Abdomen:     Soft, non-tender, bowel sounds active   Extremities:   Extremities normal, atraumatic, no cyanosis or edema   Skin:   Skin color, texture, turgor normal, no rashes or lesions   Lymph nodes:   Cervical normal   Neurologic:   CNII-XII intact            Lab, Imaging and other studies: I have personally reviewed pertinent labs    CBC: No results found for: WBC, HGB, HCT, MCV, PLT, ADJUSTEDWBC, MCH, MCHC, RDW, MPV, NRBC  CMP:   Lab Results   Component Value Date    K 5 0 07/06/2021     07/06/2021    CO2 24 07/06/2021    BUN 23 07/06/2021    CREATININE 2 84 (H) 07/06/2021    CALCIUM 9 2 07/06/2021    EGFR 16 07/06/2021         Results from last 7 days   Lab Units 07/06/21  0457 07/05/21  0507 07/04/21  0537 07/02/21  0545 07/01/21  0607 06/30/21  0518   POTASSIUM mmol/L 5 0 4 5 3 8 3 9 2 7* 3 0*   CHLORIDE mmol/L 106 103 102 109* 106 109*   CO2 mmol/L 24 23 25 19* 22 16*   BUN mg/dL 23 27* 28* 35* 40* 43*   CREATININE mg/dL 2 84* 2 85* 2 84* 3 04* 3 57* 3 74*   CALCIUM mg/dL 9 2 8 8 8 5 8 3 8 3 8 3   ALK PHOS U/L  --   --   --  153* 154* 168*   ALT U/L  --   --   --  14 16 20   AST U/L  --   --   --  32 26 28         Phosphorus: No results found for: PHOS  Magnesium: No results found for: MG  Urinalysis: No results found for: COLORU, CLARITYU, SPECGRAV, PHUR, LEUKOCYTESUR, NITRITE, PROTEINUA, GLUCOSEU, KETONESU, BILIRUBINUR, BLOODU  Ionized Calcium: No results found for: CAION  Coagulation: No results found for: PT, INR, APTT  Troponin: No results found for: TROPONINI  ABG: No results found for: PHART, NRK9DAI, PO2ART, GBU3IMY, Q7NCWKFA, BEART, SOURCE  Radiology review:     IMAGING  Procedure: XR abdomen obstruction series    Result Date: 7/5/2021  Narrative: OBSTRUCTION SERIES INDICATION:   eval sbo  COMPARISON:  Obstruction plain film series from 7/1/2021  EXAM PERFORMED/VIEWS:  XR ABDOMEN OBSTRUCTION SERIES FINDINGS: Unchanged multiple dilated small bowel loops consistent with small bowel obstruction  No free air beneath the hemidiaphragms  No pathologic calcifications or soft tissue masses evident  Bilateral ureteral stents  Calcified fibroids again noted  Osseous structures are unremarkable  Examination of the chest reveals a normal cardiomediastinal silhouette  Lungs are clear       Impression: Unchanged multiple dilated small bowel loops consistent with small bowel obstruction  Workstation performed: [id number]       Current Facility-Administered Medications   Medication Dose Route Frequency    heparin (porcine) subcutaneous injection 5,000 Units  5,000 Units Subcutaneous Q8H [address]    letrozole (FEMARA) tablet 2 5 mg  2 5 mg Oral Daily    methimazole (TAPAZOLE) tablet 2 5 mg  2 5 mg Oral Every Other Day    nystatin (MYCOSTATIN) powder   Topical BID    ondansetron (ZOFRAN) injection 4 mg  4 mg Intravenous Q6H PRN    potassium chloride 40 mEq in sodium chloride 0 45 % 1,000 mL infusion   Intravenous Continuous     Medications Discontinued During This Encounter   Medication Reason    potassium chloride (K-DUR,KLOR-CON) CR tablet 40 mEq     letrozole [facility]) tablet 2 5 mg     methimazole (TAPAZOLE) tablet 2 5 mg     sodium chloride 0 9 % infusion     sodium bicarbonate 150 mEq in dextrose 5 % 1,000 mL infusion     methimazole (TAPAZOLE) tablet 2 5 mg     sodium bicarbonate 8 4 % injection 50 mEq Formulary change    cefTRIAXone (ROCEPHIN) IVPB (premix in dextrose) 1,000 mg 50 mL     metroNIDAZOLE (FLAGYL) IVPB (premix) 500 mg 100 mL     multi-electrolyte (PLASMALYTE-A/ISOLYTE-S PH 7 4) IV solution     sodium chloride infusion 6 50 % Duplicate order       Hemal Stanley, DO      This progress note was produced in part using a dictation device which may document imprecise wording from author's original intent

## 2021-07-06 NOTE — OCCUPATIONAL THERAPY NOTE
Occupational Therapy Cancellation Note     Patient Name: Priyanka HARTLEY Date: 7/6/2021  Problem List  Principal Problem:    Colitis, acute  Active Problems:    Acute on chronic kidney failure ([facility] [address] )    Hyperthyroidism    History of breast cancer    Partial small bowel obstruction ([facility] [address] )    Ileus ([facility] )            07/06/21 1122   Note Type   Note Type Treatment   Cancel Reasons Patient off floor/test       Chart review completed  Attempted to see Pt x2 this AM for OT treatment session, however, Pt off unit getting a test  Will continue to follow and address as medically appropriate and as schedule allows      Laurie Menchaca OTR/LI

## 2021-07-06 NOTE — PHYSICAL THERAPY NOTE
PHYSICAL THERAPY NOTE          Patient Name: Digna Campbell  [id number] Date: 7/6/2021 07/06/21 1335   Note Type   Note Type Treatment   Cancel Reasons Patient off floor/test     Chart reviewed  Attempted to see patient this PM, however patient off unit for testing  Will continue to follow and see patient as medically appropriate at a later time       Domingo Padron, PT,DPT

## 2021-07-06 NOTE — PROGRESS NOTES
[address]  Progress Note - Jamison Billings 1945, 70 y o  female MRN: [id number]  Unit/Bed#: -[facility] Encounter: [id number]  Primary Care Provider: Laura Rutledge MD   Date and time admitted to hospital: 6/29/2021  5:35 PM    * Colitis, acute  Assessment & Plan  Background: Presented to the ED after friend found her covered in urine and feces at home  · She lives alone  States she has been having watery diarrhea x 5 days    · Remains afebrile with leukocytosis; denies any recent abx use, travel, uncooked meat, eating out   · CT abdomen/pelvis: new small bowel dilatation suspicious for obstruction and secondary signs of mild colitis   · C diff melissa cx negative suspect more partial obstruction symptoms   · Monitoring off of antibiotics  · Surgery consulted; input appreciated in regards to partial bowel obstruction  · Gastroenterology evaluated; input appreciated  · If SBO resolves and persistent diarrhea will need colonoscopy   · Ova and parasites are pending   · Occult negative   · IVF per Nephrology; normal saline ordered on 07/06 given new NPO status    Acute on chronic kidney failure [facility])  Assessment & Plan  Lab Results   Component Value Date    EGFR 16 07/06/2021    EGFR 16 07/05/2021    EGFR 16 07/04/2021    CREATININE 2 84 (H) 07/06/2021    CREATININE 2 85 (H) 07/05/2021    CREATININE 2 84 (H) 07/04/2021     · Stable; Present on admission; superimposed on CKD 2   · Baseline appears to be 0 7-1 1  · Renal function has not returned to baseline since episode of obstructive uropathy earlier this year  · Will need to follow with nephrologist as outpatient   · S/P bilateral ureteral stent placement 04/06/21 for hydronephrosis  · Hx of prior admissions with ZOYA and leaving AMA x2 on 04/07/21 and 05/11/21   · Suspect pre-renal secondary to decreased PO intake and persistent diarrhea   · CT abdomen/pelvis no signs on obstruction in the kidneys/ureter   · Avoid nephrotoxins, relative hypotension, and NSAIDs if possible  · Nephrology following; IV fluid titration per Nephrology team  · Monitor kidney function with morning labs      Partial small bowel obstruction (HCC)  Assessment & Plan  · Persistent; Consistent with incomplete small-bowel obstruction  · On 7/6 throwing up barium for follow-through study  · Surgery following; input as noted below  · Status post rectal to rectal tube; since removed removed  · Repeat obstruction series from 7/4 with unchanged multiple dilated small-bowel loops consistent with small-bowel obstruction  · Reduced back to NPO on 07/06  · Follow-through small-bowel study ordered; patient with episode of emesis of barium  · NG tube ordered    Hyperthyroidism  Assessment & Plan  · TSH is normal restart her methimazole as she is being advanced to clear liquid diet per surgical team    History of breast cancer  Assessment & Plan  · Diagnosed 2007 s/p left mastectomy   · Resume Femara as patient is going to test taking some sips of clears  · Of note, alk phos was elevated on admission 234; CT abdomen/pelvis reveals chronic gallstones and chronic distended gallbladder  Alk phos was elevated prior admission 05/11/21 317   · Consideration of bone metastasis- patient should f/u with imaging as outpatient       VTE Pharmacologic Prophylaxis: VTE Score: 6 High Risk (Score >/= 5) - Pharmacological DVT Prophylaxis Ordered: heparin  Sequential Compression Devices Ordered  Patient Centered Rounds: I performed bedside rounds with nursing staff today  Discussions with Specialists or Other Care Team Provider:  Nursing staff, case management, Nephrology, and surgical team    Education and Discussions with Family / Patient: Updated  (daughter) via phone  Time Spent for Care: 30 minutes  More than 50% of total time spent on counseling and coordination of care as described above      Current Length of Stay: 7 day(s)  Current Patient Status: Inpatient   Certification Statement: The patient will continue to require additional inpatient hospital stay due to IV fluid, NPO, and requirement for ongoing treatment of her persistent bowel obstruction  Discharge Plan: Anticipate discharge in >72 hrs to home with home services  Code Status: Level 3 - DNAR and DNI    Subjective:   Patient did have episode of nausea with emesis after drinking barium  It is complaining of ongoing frequent diarrhea as well  Objective:   Vitals:   Temp (24hrs), Av 9 °F (36 6 °C), Min:97 8 °F (36 6 °C), Max:98 °F (36 7 °C)    Temp:  [97 8 °F (36 6 °C)-98 °F (36 7 °C)] 97 8 °F (36 6 °C)  HR:  [80-89] 80  Resp:  [17-18] 17  BP: (104-123)/(57-64) 123/64  SpO2:  [96 %-98 %] 96 %  Body mass index is 27 03 kg/m²  Input and Output Summary (last 24 hours): Intake/Output Summary (Last 24 hours) at 2021 1249  Last data filed at 2021 0912  Gross per 24 hour   Intake 2733 75 ml   Output --   Net 2733 75 ml       Physical Exam:   Physical Exam  Constitutional:       Appearance: She is not ill-appearing or diaphoretic  Cardiovascular:      Rate and Rhythm: Normal rate and regular rhythm  Pulses: Normal pulses  Heart sounds: No gallop  Pulmonary:      Effort: Pulmonary effort is normal       Breath sounds: Normal breath sounds  Abdominal:      General: Bowel sounds are normal  There is distension  Tenderness: There is abdominal tenderness  Skin:     General: Skin is warm and dry  Capillary Refill: Capillary refill takes less than 2 seconds  Neurological:      Mental Status: She is alert and oriented to person, place, and time  Mental status is at baseline     Psychiatric:         Mood and Affect: Mood normal          Behavior: Behavior normal         Additional Data:     Labs:  Results from last 7 days   Lab Units 21  0507   WBC Thousand/uL 9 54   HEMOGLOBIN g/dL 13 1   HEMATOCRIT % 41 1   PLATELETS Thousands/uL 172   NEUTROS PCT % 67   LYMPHS PCT % 21   MONOS PCT % 7   EOS PCT % 3     Results from last 7 days   Lab Units 07/06/21  0457 07/02/21  0545   SODIUM mmol/L 137 140   POTASSIUM mmol/L 5 0 3 9   CHLORIDE mmol/L 106 109*   CO2 mmol/L 24 19*   BUN mg/dL 23 35*   CREATININE mg/dL 2 84* 3 04*   ANION GAP mmol/L 7 12   CALCIUM mg/dL 9 2 8 3   ALBUMIN g/dL  --  1 9*   TOTAL BILIRUBIN mg/dL  --  0 57   ALK PHOS U/L  --  153*   ALT U/L  --  14   AST U/L  --  32   GLUCOSE RANDOM mg/dL 76 79         Results from last 7 days   Lab Units 06/29/21  1819   POC GLUCOSE mg/dl 104     Results from last 7 days   Lab Units 06/30/21  0518   HEMOGLOBIN A1C % 5 1     Results from last 7 days   Lab Units 07/01/21  0607 06/30/21  0057 06/29/21  2240 06/29/21  2137 06/29/21  1810   LACTIC ACID mmol/L  --  1 2 2 1* 2 1* 2 7*   PROCALCITONIN ng/ml 0 12  --  0 12  --   --        Lines/Drains:  Invasive Devices     Peripheral Intravenous Line            Peripheral IV 07/05/21 Right;Ventral (anterior) Forearm 1 day              Imaging: Reviewed radiology reports from this admission including: xray(s)    Recent Cultures (last 7 days):   Results from last 7 days   Lab Units 06/30/21  2216 06/29/21  1852 06/29/21  1810 06/29/21  1805   BLOOD CULTURE   --   --  No Growth After 5 Days  No Growth After 5 Days     URINE CULTURE   --  No Growth <1000 cfu/mL  --   --    C DIFF TOXIN B BY PCR  Negative  --   --   --        Last 24 Hours Medication List:   Current Facility-Administered Medications   Medication Dose Route Frequency Provider Last Rate    heparin (porcine)  5,000 Units Subcutaneous Q8H SUSIE Fishman PA-C      letrozole  2 5 mg Oral Daily Kirill Reyes MD      lidocaine  1 application Topical Once SIMON Barrett      methimazole  2 5 mg Oral Every Other Day Kirill Reyes MD      nystatin   Topical BID Kirill Reyes MD      ondansetron  4 mg Intravenous Q6H PRN Kirill Reyes MD      sodium chloride  50 mL/hr Intravenous Continuous Terry baez SIMON          Today, Patient Was Seen By: SIMON Mansfield    **Please Note: This note may have been constructed using a voice recognition system  **

## 2021-07-06 NOTE — ASSESSMENT & PLAN NOTE
Background: Presented to the ED after friend found her covered in urine and feces at home  · She lives alone  States she has been having watery diarrhea x 5 days    · Remains afebrile with leukocytosis; denies any recent abx use, travel, uncooked meat, eating out   · CT abdomen/pelvis: new small bowel dilatation suspicious for obstruction and secondary signs of mild colitis   · C diff melissa cx negative suspect more partial obstruction symptoms   · Monitoring off of antibiotics  · Surgery consulted; input appreciated in regards to partial bowel obstruction  · Gastroenterology evaluated; input appreciated  · If SBO resolves and persistent diarrhea will need colonoscopy   · Ova and parasites are pending   · Occult negative   · IVF per Nephrology; normal saline ordered on 07/06 given new NPO status

## 2021-07-06 NOTE — PLAN OF CARE
Problem: Potential for Falls  Goal: Patient will remain free of falls  Description: INTERVENTIONS:  - Educate patient/family on patient safety including physical limitations  - Instruct patient to call for assistance with activity   - Consult OT/PT to assist with strengthening/mobility   - Keep Call bell within reach  - Keep bed low and locked with side rails adjusted as appropriate  - Keep care items and personal belongings within reach  - Initiate and maintain comfort rounds  - Make Fall Risk Sign visible to staff  - Offer Toileting every 2 Hours, in advance of need  - Initiate/Maintain bed/chair alarm  - Obtain necessary fall risk management equipment: brief  - Apply yellow socks and bracelet for high fall risk patients  - Consider moving patient to room near nurses station  Outcome: Progressing     Problem: MOBILITY - ADULT  Goal: Maintain or return to baseline ADL function  Description: INTERVENTIONS:  -  Assess patient's ability to carry out ADLs; assess patient's baseline for ADL function and identify physical deficits which impact ability to perform ADLs (bathing, care of mouth/teeth, toileting, grooming, dressing, etc )  - Assess/evaluate cause of self-care deficits   - Assess range of motion  - Assess patient's mobility; develop plan if impaired  - Assess patient's need for assistive devices and provide as appropriate  - Encourage maximum independence but intervene and supervise when necessary  - Involve family in performance of ADLs  - Assess for home care needs following discharge   - Consider OT consult to assist with ADL evaluation and planning for discharge  - Provide patient education as appropriate  Outcome: Progressing  Goal: Maintains/Returns to pre admission functional level  Description: INTERVENTIONS:  - Perform BMAT or MOVE assessment daily    - Set and communicate daily mobility goal to care team and patient/family/caregiver     - Collaborate with rehabilitation services on mobility goals if consulted  - Perform Range of Motion 4 times a day  - Reposition patient every 2 hours    - Dangle patient 4 times a day  - Stand patient 4 times a day  - Ambulate patient 4 times a day  - Out of bed to chair 4 times a day   - Out of bed for meals 4 times a day  - Out of bed for toileting  - Record patient progress and toleration of activity level   Outcome: Progressing     Problem: Prexisting or High Potential for Compromised Skin Integrity  Goal: Skin integrity is maintained or improved  Description: INTERVENTIONS:  - Identify patients at risk for skin breakdown  - Assess and monitor skin integrity  - Assess and monitor nutrition and hydration status  - Monitor labs   - Assess for incontinence   - Turn and reposition patient  - Assist with mobility/ambulation  - Relieve pressure over bony prominences  - Avoid friction and shearing  - Provide appropriate hygiene as needed including keeping skin clean and dry  - Evaluate need for skin moisturizer/barrier cream  - Collaborate with interdisciplinary team   - Patient/family teaching  - Consider wound care consult   Outcome: Progressing     Problem: PAIN - ADULT  Goal: Verbalizes/displays adequate comfort level or baseline comfort level  Description: Interventions:  - Encourage patient to monitor pain and request assistance  - Assess pain using appropriate pain scale  - Administer analgesics based on type and severity of pain and evaluate response  - Implement non-pharmacological measures as appropriate and evaluate response  - Consider cultural and social influences on pain and pain management  - Notify physician/advanced practitioner if interventions unsuccessful or patient reports new pain  Outcome: Progressing     Problem: INFECTION - ADULT  Goal: Absence or prevention of progression during hospitalization  Description: INTERVENTIONS:  - Assess and monitor for signs and symptoms of infection  - Monitor lab/diagnostic results  - Monitor all insertion sites, i e  indwelling lines, tubes, and drains  - Monitor endotracheal if appropriate and nasal secretions for changes in amount and color  - [place] appropriate cooling/warming therapies per order  - Administer medications as ordered  - Instruct and encourage patient and family to use good hand hygiene technique  - Identify and instruct in appropriate isolation precautions for identified infection/condition  Outcome: Progressing     Problem: SAFETY ADULT  Goal: Patient will remain free of falls  Description: INTERVENTIONS:  - Educate patient/family on patient safety including physical limitations  - Instruct patient to call for assistance with activity   - Consult OT/PT to assist with strengthening/mobility   - Keep Call bell within reach  - Keep bed low and locked with side rails adjusted as appropriate  - Keep care items and personal belongings within reach  - Initiate and maintain comfort rounds  - Make Fall Risk Sign visible to staff  - Offer Toileting every 2 Hours, in advance of need  - Initiate/Maintain bed/chair alarm  - Obtain necessary fall risk management equipment: brief  - Apply yellow socks and bracelet for high fall risk patients  - Consider moving patient to room near nurses station  Outcome: Progressing  Goal: Maintain or return to baseline ADL function  Description: INTERVENTIONS:  -  Assess patient's ability to carry out ADLs; assess patient's baseline for ADL function and identify physical deficits which impact ability to perform ADLs (bathing, care of mouth/teeth, toileting, grooming, dressing, etc )  - Assess/evaluate cause of self-care deficits   - Assess range of motion  - Assess patient's mobility; develop plan if impaired  - Assess patient's need for assistive devices and provide as appropriate  - Encourage maximum independence but intervene and supervise when necessary  - Involve family in performance of ADLs  - Assess for home care needs following discharge   - Consider OT consult to assist with ADL evaluation and planning for discharge  - Provide patient education as appropriate  Outcome: Progressing  Goal: Maintains/Returns to pre admission functional level  Description: INTERVENTIONS:  - Perform BMAT or MOVE assessment daily    - Set and communicate daily mobility goal to care team and patient/family/caregiver  - Collaborate with rehabilitation services on mobility goals if consulted  - Perform Range of Motion 4 times a day  - Reposition patient every 2 hours  - Dangle patient 4 times a day  - Stand patient 4 times a day  - Ambulate patient 4 times a day  - Out of bed to chair 4 times a day   - Out of bed for meals 4 times a day  - Out of bed for toileting  - Record patient progress and toleration of activity level   Outcome: Progressing     Problem: DISCHARGE PLANNING  Goal: Discharge to home or other facility with appropriate resources  Description: INTERVENTIONS:  - Identify barriers to discharge w/patient and caregiver  - Arrange for needed discharge resources and transportation as appropriate  - Identify discharge learning needs (meds, wound care, etc )  - Arrange for interpretive services to assist at discharge as needed  - Refer to Case Management Department for coordinating discharge planning if the patient needs post-hospital services based on physician/advanced practitioner order or complex needs related to functional status, cognitive ability, or social support system  Outcome: Progressing     Problem: Knowledge Deficit  Goal: Patient/family/caregiver demonstrates understanding of disease process, treatment plan, medications, and discharge instructions  Description: Complete learning assessment and assess knowledge base    Interventions:  - Provide teaching at level of understanding  - Provide teaching via preferred learning methods  Outcome: Progressing     Problem: GASTROINTESTINAL - ADULT  Goal: Minimal or absence of nausea and/or vomiting  Description: INTERVENTIONS:  - Administer IV fluids if ordered to ensure adequate hydration  - Maintain NPO status until nausea and vomiting are resolved  - Nasogastric tube if ordered  - Administer ordered antiemetic medications as needed  - Provide nonpharmacologic comfort measures as appropriate  - Advance diet as tolerated, if ordered  - Consider nutrition services referral to assist patient with adequate nutrition and appropriate food choices  Outcome: Progressing  Goal: Maintains or returns to baseline bowel function  Description: INTERVENTIONS:  - Assess bowel function  - Encourage oral fluids to ensure adequate hydration  - Administer IV fluids if ordered to ensure adequate hydration  - Administer ordered medications as needed  - Encourage mobilization and activity  - Consider nutritional services referral to assist patient with adequate nutrition and appropriate food choices  Outcome: Progressing  Goal: Maintains adequate nutritional intake  Description: INTERVENTIONS:  - Monitor percentage of each meal consumed  - Identify factors contributing to decreased intake, treat as appropriate  - Assist with meals as needed  - Monitor I&O, weight, and lab values if indicated  - Obtain nutrition services referral as needed  Outcome: Progressing  Goal: Oral mucous membranes remain intact  Description: INTERVENTIONS  - Assess oral mucosa and hygiene practices  - Implement preventative oral hygiene regimen  - Implement oral medicated treatments as ordered  - Initiate Nutrition services referral as needed  Outcome: Progressing     Problem: Nutrition/Hydration-ADULT  Goal: Nutrient/Hydration intake appropriate for improving, restoring or maintaining nutritional needs  Description: Monitor and assess patient's nutrition/hydration status for malnutrition  Collaborate with interdisciplinary team and initiate plan and interventions as ordered  Monitor patient's weight and dietary intake as ordered or per policy   Utilize nutrition screening tool and intervene as necessary  Determine patient's food preferences and provide high-protein, high-caloric foods as appropriate       INTERVENTIONS:  - Monitor oral intake, urinary output, labs, and treatment plans  - Assess nutrition and hydration status and recommend course of action  - Evaluate amount of meals eaten  - Assist patient with eating if necessary   - Allow adequate time for meals  - Recommend/ encourage appropriate diets, oral nutritional supplements, and vitamin/mineral supplements  - Order, calculate, and assess calorie counts as needed  - Recommend, monitor, and adjust tube feedings and TPN/PPN based on assessed needs  - Assess need for intravenous fluids  - Provide specific nutrition/hydration education as appropriate  - Include patient/family/caregiver in decisions related to nutrition  Outcome: Progressing

## 2021-07-07 LAB
ANION GAP SERPL CALCULATED.3IONS-SCNC: 11 MMOL/L (ref 4–13)
BACTERIA UR QL AUTO: ABNORMAL /HPF
BASOPHILS # BLD AUTO: 0.07 THOUSANDS/ΜL (ref 0–0.1)
BASOPHILS NFR BLD AUTO: 1 % (ref 0–1)
BILIRUB UR QL STRIP: ABNORMAL
BUN SERPL-MCNC: 22 MG/DL (ref 5–25)
CALCIUM SERPL-MCNC: 8.8 MG/DL (ref 8.3–10.1)
CHLORIDE SERPL-SCNC: 109 MMOL/L (ref 100–108)
CLARITY UR: ABNORMAL
CO2 SERPL-SCNC: 22 MMOL/L (ref 21–32)
COLOR UR: ABNORMAL
CREAT SERPL-MCNC: 2.85 MG/DL (ref 0.6–1.3)
CREAT UR-MCNC: 206 MG/DL
CREAT UR-MCNC: 265 MG/DL
EOSINOPHIL # BLD AUTO: 0.28 THOUSAND/ΜL (ref 0–0.61)
EOSINOPHIL NFR BLD AUTO: 3 % (ref 0–6)
ERYTHROCYTE [DISTWIDTH] IN BLOOD BY AUTOMATED COUNT: 15 % (ref 11.6–15.1)
GFR SERPL CREATININE-BSD FRML MDRD: 16 ML/MIN/1.73SQ M
GLUCOSE SERPL-MCNC: 63 MG/DL (ref 65–140)
GLUCOSE UR STRIP-MCNC: NEGATIVE MG/DL
HCT VFR BLD AUTO: 39.7 % (ref 34.8–46.1)
HGB BLD-MCNC: 12.5 G/DL (ref 11.5–15.4)
HGB UR QL STRIP.AUTO: ABNORMAL
HYALINE CASTS #/AREA URNS LPF: ABNORMAL /LPF
IMM GRANULOCYTES # BLD AUTO: 0.03 THOUSAND/UL (ref 0–0.2)
IMM GRANULOCYTES NFR BLD AUTO: 0 % (ref 0–2)
KETONES UR STRIP-MCNC: ABNORMAL MG/DL
LEUKOCYTE ESTERASE UR QL STRIP: ABNORMAL
LYMPHOCYTES # BLD AUTO: 2.1 THOUSANDS/ΜL (ref 0.6–4.47)
LYMPHOCYTES NFR BLD AUTO: 25 % (ref 14–44)
MCH RBC QN AUTO: 30.3 PG (ref 26.8–34.3)
MCHC RBC AUTO-ENTMCNC: 31.5 G/DL (ref 31.4–37.4)
MCV RBC AUTO: 96 FL (ref 82–98)
MONOCYTES # BLD AUTO: 0.78 THOUSAND/ΜL (ref 0.17–1.22)
MONOCYTES NFR BLD AUTO: 9 % (ref 4–12)
NEUTROPHILS # BLD AUTO: 5.19 THOUSANDS/ΜL (ref 1.85–7.62)
NEUTS SEG NFR BLD AUTO: 62 % (ref 43–75)
NITRITE UR QL STRIP: NEGATIVE
NON-SQ EPI CELLS URNS QL MICRO: ABNORMAL /HPF
NRBC BLD AUTO-RTO: 0 /100 WBCS
O+P STL CONC: NORMAL
PH UR STRIP.AUTO: 5.5 [PH]
PLATELET # BLD AUTO: 173 THOUSANDS/UL (ref 149–390)
PMV BLD AUTO: 11.6 FL (ref 8.9–12.7)
POTASSIUM SERPL-SCNC: 4 MMOL/L (ref 3.5–5.3)
PROT UR STRIP-MCNC: ABNORMAL MG/DL
RBC # BLD AUTO: 4.12 MILLION/UL (ref 3.81–5.12)
RBC #/AREA URNS AUTO: ABNORMAL /HPF
SODIUM 24H UR-SCNC: 42 MOL/L
SODIUM 24H UR-SCNC: 44 MOL/L
SODIUM SERPL-SCNC: 142 MMOL/L (ref 136–145)
SP GR UR STRIP.AUTO: 1.02 (ref 1–1.03)
UROBILINOGEN UR QL STRIP.AUTO: 0.2 E.U./DL
WBC # BLD AUTO: 8.45 THOUSAND/UL (ref 4.31–10.16)
WBC #/AREA URNS AUTO: ABNORMAL /HPF

## 2021-07-07 PROCEDURE — 80048 BASIC METABOLIC PNL TOTAL CA: CPT | Performed by: NURSE PRACTITIONER

## 2021-07-07 PROCEDURE — 99232 SBSQ HOSP IP/OBS MODERATE 35: CPT | Performed by: INTERNAL MEDICINE

## 2021-07-07 PROCEDURE — 82570 ASSAY OF URINE CREATININE: CPT | Performed by: INTERNAL MEDICINE

## 2021-07-07 PROCEDURE — 85025 COMPLETE CBC W/AUTO DIFF WBC: CPT | Performed by: NURSE PRACTITIONER

## 2021-07-07 PROCEDURE — 84300 ASSAY OF URINE SODIUM: CPT | Performed by: INTERNAL MEDICINE

## 2021-07-07 PROCEDURE — 97116 GAIT TRAINING THERAPY: CPT

## 2021-07-07 PROCEDURE — 97110 THERAPEUTIC EXERCISES: CPT

## 2021-07-07 PROCEDURE — 99232 SBSQ HOSP IP/OBS MODERATE 35: CPT | Performed by: NURSE PRACTITIONER

## 2021-07-07 PROCEDURE — 81001 URINALYSIS AUTO W/SCOPE: CPT | Performed by: INTERNAL MEDICINE

## 2021-07-07 PROCEDURE — 97530 THERAPEUTIC ACTIVITIES: CPT

## 2021-07-07 RX ADMIN — METHIMAZOLE 2.5 MG: 5 TABLET ORAL at 08:35

## 2021-07-07 RX ADMIN — Medication 1 SPRAY: at 16:10

## 2021-07-07 RX ADMIN — Medication 1 SPRAY: at 13:34

## 2021-07-07 RX ADMIN — LETROZOLE 2.5 MG: 2.5 TABLET, FILM COATED ORAL at 08:35

## 2021-07-07 RX ADMIN — Medication 1 SPRAY: at 06:22

## 2021-07-07 RX ADMIN — Medication 1 SPRAY: at 08:36

## 2021-07-07 RX ADMIN — Medication 1 SPRAY: at 03:36

## 2021-07-07 RX ADMIN — SODIUM CHLORIDE 50 ML/HR: 0.9 INJECTION, SOLUTION INTRAVENOUS at 11:49

## 2021-07-07 RX ADMIN — Medication 1 SPRAY: at 00:43

## 2021-07-07 RX ADMIN — NYSTATIN: 100000 POWDER TOPICAL at 08:36

## 2021-07-07 RX ADMIN — Medication 1 SPRAY: at 20:34

## 2021-07-07 RX ADMIN — NYSTATIN: 100000 POWDER TOPICAL at 17:29

## 2021-07-07 NOTE — PLAN OF CARE
Problem: Potential for Falls  Goal: Patient will remain free of falls  Description: INTERVENTIONS:  - Educate patient/family on patient safety including physical limitations  - Instruct patient to call for assistance with activity   - Consult OT/PT to assist with strengthening/mobility   - Keep Call bell within reach  - Keep bed low and locked with side rails adjusted as appropriate  - Keep care items and personal belongings within reach  - Initiate and maintain comfort rounds  - Make Fall Risk Sign visible to staff  - Offer Toileting every  Hours, in advance of need  - Initiate/Maintain alarm  - Obtain necessary fall risk management equipment:   - Apply yellow socks and bracelet for high fall risk patients  - Consider moving patient to room near nurses station  Outcome: Progressing     Problem: MOBILITY - ADULT  Goal: Maintain or return to baseline ADL function  Description: INTERVENTIONS:  -  Assess patient's ability to carry out ADLs; assess patient's baseline for ADL function and identify physical deficits which impact ability to perform ADLs (bathing, care of mouth/teeth, toileting, grooming, dressing, etc )  - Assess/evaluate cause of self-care deficits   - Assess range of motion  - Assess patient's mobility; develop plan if impaired  - Assess patient's need for assistive devices and provide as appropriate  - Encourage maximum independence but intervene and supervise when necessary  - Involve family in performance of ADLs  - Assess for home care needs following discharge   - Consider OT consult to assist with ADL evaluation and planning for discharge  - Provide patient education as appropriate  Outcome: Progressing  Goal: Maintains/Returns to pre admission functional level  Description: INTERVENTIONS:  - Perform BMAT or MOVE assessment daily    - Set and communicate daily mobility goal to care team and patient/family/caregiver     - Collaborate with rehabilitation services on mobility goals if consulted  - Perform Range of Motion  times a day  - Reposition patient every  hours    - Dangle patient  times a day  - Stand patient  times a day  - Ambulate patient  times a day  - Out of bed to chair  times a day   - Out of bed for meals  times a day  - Out of bed for toileting  - Record patient progress and toleration of activity level   Outcome: Progressing     Problem: Prexisting or High Potential for Compromised Skin Integrity  Goal: Skin integrity is maintained or improved  Description: INTERVENTIONS:  - Identify patients at risk for skin breakdown  - Assess and monitor skin integrity  - Assess and monitor nutrition and hydration status  - Monitor labs   - Assess for incontinence   - Turn and reposition patient  - Assist with mobility/ambulation  - Relieve pressure over bony prominences  - Avoid friction and shearing  - Provide appropriate hygiene as needed including keeping skin clean and dry  - Evaluate need for skin moisturizer/barrier cream  - Collaborate with interdisciplinary team   - Patient/family teaching  - Consider wound care consult   Outcome: Progressing     Problem: PAIN - ADULT  Goal: Verbalizes/displays adequate comfort level or baseline comfort level  Description: Interventions:  - Encourage patient to monitor pain and request assistance  - Assess pain using appropriate pain scale  - Administer analgesics based on type and severity of pain and evaluate response  - Implement non-pharmacological measures as appropriate and evaluate response  - Consider cultural and social influences on pain and pain management  - Notify physician/advanced practitioner if interventions unsuccessful or patient reports new pain  Outcome: Progressing     Problem: INFECTION - ADULT  Goal: Absence or prevention of progression during hospitalization  Description: INTERVENTIONS:  - Assess and monitor for signs and symptoms of infection  - Monitor lab/diagnostic results  - Monitor all insertion sites, i e  indwelling lines, tubes, and drains  - Monitor endotracheal if appropriate and nasal secretions for changes in amount and color  - [place] appropriate cooling/warming therapies per order  - Administer medications as ordered  - Instruct and encourage patient and family to use good hand hygiene technique  - Identify and instruct in appropriate isolation precautions for identified infection/condition  Outcome: Progressing     Problem: SAFETY ADULT  Goal: Patient will remain free of falls  Description: INTERVENTIONS:  - Educate patient/family on patient safety including physical limitations  - Instruct patient to call for assistance with activity   - Consult OT/PT to assist with strengthening/mobility   - Keep Call bell within reach  - Keep bed low and locked with side rails adjusted as appropriate  - Keep care items and personal belongings within reach  - Initiate and maintain comfort rounds  - Make Fall Risk Sign visible to staff  - Offer Toileting every  Hours, in advance of need  - Initiate/Maintain alarm  - Obtain necessary fall risk management equipment:   - Apply yellow socks and bracelet for high fall risk patients  - Consider moving patient to room near nurses station  Outcome: Progressing  Goal: Maintain or return to baseline ADL function  Description: INTERVENTIONS:  -  Assess patient's ability to carry out ADLs; assess patient's baseline for ADL function and identify physical deficits which impact ability to perform ADLs (bathing, care of mouth/teeth, toileting, grooming, dressing, etc )  - Assess/evaluate cause of self-care deficits   - Assess range of motion  - Assess patient's mobility; develop plan if impaired  - Assess patient's need for assistive devices and provide as appropriate  - Encourage maximum independence but intervene and supervise when necessary  - Involve family in performance of ADLs  - Assess for home care needs following discharge   - Consider OT consult to assist with ADL evaluation and planning for discharge  - Provide patient education as appropriate  Outcome: Progressing  Goal: Maintains/Returns to pre admission functional level  Description: INTERVENTIONS:  - Perform BMAT or MOVE assessment daily    - Set and communicate daily mobility goal to care team and patient/family/caregiver  - Collaborate with rehabilitation services on mobility goals if consulted  - Perform Range of Motion  times a day  - Reposition patient every  hours  - Dangle patient  times a day  - Stand patient  times a day  - Ambulate patient  times a day  - Out of bed to chair  times a day   - Out of bed for meals times a day  - Out of bed for toileting  - Record patient progress and toleration of activity level   Outcome: Progressing     Problem: DISCHARGE PLANNING  Goal: Discharge to home or other facility with appropriate resources  Description: INTERVENTIONS:  - Identify barriers to discharge w/patient and caregiver  - Arrange for needed discharge resources and transportation as appropriate  - Identify discharge learning needs (meds, wound care, etc )  - Arrange for interpretive services to assist at discharge as needed  - Refer to Case Management Department for coordinating discharge planning if the patient needs post-hospital services based on physician/advanced practitioner order or complex needs related to functional status, cognitive ability, or social support system  Outcome: Progressing     Problem: Knowledge Deficit  Goal: Patient/family/caregiver demonstrates understanding of disease process, treatment plan, medications, and discharge instructions  Description: Complete learning assessment and assess knowledge base    Interventions:  - Provide teaching at level of understanding  - Provide teaching via preferred learning methods  Outcome: Progressing     Problem: GASTROINTESTINAL - ADULT  Goal: Minimal or absence of nausea and/or vomiting  Description: INTERVENTIONS:  - Administer IV fluids if ordered to ensure adequate hydration  - Maintain NPO status until nausea and vomiting are resolved  - Nasogastric tube if ordered  - Administer ordered antiemetic medications as needed  - Provide nonpharmacologic comfort measures as appropriate  - Advance diet as tolerated, if ordered  - Consider nutrition services referral to assist patient with adequate nutrition and appropriate food choices  Outcome: Progressing  Goal: Maintains or returns to baseline bowel function  Description: INTERVENTIONS:  - Assess bowel function  - Encourage oral fluids to ensure adequate hydration  - Administer IV fluids if ordered to ensure adequate hydration  - Administer ordered medications as needed  - Encourage mobilization and activity  - Consider nutritional services referral to assist patient with adequate nutrition and appropriate food choices  Outcome: Progressing  Goal: Maintains adequate nutritional intake  Description: INTERVENTIONS:  - Monitor percentage of each meal consumed  - Identify factors contributing to decreased intake, treat as appropriate  - Assist with meals as needed  - Monitor I&O, weight, and lab values if indicated  - Obtain nutrition services referral as needed  Outcome: Progressing  Goal: Oral mucous membranes remain intact  Description: INTERVENTIONS  - Assess oral mucosa and hygiene practices  - Implement preventative oral hygiene regimen  - Implement oral medicated treatments as ordered  - Initiate Nutrition services referral as needed  Outcome: Progressing     Problem: Nutrition/Hydration-ADULT  Goal: Nutrient/Hydration intake appropriate for improving, restoring or maintaining nutritional needs  Description: Monitor and assess patient's nutrition/hydration status for malnutrition  Collaborate with interdisciplinary team and initiate plan and interventions as ordered  Monitor patient's weight and dietary intake as ordered or per policy  Utilize nutrition screening tool and intervene as necessary   Determine patient's food preferences and provide high-protein, high-caloric foods as appropriate       INTERVENTIONS:  - Monitor oral intake, urinary output, labs, and treatment plans  - Assess nutrition and hydration status and recommend course of action  - Evaluate amount of meals eaten  - Assist patient with eating if necessary   - Allow adequate time for meals  - Recommend/ encourage appropriate diets, oral nutritional supplements, and vitamin/mineral supplements  - Order, calculate, and assess calorie counts as needed  - Recommend, monitor, and adjust tube feedings and TPN/PPN based on assessed needs  - Assess need for intravenous fluids  - Provide specific nutrition/hydration education as appropriate  - Include patient/family/caregiver in decisions related to nutrition  Outcome: Progressing

## 2021-07-07 NOTE — WOUND OSTOMY CARE
Progress Note - Wound   Lethaniel Red 70 y o  female MRN: [id number]  Unit/Bed#: -[facility] Encounter: [id number]    History of Present Illness:Pt admitted with acute colitis,Uncontrolled type 2 DM   Alert oriented  Lying supine in bed  Able to turn and reposition in bed  Educated patient on increase use of skin breakdown with frequent incont liquid stools  She refused us of rectal tube to manage frequent liquid stools to protect skin and prevent breakdown  Assessment & Findings:  Pt had been incont of frequent loose liquid stools  She has refused use of rectal tube  Currently using Pur wik for bladder control  1) bilateral heel intact  2) Buttocks, sacrum and perineum red excoriated  MASD  3) Areas beneath both breasts and abdomen have resolved and are clean dry and intact  Skin Care Plan:  -Hydraguard to bilateral sacrum, buttock  Right lateral thigh and heels BID and PRN  2-Elevate heels to offload pressure  3-Ehob cushion in chair when out of bed  4-Moisturize skin daily with skin nourishing cream   5-Turn/reposition q2h or when medically stable for pressure re-distribution on skin  6-Wash bilateral  Breast and groin skin folds with soap and water, pat dry  Apply light dusting of powder then ABD to folds  Wound 07/01/21 MASD Buttocks Bilateral (Active)   Wound Image   07/07/21 0946   Wound Description Beefy red;Fragile;Pink 07/07/21 0946   Britney-wound Assessment Erythema; Excoriated 07/07/21 0946   Treatments Cleansed;Site care 07/07/21 0946   Dressing Moisture barrier 07/07/21 0946   Patient Tolerance Tolerated well 07/07/21 0946       Wound 07/02/21 Other (Comment) Breast Right; Lower (Active)   Wound Image   07/07/21 0946   Wound Description Clean;Dry; Intact 07/07/21 0946   Britney-wound Assessment Clean;Dry; Intact 07/07/21 0946   Wound Length (cm) 3 cm 07/02/21 1050   Wound Width (cm) 4 cm 07/02/21 1050   Wound Surface Area (cm^2) 12 cm^2 07/02/21 1050   Treatments Cleansed;Site care 07/07/21 0946   Dressing Open to air 07/07/21 0946   Dressing Changed New 07/02/21 1050   Patient Tolerance Tolerated well 07/07/21 0946   Dressing Status Clean;Dry; Intact 07/03/21 2000       Wound 07/02/21 Abdomen Right; Lower (Active)   Wound Image   07/07/21 0945   Wound Description Clean;Dry; Intact 07/07/21 0945   Britney-wound Assessment Clean;Dry; Intact; Brown;Fragile; Hyperpigmented 07/05/21 1938   Wound Length (cm) 4 cm 07/02/21 1050   Wound Width (cm) 14 cm 07/02/21 1050   Wound Surface Area (cm^2) 56 cm^2 07/02/21 1050   Treatments Cleansed;Site care 07/07/21 0945   Dressing Open to air 07/07/21 0945   Dressing Changed New 07/02/21 1050   Patient Tolerance Tolerated well 07/07/21 0945   Dressing Status Clean;Dry; Intact 07/05/21 0500       Wound 07/02/21 Thigh Anterior;Left;Proximal (Active)   Wound Image   07/07/21 0946   Wound Description Clean;Dry; Intact 07/07/21 0946   Britney-wound Assessment Clean;Dry; Intact 07/07/21 0946   Wound Length (cm) 7 cm 07/07/21 0943   Wound Width (cm) 14 cm 07/07/21 0943   Wound Surface Area (cm^2) 98 cm^2 07/07/21 0943   Treatments Cleansed;Site care 07/07/21 0946   Dressing Open to air 07/07/21 0946   Dressing Changed New 07/02/21 1051   Patient Tolerance Tolerated well 07/07/21 0946             Call or tigertext with any questions  Wound Care will continue to follow    Alfredo Sheldon RN

## 2021-07-07 NOTE — QUICK NOTE
Patient tolerated NGT clamping trial without N/V  Per nursing there was minimal residual at completion of trial      Order placed to remove NGT and begin clear liquid diet at this time        Sung Mcfarland PA-C

## 2021-07-07 NOTE — PROGRESS NOTES
[address]  Progress Note - [place] 1945, 70 y o  female MRN: [id number]  Unit/Bed#: -[facility] Encounter: [id number]  Primary Care Provider: Heri Edmond MD   Date and time admitted to hospital: 6/29/2021  5:35 PM    * Colitis, acute  Assessment & Plan  Background: Presented to the ED after friend found her covered in urine and feces at home  · She lives alone  States she has been having watery diarrhea x 5 days    · Remains afebrile with leukocytosis; denies any recent abx use, travel, uncooked meat, eating out   · CT abdomen/pelvis: new small bowel dilatation suspicious for obstruction and secondary signs of mild colitis   · C diff melissa cx negative suspect more partial obstruction symptoms   · Monitoring off of antibiotics  · Surgery consulted; input appreciated in regards to partial bowel obstruction  · NG tube in place; pending volume after 6 hour trial to discontinue NG and place on CLD  · Gastroenterology evaluated; input appreciated  · If SBO resolves and persistent diarrhea will need colonoscopy   · Ova and parasites are pending   · Occult negative   · IVF per Nephrology; normal saline ordered on 07/06 given new NPO status    Acute on chronic kidney failure [facility])  Assessment & Plan  Lab Results   Component Value Date    EGFR 16 07/07/2021    EGFR 16 07/06/2021    EGFR 16 07/05/2021    CREATININE 2 85 (H) 07/07/2021    CREATININE 2 84 (H) 07/06/2021    CREATININE 2 85 (H) 07/05/2021     · Stable; Present on admission; superimposed on CKD 2   · Baseline appears to be 0 7-1 1  · Renal function has not returned to baseline since episode of obstructive uropathy earlier this year  · Will need to follow with nephrologist as outpatient   · S/P bilateral ureteral stent placement 04/06/21 for hydronephrosis  · Hx of prior admissions with [facility] and leaving [facility] x2 on 04/07/21 and 05/11/21   · Suspect pre-renal secondary to decreased PO intake and persistent diarrhea   · CT abdomen/pelvis no signs on obstruction in the kidneys/ureter   · Avoid nephrotoxins, relative hypotension, and NSAIDs if possible  · Nephrology following; IV fluid titration per Nephrology team  · Monitor kidney function with morning labs      Partial small bowel obstruction (HCC)  Assessment & Plan  · Persistent; Consistent with incomplete small-bowel obstruction  · Surgery following; input as noted below  · Status post rectal to rectal tube; since removed removed  · NG tube in place; pending volume after 6 hour trial to discontinue NG and place on CLD  · Repeat obstruction series from 7/4 with unchanged multiple dilated small-bowel loops consistent with small-bowel obstruction  · Follow-through small-bowel from 7/6 ordered confirming persistent partial small bowel obstruction       Hyperthyroidism  Assessment & Plan  · TSH is normal restart her methimazole as she is being advanced to clear liquid diet per surgical team    History of breast cancer  Assessment & Plan  · Diagnosed 2007 s/p left mastectomy   · Resume Femara as patient is going to test taking some sips of clears  · Of note, alk phos was elevated on admission 234; CT abdomen/pelvis reveals chronic gallstones and chronic distended gallbladder  Alk phos was elevated prior admission 05/11/21 317   · Consideration of bone metastasis- patient should f/u with imaging as outpatient   · PT/OT consulted; recommending post acute rehab   · Patient refusing         VTE Pharmacologic Prophylaxis: VTE Score: 6 Moderate Risk (Score 3-4) - Pharmacological DVT Prophylaxis Ordered: heparin  Patient Centered Rounds: I performed bedside rounds with nursing staff today  Discussions with Specialists or Other Care Team Provider:  Nursing staff, case management, and surgical team    Education and Discussions with Family / Patient: Updated  (daughter) via phone  Time Spent for Care: 30 minutes   More than 50% of total time spent on counseling and coordination of care as described above  Current Length of Stay: 8 day(s)  Current Patient Status: Inpatient   Certification Statement: The patient will continue to require additional inpatient hospital stay due to NG tube and ongoing treatment of small-bowel obstruction  Discharge Plan: Anticipate discharge in >72 hrs to Patient currently refusing rehab however will attempt to readdress  Code Status: Level 3 - DNAR and DNI    Subjective:   Patient overall reporting that she did have less diarrhea overnight  Denies any abdominal pain at rest or to palpation  Denies any nausea or recurrent episode of emesis  Objective:     Vitals:   Temp (24hrs), Av 1 °F (36 7 °C), Min:97 8 °F (36 6 °C), Max:98 3 °F (36 8 °C)    Temp:  [97 8 °F (36 6 °C)-98 3 °F (36 8 °C)] 97 8 °F (36 6 °C)  HR:  [83-85] 83  Resp:  [15-18] 18  BP: (134)/(69-70) 134/69  SpO2:  [95 %-96 %] 95 %  Body mass index is 27 13 kg/m²  Input and Output Summary (last 24 hours): Intake/Output Summary (Last 24 hours) at 2021 1421  Last data filed at 2021 1149  Gross per 24 hour   Intake 1106 67 ml   Output 1000 ml   Net 106 67 ml       Physical Exam:   Physical Exam  Constitutional:       Appearance: She is not ill-appearing or diaphoretic  Cardiovascular:      Rate and Rhythm: Normal rate and regular rhythm  Pulses: Normal pulses  Heart sounds: No gallop  Pulmonary:      Effort: Pulmonary effort is normal       Breath sounds: Normal breath sounds  Abdominal:      General: Bowel sounds are normal  There is distension  Skin:     General: Skin is warm and dry  Capillary Refill: Capillary refill takes less than 2 seconds  Neurological:      Mental Status: She is alert and oriented to person, place, and time  Mental status is at baseline     Psychiatric:         Mood and Affect: Mood normal          Behavior: Behavior normal           Additional Data:     Labs:  Results from last 7 days   Lab Units 07/07/21  0512   WBC Thousand/uL 8 45   HEMOGLOBIN g/dL 12 5   HEMATOCRIT % 39 7   PLATELETS Thousands/uL 173   NEUTROS PCT % 62   LYMPHS PCT % 25   MONOS PCT % 9   EOS PCT % 3     Results from last 7 days   Lab Units 07/07/21  0512 07/02/21  0545   SODIUM mmol/L 142 140   POTASSIUM mmol/L 4 0 3 9   CHLORIDE mmol/L 109* 109*   CO2 mmol/L 22 19*   BUN mg/dL 22 35*   CREATININE mg/dL 2 85* 3 04*   ANION GAP mmol/L 11 12   CALCIUM mg/dL 8 8 8 3   ALBUMIN g/dL  --  1 9*   TOTAL BILIRUBIN mg/dL  --  0 57   ALK PHOS U/L  --  153*   ALT U/L  --  14   AST U/L  --  32   GLUCOSE RANDOM mg/dL 63* 79                 Results from last 7 days   Lab Units 07/01/21  0607   PROCALCITONIN ng/ml 0 12       Lines/Drains:  Invasive Devices     Peripheral Intravenous Line            Peripheral IV 07/05/21 Right;Ventral (anterior) Forearm 2 days          Drain            NG/OG Tube Nasogastric Right nares <1 day                      Imaging: Reviewed radiology reports from this admission including: Small-bowel follow-through    Recent Cultures (last 7 days):   Results from last 7 days   Lab Units 06/30/21  2216   C DIFF TOXIN B BY PCR  Negative       Last 24 Hours Medication List:   Current Facility-Administered Medications   Medication Dose Route Frequency Provider Last Rate    heparin (porcine)  5,000 Units Subcutaneous Q8H SUSIE Austin Day PA-C      letrozole  2 5 mg Oral Daily Herve Odonnell MD      methimazole  2 5 mg Oral Every Other Day Herve Odonnell MD      nystatin   Topical BID Herve Odonnell MD      ondansetron  4 mg Intravenous Q6H PRN Herve Odonnell MD      phenol  1 spray Mouth/Throat Q2H PRN SIMON Hendricks      sodium chloride  50 mL/hr Intravenous Continuous Kristine SIMON Packer 50 mL/hr (07/07/21 1149)        Today, Patient Was Seen By: SIMON Hendricks    **Please Note: This note may have been constructed using a voice recognition system  **

## 2021-07-07 NOTE — PLAN OF CARE
Problem: PHYSICAL THERAPY ADULT  Goal: Performs mobility at highest level of function for planned discharge setting  See evaluation for individualized goals  Description: Treatment/Interventions: Functional transfer training, LE strengthening/ROM, Elevations, Therapeutic exercise, Endurance training, Patient/family training, Bed mobility, Equipment eval/education, Gait training, Compensatory technique education, Spoke to nursing, Spoke to case management, OT  Equipment Recommended:  (TBD by rehab)       See flowsheet documentation for full assessment, interventions and recommendations  Outcome: Progressing  Note: Prognosis: Fair  Problem List: Decreased strength, Decreased endurance, Decreased mobility, Impaired balance, Decreased safety awareness, Decreased skin integrity, Pain  Assessment: Pt had c/o nose tubing causing her pain and SOB during treatnment  Pt was agreeable for treatment today and was limited by decreased endurance  Pt expressed her concerns about going to a rehab and was encouraged to consider this option as it will benefit her overall improvement  Pt required vc's with transfers for hand placement and demonstrated some carryover at times  This pt will benefit from cont'd PT at this time to improve conditioning, strength, and gait  Will cont to progress  Barriers to Discharge: Inaccessible home environment, Decreased caregiver support  Barriers to Discharge Comments: Lives alone with limited assist from caregivers     PT Discharge Recommendation: Post acute rehabilitation services     PT - OK to Discharge:  (When medically cleared)    See flowsheet documentation for full assessment

## 2021-07-07 NOTE — ASSESSMENT & PLAN NOTE
· Persistent; Consistent with incomplete small-bowel obstruction  · Surgery following; input as noted below  · Status post rectal to rectal tube; since removed removed  · NG tube in place; pending volume after 6 hour trial to discontinue NG and place on CLD  · Repeat obstruction series from 7/4 with unchanged multiple dilated small-bowel loops consistent with small-bowel obstruction    · Follow-through small-bowel from 7/6 ordered confirming persistent partial small bowel obstruction

## 2021-07-07 NOTE — ASSESSMENT & PLAN NOTE
Lab Results   Component Value Date    EGFR 16 07/07/2021    EGFR 16 07/06/2021    EGFR 16 07/05/2021    CREATININE 2 85 (H) 07/07/2021    CREATININE 2 84 (H) 07/06/2021    CREATININE 2 85 (H) 07/05/2021     · Stable; Present on admission; superimposed on CKD 2   · Baseline appears to be 0 7-1 1  · Renal function has not returned to baseline since episode of obstructive uropathy earlier this year  · Will need to follow with nephrologist as outpatient   · S/P bilateral ureteral stent placement 04/06/21 for hydronephrosis  · Hx of prior admissions with [facility] and leaving [facility] x2 on 04/07/21 and 05/11/21   · Suspect pre-renal secondary to decreased PO intake and persistent diarrhea   · CT abdomen/pelvis no signs on obstruction in the kidneys/ureter   · Avoid nephrotoxins, relative hypotension, and NSAIDs if possible  · Nephrology following; IV fluid titration per Nephrology team  · Monitor kidney function with morning labs

## 2021-07-07 NOTE — PLAN OF CARE
Problem: Prexisting or High Potential for Compromised Skin Integrity  Goal: Skin integrity is maintained or improved  Description: INTERVENTIONS:  - Identify patients at risk for skin breakdown  - Assess and monitor skin integrity  - Assess and monitor nutrition and hydration status  - Monitor labs   - Assess for incontinence   - Turn and reposition patient  - Assist with mobility/ambulation  - Relieve pressure over bony prominences  - Avoid friction and shearing  - Provide appropriate hygiene as needed including keeping skin clean and dry  - Evaluate need for skin moisturizer/barrier cream  - Collaborate with interdisciplinary team   - Patient/family teaching  - Consider wound care consult   Outcome: Progressing     Problem: PAIN - ADULT  Goal: Verbalizes/displays adequate comfort level or baseline comfort level  Description: Interventions:  - Encourage patient to monitor pain and request assistance  - Assess pain using appropriate pain scale  - Administer analgesics based on type and severity of pain and evaluate response  - Implement non-pharmacological measures as appropriate and evaluate response  - Consider cultural and social influences on pain and pain management  - Notify physician/advanced practitioner if interventions unsuccessful or patient reports new pain  Outcome: Progressing     Problem: INFECTION - ADULT  Goal: Absence or prevention of progression during hospitalization  Description: INTERVENTIONS:  - Assess and monitor for signs and symptoms of infection  - Monitor lab/diagnostic results  - Monitor all insertion sites, i e  indwelling lines, tubes, and drains  - Monitor endotracheal if appropriate and nasal secretions for changes in amount and color  - [place] appropriate cooling/warming therapies per order  - Administer medications as ordered  - Instruct and encourage patient and family to use good hand hygiene technique  - Identify and instruct in appropriate isolation precautions for identified infection/condition  Outcome: Progressing       Problem: DISCHARGE PLANNING  Goal: Discharge to home or other facility with appropriate resources  Description: INTERVENTIONS:  - Identify barriers to discharge w/patient and caregiver  - Arrange for needed discharge resources and transportation as appropriate  - Identify discharge learning needs (meds, wound care, etc )  - Arrange for interpretive services to assist at discharge as needed  - Refer to Case Management Department for coordinating discharge planning if the patient needs post-hospital services based on physician/advanced practitioner order or complex needs related to functional status, cognitive ability, or social support system  Outcome: Progressing     Problem: Knowledge Deficit  Goal: Patient/family/caregiver demonstrates understanding of disease process, treatment plan, medications, and discharge instructions  Description: Complete learning assessment and assess knowledge base    Interventions:  - Provide teaching at level of understanding  - Provide teaching via preferred learning methods  Outcome: Progressing     Problem: GASTROINTESTINAL - ADULT  Goal: Minimal or absence of nausea and/or vomiting  Description: INTERVENTIONS:  - Administer IV fluids if ordered to ensure adequate hydration  - Maintain NPO status until nausea and vomiting are resolved  - Nasogastric tube if ordered  - Administer ordered antiemetic medications as needed  - Provide nonpharmacologic comfort measures as appropriate  - Advance diet as tolerated, if ordered  - Consider nutrition services referral to assist patient with adequate nutrition and appropriate food choices  Outcome: Progressing     Problem: GASTROINTESTINAL - ADULT  Goal: Maintains or returns to baseline bowel function  Description: INTERVENTIONS:  - Assess bowel function  - Encourage oral fluids to ensure adequate hydration  - Administer IV fluids if ordered to ensure adequate hydration  - Administer ordered medications as needed  - Encourage mobilization and activity  - Consider nutritional services referral to assist patient with adequate nutrition and appropriate food choices  Outcome: Progressing     Problem: GASTROINTESTINAL - ADULT  Goal: Maintains adequate nutritional intake  Description: INTERVENTIONS:  - Monitor percentage of each meal consumed  - Identify factors contributing to decreased intake, treat as appropriate  - Assist with meals as needed  - Monitor I&O, weight, and lab values if indicated  - Obtain nutrition services referral as needed  Outcome: Progressing     Problem: GASTROINTESTINAL - ADULT  Goal: Oral mucous membranes remain intact  Description: INTERVENTIONS  - Assess oral mucosa and hygiene practices  - Implement preventative oral hygiene regimen  - Implement oral medicated treatments as ordered  - Initiate Nutrition services referral as needed  Outcome: Progressing     Problem: Nutrition/Hydration-ADULT  Goal: Nutrient/Hydration intake appropriate for improving, restoring or maintaining nutritional needs  Description: Monitor and assess patient's nutrition/hydration status for malnutrition  Collaborate with interdisciplinary team and initiate plan and interventions as ordered  Monitor patient's weight and dietary intake as ordered or per policy  Utilize nutrition screening tool and intervene as necessary  Determine patient's food preferences and provide high-protein, high-caloric foods as appropriate       INTERVENTIONS:  - Monitor oral intake, urinary output, labs, and treatment plans  - Assess nutrition and hydration status and recommend course of action  - Evaluate amount of meals eaten  - Assist patient with eating if necessary   - Allow adequate time for meals  - Recommend/ encourage appropriate diets, oral nutritional supplements, and vitamin/mineral supplements  - Order, calculate, and assess calorie counts as needed  - Recommend, monitor, and adjust tube feedings and TPN/PPN based on assessed needs  - Assess need for intravenous fluids  - Provide specific nutrition/hydration education as appropriate  - Include patient/family/caregiver in decisions related to nutrition  Outcome: Progressing

## 2021-07-07 NOTE — PHYSICAL THERAPY NOTE
PHYSICAL THERAPY TREATMENT NOTE  NAME:  Alberto Griffith  DATE: 07/07/21    Length Of Stay: 8  Performed at least 2 patient identifiers during session: Name and Birthday    TREATMENT:      07/07/21 1052   PT Last Visit   PT Visit Date 07/07/21   Note Type   Note Type Treatment   Pain Assessment   Pain Assessment Tool 0-10   Pain Score 6   Pain Location/Orientation Other (Comment)  (nose)   Restrictions/Precautions   Other Precautions Chair Alarm; Bed Alarm;Limb alert;Multiple lines; Fall Risk;Pain   General   Chart Reviewed Yes   Response to Previous Treatment Patient with no complaints from previous session  Cognition   Following Commands Follows one step commands without difficulty   Subjective   Subjective   (Pt states that the "nose tube" is causing her pain )   Bed Mobility   Supine to Sit 6  Modified independent   Additional items   (HOB elevated)   Sit to Supine 6  Modified independent   Transfers   Sit to Stand   (Standby )   Additional items Assist x 1;Verbal cues; Increased time required   Stand to Sit   (Standby)   Additional items Assist x 1; Increased time required;Verbal cues   Stand pivot   (Standby)   Additional items Verbal cues; Assist x 1   Additional Comments used RW with vc's for hand placement   Ambulation/Elevation   Gait pattern Narrow GUNNER; Excessively slow; Short stride; Foward flexed   Gait Assistance   (Standby)   Additional items Assist x 1   Assistive Device Rolling walker   Distance 12'x2, 8' & 2' with RW Standby  (12' 6' & 2' with RW Supervision, pt unablew to mange IV pole)   Balance   Static Sitting Good   Dynamic Sitting Good   Static Standing Fair   Dynamic Standing Fair   Ambulatory Fair -   Endurance Deficit   Endurance Deficit Yes   Endurance Deficit Description   (fatigue)   Activity Tolerance   Activity Tolerance Patient limited by fatigue   Nurse Made Aware Kenna CHRISTIAN   Exercises   Hip Flexion 15 reps; Sitting;Bilateral   Knee AROM Long Arc Quad Sitting;15 reps;Bilateral   Ankle Pumps Sitting;15 reps;Bilateral   Assessment   Prognosis Fair   Problem List Decreased strength;Decreased endurance;Decreased mobility; Impaired balance;Decreased safety awareness;Decreased skin integrity;Pain   Barriers to Discharge Inaccessible home environment;Decreased caregiver support   Barriers to Discharge Comments Lives alone with limited assist from caregivers   Goals   Patient Goals   (To get that tube out and go home  )   PT Treatment Day 2   Plan   Treatment/Interventions Functional transfer training; Therapeutic exercise;LE strengthening/ROM; Elevations; Endurance training;Patient/family training;Equipment eval/education; Bed mobility;Gait training;Spoke to nursing   Progress Slow progress, decreased activity tolerance   Recommendation   PT Discharge Recommendation Post acute rehabilitation services   Equipment Recommended   (TBD by Rehab)   PT - OK to Discharge   (When medically cleared)   Additional Comments Pt requested to go back to bed at end of session, bed alarm on   AM-PAC Basic Mobility Inpatient   Turning in Bed Without Bedrails 4   Lying on Back to Sitting on Edge of Flat Bed 4   Moving Bed to Chair 3   Standing Up From Chair 3   Walk in Room 3   Climb 3-5 Stairs 3   Basic Mobility Inpatient Raw Score 20   Basic Mobility Standardized Score 43 99       The patient's AM-PAC Basic Mobility Inpatient Short Form Raw Score is 20, Standardized Score is 43 99  A standardized score greater than 42 9 suggests the patient may benefit from discharge to home  Please also refer to the recommendation of the Physical Therapist for safe discharge planning  Pt with limited activity tolerance and support, needing post-acute rehab  Pt had c/o nose tubing causing her pain and SOB during treatnment  Pt was agreeable for treatment today and was limited by decreased endurance  Pt expressed her concerns about going to a rehab and was encouraged to consider this option as it will benefit her overall improvement   Pt required vc's with transfers for hand placement and demonstrated some carryover at times  This pt will benefit from cont'd PT at this time to improve conditioning, strength, and gait  Will cont to progress         Kevin Crawford, NICOLAS

## 2021-07-07 NOTE — PROGRESS NOTES
Progress Note - General Surgery   Enrrique Mcdowell 70 y o  female MRN: [id number]  Unit/Bed#: -01 Encounter: [id number]    Assessment:  - Incomplete SBO  - 24hr NG output: 800mL clear bright green  - Continues to have loose BMs  - Stool studies negative  - ZOYA on CKD, followed by urology  - Small bowel follow through shows improvement in small bowel caliber  Delay in small bowel transit, contrast did pass to the large bowel  Findings suggest partial obstruction    Plan:  - NG in place, will proceed with 6hr clamp trial and measure residual  - NPO  - Severe protein malnutrition was previously noted but has since been ruled out by Nutrition  - Nausea control prn  - IVF hydration  - OOB with assistance  - PT/OT  - Case management consult  - Management of medical conditions as per hospitalist    Subjective/Objective   Subjective: Pt reports significant distress regarding NG tube, it causes her a lot of irritation  She reports no abdominal pain  Patient had several small liquid bowel movements last night as per nursing  Pt had nausea and vomiting yesterday, which prompted NG tube placement  Patient denies nausea, vomiting fever, chills, chest pain, or shortness of breath today  Pt does express she is dissatisfied with her care  Objective:     Blood pressure 134/69, pulse 83, temperature 97 8 °F (36 6 °C), resp  rate 18, height 5' (1 524 m), weight 63 kg (138 lb 14 2 oz), SpO2 95 %  ,Body mass index is 27 13 kg/m²        Intake/Output Summary (Last 24 hours) at 7/7/2021 0731  Last data filed at 7/7/2021 5318  Gross per 24 hour   Intake 890 ml   Output 1000 ml   Net -110 ml       Invasive Devices     Peripheral Intravenous Line            Peripheral IV 07/05/21 Right;Ventral (anterior) Forearm 2 days          Drain            NG/OG Tube Nasogastric Right nares <1 day                Physical Exam: /69   Pulse 83   Temp 97 8 °F (36 6 °C)   Resp 18   Ht 5' (1 524 m)   Wt 63 kg (138 lb 14 2 oz)   SpO2 95% BMI 27 13 kg/m²   General appearance: alert and oriented, in no acute distress  Lungs: clear to auscultation bilaterally  Heart: regular rate and rhythm, S1, S2 normal, no murmur, click, rub or gallop  Abdomen: Abdomen is distended  Hypoactive BS x4  Tympanic percussion  Abdomen is nontender x4  No guarding or rebound  Lab, Imaging and other studies:  I have personally reviewed pertinent lab results      CBC:   Lab Results   Component Value Date    WBC 8 45 07/07/2021    HGB 12 5 07/07/2021    HCT 39 7 07/07/2021    MCV 96 07/07/2021     07/07/2021    MCH 30 3 07/07/2021    MCHC 31 5 07/07/2021    RDW 15 0 07/07/2021    MPV 11 6 07/07/2021    NRBC 0 07/07/2021   CMP:   Lab Results   Component Value Date    SODIUM 142 07/07/2021    K 4 0 07/07/2021     (H) 07/07/2021    CO2 22 07/07/2021    BUN 22 07/07/2021    CREATININE 2 85 (H) 07/07/2021    CALCIUM 8 8 07/07/2021    EGFR 16 07/07/2021     VTE Pharmacologic Prophylaxis: Heparin  VTE Mechanical Prophylaxis: sequential compression device   Alfonso Sanchez PA-C

## 2021-07-07 NOTE — ASSESSMENT & PLAN NOTE
Background: Presented to the ED after friend found her covered in urine and feces at home  · She lives alone  States she has been having watery diarrhea x 5 days    · Remains afebrile with leukocytosis; denies any recent abx use, travel, uncooked meat, eating out   · CT abdomen/pelvis: new small bowel dilatation suspicious for obstruction and secondary signs of mild colitis   · C diff melissa cx negative suspect more partial obstruction symptoms   · Monitoring off of antibiotics  · Surgery consulted; input appreciated in regards to partial bowel obstruction  · NG tube in place; pending volume after 6 hour trial to discontinue NG and place on CLD  · Gastroenterology evaluated; input appreciated  · If SBO resolves and persistent diarrhea will need colonoscopy   · Ova and parasites are pending   · Occult negative   · IVF per Nephrology; normal saline ordered on 07/06 given new NPO status

## 2021-07-07 NOTE — PROGRESS NOTES
Progress Note - Nephrology   Digna Campbell 70 y o  female MRN: [id number]  Unit/Bed#: -01 Encounter: [id number]    A/P:  1  Acute kidney injury along with chronic kidney disease stage 3  · Admitted with a creatinine of 3 74 mg/dL  · Treated with IV fluids and her creatinine has trended down to 2 85 mg/dL today  · Creatinine has been stable over the past 4 days with no significant change  · Nonoliguric:  890/1000 (+11 093 since admission)  · Continue to monitor daily studies and avoid nephrotoxins  · She may  be at a new baseline    2  Chronic kidney disease stage 3  · Baseline creatinine is indeterminate  Most recent level was 1 2 mg/dL    3  Non-anion gap metabolic acidosis  · Due to GI loss and is stable without requirement for replacement    4  History of bilateral hydroureteronephrosis status post bilateral stents  · Will need urologic follow-up    5  Incomplete small-bowel obstruction  · Has NG tube which is clamped  · Will continue IV fluids and obtain serial studies  · Check urine lytes to evaluate volume status more fully today                  Follow up reason for today's visit:  Acute kidney injury/chronic kidney disease    Colitis, acute    Patient Active Problem List   Diagnosis    Acute on chronic kidney failure ([facility] [address] )    Hyperthyroidism    History of breast cancer    Uncontrolled type 2 diabetes mellitus, without long-term current use of insulin (HCC)    Cirrhosis of liver (HCC)    Chest pain    Constipation    Hydronephrosis    Hypomagnesemia    Parotiditis    Hyponatremia    Colitis, acute    Partial small bowel obstruction (HCC)    Ileus (HCC)         Subjective:   No headaches or dizziness  Has no chest pain but NG tube bothers her denies nausea  No dysuria  She says she is very uncomfortable    Objective:     Vitals: Blood pressure 134/69, pulse 83, temperature 97 8 °F (36 6 °C), resp  rate 18, height 5' (1 524 m), weight 63 kg (138 lb 14 2 oz), SpO2 95 %  ,Body mass index is 27 13 kg/m²  Weight (last 2 days)     Date/Time   Weight    07/07/21 0600   63 (138 89)    07/06/21 0600   62 8 (138 4)    07/05/21 0545   63 1 (139 2)                Intake/Output Summary (Last 24 hours) at 7/7/2021 1138  Last data filed at 7/7/2021 9241  Gross per 24 hour   Intake 140 ml   Output 1000 ml   Net -860 ml     I/O last 3 completed shifts: In: 2233 8 [I V :2093 8; NG/GT:140]  Out: 1000 [Urine:200; Emesis/NG output:800]         Physical Exam: /69   Pulse 83   Temp 97 8 °F (36 6 °C)   Resp 18   Ht 5' (1 524 m)   Wt 63 kg (138 lb 14 2 oz)   SpO2 95%   BMI 27 13 kg/m²     General Appearance:    Alert, frail and pale cooperative, no distress, appears stated age   Head:    Normocephalic, without obvious abnormality, atraumatic   Eyes:    Conjunctiva/corneas clear   Ears:    Normal external ears   Nose:   Nares normal, septum midline, mucosa normal, no drainage    or sinus tenderness   Throat:   Lips, mucosa, and tongue normal; teeth and gums normal   Neck:   Supple, symmetrical, trachea midline, no adenopathy;        thyroid:  No enlargement/tenderness/nodules; no carotid    bruit or JVD   Back:     Symmetric, no curvature, ROM normal, no CVA tenderness   Lungs:     Clear to auscultation bilaterally, respirations unlabored   Chest wall:    No tenderness or deformity   Heart:    Regular rate and rhythm, S1 and S2 normal, no murmur, rub   or gallop   Abdomen:     BS + has clamped NGT in place   Extremities:   Extremities normal, atraumatic, no cyanosis or edema   Skin:   Skin color, texture, turgor normal, no rashes or lesions   Lymph nodes:   Cervical normal   Neurologic:   CNII-XII conversant and moving all extremities  I observed physical therapy with her            Lab, Imaging and other studies: I have personally reviewed pertinent labs    CBC:   Lab Results   Component Value Date    WBC 8 45 07/07/2021    HGB 12 5 07/07/2021    HCT 39 7 07/07/2021    MCV 96 07/07/2021     07/07/2021    MCH 30 3 07/07/2021    MCHC 31 5 07/07/2021    RDW 15 0 07/07/2021    MPV 11 6 07/07/2021    NRBC 0 07/07/2021     CMP:   Lab Results   Component Value Date    K 4 0 07/07/2021     (H) 07/07/2021    CO2 22 07/07/2021    BUN 22 07/07/2021    CREATININE 2 85 (H) 07/07/2021    CALCIUM 8 8 07/07/2021    EGFR 16 07/07/2021         Results from last 7 days   Lab Units 07/07/21  0512 07/06/21  0457 07/05/21  0507 07/02/21  0545 07/01/21  0607   POTASSIUM mmol/L 4 0 5 0 4 5 3 9 2 7*   CHLORIDE mmol/L 109* 106 103 109* 106   CO2 mmol/L 22 24 23 19* 22   BUN mg/dL 22 23 27* 35* 40*   CREATININE mg/dL 2 85* 2 84* 2 85* 3 04* 3 57*   CALCIUM mg/dL 8 8 9 2 8 8 8 3 8 3   ALK PHOS U/L  --   --   --  153* 154*   ALT U/L  --   --   --  14 16   AST U/L  --   --   --  32 26         Phosphorus: No results found for: PHOS  Magnesium: No results found for: MG  Urinalysis: No results found for: COLORU, CLARITYU, SPECGRAV, PHUR, LEUKOCYTESUR, NITRITE, PROTEINUA, GLUCOSEU, KETONESU, BILIRUBINUR, BLOODU  Ionized Calcium: No results found for: CAION  Coagulation: No results found for: PT, INR, APTT  Troponin: No results found for: TROPONINI  ABG: No results found for: PHART, GZR6TSW, PO2ART, ZLI3FDS, X7GPWVYN, BEART, SOURCE  Radiology review:     IMAGING  Procedure: FL small bowel    Result Date: 7/6/2021  Narrative: SMALL BOWEL FOLLOW THROUGH INDICATION:   SBO  COMPARISON:   X-ray from 7/4/2021 as well as CT from 4/1/2021 FLUOROSCOPY TIME:   0 5 minutes IMAGES:  12 TECHNIQUE:    view of the abdomen was obtained  Oral contrast was then administered to the patient and followed through the small bowel to the colon utilizing overhead radiographs at periodic intervals  Spot imaging of the terminal ileum was also performed  The patient could not lay supine due to extreme nausea  Consequently, most the study was performed in the erect position as the patient couldn't tolerate    Semierect fluoroscopy was performed but was limited  Oral Omnipaque 350 was utilized  FINDINGS:  view of the abdomen demonstrates numerous air-fluid level similar to the study of July 4  Calcification pelvis probably fibroid is again demonstrated as are bilateral ureteral stents  No free air is seen  Some sutures appear to be present in the  left upper quadrant of the abdomen The stomach demonstrates normal contour without discernible filling defect  Normal caliber small bowel loops  There is a normal fold pattern and thickness  Oral contrast reaches the proximal ileum in approximately 80 minutes  By 3 hours and 35 minutes oral contrast may have reached the colon with what appears to be splenic flexure opacified and new rectal contrast but these images had some motion artifact and there was still numerous opacified small bowel loops obscuring detail  Final image was obtained 6 hours and 40 minutes after the administration of oral contrast and contrast is clearly seen in the rectosigmoid and most likely the splenic flexure with clearing of contrast from small bowel elsewhere  This image was obtained in the supine projection  Bowel loops were borderline dilated on this image  Less pronounced in the supine study of July 4  Nasogastric tube is now present in the left upper quadrant  The terminal ileum could not be identified on this study  Impression: Small bowel caliber is improved as compared to yesterday's obstruction series  There was delay in small bowel transit however contrast did pass into the large bowel  The study was limited and no definite zone of transition of caliber of small bowel could be identified  Findings suggest partial obstruction more likely than ileus as colon is fairly decompressed   Workstation performed: [id number]       Current Facility-Administered Medications   Medication Dose Route Frequency    heparin (porcine) subcutaneous injection 5,000 Units  5,000 Units Subcutaneous Q8H [address]    letrozole (FEMARA) tablet 2 5 mg  2 5 mg Oral Daily    methimazole (TAPAZOLE) tablet 2 5 mg  2 5 mg Oral Every Other Day    nystatin (MYCOSTATIN) powder   Topical BID    ondansetron (ZOFRAN) injection 4 mg  4 mg Intravenous Q6H PRN    phenol (CHLORASEPTIC) 1 4 % mucosal liquid 1 spray  1 spray Mouth/Throat Q2H PRN    sodium chloride 0 9 % infusion  50 mL/hr Intravenous Continuous     Medications Discontinued During This Encounter   Medication Reason    potassium chloride (K-DUR,KLOR-CON) CR tablet 40 mEq     letrozole [facility]) tablet 2 5 mg     methimazole (TAPAZOLE) tablet 2 5 mg     sodium chloride 0 9 % infusion     sodium bicarbonate 150 mEq in dextrose 5 % 1,000 mL infusion     methimazole (TAPAZOLE) tablet 2 5 mg     sodium bicarbonate 8 4 % injection 50 mEq Formulary change    cefTRIAXone (ROCEPHIN) IVPB (premix in dextrose) 1,000 mg 50 mL     metroNIDAZOLE (FLAGYL) IVPB (premix) 500 mg 100 mL     multi-electrolyte (PLASMALYTE-A/ISOLYTE-S PH 7 4) IV solution     sodium chloride infusion 7 88 % Duplicate order    potassium chloride 40 mEq in sodium chloride 0 45 % 1,000 mL infusion        Sahil Schreiber MD      This progress note was produced in part using a dictation device which may document imprecise wording from author's original intent

## 2021-07-08 LAB
ANION GAP SERPL CALCULATED.3IONS-SCNC: 10 MMOL/L (ref 4–13)
BASOPHILS # BLD AUTO: 0.07 THOUSANDS/ΜL (ref 0–0.1)
BASOPHILS NFR BLD AUTO: 1 % (ref 0–1)
BUN SERPL-MCNC: 23 MG/DL (ref 5–25)
CALCIUM SERPL-MCNC: 8.8 MG/DL (ref 8.3–10.1)
CHLORIDE SERPL-SCNC: 110 MMOL/L (ref 100–108)
CO2 SERPL-SCNC: 22 MMOL/L (ref 21–32)
CREAT SERPL-MCNC: 2.92 MG/DL (ref 0.6–1.3)
EOSINOPHIL # BLD AUTO: 0.27 THOUSAND/ΜL (ref 0–0.61)
EOSINOPHIL NFR BLD AUTO: 4 % (ref 0–6)
ERYTHROCYTE [DISTWIDTH] IN BLOOD BY AUTOMATED COUNT: 15.3 % (ref 11.6–15.1)
GFR SERPL CREATININE-BSD FRML MDRD: 15 ML/MIN/1.73SQ M
GLUCOSE SERPL-MCNC: 48 MG/DL (ref 65–140)
GLUCOSE SERPL-MCNC: 53 MG/DL (ref 65–140)
GLUCOSE SERPL-MCNC: 54 MG/DL (ref 65–140)
GLUCOSE SERPL-MCNC: 57 MG/DL (ref 65–140)
GLUCOSE SERPL-MCNC: 80 MG/DL (ref 65–140)
GLUCOSE SERPL-MCNC: 88 MG/DL (ref 65–140)
GLUCOSE SERPL-MCNC: 99 MG/DL (ref 65–140)
HCT VFR BLD AUTO: 38.4 % (ref 34.8–46.1)
HGB BLD-MCNC: 11.8 G/DL (ref 11.5–15.4)
IMM GRANULOCYTES # BLD AUTO: 0.03 THOUSAND/UL (ref 0–0.2)
IMM GRANULOCYTES NFR BLD AUTO: 0 % (ref 0–2)
LYMPHOCYTES # BLD AUTO: 1.65 THOUSANDS/ΜL (ref 0.6–4.47)
LYMPHOCYTES NFR BLD AUTO: 24 % (ref 14–44)
MCH RBC QN AUTO: 30.2 PG (ref 26.8–34.3)
MCHC RBC AUTO-ENTMCNC: 30.7 G/DL (ref 31.4–37.4)
MCV RBC AUTO: 98 FL (ref 82–98)
MONOCYTES # BLD AUTO: 0.64 THOUSAND/ΜL (ref 0.17–1.22)
MONOCYTES NFR BLD AUTO: 9 % (ref 4–12)
NEUTROPHILS # BLD AUTO: 4.28 THOUSANDS/ΜL (ref 1.85–7.62)
NEUTS SEG NFR BLD AUTO: 62 % (ref 43–75)
NRBC BLD AUTO-RTO: 0 /100 WBCS
PLATELET # BLD AUTO: 153 THOUSANDS/UL (ref 149–390)
PMV BLD AUTO: 10.9 FL (ref 8.9–12.7)
POTASSIUM SERPL-SCNC: 4 MMOL/L (ref 3.5–5.3)
RBC # BLD AUTO: 3.91 MILLION/UL (ref 3.81–5.12)
SODIUM SERPL-SCNC: 142 MMOL/L (ref 136–145)
WBC # BLD AUTO: 6.94 THOUSAND/UL (ref 4.31–10.16)
WBC SPEC QL GRAM STN: ABNORMAL

## 2021-07-08 PROCEDURE — 99232 SBSQ HOSP IP/OBS MODERATE 35: CPT | Performed by: INTERNAL MEDICINE

## 2021-07-08 PROCEDURE — 85025 COMPLETE CBC W/AUTO DIFF WBC: CPT | Performed by: NURSE PRACTITIONER

## 2021-07-08 PROCEDURE — 82948 REAGENT STRIP/BLOOD GLUCOSE: CPT

## 2021-07-08 PROCEDURE — 80048 BASIC METABOLIC PNL TOTAL CA: CPT | Performed by: NURSE PRACTITIONER

## 2021-07-08 PROCEDURE — 99232 SBSQ HOSP IP/OBS MODERATE 35: CPT | Performed by: PHYSICIAN ASSISTANT

## 2021-07-08 PROCEDURE — 97535 SELF CARE MNGMENT TRAINING: CPT

## 2021-07-08 RX ORDER — DEXTROSE MONOHYDRATE 25 G/50ML
25 INJECTION, SOLUTION INTRAVENOUS ONCE AS NEEDED
Status: DISCONTINUED | OUTPATIENT
Start: 2021-07-08 | End: 2021-07-10 | Stop reason: HOSPADM

## 2021-07-08 RX ORDER — DEXTROSE AND SODIUM CHLORIDE 5; .9 G/100ML; G/100ML
100 INJECTION, SOLUTION INTRAVENOUS CONTINUOUS
Status: DISCONTINUED | OUTPATIENT
Start: 2021-07-08 | End: 2021-07-10

## 2021-07-08 RX ADMIN — DEXTROSE AND SODIUM CHLORIDE 75 ML/HR: 5; .9 INJECTION, SOLUTION INTRAVENOUS at 21:55

## 2021-07-08 RX ADMIN — NYSTATIN: 100000 POWDER TOPICAL at 08:32

## 2021-07-08 RX ADMIN — DEXTROSE AND SODIUM CHLORIDE 100 ML/HR: 5; .9 INJECTION, SOLUTION INTRAVENOUS at 12:12

## 2021-07-08 RX ADMIN — SODIUM CHLORIDE 50 ML/HR: 0.9 INJECTION, SOLUTION INTRAVENOUS at 08:32

## 2021-07-08 RX ADMIN — LETROZOLE 2.5 MG: 2.5 TABLET, FILM COATED ORAL at 08:32

## 2021-07-08 RX ADMIN — NYSTATIN: 100000 POWDER TOPICAL at 17:41

## 2021-07-08 NOTE — PROGRESS NOTES
Progress Note - Nephrology   Range Friday 70 y o  female MRN: [id number]  Unit/Bed#: -[facility] Encounter: [id number]    A/P:  1  Acute kidney injury along with chronic kidney disease stage 3  · Was admitted with creatinine of 3 74 mg/dL  · Was given IV fluids and creatinine has declined to 2 92 mg/dL  · Receiving normal saline at 50 mils per hour and just starting to drink liquids  · Fractional excretion of sodium is very low at 0 33% due to decreased intake  · Will change IV fluids to D5 normal saline at 100 mils per hour for 1 L and then decrease to 75 mils per hour from marked volume depletion  · Encourage clear liquids as tolerated  · Obtain serial studies  · Monitor urine output    2  Chronic kidney disease stage 3  · Unclear baseline  Most recent level was 1 2 mg/dL    3  Non-anion gap metabolic acidosis  · Bicarbonate is stable at 22 millimoles per L  · No need for bicarbonate replacement    4  History of bilateral hydroureteronephrosis status post bilateral stents  · Will follow-up with Urology on discharge    5  Incomplete small bowel obstruction  · Improved and starting p o  Intake          Follow up reason for today's visit:  Acute kidney injury    Colitis, acute    Patient Active Problem List   Diagnosis    Acute on chronic kidney failure ([facility] [address] )    Hyperthyroidism    History of breast cancer    Uncontrolled type 2 diabetes mellitus, without long-term current use [facility] insulin [facility]    Cirrhosis of liver (HCC)    Chest pain    Constipation    Hydronephrosis    Hypomagnesemia    Parotiditis    Hyponatremia    Colitis, acute    Partial small bowel obstruction (HCC)    Ileus (HCC)         Subjective:   Complains of thirst and weakness  Denies chest pain, shortness of breath, abdominal pain, nausea vomiting    Objective:     Vitals: Blood pressure 134/81, pulse 94, temperature 98 6 °F (37 °C), resp  rate 18, height 5' (1 524 m), weight 60 4 kg (133 lb 2 oz), SpO2 96 %  ,Body mass index is 26 kg/m²  Weight (last 2 days)     Date/Time   Weight    07/08/21 0538   60 4 (133 13)    07/07/21 0600   63 (138 89)    07/06/21 0600   62 8 (138 4)                Intake/Output Summary (Last 24 hours) at 7/8/2021 1125  Last data filed at 7/8/2021 0832  Gross per 24 hour   Intake 2002 5 ml   Output 0 ml   Net 2002 5 ml     I/O last 3 completed shifts: In: 1086 7 [I V :966 7; NG/GT:120]  Out: 600 [Urine:200; Emesis/NG output:400]         Physical Exam: /81   Pulse 94   Temp 98 6 °F (37 °C)   Resp 18   Ht 5' (1 524 m)   Wt 60 4 kg (133 lb 2 oz)   SpO2 96%   BMI 26 00 kg/m²     General Appearance:    Alert, cooperative, no distress, appears stated age   Head:    Normocephalic, without obvious abnormality, atraumatic   Eyes:    Conjunctiva/corneas clear   Ears:    Normal external ears   Nose:   Nares normal, septum midline, mucosa normal, no drainage    or sinus tenderness   Throat:   Lips, mucosa, and tongue dry; teeth and gums normal   Neck:   Supple, symmetrical, trachea midline, no adenopathy;        thyroid:  No enlargement/tenderness/nodules; no carotid    bruit or JVD   Back:     Symmetric, no curvature, ROM normal, no CVA tenderness   Lungs:     Clear to auscultation bilaterally, respirations unlabored   Chest wall:    No tenderness or deformity   Heart:    Regular rate and rhythm, S1 and S2 normal, no murmur, rub   or gallop   Abdomen:     Soft, non-tender, bowel sounds active   Extremities:   Extremities normal, atraumatic, no cyanosis or edema   Skin:   Skin color, texture, turgor normal, no rashes or lesions   Lymph nodes:   Cervical normal   Neurologic:   CNII-XII intact            Lab, Imaging and other studies: I have personally reviewed pertinent labs    CBC:   Lab Results   Component Value Date    WBC 6 94 07/08/2021    HGB 11 8 07/08/2021    HCT 38 4 07/08/2021    MCV 98 07/08/2021     07/08/2021    MCH 30 2 07/08/2021    MCHC 30 7 (L) 07/08/2021    RDW 15 3 (H) 07/08/2021    MPV 10 9 07/08/2021    NRBC 0 07/08/2021     CMP:   Lab Results   Component Value Date    K 4 0 07/08/2021     (H) 07/08/2021    CO2 22 07/08/2021    BUN 23 07/08/2021    CREATININE 2 92 (H) 07/08/2021    CALCIUM 8 8 07/08/2021    EGFR 15 07/08/2021         Results from last 7 days   Lab Units 07/08/21  0446 07/07/21  0512 07/06/21  0457 07/02/21  0545   POTASSIUM mmol/L 4 0 4 0 5 0 3 9   CHLORIDE mmol/L 110* 109* 106 109*   CO2 mmol/L 22 22 24 19*   BUN mg/dL 23 22 23 35*   CREATININE mg/dL 2 92* 2 85* 2 84* 3 04*   CALCIUM mg/dL 8 8 8 8 9 2 8 3   ALK PHOS U/L  --   --   --  153*   ALT U/L  --   --   --  14   AST U/L  --   --   --  32         Phosphorus: No results found for: PHOS  Magnesium: No results found for: MG  Urinalysis:   Lab Results   Component Value Date    COLORU Dk Yellow 07/07/2021    CLARITYU Slightly Cloudy 07/07/2021    SPECGRAV 1 025 07/07/2021    PHUR 5 5 07/07/2021    LEUKOCYTESUR Small (A) 07/07/2021    NITRITE Negative 07/07/2021    GLUCOSEU Negative 07/07/2021    KETONESU Trace (A) 07/07/2021    BILIRUBINUR Moderate (A) 07/07/2021    BLOODU Moderate (A) 07/07/2021     Ionized Calcium: No results found for: CAION  Coagulation: No results found for: PT, INR, APTT  Troponin: No results found for: TROPONINI  ABG: No results found for: PHART, AWY9ZHZ, PO2ART, ROO0KHK, D2SLIYBJ, BEART, SOURCE  Radiology review:     IMAGING  Procedure: FL small bowel    Result Date: 7/6/2021  Narrative: SMALL BOWEL FOLLOW THROUGH INDICATION:   SBO  COMPARISON:   X-ray from 7/4/2021 as well as CT from 4/1/2021 FLUOROSCOPY TIME:   0 5 minutes IMAGES:  12 TECHNIQUE:    view of the abdomen was obtained  Oral contrast was then administered to the patient and followed through the small bowel to the colon utilizing overhead radiographs at periodic intervals  Spot imaging of the terminal ileum was also performed  The patient could not lay supine due to extreme nausea    Consequently, most the study was performed in the erect position as the patient couldn't tolerate  Semierect fluoroscopy was performed but was limited  Oral Omnipaque 350 was utilized  FINDINGS:  view of the abdomen demonstrates numerous air-fluid level similar to the study of July 4  Calcification pelvis probably fibroid is again demonstrated as are bilateral ureteral stents  No free air is seen  Some sutures appear to be present in the  left upper quadrant of the abdomen The stomach demonstrates normal contour without discernible filling defect  Normal caliber small bowel loops  There is a normal fold pattern and thickness  Oral contrast reaches the proximal ileum in approximately 80 minutes  By 3 hours and 35 minutes oral contrast may have reached the colon with what appears to be splenic flexure opacified and new rectal contrast but these images had some motion artifact and there was still numerous opacified small bowel loops obscuring detail  Final image was obtained 6 hours and 40 minutes after the administration of oral contrast and contrast is clearly seen in the rectosigmoid and most likely the splenic flexure with clearing of contrast from small bowel elsewhere  This image was obtained in the supine projection  Bowel loops were borderline dilated on this image  Less pronounced in the supine study of July 4  Nasogastric tube is now present in the left upper quadrant  The terminal ileum could not be identified on this study  Impression: Small bowel caliber is improved as compared to yesterday's obstruction series  There was delay in small bowel transit however contrast did pass into the large bowel  The study was limited and no definite zone of transition of caliber of small bowel could be identified  Findings suggest partial obstruction more likely than ileus as colon is fairly decompressed   Workstation performed: [id number]       Current Facility-Administered Medications   Medication Dose Route Frequency    dextrose 5 % and sodium chloride 0 9 % infusion  100 mL/hr Intravenous Continuous    heparin (porcine) subcutaneous injection 5,000 Units  5,000 Units Subcutaneous Q8H [address]    letrozole (FEMARA) tablet 2 5 mg  2 5 mg Oral Daily    methimazole (TAPAZOLE) tablet 2 5 mg  2 5 mg Oral Every Other Day    nystatin (MYCOSTATIN) powder   Topical BID    ondansetron (ZOFRAN) injection 4 mg  4 mg Intravenous Q6H PRN    phenol (CHLORASEPTIC) 1 4 % mucosal liquid 1 spray  1 spray Mouth/Throat Q2H PRN     Medications Discontinued During This Encounter   Medication Reason    potassium chloride (K-DUR,KLOR-CON) CR tablet 40 mEq     letrozole (FEMARA) tablet 2 5 mg     methimazole (TAPAZOLE) tablet 2 5 mg     sodium chloride 0 9 % infusion     sodium bicarbonate 150 mEq in dextrose 5 % 1,000 mL infusion     methimazole (TAPAZOLE) tablet 2 5 mg     sodium bicarbonate 8 4 % injection 50 mEq Formulary change    cefTRIAXone (ROCEPHIN) IVPB (premix in dextrose) 1,000 mg 50 mL     metroNIDAZOLE (FLAGYL) IVPB (premix) 500 mg 100 mL     multi-electrolyte (PLASMALYTE-A/ISOLYTE-S PH 7 4) IV solution     sodium chloride infusion 6 76 % Duplicate order    potassium chloride 40 mEq in sodium chloride 0 45 % 1,000 mL infusion     sodium chloride 0 9 % infusion        Yadi Gray MD      This progress note was produced in part using a dictation device which may document imprecise wording from author's original intent

## 2021-07-08 NOTE — PLAN OF CARE
Problem: Potential for Falls  Goal: Patient will remain free of falls  Description: INTERVENTIONS:  - Educate patient/family on patient safety including physical limitations  - Instruct patient to call for assistance with activity   - Consult OT/PT to assist with strengthening/mobility   - Keep Call bell within reach  - Keep bed low and locked with side rails adjusted as appropriate  - Keep care items and personal belongings within reach  - Initiate and maintain comfort rounds  - Make Fall Risk Sign visible to staff  - Offer Toileting every  Hours, in advance of need  - Initiate/Maintain alarm  - Obtain necessary fall risk management equipment:   - Apply yellow socks and bracelet for high fall risk patients  - Consider moving patient to room near nurses station  Outcome: Progressing     Problem: MOBILITY - ADULT  Goal: Maintain or return to baseline ADL function  Description: INTERVENTIONS:  -  Assess patient's ability to carry out ADLs; assess patient's baseline for ADL function and identify physical deficits which impact ability to perform ADLs (bathing, care of mouth/teeth, toileting, grooming, dressing, etc )  - Assess/evaluate cause of self-care deficits   - Assess range of motion  - Assess patient's mobility; develop plan if impaired  - Assess patient's need for assistive devices and provide as appropriate  - Encourage maximum independence but intervene and supervise when necessary  - Involve family in performance of ADLs  - Assess for home care needs following discharge   - Consider OT consult to assist with ADL evaluation and planning for discharge  - Provide patient education as appropriate  Outcome: Progressing  Goal: Maintains/Returns to pre admission functional level  Description: INTERVENTIONS:  - Perform BMAT or MOVE assessment daily    - Set and communicate daily mobility goal to care team and patient/family/caregiver     - Collaborate with rehabilitation services on mobility goals if consulted  - Perform Range of Motion  times a day  - Reposition patient every  hours    - Dangle patient  times a day  - Stand patient  times a day  - Ambulate patient  times a day  - Out of bed to chair  times a day   - Out of bed for meals  times a day  - Out of bed for toileting  - Record patient progress and toleration of activity level   Outcome: Progressing     Problem: Prexisting or High Potential for Compromised Skin Integrity  Goal: Skin integrity is maintained or improved  Description: INTERVENTIONS:  - Identify patients at risk for skin breakdown  - Assess and monitor skin integrity  - Assess and monitor nutrition and hydration status  - Monitor labs   - Assess for incontinence   - Turn and reposition patient  - Assist with mobility/ambulation  - Relieve pressure over bony prominences  - Avoid friction and shearing  - Provide appropriate hygiene as needed including keeping skin clean and dry  - Evaluate need for skin moisturizer/barrier cream  - Collaborate with interdisciplinary team   - Patient/family teaching  - Consider wound care consult   Outcome: Progressing     Problem: PAIN - ADULT  Goal: Verbalizes/displays adequate comfort level or baseline comfort level  Description: Interventions:  - Encourage patient to monitor pain and request assistance  - Assess pain using appropriate pain scale  - Administer analgesics based on type and severity of pain and evaluate response  - Implement non-pharmacological measures as appropriate and evaluate response  - Consider cultural and social influences on pain and pain management  - Notify physician/advanced practitioner if interventions unsuccessful or patient reports new pain  Outcome: Progressing     Problem: INFECTION - ADULT  Goal: Absence or prevention of progression during hospitalization  Description: INTERVENTIONS:  - Assess and monitor for signs and symptoms of infection  - Monitor lab/diagnostic results  - Monitor all insertion sites, i e  indwelling lines, tubes, and drains  - Monitor endotracheal if appropriate and nasal secretions for changes in amount and color  - [place] appropriate cooling/warming therapies per order  - Administer medications as ordered  - Instruct and encourage patient and family to use good hand hygiene technique  - Identify and instruct in appropriate isolation precautions for identified infection/condition  Outcome: Progressing     Problem: SAFETY ADULT  Goal: Patient will remain free of falls  Description: INTERVENTIONS:  - Educate patient/family on patient safety including physical limitations  - Instruct patient to call for assistance with activity   - Consult OT/PT to assist with strengthening/mobility   - Keep Call bell within reach  - Keep bed low and locked with side rails adjusted as appropriate  - Keep care items and personal belongings within reach  - Initiate and maintain comfort rounds  - Make Fall Risk Sign visible to staff  - Offer Toileting every  Hours, in advance of need  - Initiate/Maintain alarm  - Obtain necessary fall risk management equipment:   - Apply yellow socks and bracelet for high fall risk patients  - Consider moving patient to room near nurses station  Outcome: Progressing  Goal: Maintain or return to baseline ADL function  Description: INTERVENTIONS:  -  Assess patient's ability to carry out ADLs; assess patient's baseline for ADL function and identify physical deficits which impact ability to perform ADLs (bathing, care of mouth/teeth, toileting, grooming, dressing, etc )  - Assess/evaluate cause of self-care deficits   - Assess range of motion  - Assess patient's mobility; develop plan if impaired  - Assess patient's need for assistive devices and provide as appropriate  - Encourage maximum independence but intervene and supervise when necessary  - Involve family in performance of ADLs  - Assess for home care needs following discharge   - Consider OT consult to assist with ADL evaluation and planning for discharge  - Provide patient education as appropriate  Outcome: Progressing  Goal: Maintains/Returns to pre admission functional level  Description: INTERVENTIONS:  - Perform BMAT or MOVE assessment daily    - Set and communicate daily mobility goal to care team and patient/family/caregiver  - Collaborate with rehabilitation services on mobility goals if consulted  - Perform Range of Motion  times a day  - Reposition patient every  hours  - Dangle patient  times a day  - Stand patient  times a day  - Ambulate patient  times a day  - Out of bed to chair  times a day   - Out of bed for meals times a day  - Out of bed for toileting  - Record patient progress and toleration of activity level   Outcome: Progressing     Problem: DISCHARGE PLANNING  Goal: Discharge to home or other facility with appropriate resources  Description: INTERVENTIONS:  - Identify barriers to discharge w/patient and caregiver  - Arrange for needed discharge resources and transportation as appropriate  - Identify discharge learning needs (meds, wound care, etc )  - Arrange for interpretive services to assist at discharge as needed  - Refer to Case Management Department for coordinating discharge planning if the patient needs post-hospital services based on physician/advanced practitioner order or complex needs related to functional status, cognitive ability, or social support system  Outcome: Progressing     Problem: Knowledge Deficit  Goal: Patient/family/caregiver demonstrates understanding of disease process, treatment plan, medications, and discharge instructions  Description: Complete learning assessment and assess knowledge base    Interventions:  - Provide teaching at level of understanding  - Provide teaching via preferred learning methods  Outcome: Progressing     Problem: GASTROINTESTINAL - ADULT  Goal: Minimal or absence of nausea and/or vomiting  Description: INTERVENTIONS:  - Administer IV fluids if ordered to ensure adequate hydration  - Maintain NPO status until nausea and vomiting are resolved  - Nasogastric tube if ordered  - Administer ordered antiemetic medications as needed  - Provide nonpharmacologic comfort measures as appropriate  - Advance diet as tolerated, if ordered  - Consider nutrition services referral to assist patient with adequate nutrition and appropriate food choices  Outcome: Progressing  Goal: Maintains or returns to baseline bowel function  Description: INTERVENTIONS:  - Assess bowel function  - Encourage oral fluids to ensure adequate hydration  - Administer IV fluids if ordered to ensure adequate hydration  - Administer ordered medications as needed  - Encourage mobilization and activity  - Consider nutritional services referral to assist patient with adequate nutrition and appropriate food choices  Outcome: Progressing  Goal: Maintains adequate nutritional intake  Description: INTERVENTIONS:  - Monitor percentage of each meal consumed  - Identify factors contributing to decreased intake, treat as appropriate  - Assist with meals as needed  - Monitor I&O, weight, and lab values if indicated  - Obtain nutrition services referral as needed  Outcome: Progressing  Goal: Oral mucous membranes remain intact  Description: INTERVENTIONS  - Assess oral mucosa and hygiene practices  - Implement preventative oral hygiene regimen  - Implement oral medicated treatments as ordered  - Initiate Nutrition services referral as needed  Outcome: Progressing     Problem: Nutrition/Hydration-ADULT  Goal: Nutrient/Hydration intake appropriate for improving, restoring or maintaining nutritional needs  Description: Monitor and assess patient's nutrition/hydration status for malnutrition  Collaborate with interdisciplinary team and initiate plan and interventions as ordered  Monitor patient's weight and dietary intake as ordered or per policy  Utilize nutrition screening tool and intervene as necessary   Determine patient's food preferences and provide high-protein, high-caloric foods as appropriate       INTERVENTIONS:  - Monitor oral intake, urinary output, labs, and treatment plans  - Assess nutrition and hydration status and recommend course of action  - Evaluate amount of meals eaten  - Assist patient with eating if necessary   - Allow adequate time for meals  - Recommend/ encourage appropriate diets, oral nutritional supplements, and vitamin/mineral supplements  - Order, calculate, and assess calorie counts as needed  - Recommend, monitor, and adjust tube feedings and TPN/PPN based on assessed needs  - Assess need for intravenous fluids  - Provide specific nutrition/hydration education as appropriate  - Include patient/family/caregiver in decisions related to nutrition  Outcome: Progressing

## 2021-07-08 NOTE — PLAN OF CARE
Problem: MOBILITY - ADULT  Goal: Maintains/Returns to pre admission functional level  Description: INTERVENTIONS:  -  Assess patient's ability to carry out ADLs; assess patient's baseline for ADL function and identify physical deficits which impact ability to perform ADLs (bathing, care of mouth/teeth, toileting, grooming, dressing, etc )  - Assess/evaluate cause of self-care deficits   - Assess range of motion  - Assess patient's mobility; develop plan if impaired  - Assess patient's need for assistive devices and provide as appropriate  - Encourage maximum independence but intervene and supervise when necessary  - Involve family in performance of ADLs  - Assess for home care needs following discharge   - Consider OT consult to assist with ADL evaluation and planning for discharge  - Provide patient education as appropriate  Outcome: Progressing     Problem: Prexisting or High Potential for Compromised Skin Integrity  Goal: Skin integrity is maintained or improved  Description: INTERVENTIONS:  - Identify patients at risk for skin breakdown  - Assess and monitor skin integrity  - Assess and monitor nutrition and hydration status  - Monitor labs   - Assess for incontinence   - Turn and reposition patient  - Assist with mobility/ambulation  - Relieve pressure over bony prominences  - Avoid friction and shearing  - Provide appropriate hygiene as needed including keeping skin clean and dry  - Evaluate need for skin moisturizer/barrier cream  - Collaborate with interdisciplinary team   - Patient/family teaching  - Consider wound care consult   Outcome: Progressing     Problem: PAIN - ADULT  Goal: Verbalizes/displays adequate comfort level or baseline comfort level  Description: Interventions:  - Encourage patient to monitor pain and request assistance  - Assess pain using appropriate pain scale  - Administer analgesics based on type and severity of pain and evaluate response  - Implement non-pharmacological measures as appropriate and evaluate response  - Consider cultural and social influences on pain and pain management  - Notify physician/advanced practitioner if interventions unsuccessful or patient reports new pain  Outcome: Progressing     Problem: INFECTION - ADULT  Goal: Absence or prevention of progression during hospitalization  Description: INTERVENTIONS:  - Assess and monitor for signs and symptoms of infection  - Monitor lab/diagnostic results  - Monitor all insertion sites, i e  indwelling lines, tubes, and drains  - Monitor endotracheal if appropriate and nasal secretions for changes in amount and color  - [place] appropriate cooling/warming therapies per order  - Administer medications as ordered  - Instruct and encourage patient and family to use good hand hygiene technique  - Identify and instruct in appropriate isolation precautions for identified infection/condition  Outcome: Progressing     Problem: DISCHARGE PLANNING  Goal: Discharge to home or other facility with appropriate resources  Description: INTERVENTIONS:  - Identify barriers to discharge w/patient and caregiver  - Arrange for needed discharge resources and transportation as appropriate  - Identify discharge learning needs (meds, wound care, etc )  - Arrange for interpretive services to assist at discharge as needed  - Refer to Case Management Department for coordinating discharge planning if the patient needs post-hospital services based on physician/advanced practitioner order or complex needs related to functional status, cognitive ability, or social support system  Outcome: Progressing     Problem: Knowledge Deficit  Goal: Patient/family/caregiver demonstrates understanding of disease process, treatment plan, medications, and discharge instructions  Description: Complete learning assessment and assess knowledge base    Interventions:  - Provide teaching at level of understanding  - Provide teaching via preferred learning methods  Outcome: Progressing     Problem: GASTROINTESTINAL - ADULT  Goal: Minimal or absence of nausea and/or vomiting  Description: INTERVENTIONS:  - Administer IV fluids if ordered to ensure adequate hydration  - Maintain NPO status until nausea and vomiting are resolved  - Nasogastric tube if ordered  - Administer ordered antiemetic medications as needed  - Provide nonpharmacologic comfort measures as appropriate  - Advance diet as tolerated, if ordered  - Consider nutrition services referral to assist patient with adequate nutrition and appropriate food choices  Outcome: Progressing     Problem: GASTROINTESTINAL - ADULT  Goal: Maintains or returns to baseline bowel function  Description: INTERVENTIONS:  - Assess bowel function  - Encourage oral fluids to ensure adequate hydration  - Administer IV fluids if ordered to ensure adequate hydration  - Administer ordered medications as needed  - Encourage mobilization and activity  - Consider nutritional services referral to assist patient with adequate nutrition and appropriate food choices  Outcome: Progressing     Problem: GASTROINTESTINAL - ADULT  Goal: Maintains adequate nutritional intake  Description: INTERVENTIONS:  - Monitor percentage of each meal consumed  - Identify factors contributing to decreased intake, treat as appropriate  - Assist with meals as needed  - Monitor I&O, weight, and lab values if indicated  - Obtain nutrition services referral as needed  Outcome: Progressing     Problem: GASTROINTESTINAL - ADULT  Goal: Oral mucous membranes remain intact  Description: INTERVENTIONS  - Assess oral mucosa and hygiene practices  - Implement preventative oral hygiene regimen  - Implement oral medicated treatments as ordered  - Initiate Nutrition services referral as needed  Outcome: Progressing     Problem: Nutrition/Hydration-ADULT  Goal: Nutrient/Hydration intake appropriate for improving, restoring or maintaining nutritional needs  Description: Monitor and assess patient's nutrition/hydration status for malnutrition  Collaborate with interdisciplinary team and initiate plan and interventions as ordered  Monitor patient's weight and dietary intake as ordered or per policy  Utilize nutrition screening tool and intervene as necessary  Determine patient's food preferences and provide high-protein, high-caloric foods as appropriate       INTERVENTIONS:  - Monitor oral intake, urinary output, labs, and treatment plans  - Assess nutrition and hydration status and recommend course of action  - Evaluate amount of meals eaten  - Assist patient with eating if necessary   - Allow adequate time for meals  - Recommend/ encourage appropriate diets, oral nutritional supplements, and vitamin/mineral supplements  - Order, calculate, and assess calorie counts as needed  - Recommend, monitor, and adjust tube feedings and TPN/PPN based on assessed needs  - Assess need for intravenous fluids  - Provide specific nutrition/hydration education as appropriate  - Include patient/family/caregiver in decisions related to nutrition  Outcome: Progressing

## 2021-07-08 NOTE — PROGRESS NOTES
[address]  Progress Note - Kiki Liz 1945, 70 y o  female MRN: [id number]  Unit/Bed#: MS [facility] Encounter: [id number]  Primary Care Provider: Gabriel Morgan MD   Date and time admitted to hospital: 6/29/2021  5:35 PM    * Colitis, acute  Assessment & Plan  Background: Presented to the ED after friend found her covered in urine and feces at home  · She lives alone  States she had watery diarrhea x 5 days  · Remains afebrile with leukocytosis; denies any recent abx use, travel, uncooked meat, eating out   · CT abdomen/pelvis: new small bowel dilatation suspicious for obstruction and secondary signs of mild colitis   · C diff melissa cx negative suspect more partial obstruction symptoms   · Monitoring off of antibiotics  · Surgery consulted; see plan regarding SBO  · Diarrhea is improving  Patient with formed stool 7/8  · Gastroenterology evaluated; input appreciated  · If SBO resolves and persistent diarrhea will need colonoscopy   · Ova and parasites negative   · Occult negative   · IVF being adjusted by nephro, patient now on CLD    Partial small bowel obstruction ([address] )  Assessment & Plan  · Now resolving; Consistent with incomplete small-bowel obstruction  · Repeat obstruction series from 7/4 with unchanged multiple dilated small-bowel loops consistent with small-bowel obstruction    · Follow-through small-bowel from 7/6 ordered confirming persistent partial small bowel obstruction     · Surgery following; input as noted below  · Status post rectal to rectal tube; since removed removed  · NG tube removed 7/7  · Advancing to CLD with toast/crackers      Diabetes mellitus [facility])  Assessment & Plan  Lab Results   Component Value Date    HGBA1C 5 1 06/30/2021       Recent Labs     07/08/21  0616 07/08/21  0634 07/08/21  0652 07/08/21  0850   POCGLU 48* 54* 57* 80       Blood Sugar Average: Last 72 hrs:  (P) 59 75   Patient has documented history of DM type 2 however takes no medications for this  While NPO, patient had hypoglycemia  Will check Accu-Cheks regularly  On dextrose containing fluids  History of breast cancer  Assessment & Plan  · Diagnosed 2007 s/p left mastectomy   · Resume Femara as patient is going to test taking some sips of clears  · Of note, alk phos was elevated on admission 234; CT abdomen/pelvis reveals chronic gallstones and chronic distended gallbladder   Alk phos was elevated prior admission 05/11/21 317   · Consideration of bone metastasis- patient should f/u with imaging as outpatient   · PT/OT consulted; recommending post acute rehab   · Patient refusing     Hyperthyroidism  Assessment & Plan  · TSH is normal; restart her methimazole as she is being advanced to clear liquid diet per surgical team    Acute on chronic kidney failure [facility])  Assessment & Plan  Lab Results   Component Value Date    EGFR 15 07/08/2021    EGFR 16 07/07/2021    EGFR 16 07/06/2021    CREATININE 2 92 (H) 07/08/2021    CREATININE 2 85 (H) 07/07/2021    CREATININE 2 84 (H) 07/06/2021     · Stable; Present on admission; superimposed on CKD 2   · Baseline appears to be 0 7-1 1  · Renal function has not returned to baseline since episode of obstructive uropathy earlier this year  · Will need to follow with nephrologist as outpatient   · S/P bilateral ureteral stent placement 04/06/21 for hydronephrosis  · Hx of prior admissions with [facility] and leaving AMA x2 on 04/07/21 and 05/11/21   · Suspect pre-renal secondary to decreased PO intake and persistent diarrhea   · CT abdomen/pelvis no signs on obstruction in the kidneys/ureter   · Avoid nephrotoxins, relative hypotension, and NSAIDs if possible  · Nephrology following; IV fluid titration per Nephrology team  · Monitor kidney function with morning labs      Severe sepsis (HCC)-resolved as of 7/4/2021  Assessment & Plan  · Resolved; Present on admission as evidence by tachycardia, leukocytosis, and lactic acidosis  · Suspected source multifactorial in the setting of colitis and UTI  · Remains afebrile    Acute cystitis with hematuria-resolved as of 7/4/2021  Assessment & Plan  · Present on admission as evidence by dysuria symptoms however is normally incontinent at home   · Symptoms resolved; status post 3 days of Rocephin ucx was negative tx due to significant pyuria     High anion gap metabolic acidosis-resolved as of 7/4/2021  Assessment & Plan  · Secondary to lactic acidosis and uremia from diarrhea  · Resolved status post initial bicarb IV infusion and then intermittent injections   · Nephrology following; input appreciated    Hypokalemia-resolved as of 7/4/2021  Assessment & Plan  · Resolved with supplementation        VTE Pharmacologic Prophylaxis: VTE Score: 6 High Risk (Score >/= 5) - Pharmacological DVT Prophylaxis Ordered: heparin  Sequential Compression Devices Ordered  Patient Centered Rounds: I performed bedside rounds with nursing staff today  Discussions with Specialists or Other Care Team Provider:  Discussed with nursing, case management    Education and Discussions with Family / Patient: Updated  (daughter) via phone  Phone call made to daughter  Left message with call back number  Time Spent for Care: 45 minutes  More than 50% of total time spent on counseling and coordination of care as described above  Current Length of Stay: 9 day(s)  Current Patient Status: Inpatient   Certification Statement: The patient will continue to require additional inpatient hospital stay due to Resolving small-bowel obstruction, frequent monitoring, acute kidney injury requiring IVF  Discharge Plan: Anticipate discharge in 48-72 hrs to discharge location to be determined pending rehab evaluations  Code Status: Level 3 - DNAR and DNI    Subjective:   Patient beginning to have formed stool  Slight nausea but no more vomiting with clear liquid and crackers  Not interested in rehab      Objective:     Vitals:   Temp (24hrs), Av 3 °F (36 8 °C), Min:98 °F (36 7 °C), Max:98 6 °F (37 °C)    Temp:  [98 °F (36 7 °C)-98 6 °F (37 °C)] 98 6 °F (37 °C)  HR:  [78-94] 94  Resp:  [18] 18  BP: (121-134)/(59-81) 134/81  SpO2:  [96 %-97 %] 96 %  Body mass index is 26 kg/m²  Input and Output Summary (last 24 hours): Intake/Output Summary (Last 24 hours) at 2021 1427  Last data filed at 2021 1333  Gross per 24 hour   Intake 1175 83 ml   Output 0 ml   Net 1175 83 ml       Physical Exam:   Physical Exam  Vitals and nursing note reviewed  Constitutional:       General: She is not in acute distress  Appearance: She is well-developed  HENT:      Head: Normocephalic and atraumatic  Eyes:      Conjunctiva/sclera: Conjunctivae normal    Cardiovascular:      Rate and Rhythm: Normal rate and regular rhythm  Heart sounds: No murmur heard  Pulmonary:      Effort: Pulmonary effort is normal  No respiratory distress  Breath sounds: Normal breath sounds  Abdominal:      Palpations: Abdomen is soft  Tenderness: There is no abdominal tenderness  Musculoskeletal:      Cervical back: Neck supple  Skin:     General: Skin is warm and dry  Neurological:      Mental Status: She is alert          Additional Data:     Labs:  Results from last 7 days   Lab Units 21  0446   WBC Thousand/uL 6 94   HEMOGLOBIN g/dL 11 8   HEMATOCRIT % 38 4   PLATELETS Thousands/uL 153   NEUTROS PCT % 62   LYMPHS PCT % 24   MONOS PCT % 9   EOS PCT % 4     Results from last 7 days   Lab Units 21  0446 21  0545   SODIUM mmol/L 142 140   POTASSIUM mmol/L 4 0 3 9   CHLORIDE mmol/L 110* 109*   CO2 mmol/L 22 19*   BUN mg/dL 23 35*   CREATININE mg/dL 2 92* 3 04*   ANION GAP mmol/L 10 12   CALCIUM mg/dL 8 8 8 3   ALBUMIN g/dL  --  1 9*   TOTAL BILIRUBIN mg/dL  --  0 57   ALK PHOS U/L  --  153*   ALT U/L  --  14   AST U/L  --  32   GLUCOSE RANDOM mg/dL 53* 79         Results from last 7 days   Lab Units 21  0850 07/08/21  0652 07/08/21  0634 07/08/21  0616   POC GLUCOSE mg/dl 80 57* 54* 48*               Lines/Drains:  Invasive Devices     Peripheral Intravenous Line            Peripheral IV 07/05/21 Right;Ventral (anterior) Forearm 3 days                      Imaging: Reviewed radiology reports from this admission including: abdominal/pelvic CT    Recent Cultures (last 7 days):         Last 24 Hours Medication List:   Current Facility-Administered Medications   Medication Dose Route Frequency Provider Last Rate    dextrose 5 % and sodium chloride 0 9 %  100 mL/hr Intravenous Continuous Yadi Gray  mL/hr (07/08/21 1212)    dextrose  25 mL Intravenous Once PRN Ely Jefferson PA-C      heparin (porcine)  5,000 Units Subcutaneous Q8H [address] Ignacia Mcconnell PA-C      letrozole  2 5 mg Oral Daily Irina Madsen MD      methimazole  2 5 mg Oral Every Other Day Irina Madsen MD      nystatin   Topical BID Irina Madsen MD      ondansetron  4 mg Intravenous Q6H PRN Irina Madsen MD      phenol  1 spray Mouth/Throat Q2H PRN SIMON Jiménez          Today, Patient Was Seen By: Jay Nolasco PA-C    **Please Note: This note may have been constructed using a voice recognition system  **

## 2021-07-08 NOTE — CASE MANAGEMENT
Pt discussed in MDT rounds, PT recommending STR and Pt continues to decline referral  CM discussed w/ Pt, encouraged Pt to consider short stay at [facility] as Pt had been deconditioned  Pt reports she is agreeable to [facility]  Pt states she has been to [facility] and [facility] in the past and didn't feel it was helpful  CM encouraged Pt to reconsider and that CM would f/u again  Pt is set-up w/ [facility]

## 2021-07-08 NOTE — ASSESSMENT & PLAN NOTE
· Now resolving; Consistent with incomplete small-bowel obstruction  · Repeat obstruction series from 7/4 with unchanged multiple dilated small-bowel loops consistent with small-bowel obstruction    · Follow-through small-bowel from 7/6 ordered confirming persistent partial small bowel obstruction     · Surgery following; input as noted below  · Status post rectal to rectal tube; since removed removed  · NG tube removed 7/7  · Advancing to CLD with toast/crackers

## 2021-07-08 NOTE — NURSING NOTE
Pt glucose recheck after cranberry juice was 57  SIMON Wallace aware of same  Pt requesting OJ, however is on a clear liquid diet  Per SLIM, try apple juice  Pt does not like apple juice, but will try to drink it  Day shift RN aware of same  Will continue to monitor

## 2021-07-08 NOTE — ASSESSMENT & PLAN NOTE
· TSH is normal; restart her methimazole as she is being advanced to clear liquid diet per surgical team

## 2021-07-08 NOTE — PROGRESS NOTES
Progress Note - General Surgery   Wendy Moore 70 y o  female MRN: [id number]  Unit/Bed#: -01 Encounter: [id number]    Assessment:  - Partial SBO  - Tolerated clears well last night  Spit up a small amount of liquid this morning, which the patient states was due to having cold liquid in the morning and drinking apple juice, which is something she finds very unappetizing to begin with   - NG removed yesterday, did well after removal   - Afebrile, VS WNL  - ZOYA on CKD, followed by urology    Plan:  - Will advance to clear liquids with toast and crackers  Pt told to chew crackers very well and swallow with liquid  She is to tell her nurse immediately if she has further nausea/vomiting    - Nausea control prn  - IVF hydration  - I/Os  - Ambulate with assistance  - PT/OT  - Management of medical conditions as per primary    Subjective/Objective     Subjective: Patient states she was doing well yesterday with the NG tube removed  Patient clear liquids for dinner and tolerated it very well  Patient had several small loose bowel movements last night  Patient reports that this morning the nurse found that her blood sugar was low  Patient was given cold cranberry juice  Patient reports the cold liquids upset her stomach  She was then given apple juice  However, she does not like apple juice, stating it is too sweet  She reports "the site of it sickens me" and she did spit up a small amount of liquid this morning due to this  Patient reports that she knows how her body reacts to these things  She says that when she feels like that she normally has some Adan crackers or peanut butter crackers, which usually comes on her stomach  Currently denies abdominal pain, fever, CP, or SOB  Objective:     Blood pressure 134/81, pulse 94, temperature 98 6 °F (37 °C), resp  rate 18, height 5' (1 524 m), weight 60 4 kg (133 lb 2 oz), SpO2 97 %  ,Body mass index is 26 kg/m²        Intake/Output Summary (Last 24 hours) at 7/8/2021 0751  Last data filed at 7/7/2021 1630  Gross per 24 hour   Intake 996 67 ml   Output 0 ml   Net 996 67 ml       Invasive Devices     Peripheral Intravenous Line            Peripheral IV 07/05/21 Right;Ventral (anterior) Forearm 3 days                Physical Exam: /81   Pulse 94   Temp 98 6 °F (37 °C)   Resp 18   Ht 5' (1 524 m)   Wt 60 4 kg (133 lb 2 oz)   SpO2 97%   BMI 26 00 kg/m²   General appearance: Pt is alert and oriented  Mild emotional distress  Lungs: clear to auscultation bilaterally  Heart: regular rate and rhythm, S1, S2 normal, no murmur, click, rub or gallop  Abdomen: soft, non-tender; bowel sounds normal; no masses,  no organomegaly and softly distended with tympanic percussion    Lab, Imaging and other studies:  I have personally reviewed pertinent lab results      CBC:   Lab Results   Component Value Date    WBC 6 94 07/08/2021    HGB 11 8 07/08/2021    HCT 38 4 07/08/2021    MCV 98 07/08/2021     07/08/2021    MCH 30 2 07/08/2021    MCHC 30 7 (L) 07/08/2021    RDW 15 3 (H) 07/08/2021    MPV 10 9 07/08/2021    NRBC 0 07/08/2021   CMP:   Lab Results   Component Value Date    SODIUM 142 07/08/2021    K 4 0 07/08/2021     (H) 07/08/2021    CO2 22 07/08/2021    BUN 23 07/08/2021    CREATININE 2 92 (H) 07/08/2021    CALCIUM 8 8 07/08/2021    EGFR 15 07/08/2021   Urinalysis:   Lab Results   Component Value Date    COLORU Dk Yellow 07/07/2021    CLARITYU Slightly Cloudy 07/07/2021    SPECGRAV 1 025 07/07/2021    PHUR 5 5 07/07/2021    LEUKOCYTESUR Small (A) 07/07/2021    NITRITE Negative 07/07/2021    GLUCOSEU Negative 07/07/2021    KETONESU Trace (A) 07/07/2021    BILIRUBINUR Moderate (A) 07/07/2021    BLOODU Moderate (A) 07/07/2021     VTE Pharmacologic Prophylaxis: Heparin  VTE Mechanical Prophylaxis: sequential compression device    Alfonso Sanchez PA-C

## 2021-07-08 NOTE — PLAN OF CARE
Problem: OCCUPATIONAL THERAPY ADULT  Goal: Performs self-care activities at highest level of function for planned discharge setting  See evaluation for individualized goals  Description: Treatment Interventions: ADL retraining, Functional transfer training, UE strengthening/ROM, Endurance training, Cognitive reorientation, Patient/family training, Equipment evaluation/education, Neuromuscular reeducation, Compensatory technique education, Continued evaluation, Energy conservation, Activityengagement          See flowsheet documentation for full assessment, interventions and recommendations  Outcome: Progressing  Note: Limitation: Decreased ADL status, Decreased UE strength, Decreased Safe judgement during ADL, Decreased cognition, Decreased endurance, Decreased self-care trans, Decreased high-level ADLs  Prognosis: Good  Assessment: Pt participated in OT tx session #1 at this time focused on self-care  Pt alert and cooperative to participate  Pt able to complete bed mobility @ Mod I and SPT @ SBA  Toileting @ Min A for thoroughness of pericare after BM  Able to complete grooming tasks of brushing teeth, using a shampoo cap for hair and brushing hair with supervision, provided set-up  Pt would continue to benefit from post-acute rehabilitation services d/t significantly diminished endurance, strength and activity tolerance    Recommendation:  (post acute rehab)  OT Discharge Recommendation: Post acute rehabilitation services

## 2021-07-08 NOTE — ASSESSMENT & PLAN NOTE
· Diagnosed 2007 s/p left mastectomy   · Resume Femara as patient is going to test taking some sips of clears  · Of note, alk phos was elevated on admission 234; CT abdomen/pelvis reveals chronic gallstones and chronic distended gallbladder   Alk phos was elevated prior admission 05/11/21 317   · Consideration of bone metastasis- patient should f/u with imaging as outpatient   · PT/OT consulted; recommending post acute rehab   · Patient refusing

## 2021-07-08 NOTE — OCCUPATIONAL THERAPY NOTE
[address]  Treatment Note     Patient Name: Cary Adrian  [id number]'Y Date: 7/8/2021  Problem List  Principal Problem:    Colitis, acute  Active Problems:    Acute on chronic kidney failure ([facility] )    Hyperthyroidism    History of breast cancer    Diabetes mellitus ([facility] )    Partial small bowel obstruction ([facility] )          07/08/21 1400   Note Type   Note Type Treatment   Restrictions/Precautions   Other Precautions Chair Alarm; Bed Alarm;Limb alert;Multiple lines; Fall Risk;Pain  (LUE limb alert d/t mastectomy)   Pain Assessment   Pain Assessment Tool 0-10   Pain Score No Pain   ADL   Grooming Assistance 5  Supervision/Setup   Grooming Deficit Setup;Supervision/safety; Increased time to complete   Grooming Comments Pt able to complete grooming tasks of brushing teeth, donning shampoo cap and grooming hair with supervision, provided set-up, while seated supine in bed  UB Dressing Assistance 5  Supervision/Setup   UB Dressing Deficit Setup;Supervision/safety; Increased time to complete   UB Dressing Comments Pt able to don hospital gown with supervision, provided set-up  Toileting Assistance  4  Minimal Assistance   Toileting Deficit Setup;Verbal cueing;Supervison/safety; Increased time to complete   Toileting Comments Pt required Min A for assist with thoroughness of pericare after BM   Bed Mobility   Supine to Sit 6  Modified independent   Additional items HOB elevated   Sit to Supine 6  Modified independent   Additional items HOB elevated   Additional Comments Pt presented supine in bed at beginning and end of tx  At end of session, call bell within reach  All needs met at this time  Transfers   Sit to Stand   (SBA)   Additional items Assist x 1; Increased time required   Stand to Sit   (SBA)   Additional items Assist x 1; Increased time required   Stand pivot   (SBA)   Additional items Assist x 1; Increased time required   Toilet transfer   (SBA)   Additional items Assist x 1; Increased time required;Commode Additional Comments Pt able to complete SPT from EOB to [facility] with SBA and use of RW  Cognition   Overall Cognitive Status WFL   Arousal/Participation Alert; Responsive; Cooperative   Attention Within functional limits   Orientation Level Oriented X4   Memory Within functional limits   Following Commands Follows all commands and directions without difficulty   Activity Tolerance   Activity Tolerance Patient limited by fatigue   Medical Staff Made Aware Spoke to RNPapo   Assessment   Assessment Pt participated in OT tx session #1 at this time focused on self-care  Pt alert and cooperative to participate  Pt able to complete bed mobility @ Mod I and SPT @ SBA  Toileting @ Min A for thoroughness of pericare after BM  Able to complete grooming tasks of brushing teeth, using a shampoo cap for hair and brushing hair with supervision, provided set-up  Pt would continue to benefit from post-acute rehabilitation services d/t significantly diminished endurance, strength and activity tolerance  Plan   Treatment Interventions ADL retraining;Functional transfer training;UE strengthening/ROM; Endurance training;Patient/family training; Compensatory technique education;Continued evaluation; Energy conservation; Activityengagement   Goal Expiration Date 07/11/21   OT Treatment Day 1   OT Frequency 3-5x/wk   Recommendation   OT Discharge Recommendation Post acute rehabilitation services   AM-PAC Daily Activity Inpatient   Lower Body Dressing 3   Bathing 3   Toileting 3   Upper Body Dressing 4   Grooming 3   Eating 4   Daily Activity Raw Score 20   Daily Activity Standardized Score (Calc for Raw Score >=11) 42 03   AM-PAC Applied Cognition Inpatient   Following a Speech/Presentation 4   Understanding Ordinary Conversation 4   Taking Medications 4   Remembering Where Things Are Placed or Put Away 4   Remembering List of 4-5 Errands 3   Taking Care of Complicated Tasks 3   Applied Cognition Raw Score 22   Applied Cognition Standardized Score 47 83     Colton Nazario, OTR/L

## 2021-07-08 NOTE — ASSESSMENT & PLAN NOTE
· Present on admission as evidence by dysuria symptoms however is normally incontinent at home   · Symptoms resolved; status post 3 days of Rocephin ucx was negative tx due to significant pyuria

## 2021-07-08 NOTE — NURSING NOTE
Pt glucose 53 on blood work  Fingerstick glucose was 48  Pt given cranberry juice since she's on a clear liquid diet  Will continue to monitor

## 2021-07-08 NOTE — ASSESSMENT & PLAN NOTE
Lab Results   Component Value Date    EGFR 15 07/08/2021    EGFR 16 07/07/2021    EGFR 16 07/06/2021    CREATININE 2 92 (H) 07/08/2021    CREATININE 2 85 (H) 07/07/2021    CREATININE 2 84 (H) 07/06/2021     · Stable; Present on admission; superimposed on CKD 2   · Baseline appears to be 0 7-1 1  · Renal function has not returned to baseline since episode of obstructive uropathy earlier this year  · Will need to follow with nephrologist as outpatient   · S/P bilateral ureteral stent placement 04/06/21 for hydronephrosis  · Hx of prior admissions with [facility] and leaving [facility] x2 on 04/07/21 and 05/11/21   · Suspect pre-renal secondary to decreased PO intake and persistent diarrhea   · CT abdomen/pelvis no signs on obstruction in the kidneys/ureter   · Avoid nephrotoxins, relative hypotension, and NSAIDs if possible  · Nephrology following; IV fluid titration per Nephrology team  · Monitor kidney function with morning labs

## 2021-07-08 NOTE — ASSESSMENT & PLAN NOTE
Background: Presented to the ED after friend found her covered in urine and feces at home  · She lives alone  States she had watery diarrhea x 5 days  · Remains afebrile with leukocytosis; denies any recent abx use, travel, uncooked meat, eating out   · CT abdomen/pelvis: new small bowel dilatation suspicious for obstruction and secondary signs of mild colitis   · C diff melissa cx negative suspect more partial obstruction symptoms   · Monitoring off of antibiotics  · Surgery consulted; see plan regarding SBO  · Diarrhea is improving    Patient with formed stool 7/8  · Gastroenterology evaluated; input appreciated  · If SBO resolves and persistent diarrhea will need colonoscopy   · Ova and parasites negative   · Occult negative   · IVF being adjusted by nephro, patient now on CLD

## 2021-07-08 NOTE — ASSESSMENT & PLAN NOTE
Lab Results   Component Value Date    HGBA1C 5 1 06/30/2021       Recent Labs     07/08/21  0616 07/08/21  0634 07/08/21  0652 07/08/21  0850   POCGLU 48* 54* 57* 80       Blood Sugar Average: Last 72 hrs:  (P) 59 75   Patient has documented history of DM type 2 however takes no medications for this  While NPO, patient had hypoglycemia  Will check Accu-Cheks regularly  On dextrose containing fluids

## 2021-07-09 LAB
ALBUMIN SERPL BCP-MCNC: 2.5 G/DL (ref 3.5–5)
ANION GAP SERPL CALCULATED.3IONS-SCNC: 14 MMOL/L (ref 4–13)
BUN SERPL-MCNC: 18 MG/DL (ref 5–25)
CALCIUM SERPL-MCNC: 8.9 MG/DL (ref 8.3–10.1)
CHLORIDE SERPL-SCNC: 109 MMOL/L (ref 100–108)
CO2 SERPL-SCNC: 18 MMOL/L (ref 21–32)
CREAT SERPL-MCNC: 2.61 MG/DL (ref 0.6–1.3)
ERYTHROCYTE [DISTWIDTH] IN BLOOD BY AUTOMATED COUNT: 14.9 % (ref 11.6–15.1)
EST. AVERAGE GLUCOSE BLD GHB EST-MCNC: 91 MG/DL
GFR SERPL CREATININE-BSD FRML MDRD: 18 ML/MIN/1.73SQ M
GLUCOSE SERPL-MCNC: 102 MG/DL (ref 65–140)
GLUCOSE SERPL-MCNC: 77 MG/DL (ref 65–140)
GLUCOSE SERPL-MCNC: 84 MG/DL (ref 65–140)
GLUCOSE SERPL-MCNC: 96 MG/DL (ref 65–140)
GLUCOSE SERPL-MCNC: 99 MG/DL (ref 65–140)
HBA1C MFR BLD: 4.8 %
HCT VFR BLD AUTO: 39.2 % (ref 34.8–46.1)
HGB BLD-MCNC: 12.3 G/DL (ref 11.5–15.4)
MCH RBC QN AUTO: 30.8 PG (ref 26.8–34.3)
MCHC RBC AUTO-ENTMCNC: 31.4 G/DL (ref 31.4–37.4)
MCV RBC AUTO: 98 FL (ref 82–98)
PH BLDV: 7.34 [PH] (ref 7.3–7.4)
PLATELET # BLD AUTO: 162 THOUSANDS/UL (ref 149–390)
PMV BLD AUTO: 11.1 FL (ref 8.9–12.7)
POTASSIUM SERPL-SCNC: 3.6 MMOL/L (ref 3.5–5.3)
RBC # BLD AUTO: 3.99 MILLION/UL (ref 3.81–5.12)
SODIUM SERPL-SCNC: 141 MMOL/L (ref 136–145)
WBC # BLD AUTO: 8.23 THOUSAND/UL (ref 4.31–10.16)

## 2021-07-09 PROCEDURE — 97110 THERAPEUTIC EXERCISES: CPT

## 2021-07-09 PROCEDURE — 99232 SBSQ HOSP IP/OBS MODERATE 35: CPT | Performed by: INTERNAL MEDICINE

## 2021-07-09 PROCEDURE — 83036 HEMOGLOBIN GLYCOSYLATED A1C: CPT | Performed by: PHYSICIAN ASSISTANT

## 2021-07-09 PROCEDURE — 99232 SBSQ HOSP IP/OBS MODERATE 35: CPT | Performed by: PHYSICIAN ASSISTANT

## 2021-07-09 PROCEDURE — 97116 GAIT TRAINING THERAPY: CPT

## 2021-07-09 PROCEDURE — 82040 ASSAY OF SERUM ALBUMIN: CPT | Performed by: INTERNAL MEDICINE

## 2021-07-09 PROCEDURE — 82948 REAGENT STRIP/BLOOD GLUCOSE: CPT

## 2021-07-09 PROCEDURE — 82800 BLOOD PH: CPT | Performed by: INTERNAL MEDICINE

## 2021-07-09 PROCEDURE — 80048 BASIC METABOLIC PNL TOTAL CA: CPT | Performed by: INTERNAL MEDICINE

## 2021-07-09 PROCEDURE — 85027 COMPLETE CBC AUTOMATED: CPT | Performed by: PHYSICIAN ASSISTANT

## 2021-07-09 PROCEDURE — 97530 THERAPEUTIC ACTIVITIES: CPT

## 2021-07-09 RX ORDER — LORATADINE 10 MG/1
10 TABLET ORAL DAILY PRN
Status: DISCONTINUED | OUTPATIENT
Start: 2021-07-09 | End: 2021-07-10 | Stop reason: HOSPADM

## 2021-07-09 RX ORDER — ACETAMINOPHEN 325 MG/1
650 TABLET ORAL EVERY 6 HOURS PRN
Status: DISCONTINUED | OUTPATIENT
Start: 2021-07-09 | End: 2021-07-10 | Stop reason: HOSPADM

## 2021-07-09 RX ORDER — LORATADINE AND PSEUDOEPHEDRINE 10; 240 MG/1; MG/1
1 TABLET, EXTENDED RELEASE ORAL DAILY PRN
Status: DISCONTINUED | OUTPATIENT
Start: 2021-07-09 | End: 2021-07-09

## 2021-07-09 RX ORDER — SODIUM BICARBONATE 650 MG/1
650 TABLET ORAL
Status: DISCONTINUED | OUTPATIENT
Start: 2021-07-09 | End: 2021-07-10 | Stop reason: HOSPADM

## 2021-07-09 RX ORDER — CALCIUM CARBONATE 200(500)MG
500 TABLET,CHEWABLE ORAL DAILY PRN
Status: DISCONTINUED | OUTPATIENT
Start: 2021-07-09 | End: 2021-07-10

## 2021-07-09 RX ADMIN — NYSTATIN: 100000 POWDER TOPICAL at 17:35

## 2021-07-09 RX ADMIN — LORATADINE 10 MG: 10 TABLET ORAL at 14:18

## 2021-07-09 RX ADMIN — ACETAMINOPHEN 650 MG: 325 TABLET ORAL at 10:11

## 2021-07-09 RX ADMIN — ONDANSETRON 4 MG: 2 INJECTION INTRAMUSCULAR; INTRAVENOUS at 14:33

## 2021-07-09 RX ADMIN — DEXTROSE AND SODIUM CHLORIDE 100 ML/HR: 5; .9 INJECTION, SOLUTION INTRAVENOUS at 21:02

## 2021-07-09 RX ADMIN — CALCIUM CARBONATE (ANTACID) CHEW TAB 500 MG 500 MG: 500 CHEW TAB at 15:28

## 2021-07-09 RX ADMIN — SODIUM BICARBONATE 650 MG TABLET 650 MG: at 11:35

## 2021-07-09 RX ADMIN — LETROZOLE 2.5 MG: 2.5 TABLET, FILM COATED ORAL at 08:23

## 2021-07-09 RX ADMIN — DEXTROSE AND SODIUM CHLORIDE 100 ML/HR: 5; .9 INJECTION, SOLUTION INTRAVENOUS at 08:28

## 2021-07-09 RX ADMIN — NYSTATIN: 100000 POWDER TOPICAL at 08:24

## 2021-07-09 RX ADMIN — SODIUM BICARBONATE 650 MG TABLET 650 MG: at 17:35

## 2021-07-09 RX ADMIN — METHIMAZOLE 2.5 MG: 5 TABLET ORAL at 08:23

## 2021-07-09 NOTE — ASSESSMENT & PLAN NOTE
· Now resolving; Consistent with incomplete small-bowel obstruction  · Repeat obstruction series from 7/4 with unchanged multiple dilated small-bowel loops consistent with small-bowel obstruction    · Follow-through small-bowel from 7/6 ordered confirming persistent partial small bowel obstruction     · Surgery following; input as noted below  · Status post rectal to rectal tube; since removed removed  · NG tube removed 7/7  · Advancing to surgical soft

## 2021-07-09 NOTE — ASSESSMENT & PLAN NOTE
Background: Presented to the ED after friend found her covered in urine and feces at home  · She lives alone  States she had watery diarrhea x 5 days  · Remains afebrile with leukocytosis; denies any recent abx use, travel, uncooked meat, eating out   · CT abdomen/pelvis: new small bowel dilatation suspicious for obstruction and secondary signs of mild colitis   · C diff melissa cx negative suspect more partial obstruction symptoms   · Monitoring off of antibiotics  · Surgery consulted; see plan regarding SBO  · Diarrhea is improving    Patient with formed stool 7/8  · Gastroenterology evaluated; input appreciated  · If SBO resolves and persistent diarrhea will need colonoscopy   · Ova and parasites negative   · Occult negative   · IVF being adjusted by nephro; diet being advanced by surgery

## 2021-07-09 NOTE — WOUND OSTOMY CARE
Progress Note - Wound   Carmin Libman 70 y o  female MRN: [id number]  Unit/Bed#: -01 Encounter: [id number]      Assessment:  Patient is seen for wound care follow up today   Patient is in bed for the assessment of the skin   Close supervision to stand for the assessment of the buttocks   Patient has a bedside commode she is using   Patient does have attends on for bowel or bladder accidents   Assessment findings   1  Bilateral heels dry and intact   2  Sacral buttocks dry and intact   3  Groin , abdominal folds and under breast area with resolved candidiasis rash   Skin care plans:  1-Hydraguard to bilateral sacrum, buttock  and  heels BID and PRN  2-Elevate heels to offload pressure  3-Ehob cushion in chair when out of bed  4-Moisturize skin daily with skin nourishing cream   5-Turn/reposition q2h or when medically stable for pressure re-distribution on skin  Objective:    Vitals: Blood pressure 137/75, pulse 79, temperature 98 3 °F (36 8 °C), resp  rate 17, height 5' (1 524 m), weight 60 2 kg (132 lb 11 5 oz), SpO2 95 %  ,Body mass index is 25 92 kg/m²  Wound 07/01/21 MASD Buttocks Bilateral (Active)   Wound Image   07/09/21 1021   Wound Description  Pink intactFragile 07/09/21 1100   Britney-wound Assessment Clean dry and intact  07/09/21 1100   Wound Length (cm) 0 cm 07/09/21 1021   Wound Width (cm) 0 cm 07/09/21 1021   Wound Depth (cm) 0 cm 07/09/21 1021   Wound Surface Area (cm^2) 0 cm^2 07/09/21 1021   Wound Volume (cm^3) 0 cm^3 07/09/21 1021   Calculated Wound Volume (cm^3) 0 cm^3 07/09/21 1021   Drainage Amount None 07/09/21 1021   Treatments Cleansed;Site care 07/07/21 2008   Dressing Moisture barrier 07/09/21 1100   Patient Tolerance Tolerated well 07/09/21 1100       Wound 07/02/21 Other (Comment) Breast Right; Lower (Active)   Wound Image   07/09/21 1019   Wound Description Clean;Dry; Intact 07/09/21 1200   Britney-wound Assessment Clean;Dry; Intact 07/09/21 1100   Wound Length (cm) 0 cm 07/09/21 1019   Wound Width (cm) 0 cm 07/09/21 1019   Wound Depth (cm) 0 cm 07/09/21 1019   Wound Surface Area (cm^2) 0 cm^2 07/09/21 1019   Wound Volume (cm^3) 0 cm^3 07/09/21 1019   Calculated Wound Volume (cm^3) 0 cm^3 07/09/21 1019   Drainage Amount None 07/09/21 1019   Treatments Site care 07/09/21 1019   Dressing Other (Comment) 07/09/21 1100   Dressing Changed New 07/02/21 1050   Patient Tolerance Tolerated well 07/09/21 1019   Dressing Status Clean;Dry; Intact 07/03/21 2000       Wound 07/02/21 Abdomen Right; Lower (Active)   Wound Image   07/09/21 1018   Wound Description Clean;Dry; Intact 07/09/21 1100   Britney-wound Assessment Clean;Dry; Intact 07/09/21 1100   Wound Length (cm) 0 cm 07/09/21 1100   Wound Width (cm) 0 cm 07/09/21 1100   Wound Depth (cm) 0 cm 07/09/21 1100   Wound Surface Area (cm^2) 0 cm^2 07/09/21 1100   Wound Volume (cm^3) 0 cm^3 07/09/21 1100   Calculated Wound Volume (cm^3) 0 cm^3 07/09/21 1100   Treatments Cleansed;Site care 07/07/21 0945   Dressing Other (Comment) 07/09/21 1100   Dressing Changed New 07/02/21 1050   Patient Tolerance Tolerated well 07/09/21 1100   Dressing Status Clean;Dry; Intact 07/05/21 0500       Wound 07/02/21 Thigh Anterior;Left;Proximal (Active)   Wound Image   07/09/21 1018   Wound Description Clean;Dry; Intact 07/09/21 1100   Britney-wound Assessment Clean;Dry; Intact 07/09/21 1100   Wound Length (cm) 0 cm 07/09/21 1100   Wound Width (cm) 0 cm 07/09/21 1100   Wound Depth (cm) 0 cm 07/09/21 1100   Wound Surface Area (cm^2) 0 cm^2 07/09/21 1100   Wound Volume (cm^3) 0 cm^3 07/09/21 1100   Calculated Wound Volume (cm^3) 0 cm^3 07/09/21 1100   Drainage Amount None 07/09/21 1100   Non-staged Wound Description Not applicable 39/19/96 8604   Treatments Cleansed;Site care 07/07/21 0946   Dressing Moisture barrier 07/09/21 1100   Dressing Changed New 07/02/21 1051   Patient Tolerance Tolerated well 07/09/21 1100       Wound care will sign off kaiser or tiger text with questions     Roby MADRIDN Lucio & Megan

## 2021-07-09 NOTE — PROGRESS NOTES
Progress Note - Nephrology   Chano Blackwell 70 y o  female MRN: [id number]  Unit/Bed#: -01 Encounter: [id number]    A/P:  1  Acute kidney injury  · Admitted with a creatinine of 3 74 mg/dL  · Treated with IV fluids  Currently receiving D5 normal saline at 75 mils per hour  · Creatinine has trended to 2 61 mg/dL today  · Nonoliguric but urine outputs are inaccurate  · Fractional excretion of sodium is 0 33% indicating volume depletion  · Continue IV fluids until fully taking po    2  Chronic kidney disease stage 3  · Indeterminate baseline due to a paucity of values  · Most recent creatinine level 1 2 mg/dL    3  Anion gap metabolic acidosis  · This is new as had a normal anion gap prior and has risen to 14 today  · Bicarbonate has dropped to 18 millimoles per L  · Start oral bicarbonate supplementation and check labs in the morning    4  Incomplete small-bowel obstruction  · As per surgery but taking p o  at this time        Follow up reason for today's visit:  Acute kidney injury  Colitis, acute    Patient Active Problem List   Diagnosis    Acute on chronic kidney failure ([facility] )    Hyperthyroidism    History of breast cancer    Diabetes mellitus ([facility] )    Cirrhosis of liver ([place] [address] )    Chest pain    Constipation    Hydronephrosis    Hypomagnesemia    Parotiditis    Hyponatremia    Colitis, acute    Partial small bowel obstruction (HCC)    Ileus (HCC)         Subjective: 3  Denies chest pain shortness of breath  Complains of abdominal discomfort on rolling on from side to side  Taking fluids  No nausea vomiting  Denies dysuria    Objective:     Vitals: Blood pressure 137/75, pulse 79, temperature 98 3 °F (36 8 °C), resp  rate 17, height 5' (1 524 m), weight 60 2 kg (132 lb 11 5 oz), SpO2 95 %  ,Body mass index is 25 92 kg/m²      Weight (last 2 days)     Date/Time   Weight    07/09/21 0551   60 2 (132 72)    07/08/21 0538   60 4 (133 13)    07/07/21 0600   63 (138 89)                Intake/Output Summary (Last 24 hours) at 7/9/2021 1054  Last data filed at 7/8/2021 2155  Gross per 24 hour   Intake 1111 67 ml   Output --   Net 1111 67 ml     I/O last 3 completed shifts: In: 2147 5 [P O :140; I V :2007 5]  Out: -          Physical Exam: /75   Pulse 79   Temp 98 3 °F (36 8 °C)   Resp 17   Ht 5' (1 524 m)   Wt 60 2 kg (132 lb 11 5 oz)   SpO2 95%   BMI 25 92 kg/m²     General Appearance:    Alert, cooperative, no distress, appears stated age   Head:    Normocephalic, without obvious abnormality, atraumatic   Eyes:    Conjunctiva/corneas clear   Ears:    Normal external ears   Nose:   Nares normal, septum midline, mucosa normal, no drainage    or sinus tenderness   Throat:   Lips, mucosa, and tongue normal; teeth and gums normal   Neck:   Supple, symmetrical, trachea midline, no adenopathy;        thyroid:  No enlargement/tenderness/nodules; no carotid    bruit or JVD   Back:     Symmetric, no curvature, ROM normal, no CVA tenderness   Lungs:     Clear to auscultation bilaterally, respirations unlabored   Chest wall:    No tenderness or deformity   Heart:    Regular rate and rhythm, S1 and S2 normal, no murmur, rub   or gallop   Abdomen:     Soft, non-tender, bowel sounds active   Extremities:   Extremities normal, atraumatic, no cyanosis or edema   Skin:   Skin color, texture, turgor normal, no rashes or lesions   Lymph nodes:   Cervical normal   Neurologic:   CNII-XII intact            Lab, Imaging and other studies: I have personally reviewed pertinent labs    CBC:   Lab Results   Component Value Date    WBC 8 23 07/09/2021    HGB 12 3 07/09/2021    HCT 39 2 07/09/2021    MCV 98 07/09/2021     07/09/2021    MCH 30 8 07/09/2021    MCHC 31 4 07/09/2021    RDW 14 9 07/09/2021    MPV 11 1 07/09/2021     CMP:   Lab Results   Component Value Date    K 3 6 07/09/2021     (H) 07/09/2021    CO2 18 (L) 07/09/2021    BUN 18 07/09/2021    CREATININE 2 61 (H) 07/09/2021    CALCIUM 8 9 07/09/2021 EGFR 18 07/09/2021         Results from last 7 days   Lab Units 07/09/21  0623 07/08/21  0446 07/07/21  0512   POTASSIUM mmol/L 3 6 4 0 4 0   CHLORIDE mmol/L 109* 110* 109*   CO2 mmol/L 18* 22 22   BUN mg/dL 18 23 22   CREATININE mg/dL 2 61* 2 92* 2 85*   CALCIUM mg/dL 8 9 8 8 8 8         Phosphorus: No results found for: PHOS  Magnesium: No results found for: MG  Urinalysis: No results found for: Susen Lopez, SPECGRAV, PHUR, LEUKOCYTESUR, NITRITE, PROTEINUA, GLUCOSEU, KETONESU, BILIRUBINUR, BLOODU  Ionized Calcium: No results found for: CAION  Coagulation: No results found for: PT, INR, APTT  Troponin: No results found for: TROPONINI  ABG: No results found for: PHART, VKN8FOW, PO2ART, JYJ2JLR, F4QPOEJB, BEART, SOURCE  Radiology review:     IMAGING  Procedure: FL small bowel    Result Date: 7/6/2021  Narrative: SMALL BOWEL FOLLOW THROUGH INDICATION:   SBO  COMPARISON:   X-ray from 7/4/2021 as well as CT from 4/1/2021 FLUOROSCOPY TIME:   0 5 minutes IMAGES:  12 TECHNIQUE:    view of the abdomen was obtained  Oral contrast was then administered to the patient and followed through the small bowel to the colon utilizing overhead radiographs at periodic intervals  Spot imaging of the terminal ileum was also performed  The patient could not lay supine due to extreme nausea  Consequently, most the study was performed in the erect position as the patient couldn't tolerate  Semierect fluoroscopy was performed but was limited  Oral Omnipaque 350 was utilized  FINDINGS:  view of the abdomen demonstrates numerous air-fluid level similar to the study of July 4  Calcification pelvis probably fibroid is again demonstrated as are bilateral ureteral stents  No free air is seen  Some sutures appear to be present in the  left upper quadrant of the abdomen The stomach demonstrates normal contour without discernible filling defect  Normal caliber small bowel loops  There is a normal fold pattern and thickness  Oral contrast reaches the proximal ileum in approximately 80 minutes  By 3 hours and 35 minutes oral contrast may have reached the colon with what appears to be splenic flexure opacified and new rectal contrast but these images had some motion artifact and there was still numerous opacified small bowel loops obscuring detail  Final image was obtained 6 hours and 40 minutes after the administration of oral contrast and contrast is clearly seen in the rectosigmoid and most likely the splenic flexure with clearing of contrast from small bowel elsewhere  This image was obtained in the supine projection  Bowel loops were borderline dilated on this image  Less pronounced in the supine study of July 4  Nasogastric tube is now present in the left upper quadrant  The terminal ileum could not be identified on this study  Impression: Small bowel caliber is improved as compared to yesterday's obstruction series  There was delay in small bowel transit however contrast did pass into the large bowel  The study was limited and no definite zone of transition of caliber of small bowel could be identified  Findings suggest partial obstruction more likely than ileus as colon is fairly decompressed   Workstation performed: [id number]       Current Facility-Administered Medications   Medication Dose Route Frequency    acetaminophen (TYLENOL) tablet 650 mg  650 mg Oral Q6H PRN    dextrose 5 % and sodium chloride 0 9 % infusion  100 mL/hr Intravenous Continuous    dextrose 50 % IV solution 25 mL  25 mL Intravenous Once PRN    heparin (porcine) subcutaneous injection 5,000 Units  5,000 Units Subcutaneous Q8H [facility]    letrozole (FEMARA) tablet 2 5 mg  2 5 mg Oral Daily    methimazole (TAPAZOLE) tablet 2 5 mg  2 5 mg Oral Every Other Day    nystatin (MYCOSTATIN) powder   Topical BID    ondansetron (ZOFRAN) injection 4 mg  4 mg Intravenous Q6H PRN    phenol (CHLORASEPTIC) 1 4 % mucosal liquid 1 spray  1 spray Mouth/Throat Q2H PRN     Medications Discontinued During This Encounter   Medication Reason    potassium chloride (K-DUR,KLOR-CON) CR tablet 40 mEq     letrozole [facility]) tablet 2 5 mg     methimazole (TAPAZOLE) tablet 2 5 mg     sodium chloride 0 9 % infusion     sodium bicarbonate 150 mEq in dextrose 5 % 1,000 mL infusion     methimazole (TAPAZOLE) tablet 2 5 mg     sodium bicarbonate 8 4 % injection 50 mEq Formulary change    cefTRIAXone (ROCEPHIN) IVPB (premix in dextrose) 1,000 mg 50 mL     metroNIDAZOLE (FLAGYL) IVPB (premix) 500 mg 100 mL     multi-electrolyte (PLASMALYTE-A/ISOLYTE-S PH 7 4) IV solution     sodium chloride infusion 4 25 % Duplicate order    potassium chloride 40 mEq in sodium chloride 0 45 % 1,000 mL infusion     sodium chloride 0 9 % infusion        Christiane Sepulveda MD      This progress note was produced in part using a dictation device which may document imprecise wording from author's original intent

## 2021-07-09 NOTE — PROGRESS NOTES
[address]  Progress Note - Cary Adrian 1945, 70 y o  female MRN: [id number]  Unit/Bed#: [facility] Encounter: [id number]  Primary Care Provider: Lisha Falk MD   Date and time admitted to hospital: 6/29/2021  5:35 PM    * Colitis, acute  Assessment & Plan  Background: Presented to the ED after friend found her covered in urine and feces at home  · She lives alone  States she had watery diarrhea x 5 days  · Remains afebrile with leukocytosis; denies any recent abx use, travel, uncooked meat, eating out   · CT abdomen/pelvis: new small bowel dilatation suspicious for obstruction and secondary signs of mild colitis   · C diff melissa cx negative suspect more partial obstruction symptoms   · Monitoring off of antibiotics  · Surgery consulted; see plan regarding SBO  · Diarrhea is improving  Patient with formed stool 7/8  · Gastroenterology evaluated; input appreciated  · If SBO resolves and persistent diarrhea will need colonoscopy   · Ova and parasites negative   · Occult negative   · IVF being adjusted by nephro; diet being advanced by surgery    Partial small bowel obstruction ([address] )  Assessment & Plan  · Now resolving; Consistent with incomplete small-bowel obstruction  · Repeat obstruction series from 7/4 with unchanged multiple dilated small-bowel loops consistent with small-bowel obstruction    · Follow-through small-bowel from 7/6 ordered confirming persistent partial small bowel obstruction     · Surgery following; input as noted below  · Status post rectal to rectal tube; since removed removed  · NG tube removed 7/7  · Advancing to surgical soft      Diabetes mellitus [facility])  Assessment & Plan  Lab Results   Component Value Date    HGBA1C 4 8 07/09/2021       Recent Labs     07/08/21  1613 07/08/21  2058 07/09/21  0725 07/09/21  1120   POCGLU 88 99 84 102       Blood Sugar Average: Last 72 hrs:  (P) 76 5   Patient has documented history of DM type 2 however takes no medications for this  While NPO, patient had hypoglycemia  Will check Accu-Cheks regularly  On dextrose containing fluids  History of breast cancer  Assessment & Plan  · Diagnosed 2007 s/p left mastectomy   · Resume [employer] as patient is going to test taking some sips of clears  · Of note, alk phos was elevated on admission 234; CT abdomen/pelvis reveals chronic gallstones and chronic distended gallbladder  Alk phos was elevated prior admission 05/11/21 317   · Consideration of bone metastasis- patient should f/u with imaging as outpatient   · PT/OT consulted; recommending post acute rehab   · Patient refusing     Hyperthyroidism  Assessment & Plan  · TSH is normal; restart her methimazole as patient tolerating p o      Acute on chronic kidney failure [facility])  Assessment & Plan  Lab Results   Component Value Date    EGFR 18 07/09/2021    EGFR 15 07/08/2021    EGFR 16 07/07/2021    CREATININE 2 61 (H) 07/09/2021    CREATININE 2 92 (H) 07/08/2021    CREATININE 2 85 (H) 07/07/2021     · Stable; Present on admission; superimposed on CKD 2   · Baseline appears to be 0 7-1 1  · Renal function has not returned to baseline since episode of obstructive uropathy earlier this year  · Will need to follow with nephrologist as outpatient   · S/P bilateral ureteral stent placement 04/06/21 for hydronephrosis  · Hx of prior admissions with [facility] and leaving [facility] x2 on 04/07/21 and 05/11/21   · Suspect pre-renal secondary to decreased PO intake and persistent diarrhea   · CT abdomen/pelvis no signs on obstruction in the kidneys/ureter   · Avoid nephrotoxins, relative hypotension, and NSAIDs if possible  · Nephrology following; IV fluid titration per Nephrology team  · Monitor kidney function with morning labs      Severe sepsis (HCC)-resolved as of 7/4/2021  Assessment & Plan  · Resolved; Present on admission as evidence by tachycardia, leukocytosis, and lactic acidosis  · Suspected source multifactorial in the setting of colitis and UTI  · Remains afebrile    Acute cystitis with hematuria-resolved as of 7/4/2021  Assessment & Plan  · Present on admission as evidence by dysuria symptoms however is normally incontinent at home   · Symptoms resolved; status post 3 days of Rocephin ucx was negative tx due to significant pyuria     High anion gap metabolic acidosis-resolved as of 7/4/2021  Assessment & Plan  · Secondary to lactic acidosis and uremia from diarrhea  · Resolved status post initial bicarb IV infusion and then intermittent injections   · Nephrology following; input appreciated    Hypokalemia-resolved as of 7/4/2021  Assessment & Plan  · Resolved with supplementation        VTE Pharmacologic Prophylaxis: VTE Score: 6 High Risk (Score >/= 5) - Pharmacological DVT Prophylaxis Ordered: heparin  Sequential Compression Devices Ordered  Patient Centered Rounds: I performed bedside rounds with nursing staff today  Discussions with Specialists or Other Care Team Provider:  Discussed with nursing, case management, will discuss with surgery    Education and Discussions with Family / Patient: Updated  (daughter) via phone  Spoke over the phone with Waldemar Dasilva and full medical update given  Time Spent for Care: 45 minutes  More than 50% of total time spent on counseling and coordination of care as described above  Current Length of Stay: 10 day(s)  Current Patient Status: Inpatient   Certification Statement: The patient will continue to require additional inpatient hospital stay due to Resolving partial small bowel obstruction, on IV fluids, monitoring lab work  Discharge Plan: Anticipate discharge in 24-48 hrs to home with home services  Code Status: Level 3 - DNAR and DNI    Subjective:   Patient beginning to feel better  Had some mild abdominal cramping this morning but that is resolved  Now having more formed stool and diarrhea is resolving  States at baseline she does have loose stool and this is close to her baseline  Tolerating more advanced diet without nausea or vomiting  No fevers or chills  Adamant that she will not go to rehab but will accept home health care  Had a headache earlier today which is resolving with Tylenol  Objective:     Vitals:   Temp (24hrs), Av 2 °F (36 8 °C), Min:97 9 °F (36 6 °C), Max:98 3 °F (36 8 °C)    Temp:  [97 9 °F (36 6 °C)-98 3 °F (36 8 °C)] 98 3 °F (36 8 °C)  HR:  [79-83] 79  Resp:  [15-17] 17  BP: (125-137)/(75-76) 137/75  SpO2:  [95 %-96 %] 95 %  Body mass index is 25 92 kg/m²  Input and Output Summary (last 24 hours): Intake/Output Summary (Last 24 hours) at 2021 1430  Last data filed at 2021 1417  Gross per 24 hour   Intake 971 67 ml   Output 200 ml   Net 771 67 ml       Physical Exam:   Physical Exam  Vitals and nursing note reviewed  Exam conducted with a chaperone present  Constitutional:       General: She is not in acute distress  Appearance: She is well-developed  HENT:      Head: Normocephalic and atraumatic  Eyes:      Conjunctiva/sclera: Conjunctivae normal    Cardiovascular:      Rate and Rhythm: Normal rate and regular rhythm  Heart sounds: No murmur heard  Pulmonary:      Effort: Pulmonary effort is normal  No respiratory distress  Breath sounds: Normal breath sounds  Abdominal:      Palpations: Abdomen is soft  Tenderness: There is no abdominal tenderness  Musculoskeletal:      Cervical back: Neck supple  Skin:     General: Skin is warm and dry  Neurological:      Mental Status: She is alert            Additional Data:     Labs:  Results from last 7 days   Lab Units 21  0623 21  0446   WBC Thousand/uL 8 23 6 94   HEMOGLOBIN g/dL 12 3 11 8   HEMATOCRIT % 39 2 38 4   PLATELETS Thousands/uL 162 153   NEUTROS PCT %  --  62   LYMPHS PCT %  --  24   MONOS PCT %  --  9   EOS PCT %  --  4     Results from last 7 days   Lab Units 21  0623   SODIUM mmol/L 141   POTASSIUM mmol/L 3 6   CHLORIDE mmol/L 109*   CO2 mmol/L 18*   BUN mg/dL 18   CREATININE mg/dL 2 61*   ANION GAP mmol/L 14*   CALCIUM mg/dL 8 9   ALBUMIN g/dL 2 5*   GLUCOSE RANDOM mg/dL 77         Results from last 7 days   Lab Units 07/09/21  1120 07/09/21  0725 07/08/21  2058 07/08/21  1613 07/08/21  0850 07/08/21  0652 07/08/21  0634 07/08/21  0616   POC GLUCOSE mg/dl 102 84 99 88 80 57* 54* 48*     Results from last 7 days   Lab Units 07/09/21  0623   HEMOGLOBIN A1C % 4 8           Lines/Drains:  Invasive Devices     Peripheral Intravenous Line            Peripheral IV 07/05/21 Right;Ventral (anterior) Forearm 4 days                      Imaging: Reviewed radiology reports from this admission including: abdominal/pelvic CT    Recent Cultures (last 7 days):         Last 24 Hours Medication List:   Current Facility-Administered Medications   Medication Dose Route Frequency Provider Last Rate    acetaminophen  650 mg Oral Q6H PRN Ely Jefferson PA-C      dextrose 5 % and sodium chloride 0 9 %  100 mL/hr Intravenous Continuous Airam Albarado  mL/hr (07/09/21 0828)    dextrose  25 mL Intravenous Once PRN Ely Jefferson PA-C      heparin (porcine)  5,000 Units Subcutaneous Q8H [facility] Racquel Stewart PA-C      letrozole  2 5 mg Oral Daily Keturah Templeton MD      loratadine  10 mg Oral Daily PRN Ely Jefferson PA-C      methimazole  2 5 mg Oral Every Other Day Keturah Templeton MD      nystatin   Topical BID Keturah Templeton MD      ondansetron  4 mg Intravenous Q6H PRN Keturah Templeton MD      phenol  1 spray Mouth/Throat Q2H PRN SIMON Gabriel      sodium bicarbonate  650 mg Oral BID after meals Airam Albarado MD          Today, Patient Was Seen By: Kevin Bradley PA-C    **Please Note: This note may have been constructed using a voice recognition system  **

## 2021-07-09 NOTE — PROGRESS NOTES
Attempted to change patient's IV that is due to be changed and patient requested IV to stay in place due to possible discharge tomorrow  PA aware and ok to keep IV site for 24hrs

## 2021-07-09 NOTE — ASSESSMENT & PLAN NOTE
Lab Results   Component Value Date    HGBA1C 4 8 07/09/2021       Recent Labs     07/08/21  1613 07/08/21 2058 07/09/21  0725 07/09/21  1120   POCGLU 88 99 84 102       Blood Sugar Average: Last 72 hrs:  (P) 76 5   Patient has documented history of DM type 2 however takes no medications for this  While NPO, patient had hypoglycemia  Will check Accu-Cheks regularly  On dextrose containing fluids

## 2021-07-09 NOTE — PLAN OF CARE
Problem: PHYSICAL THERAPY ADULT  Goal: Performs mobility at highest level of function for planned discharge setting  See evaluation for individualized goals  Description: Treatment/Interventions: Functional transfer training, LE strengthening/ROM, Elevations, Therapeutic exercise, Endurance training, Patient/family training, Bed mobility, Equipment eval/education, Gait training, Compensatory technique education, Spoke to nursing, Spoke to case management, OT  Equipment Recommended:  (TBD by rehab)       See flowsheet documentation for full assessment, interventions and recommendations  Outcome: Progressing  Note: Prognosis: Fair  Problem List: Decreased strength, Decreased endurance, Decreased mobility, Decreased coordination, Impaired balance, Decreased safety awareness, Pain  Assessment: Pt seen for PT treatment session this date with interventions consisting of bed mobility, transfers, therex, and ambulation  Pt agreeable to PT treatment session upon arrival  In comparison to previous session, pt with no improvements with decreased ambulation distance with increased fatigue  Pt SOB after ambulation that resolved with rest breaks  V/c for transfers for hand placement, upright posture, and for appropriate technique with therex  Continue to recommend STR at time of d/c in order to maximize pt's functional independence and safety w/ mobility  Pt continues to be functioning below baseline level, and remains limited 2* factors listed above including fatigue and headaches  PT will continue to see pt while here in order to address the deficits listed above and provide interventions consistent w/ POC in effort to achieve STGs  Barriers to Discharge: Inaccessible home environment  Barriers to Discharge Comments: Lives alone with limited assist from caregivers     PT Discharge Recommendation: Post acute rehabilitation services     PT - OK to Discharge: Yes    See flowsheet documentation for full assessment

## 2021-07-09 NOTE — ASSESSMENT & PLAN NOTE
Lab Results   Component Value Date    EGFR 18 07/09/2021    EGFR 15 07/08/2021    EGFR 16 07/07/2021    CREATININE 2 61 (H) 07/09/2021    CREATININE 2 92 (H) 07/08/2021    CREATININE 2 85 (H) 07/07/2021     · Stable; Present on admission; superimposed on CKD 2   · Baseline appears to be 0 7-1 1  · Renal function has not returned to baseline since episode of obstructive uropathy earlier this year  · Will need to follow with nephrologist as outpatient   · S/P bilateral ureteral stent placement 04/06/21 for hydronephrosis  · Hx of prior admissions with [facility] and leaving [facility] x2 on 04/07/21 and 05/11/21   · Suspect pre-renal secondary to decreased PO intake and persistent diarrhea   · CT abdomen/pelvis no signs on obstruction in the kidneys/ureter   · Avoid nephrotoxins, relative hypotension, and NSAIDs if possible  · Nephrology following; IV fluid titration per Nephrology team  · Monitor kidney function with morning labs

## 2021-07-09 NOTE — PROGRESS NOTES
Progress Note - General Surgery   Christi Deeds 70 y o  female MRN: [id number]  Unit/Bed#: -01 Encounter: [id number]    Assessment:  81-year-old female who was admitted due to partial small bowel obstruction which is resolving    Plan:  Diet as tolerated  The patient is doing well from a surgical standpoint, will sign off at this time  Please call with any questions or concerns  No need for surgical follow-up  Subjective/Objective       Subjective: The patient states she is able to tolerate a soft diet without difficulty  Her abdominal pain has improved    Objective:     Blood pressure 158/91, pulse 80, temperature 98 7 °F (37 1 °C), temperature source Oral, resp  rate 18, height 5' (1 524 m), weight 60 2 kg (132 lb 11 5 oz), SpO2 96 %  ,Body mass index is 25 92 kg/m²        Intake/Output Summary (Last 24 hours) at 7/9/2021 1658  Last data filed at 7/9/2021 1417  Gross per 24 hour   Intake 971 67 ml   Output 200 ml   Net 771 67 ml       Invasive Devices     Peripheral Intravenous Line            Peripheral IV 07/05/21 Right;Ventral (anterior) Forearm 4 days                Physical Exam: Head: Normocephalic, without obvious abnormality, atraumatic  Abdomen: soft, non-tender; bowel sounds normal; no masses,  no organomegaly  Skin: Skin color, texture, turgor normal  No rashes or lesions  Neurologic: Grossly normal    Lab, Imaging and other studies:  CBC:   Lab Results   Component Value Date    WBC 8 23 07/09/2021    HGB 12 3 07/09/2021    HCT 39 2 07/09/2021    MCV 98 07/09/2021     07/09/2021    MCH 30 8 07/09/2021    MCHC 31 4 07/09/2021    RDW 14 9 07/09/2021    MPV 11 1 07/09/2021   , CMP:   Lab Results   Component Value Date    SODIUM 141 07/09/2021    K 3 6 07/09/2021     (H) 07/09/2021    CO2 18 (L) 07/09/2021    BUN 18 07/09/2021    CREATININE 2 61 (H) 07/09/2021    CALCIUM 8 9 07/09/2021    EGFR 18 07/09/2021

## 2021-07-09 NOTE — PHYSICAL THERAPY NOTE
PHYSICAL THERAPY TREATMENT NOTE  NAME:  Ganesh Che  DATE: 07/09/21    Length Of Stay: 10  Performed at least 2 patient identifiers during session: Name and Birthday    TREATMENT:    07/09/21 0823   PT Last Visit   PT Visit Date 07/09/21   Note Type   Note Type Treatment   Pain Assessment   Pain Assessment Tool 0-10   Pain Score No Pain   Restrictions/Precautions   Other Precautions Chair Alarm; Bed Alarm;Multiple lines; Fall Risk;Pain;Limb alert   General   Chart Reviewed Yes   Response to Previous Treatment Patient with no complaints from previous session  Family/Caregiver Present No   Cognition   Overall Cognitive Status WFL   Orientation Level Oriented X4   Following Commands Follows all commands and directions without difficulty   Subjective   Subjective "I can't do much today"   Bed Mobility   Supine to Sit 6  Modified independent   Additional items HOB elevated; Bedrails; Increased time required   Sit to Supine 6  Modified independent   Additional items HOB elevated; Increased time required;Verbal cues   Additional Comments Pt supine in bed upon arrival with [facility] eleavted and requires increased time for mobility  Pt stated she would prefer to be back in bed at end of session ith HOB fully elevated, bed alarm on, and all needs within reach  Transfers   Sit to Stand   (SBA)   Additional items Assist x 1; Increased time required;Verbal cues   Stand to Sit   (SBA)   Additional items Assist x 1; Increased time required;Verbal cues   Stand pivot   (SBA)   Additional items Assist x 1; Increased time required;Verbal cues   Toilet transfer   (SBA)   Additional items Assist x 1;Verbal cues; Increased time required;Commode   Additional Comments All transfers with use of RW and v/c for hand placement due to safety  Pt requires mod assistance with toilet hygiene  Ambulation/Elevation   Gait pattern Narrow GUNNER; Forward Flexion;Decreased foot clearance; Excessively slow; Inconsistent milli; Short stride   Gait Assistance (SBA)   Additional items Assist x 1;Verbal cues   Assistive Device Rolling walker   Distance Pt ambulated 15'x1 and 10'x1 with use of RW and SBA with inconsisnet milli and v/c for upright posture  Pt with c/o headache after first ambulation stating she has had it for weeks  Vitals WNL  Balance   Static Sitting Good   Dynamic Sitting Good   Static Standing Fair   Dynamic Standing Fair   Ambulatory Fair -   Endurance Deficit   Endurance Deficit Yes   Endurance Deficit Description Fatigue   Activity Tolerance   Activity Tolerance Patient limited by fatigue   Nurse Made Aware GINO Cardoso   Exercises   Glute Sets Sitting;10 reps;AROM; Bilateral   Hip Flexion Sitting;10 reps;AROM; Bilateral   Hip Abduction Sitting;10 reps;AROM; Bilateral   Hip Adduction Sitting;10 reps;AROM; Bilateral   Knee AROM Long Arc Quad Sitting;10 reps;AROM; Bilateral   Ankle Pumps Sitting;10 reps;AROM; Bilateral   Assessment   Prognosis Fair   Problem List Decreased strength;Decreased endurance;Decreased mobility; Decreased coordination; Impaired balance;Decreased safety awareness;Pain   Barriers to Discharge Inaccessible home environment   Goals   PT Treatment Day 3   Plan   Treatment/Interventions Therapeutic exercise; Functional transfer training;LE strengthening/ROM; Elevations; Endurance training;Equipment eval/education;Gait training;Bed mobility   Progress Slow progress, decreased activity tolerance   PT Frequency   (3-5x/wk)   Recommendation   PT Discharge Recommendation Post acute rehabilitation services   Equipment Recommended   (TBD by rehab)   PT - OK to Discharge Yes   Additional Comments When medically cleared   AM-PAC Basic Mobility Inpatient   Turning in Bed Without Bedrails 4   Lying on Back to Sitting on Edge of Flat Bed 3   Moving Bed to Chair 3   Standing Up From Chair 3   Walk in Room 3   Climb 3-5 Stairs 3   Basic Mobility Inpatient Raw Score 19   Basic Mobility Standardized Score 42 48     Pt seen for PT treatment session this date with interventions consisting of bed mobility, transfers, therex, and ambulation  Pt agreeable to PT treatment session upon arrival  In comparison to previous session, pt with no improvements with decreased ambulation distance with increased fatigue  Pt SOB after ambulation that resolved with rest breaks  V/c for transfers for hand placement, upright posture, and for appropriate technique with therex  Continue to recommend STR at time of d/c in order to maximize pt's functional independence and safety w/ mobility  Pt continues to be functioning below baseline level, and remains limited 2* factors listed above including fatigue and headaches  PT will continue to see pt while here in order to address the deficits listed above and provide interventions consistent w/ POC in effort to achieve STGs  The patient's AM-PAC Basic Mobility Inpatient Short Form Raw Score is 19, Standardized Score is 42 48  A standardized score less than 42 9 suggests the patient may benefit from discharge to post-acute rehabilitation services  Please also refer to the recommendation of the Physical Therapist for safe discharge planning        Renee Owen PTA,PTA

## 2021-07-10 VITALS
RESPIRATION RATE: 16 BRPM | SYSTOLIC BLOOD PRESSURE: 153 MMHG | OXYGEN SATURATION: 97 % | HEIGHT: 60 IN | HEART RATE: 78 BPM | WEIGHT: 134.92 LBS | TEMPERATURE: 98.9 F | DIASTOLIC BLOOD PRESSURE: 76 MMHG | BODY MASS INDEX: 26.49 KG/M2

## 2021-07-10 LAB
ALBUMIN SERPL BCP-MCNC: 2.2 G/DL (ref 3.5–5)
ALP SERPL-CCNC: 128 U/L (ref 46–116)
ALT SERPL W P-5'-P-CCNC: 17 U/L (ref 12–78)
ANION GAP SERPL CALCULATED.3IONS-SCNC: 13 MMOL/L (ref 4–13)
AST SERPL W P-5'-P-CCNC: 52 U/L (ref 5–45)
BILIRUB SERPL-MCNC: 0.47 MG/DL (ref 0.2–1)
BUN SERPL-MCNC: 15 MG/DL (ref 5–25)
CALCIUM ALBUM COR SERPL-MCNC: 10 MG/DL (ref 8.3–10.1)
CALCIUM SERPL-MCNC: 8.6 MG/DL (ref 8.3–10.1)
CHLORIDE SERPL-SCNC: 111 MMOL/L (ref 100–108)
CO2 SERPL-SCNC: 18 MMOL/L (ref 21–32)
CREAT SERPL-MCNC: 2.21 MG/DL (ref 0.6–1.3)
ERYTHROCYTE [DISTWIDTH] IN BLOOD BY AUTOMATED COUNT: 14.9 % (ref 11.6–15.1)
GFR SERPL CREATININE-BSD FRML MDRD: 21 ML/MIN/1.73SQ M
GLUCOSE SERPL-MCNC: 103 MG/DL (ref 65–140)
GLUCOSE SERPL-MCNC: 119 MG/DL (ref 65–140)
GLUCOSE SERPL-MCNC: 89 MG/DL (ref 65–140)
HCT VFR BLD AUTO: 39 % (ref 34.8–46.1)
HGB BLD-MCNC: 12 G/DL (ref 11.5–15.4)
MCH RBC QN AUTO: 29.9 PG (ref 26.8–34.3)
MCHC RBC AUTO-ENTMCNC: 30.8 G/DL (ref 31.4–37.4)
MCV RBC AUTO: 97 FL (ref 82–98)
PLATELET # BLD AUTO: 149 THOUSANDS/UL (ref 149–390)
PMV BLD AUTO: 11 FL (ref 8.9–12.7)
POTASSIUM SERPL-SCNC: 3.3 MMOL/L (ref 3.5–5.3)
PROT SERPL-MCNC: 7.1 G/DL (ref 6.4–8.2)
RBC # BLD AUTO: 4.01 MILLION/UL (ref 3.81–5.12)
SODIUM SERPL-SCNC: 142 MMOL/L (ref 136–145)
WBC # BLD AUTO: 7.52 THOUSAND/UL (ref 4.31–10.16)

## 2021-07-10 PROCEDURE — 85027 COMPLETE CBC AUTOMATED: CPT | Performed by: PHYSICIAN ASSISTANT

## 2021-07-10 PROCEDURE — 80053 COMPREHEN METABOLIC PANEL: CPT | Performed by: INTERNAL MEDICINE

## 2021-07-10 PROCEDURE — 99232 SBSQ HOSP IP/OBS MODERATE 35: CPT | Performed by: NURSE PRACTITIONER

## 2021-07-10 PROCEDURE — 82948 REAGENT STRIP/BLOOD GLUCOSE: CPT

## 2021-07-10 PROCEDURE — 99239 HOSP IP/OBS DSCHRG MGMT >30: CPT | Performed by: PHYSICIAN ASSISTANT

## 2021-07-10 RX ORDER — SODIUM BICARBONATE 650 MG/1
650 TABLET ORAL 2 TIMES DAILY
Qty: 20 TABLET | Refills: 0 | Status: SHIPPED | OUTPATIENT
Start: 2021-07-10 | End: 2021-08-04 | Stop reason: HOSPADM

## 2021-07-10 RX ORDER — CALCIUM CARBONATE 200(500)MG
1000 TABLET,CHEWABLE ORAL 3 TIMES DAILY PRN
Status: DISCONTINUED | OUTPATIENT
Start: 2021-07-10 | End: 2021-07-10 | Stop reason: HOSPADM

## 2021-07-10 RX ORDER — POTASSIUM CHLORIDE 20 MEQ/1
20 TABLET, EXTENDED RELEASE ORAL EVERY 4 HOURS
Status: DISCONTINUED | OUTPATIENT
Start: 2021-07-10 | End: 2021-07-10

## 2021-07-10 RX ORDER — DEXTROSE, SODIUM CHLORIDE, SODIUM LACTATE, POTASSIUM CHLORIDE, AND CALCIUM CHLORIDE 5; .6; .31; .03; .02 G/100ML; G/100ML; G/100ML; G/100ML; G/100ML
100 INJECTION, SOLUTION INTRAVENOUS CONTINUOUS
Status: DISCONTINUED | OUTPATIENT
Start: 2021-07-10 | End: 2021-07-10 | Stop reason: HOSPADM

## 2021-07-10 RX ORDER — POTASSIUM CHLORIDE 750 MG/1
10 TABLET, EXTENDED RELEASE ORAL
Status: DISCONTINUED | OUTPATIENT
Start: 2021-07-10 | End: 2021-07-10 | Stop reason: HOSPADM

## 2021-07-10 RX ADMIN — LETROZOLE 2.5 MG: 2.5 TABLET, FILM COATED ORAL at 08:14

## 2021-07-10 RX ADMIN — NYSTATIN: 100000 POWDER TOPICAL at 08:14

## 2021-07-10 RX ADMIN — CALCIUM CARBONATE (ANTACID) CHEW TAB 500 MG 1000 MG: 500 CHEW TAB at 12:11

## 2021-07-10 RX ADMIN — SODIUM BICARBONATE 650 MG TABLET 650 MG: at 08:14

## 2021-07-10 RX ADMIN — DEXTROSE, SODIUM CHLORIDE, SODIUM LACTATE, POTASSIUM CHLORIDE, AND CALCIUM CHLORIDE 100 ML/HR: 5; .6; .31; .03; .02 INJECTION, SOLUTION INTRAVENOUS at 08:47

## 2021-07-10 RX ADMIN — DEXTROSE AND SODIUM CHLORIDE 100 ML/HR: 5; .9 INJECTION, SOLUTION INTRAVENOUS at 05:56

## 2021-07-10 RX ADMIN — POTASSIUM CHLORIDE 20 MEQ: 1500 TABLET, EXTENDED RELEASE ORAL at 08:47

## 2021-07-10 RX ADMIN — CALCIUM CARBONATE (ANTACID) CHEW TAB 500 MG 500 MG: 500 CHEW TAB at 09:11

## 2021-07-10 NOTE — NURSING NOTE
AVS reviewed with patient  Patient verbalized understanding of same  Script for blood work given to patient

## 2021-07-10 NOTE — ASSESSMENT & PLAN NOTE
Lab Results   Component Value Date    EGFR 21 07/10/2021    EGFR 18 07/09/2021    EGFR 15 07/08/2021    CREATININE 2 21 (H) 07/10/2021    CREATININE 2 61 (H) 07/09/2021    CREATININE 2 92 (H) 07/08/2021     · Stable; Present on admission; superimposed on CKD 2   · Baseline appears to be 0 7-1 1  · Renal function has not returned to baseline since episode of obstructive uropathy earlier this year  · Will need to follow with nephrologist as outpatient   · S/P bilateral ureteral stent placement 04/06/21 for hydronephrosis  · Hx of prior admissions with [facility] and leaving [facility] x2 on 04/07/21 and 05/11/21   · Suspect pre-renal secondary to decreased PO intake and persistent diarrhea   · CT abdomen/pelvis no signs on obstruction in the kidneys/ureter   · Avoid nephrotoxins, relative hypotension, and NSAIDs if possible  · Nephrology following; IV fluid titration per Nephrology team  · Monitor kidney function with morning labs      Unfortunately, the patient is not willing to stay for further IV fluids and lab work recheck  Instructed outpatient follow-up with nephrologist and repeat CMP as an outpatient

## 2021-07-10 NOTE — PROGRESS NOTES
NEPHROLOGY PROGRESS NOTE   Carmen Becerra 70 y o  female MRN: [id number]  Unit/Bed#: -01 Encounter: [id number]    Assessment/Plan:    Carmen Becerra is a 70 y o   female with history of breast cancer, CKD 3, history of obstructive nephropathy with bilateral ureteral stent placement admitted 06/29/2021 with chief complaint of altered mental status being treated for partial small bowel obstruction which is resolving, ZOYA on CKD  Renal following along for ZOYA  Plan outlined below  1  Acute kidney injury (POA) atop chronic kidney disease  · Urine chemistries collected 7/7 continued to indicate prerenal etiology and hyaline casts also noted on that urinalysis  Fortunately, renal function continues to improve  · Receiving D5 NSS for hypoglycemia however she is developing hyperchloremic metabolic acidosis  Will switch to D5 LR-hopefully this is on formulary, otherwise, recommend trialing off of dextrose solution in lieu of a balanced salt solution  2  Stage 3 chronic kidney disease with baseline creatinine around 1 2 mg/dL however limited labs to review  3  Hyperchloremic metabolic acidosis  · In the setting of normal saline use and possible diarrhea  Stop normal saline and utilize buffered solution  Continue sodium bicarbonate tablets  4  Hypokalemia  · Will provide 60 mEq oral supplementation throughout day  Check magnesium in a m   5  History of bilateral hydroureteronephrosis status post bilateral stents  · Ureteral stents in place on imaging done 6/29  She will need to have these exchanged soon  6  Partial small bowel obstruction per General surgery and primary team  7  Hypoglycemia  · Improved with the addition of dextrose  Continue to frequently monitor glucose level  ROS  No physical complaints on exam   States she may go home today  A complete 10 point review of systems have been performed and are otherwise negative         Historical Information   Past Medical History:   Diagnosis Date    Acute cystitis with hematuria 6/29/2021    Diabetes mellitus ([facility] [address] )     Diverticulitis     High anion gap metabolic acidosis 5/4/9246    Hyperthyroidism     Hypokalemia 4/2/2021    Severe sepsis ([facility] [address] ) 6/29/2021     Past Surgical History:   Procedure Laterality Date    APPENDECTOMY      COLOSTOMY      FL RETROGRADE PYELOGRAM  4/6/2021    MASTECTOMY      TX CYSTOURETHROSCOPY,URETER CATHETER Bilateral 4/6/2021    Procedure: CYSTOSCOPY RETROGRADE PYELOGRAM WITH INSERTION STENT URETERAL;  Surgeon: Becca Enamorado MD;  Location: [facility];  Service: Urology     Social History   Social History     Substance and Sexual Activity   Alcohol Use Not Currently     Social History     Substance and Sexual Activity   Drug Use Not Currently     Social History     Tobacco Use   Smoking Status Former Smoker   Smokeless Tobacco Never Used       Family History:   History reviewed  No pertinent family history      Medications:  Pertinent medications were reviewed  Current Facility-Administered Medications   Medication Dose Route Frequency Provider Last Rate    acetaminophen  650 mg Oral Q6H PRN Ely Jefferson PA-C      calcium carbonate  500 mg Oral Daily PRN Ely Jefferson PA-C      dextrose 5 % and sodium chloride 0 9 %  100 mL/hr Intravenous Continuous Deven Kong  mL/hr (07/10/21 0556)    dextrose  25 mL Intravenous Once PRN Ely Jefferson PA-C      heparin (porcine)  5,000 Units Subcutaneous Q8H [address] Tera Hurst PA-C      letrozole  2 5 mg Oral Daily Marianna Aldridge MD      loratadine  10 mg Oral Daily PRN Ely Jefferson PA-C      methimazole  2 5 mg Oral Every Other Day Marianna Aldridge MD      nystatin   Topical BID Marianna Aldridge MD      ondansetron  4 mg Intravenous Q6H PRN Marianna Aldridge MD      phenol  1 spray Mouth/Throat Q2H PRN SIMON Gabriel      sodium bicarbonate  650 mg Oral BID after meals Deven Kong MD           Allergies Allergen Reactions    Latex Itching    Nuts - Food Allergy Other (See Comments)     Kempner allergy    Chlorhexidine Rash    Penicillins Rash         Vitals:   /76   Pulse 78   Temp 98 9 °F (37 2 °C)   Resp 16   Ht 5' (1 524 m)   Wt 61 2 kg (134 lb 14 7 oz)   SpO2 97%   BMI 26 35 kg/m²   Body mass index is 26 35 kg/m²  SpO2: 97 %,   SpO2 Activity: At Rest (Simultaneous filing  User may not have seen previous data ),   O2 Device: None (Room air) (Simultaneous filing  User may not have seen previous data )      Intake/Output Summary (Last 24 hours) at 7/10/2021 0811  Last data filed at 7/9/2021 1950  Gross per 24 hour   Intake 120 ml   Output 200 ml   Net -80 ml     Invasive Devices     Peripheral Intravenous Line            Peripheral IV 07/05/21 Right;Ventral (anterior) Forearm 5 days                Physical Exam  General: conscious, cooperative, in no acute distress  Eyes: conjunctivae pink, anicteric sclerae  ENT: lips and mucous membranes moist  Neck: supple, no JVD, no masses  Chest: clear breath sounds bilaterally, no crackles, ronchus or wheezings  CVS: S1 & S2, normal rate, regular rhythm  Abdomen: soft, non-tender, non-distended, normoactive bowel sounds, large, round  Extremities: no edema of both legs  Skin: no rash  Neuro: awake, alert, oriented      Diagnostic Data:  Lab: I have personally reviewed pertinent lab results  ,   CBC:  Results from last 7 days   Lab Units 07/10/21  0526   WBC Thousand/uL 7 52   HEMOGLOBIN g/dL 12 0   HEMATOCRIT % 39 0   PLATELETS Thousands/uL 149      CMP:   Lab Results   Component Value Date    SODIUM 142 07/10/2021    K 3 3 (L) 07/10/2021     (H) 07/10/2021    CO2 18 (L) 07/10/2021    BUN 15 07/10/2021    CREATININE 2 21 (H) 07/10/2021    CALCIUM 8 6 07/10/2021    AST 52 (H) 07/10/2021    ALT 17 07/10/2021    ALKPHOS 128 (H) 07/10/2021    EGFR 21 07/10/2021   ,   PT/INR: No results found for: PT, INR,   Magnesium: No components found for: MAG,  Phosphorous: No results found for: PHOS    Microbiology:  @LAB[facility],(urinecx:7)@        SIMON Espinoza    Portions of the record may have been created with voice recognition software  Occasional wrong word or "sound a like" substitutions may have occurred due to the inherent limitations of voice recognition software  Read the chart carefully and recognize, using context, where substitutions have occurred

## 2021-07-10 NOTE — DISCHARGE SUMMARY
[address]  Discharge- Lethaniel Red 1945, 70 y o  female MRN: [id number]  Unit/Bed#: -[facility] Encounter: [id number]  Primary Care Provider: Larry Gonzalez MD   Date and time admitted to hospital: 6/29/2021  5:35 PM    Acute on chronic kidney failure [facility])  Assessment & Plan  Lab Results   Component Value Date    EGFR 21 07/10/2021    EGFR 18 07/09/2021    EGFR 15 07/08/2021    CREATININE 2 21 (H) 07/10/2021    CREATININE 2 61 (H) 07/09/2021    CREATININE 2 92 (H) 07/08/2021     · Stable; Present on admission; superimposed on CKD 2   · Baseline appears to be 0 7-1 1  · Renal function has not returned to baseline since episode of obstructive uropathy earlier this year  · Will need to follow with nephrologist as outpatient   · S/P bilateral ureteral stent placement 04/06/21 for hydronephrosis  · Hx of prior admissions with ZOYA and leaving AMA x2 on 04/07/21 and 05/11/21   · Suspect pre-renal secondary to decreased PO intake and persistent diarrhea   · CT abdomen/pelvis no signs on obstruction in the kidneys/ureter   · Avoid nephrotoxins, relative hypotension, and NSAIDs if possible  · Nephrology following; IV fluid titration per Nephrology team  · Monitor kidney function with morning labs      Unfortunately, the patient is not willing to stay for further IV fluids and lab work recheck  Instructed outpatient follow-up with nephrologist and repeat CMP as an outpatient  * Colitis, acute  Assessment & Plan  Background: Presented to the ED after friend found her covered in urine and feces at home  · She lives alone  States she had watery diarrhea x 5 days    · Remains afebrile with leukocytosis; denies any recent abx use, travel, uncooked meat, eating out   · CT abdomen/pelvis: new small bowel dilatation suspicious for obstruction and secondary signs of mild colitis   · C diff melissa cx negative suspect more partial obstruction symptoms   · Monitoring off of antibiotics  · Surgery consulted; see plan regarding SBO  · Diarrhea is improving  Patient with formed stool 7/8  · Gastroenterology evaluated; input appreciated  · If SBO resolves and persistent diarrhea will need colonoscopy   · Ova and parasites negative   · Occult negative   · IVF being adjusted by nephro; diet being advanced by surgery    Partial small bowel obstruction ([address] )  Assessment & Plan  · Now resolving; Consistent with incomplete small-bowel obstruction  · Repeat obstruction series from 7/4 with unchanged multiple dilated small-bowel loops consistent with small-bowel obstruction  · Follow-through small-bowel from 7/6 ordered confirming persistent partial small bowel obstruction     · Surgery following; input as noted below  · Status post rectal to rectal tube; since removed removed  · NG tube removed 7/7  · Advanced to full diet  Patient tolerating well  Surgical service signed off 7/9  Diabetes mellitus [facility])  Assessment & Plan  Lab Results   Component Value Date    HGBA1C 4 8 07/09/2021       Recent Labs     07/09/21  1555 07/09/21  2045 07/10/21  0658 07/10/21  1103   POCGLU 96 99 89 119       Blood Sugar Average: Last 72 hrs:  (P) 09 [id number]   Patient has documented history of DM type 2 however takes no medications for this  While NPO, patient had hypoglycemia  Will check Accu-Cheks regularly  On dextrose containing fluids  History of breast cancer  Assessment & Plan  · Diagnosed 2007 s/p left mastectomy   · Resume Femara as patient is going to test taking some sips of clears  · Of note, alk phos was elevated on admission 234; CT abdomen/pelvis reveals chronic gallstones and chronic distended gallbladder  Alk phos was elevated prior admission 05/11/21 317   · Consideration of bone metastasis- patient should f/u with imaging as outpatient  Updated incidental finding note and requested patient follow-up with PCP    · PT/OT consulted; recommending post acute rehab   · Patient refusing Hyperthyroidism  Assessment & Plan  · TSH is normal; restart her methimazole as patient tolerating p o      Severe sepsis (HCC)-resolved as of 7/4/2021  Assessment & Plan  · Resolved; Present on admission as evidence by tachycardia, leukocytosis, and lactic acidosis  · Suspected source multifactorial in the setting of colitis and UTI  · Remains afebrile    Acute cystitis with hematuria-resolved as of 7/4/2021  Assessment & Plan  · Present on admission as evidence by dysuria symptoms however is normally incontinent at home   · Symptoms resolved; status post 3 days of Rocephin ucx was negative tx due to significant pyuria     High anion gap metabolic acidosis-resolved as of 7/4/2021  Assessment & Plan  · Secondary to lactic acidosis and uremia from diarrhea  · Resolved status post initial bicarb IV infusion and then intermittent injections   · Nephrology following; input appreciated    Hypokalemia-resolved as of 7/4/2021  Assessment & Plan  · Resolved with supplementation    Medical Problems     Resolved Problems  Date Reviewed: 7/10/2021        Resolved    Hypokalemia 7/4/2021     Resolved by  Norris Campbell PA-C    High anion gap metabolic acidosis 0/9/2006     Resolved by  Norris Campbell PA-C    Acute cystitis with hematuria 7/4/2021     Resolved by  Norris Campbell PA-C    Severe sepsis ([address] ) 7/4/2021     Resolved by  Norris Campbell PA-C              Discharging Physician / Practitioner: Norris Campbell PA-C  PCP: Michele Goff MD  Admission Date:   Admission Orders (From admission, onward)     Ordered        06/29/21 1911  Inpatient Admission  Once                   Discharge Date: 07/10/21    Consultations During Hospital Stay:  · Nephrology  · Surgery  · Gastroenterology  · PT/OT  · Case management    Procedures Performed:   · none    Significant Findings / Test Results:   · Partial bowel obstruction  · Mild colitis  · Acute kidney injury    Incidental Findings:   · Elevated alk-phos     Test Results Pending at Discharge (will require follow up): · None     Outpatient Tests Requested:  · CMP    Complications:  None    Reason for Admission:  Bowel obstruction, sepsis, kidney injury    Hospital Course:   Jose Tomas is a 70 y o  female patient with past medical history of diverticulitis, breast cancer s/p left mastectomy on Femara, Iman's procedure with reversal, hyperthyroidism who originally presented to the hospital on 6/29/2021 due to persistent diarrhea  Upon presentation to the ED, patient met sepsis criteria with tachycardia, leukocytosis elevated lactic acidosis with suspected source UTI and colitis  CT scan showed new small bowel dilatation suspicion for obstruction and mild colitis  Patient was kept NPO and given ice chips  General surgery was consulted who elected for conservative non operative management  Patient was kept on IV fluids with Nephrology assistance for management given patient's acute kidney injury  Patient was given IV ceftriaxone  A small-bowel follow-through was done on 7/6 which at that time showed partial obstruction  Gradually, patient's bowel obstruction resolved  Patient's diarrhea also resolved  Her abdominal pain has resolved and she is having formed stool  Her diet has been advanced slowly  PT/OT worked with patient multiple times and recommended short-term rehab  Patient adamantly refused requesting [facility]  Patient's renal function has steadily been improving however is not back to baseline  She had mild hypokalemia with increasing mild hyperchloremia and nephrology elected to switch fluid from normal saline to lactated Ringers  Unfortunately, patient is not willing to stay any further for repeat lab check  She has a history of leaving AMA on prior admissions for ZOYA  Discussed risk versus benefit with patient however she would prefer to follow-up with her primary nephrologist   Will order repeat CMP      Of note, patient also had mildly elevated alk-phos which was also elevated and prior admissions  She has a history of breast cancer and may benefit to follow-up with PCP for screening  Updated incidental findings and PCP note  Please see above list of diagnoses and related plan for additional information  Condition at Discharge: fair    Discharge Day Visit / Exam:   Subjective:  After taking potassium pill, she got upset stomach and does not want to stay any longer  Wants to go home  Able to tolerate diet this morning  Having more formed stool although she has loose stool at baseline  No distal complaints  Not interested rehab  Not interested in further stay for monitoring of renal function  Vitals: Blood Pressure: 153/76 (07/10/21 0655)  Pulse: 78 (07/10/21 0655)  Temperature: 98 9 °F (37 2 °C) (07/10/21 0655)  Temp Source: Oral (07/09/21 2144)  Respirations: 16 (07/10/21 0655)  Height: 5' (152 4 cm) (07/02/21 1329)  Weight - Scale: 61 2 kg (134 lb 14 7 oz) (07/10/21 0527)  SpO2: 97 % (07/10/21 0655)  Exam:   Physical Exam  Vitals and nursing note reviewed  Exam conducted with a chaperone present  Constitutional:       General: She is not in acute distress  Appearance: She is well-developed  HENT:      Head: Normocephalic and atraumatic  Eyes:      Conjunctiva/sclera: Conjunctivae normal    Cardiovascular:      Rate and Rhythm: Normal rate and regular rhythm  Heart sounds: No murmur heard  Pulmonary:      Effort: Pulmonary effort is normal  No respiratory distress  Breath sounds: Normal breath sounds  Abdominal:      Palpations: Abdomen is soft  Tenderness: There is no abdominal tenderness  Musculoskeletal:      Cervical back: Neck supple  Skin:     General: Skin is warm and dry  Neurological:      Mental Status: She is alert  Discussion with Family: Updated  (daughter) via phone      Discharge instructions/Information to patient and family:   See after visit summary for information provided to patient and family  Provisions for Follow-Up Care:  See after visit summary for information related to follow-up care and any pertinent home health orders  Disposition:   Home with [facility] (Reminder: Complete face to face encounter)    Planned Readmission: none, however patient high readmission risk as she continues to leave contrary to medical recommendations  Discharge Statement:  I spent 60 minutes discharging the patient  This time was spent on the day of discharge  I had direct contact with the patient on the day of discharge  Greater than 50% of the total time was spent examining patient, answering all patient questions, arranging and discussing plan of care with patient as well as directly providing post-discharge instructions  Additional time then spent on discharge activities  Discharge Medications:  See after visit summary for reconciled discharge medications provided to patient and/or family        **Please Note: This note may have been constructed using a voice recognition system**

## 2021-07-10 NOTE — ASSESSMENT & PLAN NOTE
· Diagnosed 2007 s/p left mastectomy   · Resume Femara as patient is going to test taking some sips of clears  · Of note, alk phos was elevated on admission 234; CT abdomen/pelvis reveals chronic gallstones and chronic distended gallbladder  Alk phos was elevated prior admission 05/11/21 317   · Consideration of bone metastasis- patient should f/u with imaging as outpatient  Updated incidental finding note and requested patient follow-up with PCP    · PT/OT consulted; recommending post acute rehab   · Patient refusing

## 2021-07-10 NOTE — INCIDENTAL FINDINGS
The following findings require follow up:  Non-Radiographic finding   Finding: On CMP, Alkaline Phosphate (Alk Phos) levels are mildly elevated  Follow up required:  Please follow-up with your primary care provider to repeat CMP a determine if further workup is required     Follow up should be done within 4 week(s)    Please notify the following clinician to assist with the follow up:   Dr Cyrus Valdez

## 2021-07-10 NOTE — DISCHARGE INSTRUCTIONS
Acute Kidney Injury, Ambulatory Care   PLEASE COMPLETE LAB WORK AND FOLLOW-UP WITH YOUR PRIMARY KIDNEY DOCTOR (NEPHROLOGIST)  GENERAL INFORMATION:   Acute kidney injury  happens when your kidneys suddenly stop working correctly  Normally, the kidneys turn fluid, chemicals, and waste from your blood into urine  In acute kidney injury, your kidneys can no longer do this  In most cases, it is temporary, but it may become a chronic kidney condition  Common symptoms include the following:   · Decreased urination or dark-colored urine    · Swelling in your arms, legs, or feet     · Abdominal or low back pain    · Vomiting, diarrhea, or loss of appetite    · Fatigue     · Skin rash  Seek immediate care for the following symptoms:   · Heart beating faster than normal for you    · Sudden chest pain or trouble breathing    · Seizure  Treatment for acute kidney injury:  Treatment depends upon the cause of your acute kidney injury and how severe it is  Medicines may be given to increase blood flow to your kidneys and protect your kidneys  You may also need medicine to decrease inflammation in your kidneys  You may be given IV fluids to replenish fluids and help your heart pump blood  Dialysis may be needed to remove chemicals and waste from your blood when your kidneys cannot  Manage acute kidney injury:   · Manage other health conditions  Care for your diabetes, high blood pressure, or heart disease  These conditions increase your risk for acute kidney injury  · Talk to your healthcare provider before you take over-the-counter-medicine  NSAIDs, stomach medicine, or laxatives may harm your kidneys and increase your risk for acute kidney injury  Follow up with your healthcare provider as directed:  Write down your questions so you remember to ask them during your visits  CARE AGREEMENT:   You have the right to help plan your care  Learn about your health condition and how it may be treated   Discuss treatment options with your caregivers to decide what care you want to receive  You always have the right to refuse treatment  The above information is an  only  It is not intended as medical advice for individual conditions or treatments  Talk to your doctor, nurse or pharmacist before following any medical regimen to see if it is safe and effective for you  © 2014 [address] is for End User's use only and may not be sold, redistributed or otherwise used for commercial purposes  All illustrations and images included in CareNotes® are the copyrighted property of A D A M , Inc  or Carmelo Lipscomb

## 2021-07-10 NOTE — ASSESSMENT & PLAN NOTE
· Now resolving; Consistent with incomplete small-bowel obstruction  · Repeat obstruction series from 7/4 with unchanged multiple dilated small-bowel loops consistent with small-bowel obstruction  · Follow-through small-bowel from 7/6 ordered confirming persistent partial small bowel obstruction     · Surgery following; input as noted below  · Status post rectal to rectal tube; since removed removed  · NG tube removed 7/7  · Advanced to full diet  Patient tolerating well  Surgical service signed off 7/9

## 2021-07-10 NOTE — ASSESSMENT & PLAN NOTE
Lab Results   Component Value Date    HGBA1C 4 8 07/09/2021       Recent Labs     07/09/21  1555 07/09/21  2045 07/10/21  0658 07/10/21  1103   POCGLU 96 99 89 119       Blood Sugar Average: Last 72 hrs:  (P) 35 [id number]   Patient has documented history of DM type 2 however takes no medications for this  While NPO, patient had hypoglycemia  Will check Accu-Cheks regularly  On dextrose containing fluids

## 2021-07-12 ENCOUNTER — TELEPHONE (OUTPATIENT)
Dept: NEPHROLOGY | Facility: CLINIC | Age: 76
End: 2021-07-12

## 2021-07-12 NOTE — TELEPHONE ENCOUNTER
----- Message from Isamar Durand Olaf sent at 7/11/2021  6:51 AM EDT -----  Patient discharged from [address] [place] [address] Saturday  Saw for ZOYA unresolved  Please schedule for hospital follow-up  Lab work in chart

## 2021-07-23 ENCOUNTER — HOSPITAL ENCOUNTER (INPATIENT)
Facility: HOSPITAL | Age: 76
LOS: 12 days | Discharge: HOSPICE | DRG: 682 | End: 2021-08-04
Attending: EMERGENCY MEDICINE | Admitting: ANESTHESIOLOGY
Payer: MEDICARE

## 2021-07-23 ENCOUNTER — APPOINTMENT (EMERGENCY)
Dept: CT IMAGING | Facility: HOSPITAL | Age: 76
DRG: 682 | End: 2021-07-23
Payer: MEDICARE

## 2021-07-23 ENCOUNTER — APPOINTMENT (EMERGENCY)
Dept: RADIOLOGY | Facility: HOSPITAL | Age: 76
DRG: 682 | End: 2021-07-23
Payer: MEDICARE

## 2021-07-23 DIAGNOSIS — S40.012A CONTUSION OF LEFT SHOULDER, INITIAL ENCOUNTER: ICD-10-CM

## 2021-07-23 DIAGNOSIS — N17.9 ACUTE RENAL FAILURE SUPERIMPOSED ON CHRONIC KIDNEY DISEASE, UNSPECIFIED CKD STAGE, UNSPECIFIED ACUTE RENAL FAILURE TYPE (HCC): ICD-10-CM

## 2021-07-23 DIAGNOSIS — A41.9 SEPSIS (HCC): ICD-10-CM

## 2021-07-23 DIAGNOSIS — E87.2 HIGH ANION GAP METABOLIC ACIDOSIS: ICD-10-CM

## 2021-07-23 DIAGNOSIS — J96.01 ACUTE RESPIRATORY FAILURE WITH HYPOXIA (HCC): ICD-10-CM

## 2021-07-23 DIAGNOSIS — G93.41 ACUTE METABOLIC ENCEPHALOPATHY: ICD-10-CM

## 2021-07-23 DIAGNOSIS — Z51.5 END OF LIFE CARE: ICD-10-CM

## 2021-07-23 DIAGNOSIS — S70.02XA CONTUSION OF LEFT HIP, INITIAL ENCOUNTER: ICD-10-CM

## 2021-07-23 DIAGNOSIS — R74.8 ELEVATED ALKALINE PHOSPHATASE LEVEL: ICD-10-CM

## 2021-07-23 DIAGNOSIS — N17.9 ACUTE ON CHRONIC RENAL FAILURE (HCC): ICD-10-CM

## 2021-07-23 DIAGNOSIS — K74.60 CIRRHOSIS OF LIVER (HCC): ICD-10-CM

## 2021-07-23 DIAGNOSIS — N39.0 URINARY TRACT INFECTION: ICD-10-CM

## 2021-07-23 DIAGNOSIS — N18.9 ACUTE ON CHRONIC RENAL FAILURE (HCC): ICD-10-CM

## 2021-07-23 DIAGNOSIS — I95.2 HYPOTENSION DUE TO DRUGS: ICD-10-CM

## 2021-07-23 DIAGNOSIS — N13.30 HYDRONEPHROSIS, UNSPECIFIED HYDRONEPHROSIS TYPE: ICD-10-CM

## 2021-07-23 DIAGNOSIS — I21.4 NSTEMI (NON-ST ELEVATED MYOCARDIAL INFARCTION) (HCC): ICD-10-CM

## 2021-07-23 DIAGNOSIS — R41.82 ALTERED MENTAL STATUS: Primary | ICD-10-CM

## 2021-07-23 DIAGNOSIS — E87.2 METABOLIC ACIDOSIS: ICD-10-CM

## 2021-07-23 DIAGNOSIS — K83.8 COMMON BILE DUCT DILATION: ICD-10-CM

## 2021-07-23 DIAGNOSIS — G93.40 ENCEPHALOPATHY: ICD-10-CM

## 2021-07-23 DIAGNOSIS — N18.9 ACUTE RENAL FAILURE SUPERIMPOSED ON CHRONIC KIDNEY DISEASE, UNSPECIFIED CKD STAGE, UNSPECIFIED ACUTE RENAL FAILURE TYPE (HCC): ICD-10-CM

## 2021-07-23 DIAGNOSIS — J96.90 RESPIRATORY FAILURE (HCC): ICD-10-CM

## 2021-07-23 PROBLEM — K52.9 ENTERITIS: Status: ACTIVE | Noted: 2021-07-23

## 2021-07-23 PROBLEM — R77.8 ELEVATED TROPONIN I LEVEL: Status: ACTIVE | Noted: 2021-07-23

## 2021-07-23 PROBLEM — E11.9 DIABETES MELLITUS (HCC): Status: RESOLVED | Noted: 2021-04-01 | Resolved: 2021-07-23

## 2021-07-23 PROBLEM — D72.829 LEUKOCYTOSIS: Status: ACTIVE | Noted: 2021-07-23

## 2021-07-23 PROBLEM — R00.0 SINUS TACHYCARDIA: Status: ACTIVE | Noted: 2021-07-23

## 2021-07-23 PROBLEM — Z91.19 NON-COMPLIANCE: Status: ACTIVE | Noted: 2021-07-23

## 2021-07-23 LAB
ALBUMIN SERPL BCP-MCNC: 2.4 G/DL (ref 3.5–5)
ALP SERPL-CCNC: 189 U/L (ref 46–116)
ALT SERPL W P-5'-P-CCNC: 24 U/L (ref 12–78)
AMMONIA PLAS-SCNC: 56 UMOL/L (ref 11–35)
AMPHETAMINES SERPL QL SCN: NEGATIVE
ANION GAP SERPL CALCULATED.3IONS-SCNC: 18 MMOL/L (ref 4–13)
ANION GAP SERPL CALCULATED.3IONS-SCNC: 23 MMOL/L (ref 4–13)
APTT PPP: 31 SECONDS (ref 23–37)
ARTERIAL PATENCY WRIST A: YES
AST SERPL W P-5'-P-CCNC: 59 U/L (ref 5–45)
BACTERIA UR QL AUTO: ABNORMAL /HPF
BARBITURATES UR QL: NEGATIVE
BASE EXCESS BLDA CALC-SCNC: -10 MMOL/L
BASE EXCESS BLDA CALC-SCNC: -11.3 MMOL/L
BASE EXCESS BLDA CALC-SCNC: -14.2 MMOL/L
BASE EXCESS BLDA CALC-SCNC: -9.5 MMOL/L
BASOPHILS # BLD AUTO: 0.04 THOUSANDS/ΜL (ref 0–0.1)
BASOPHILS NFR BLD AUTO: 0 % (ref 0–1)
BENZODIAZ UR QL: NEGATIVE
BILIRUB SERPL-MCNC: 0.62 MG/DL (ref 0.2–1)
BILIRUB UR QL STRIP: NEGATIVE
BUN SERPL-MCNC: 30 MG/DL (ref 5–25)
BUN SERPL-MCNC: 31 MG/DL (ref 5–25)
CALCIUM ALBUM COR SERPL-MCNC: 10 MG/DL (ref 8.3–10.1)
CALCIUM SERPL-MCNC: 8.5 MG/DL (ref 8.3–10.1)
CALCIUM SERPL-MCNC: 8.7 MG/DL (ref 8.3–10.1)
CHLORIDE SERPL-SCNC: 108 MMOL/L (ref 100–108)
CHLORIDE SERPL-SCNC: 109 MMOL/L (ref 100–108)
CHLORIDE UR-SCNC: 112 MMOL/L
CK MB SERPL-MCNC: 11.2 NG/ML (ref 0–5)
CK MB SERPL-MCNC: 13.9 NG/ML (ref 0–5)
CK MB SERPL-MCNC: 2.4 % (ref 0–2.5)
CK MB SERPL-MCNC: 3.4 % (ref 0–2.5)
CK SERPL-CCNC: 408 U/L (ref 26–192)
CK SERPL-CCNC: 470 U/L (ref 26–192)
CLARITY UR: ABNORMAL
CO2 SERPL-SCNC: 11 MMOL/L (ref 21–32)
CO2 SERPL-SCNC: 13 MMOL/L (ref 21–32)
COCAINE UR QL: NEGATIVE
COLOR UR: YELLOW
CREAT SERPL-MCNC: 3.45 MG/DL (ref 0.6–1.3)
CREAT SERPL-MCNC: 4.01 MG/DL (ref 0.6–1.3)
CREAT UR-MCNC: 33.9 MG/DL
EOSINOPHIL # BLD AUTO: 0.15 THOUSAND/ΜL (ref 0–0.61)
EOSINOPHIL NFR BLD AUTO: 1 % (ref 0–6)
ERYTHROCYTE [DISTWIDTH] IN BLOOD BY AUTOMATED COUNT: 15.1 % (ref 11.6–15.1)
GFR SERPL CREATININE-BSD FRML MDRD: 10 ML/MIN/1.73SQ M
GFR SERPL CREATININE-BSD FRML MDRD: 13 ML/MIN/1.73SQ M
GGT SERPL-CCNC: 198 U/L (ref 5–85)
GLUCOSE SERPL-MCNC: 153 MG/DL (ref 65–140)
GLUCOSE SERPL-MCNC: 164 MG/DL (ref 65–140)
GLUCOSE UR STRIP-MCNC: NEGATIVE MG/DL
HCO3 BLDA-SCNC: 10.9 MMOL/L (ref 22–28)
HCO3 BLDA-SCNC: 13.9 MMOL/L (ref 22–28)
HCO3 BLDA-SCNC: 14.1 MMOL/L (ref 22–28)
HCO3 BLDA-SCNC: 15 MMOL/L (ref 22–28)
HCT VFR BLD AUTO: 36.5 % (ref 34.8–46.1)
HGB BLD-MCNC: 12.2 G/DL (ref 11.5–15.4)
HGB UR QL STRIP.AUTO: ABNORMAL
HYALINE CASTS #/AREA URNS LPF: ABNORMAL /LPF
IMM GRANULOCYTES # BLD AUTO: 0.19 THOUSAND/UL (ref 0–0.2)
IMM GRANULOCYTES NFR BLD AUTO: 1 % (ref 0–2)
INR PPP: 1.36 (ref 0.84–1.19)
KETONES UR STRIP-MCNC: NEGATIVE MG/DL
LACTATE SERPL-SCNC: 2.8 MMOL/L (ref 0.5–2)
LACTATE SERPL-SCNC: 4.3 MMOL/L (ref 0.5–2)
LACTATE SERPL-SCNC: 4.4 MMOL/L (ref 0.5–2)
LACTATE SERPL-SCNC: 6.6 MMOL/L (ref 0.5–2)
LACTATE SERPL-SCNC: 8.1 MMOL/L (ref 0.5–2)
LEUKOCYTE ESTERASE UR QL STRIP: ABNORMAL
LYMPHOCYTES # BLD AUTO: 0.64 THOUSANDS/ΜL (ref 0.6–4.47)
LYMPHOCYTES NFR BLD AUTO: 4 % (ref 14–44)
MAGNESIUM SERPL-MCNC: 1.4 MG/DL (ref 1.6–2.6)
MCH RBC QN AUTO: 31.4 PG (ref 26.8–34.3)
MCHC RBC AUTO-ENTMCNC: 33.4 G/DL (ref 31.4–37.4)
MCV RBC AUTO: 94 FL (ref 82–98)
METHADONE UR QL: NEGATIVE
MONOCYTES # BLD AUTO: 0.96 THOUSAND/ΜL (ref 0.17–1.22)
MONOCYTES NFR BLD AUTO: 6 % (ref 4–12)
NEUTROPHILS # BLD AUTO: 14.6 THOUSANDS/ΜL (ref 1.85–7.62)
NEUTS SEG NFR BLD AUTO: 88 % (ref 43–75)
NITRITE UR QL STRIP: NEGATIVE
NON-SQ EPI CELLS URNS QL MICRO: ABNORMAL /HPF
NRBC BLD AUTO-RTO: 0 /100 WBCS
NT-PROBNP SERPL-MCNC: 4754 PG/ML
O2 CT BLDA-SCNC: 15.8 ML/DL (ref 16–23)
O2 CT BLDA-SCNC: 16.3 ML/DL (ref 16–23)
O2 CT BLDA-SCNC: 17.3 ML/DL (ref 16–23)
O2 CT BLDA-SCNC: 18 ML/DL (ref 16–23)
OPIATES UR QL SCN: NEGATIVE
OXYCODONE+OXYMORPHONE UR QL SCN: NEGATIVE
OXYHGB MFR BLDA: 98.2 % (ref 94–97)
OXYHGB MFR BLDA: 98.4 % (ref 94–97)
OXYHGB MFR BLDA: 98.4 % (ref 94–97)
OXYHGB MFR BLDA: 98.6 % (ref 94–97)
PCO2 BLDA: 24.2 MM HG (ref 36–44)
PCO2 BLDA: 25.2 MM HG (ref 36–44)
PCO2 BLDA: 28.4 MM HG (ref 36–44)
PCO2 BLDA: 30.4 MM HG (ref 36–44)
PCP UR QL: NEGATIVE
PH BLDA: 7.27 [PH] (ref 7.35–7.45)
PH BLDA: 7.28 [PH] (ref 7.35–7.45)
PH BLDA: 7.34 [PH] (ref 7.35–7.45)
PH BLDA: 7.36 [PH] (ref 7.35–7.45)
PH UR STRIP.AUTO: 5.5 [PH]
PHOSPHATE SERPL-MCNC: 4.5 MG/DL (ref 2.3–4.1)
PLATELET # BLD AUTO: 206 THOUSANDS/UL (ref 149–390)
PLATELET # BLD AUTO: 233 THOUSANDS/UL (ref 149–390)
PMV BLD AUTO: 12 FL (ref 8.9–12.7)
PMV BLD AUTO: 12.1 FL (ref 8.9–12.7)
PO2 BLDA: 155.8 MM HG (ref 75–129)
PO2 BLDA: 172.4 MM HG (ref 75–129)
PO2 BLDA: 176.2 MM HG (ref 75–129)
PO2 BLDA: 205.7 MM HG (ref 75–129)
POTASSIUM SERPL-SCNC: 3.8 MMOL/L (ref 3.5–5.3)
POTASSIUM SERPL-SCNC: 4 MMOL/L (ref 3.5–5.3)
POTASSIUM UR-SCNC: 15.8 MMOL/L
PROCALCITONIN SERPL-MCNC: 0.14 NG/ML
PROT SERPL-MCNC: 7.9 G/DL (ref 6.4–8.2)
PROT UR STRIP-MCNC: ABNORMAL MG/DL
PROTHROMBIN TIME: 16.5 SECONDS (ref 11.6–14.5)
RBC # BLD AUTO: 3.88 MILLION/UL (ref 3.81–5.12)
RBC #/AREA URNS AUTO: ABNORMAL /HPF
SARS-COV-2 RNA RESP QL NAA+PROBE: NEGATIVE
SODIUM 24H UR-SCNC: 101 MOL/L
SODIUM SERPL-SCNC: 139 MMOL/L (ref 136–145)
SODIUM SERPL-SCNC: 143 MMOL/L (ref 136–145)
SP GR UR STRIP.AUTO: 1.01 (ref 1–1.03)
SPECIMEN SOURCE: ABNORMAL
T4 FREE SERPL-MCNC: 1.24 NG/DL (ref 0.76–1.46)
THC UR QL: NEGATIVE
TROPONIN I SERPL-MCNC: 3.47 NG/ML
TROPONIN I SERPL-MCNC: 3.8 NG/ML
TROPONIN I SERPL-MCNC: 5.14 NG/ML
TROPONIN I SERPL-MCNC: 5.92 NG/ML
TSH SERPL DL<=0.05 MIU/L-ACNC: 5.55 UIU/ML (ref 0.36–3.74)
UROBILINOGEN UR QL STRIP.AUTO: 0.2 E.U./DL
WBC # BLD AUTO: 16.58 THOUSAND/UL (ref 4.31–10.16)
WBC #/AREA URNS AUTO: ABNORMAL /HPF

## 2021-07-23 PROCEDURE — 83605 ASSAY OF LACTIC ACID: CPT | Performed by: NURSE PRACTITIONER

## 2021-07-23 PROCEDURE — 83605 ASSAY OF LACTIC ACID: CPT | Performed by: PHYSICIAN ASSISTANT

## 2021-07-23 PROCEDURE — 99292 CRITICAL CARE ADDL 30 MIN: CPT | Performed by: ANESTHESIOLOGY

## 2021-07-23 PROCEDURE — 87086 URINE CULTURE/COLONY COUNT: CPT | Performed by: EMERGENCY MEDICINE

## 2021-07-23 PROCEDURE — 36620 INSERTION CATHETER ARTERY: CPT | Performed by: NURSE PRACTITIONER

## 2021-07-23 PROCEDURE — 94002 VENT MGMT INPAT INIT DAY: CPT

## 2021-07-23 PROCEDURE — 03HY32Z INSERTION OF MONITORING DEVICE INTO UPPER ARTERY, PERCUTANEOUS APPROACH: ICD-10-PCS | Performed by: ANESTHESIOLOGY

## 2021-07-23 PROCEDURE — 82948 REAGENT STRIP/BLOOD GLUCOSE: CPT

## 2021-07-23 PROCEDURE — 84145 PROCALCITONIN (PCT): CPT | Performed by: EMERGENCY MEDICINE

## 2021-07-23 PROCEDURE — 84100 ASSAY OF PHOSPHORUS: CPT | Performed by: PHYSICIAN ASSISTANT

## 2021-07-23 PROCEDURE — 96365 THER/PROPH/DIAG IV INF INIT: CPT

## 2021-07-23 PROCEDURE — 99291 CRITICAL CARE FIRST HOUR: CPT | Performed by: EMERGENCY MEDICINE

## 2021-07-23 PROCEDURE — 70450 CT HEAD/BRAIN W/O DYE: CPT

## 2021-07-23 PROCEDURE — 84484 ASSAY OF TROPONIN QUANT: CPT | Performed by: EMERGENCY MEDICINE

## 2021-07-23 PROCEDURE — 83880 ASSAY OF NATRIURETIC PEPTIDE: CPT | Performed by: EMERGENCY MEDICINE

## 2021-07-23 PROCEDURE — NC001 PR NO CHARGE: Performed by: NURSE PRACTITIONER

## 2021-07-23 PROCEDURE — 85610 PROTHROMBIN TIME: CPT | Performed by: EMERGENCY MEDICINE

## 2021-07-23 PROCEDURE — 71045 X-RAY EXAM CHEST 1 VIEW: CPT

## 2021-07-23 PROCEDURE — 84443 ASSAY THYROID STIM HORMONE: CPT | Performed by: PHYSICIAN ASSISTANT

## 2021-07-23 PROCEDURE — 82553 CREATINE MB FRACTION: CPT | Performed by: PHYSICIAN ASSISTANT

## 2021-07-23 PROCEDURE — U0005 INFEC AGEN DETEC AMPLI PROBE: HCPCS | Performed by: EMERGENCY MEDICINE

## 2021-07-23 PROCEDURE — 82550 ASSAY OF CK (CPK): CPT | Performed by: PHYSICIAN ASSISTANT

## 2021-07-23 PROCEDURE — U0003 INFECTIOUS AGENT DETECTION BY NUCLEIC ACID (DNA OR RNA); SEVERE ACUTE RESPIRATORY SYNDROME CORONAVIRUS 2 (SARS-COV-2) (CORONAVIRUS DISEASE [COVID-19]), AMPLIFIED PROBE TECHNIQUE, MAKING USE OF HIGH THROUGHPUT TECHNOLOGIES AS DESCRIBED BY CMS-2020-01-R: HCPCS | Performed by: EMERGENCY MEDICINE

## 2021-07-23 PROCEDURE — 82550 ASSAY OF CK (CPK): CPT | Performed by: EMERGENCY MEDICINE

## 2021-07-23 PROCEDURE — 71260 CT THORAX DX C+: CPT

## 2021-07-23 PROCEDURE — 31500 INSERT EMERGENCY AIRWAY: CPT | Performed by: EMERGENCY MEDICINE

## 2021-07-23 PROCEDURE — 82553 CREATINE MB FRACTION: CPT | Performed by: EMERGENCY MEDICINE

## 2021-07-23 PROCEDURE — 72170 X-RAY EXAM OF PELVIS: CPT

## 2021-07-23 PROCEDURE — 94760 N-INVAS EAR/PLS OXIMETRY 1: CPT

## 2021-07-23 PROCEDURE — 4A133J1 MONITORING OF ARTERIAL PULSE, PERIPHERAL, PERCUTANEOUS APPROACH: ICD-10-PCS | Performed by: ANESTHESIOLOGY

## 2021-07-23 PROCEDURE — 84484 ASSAY OF TROPONIN QUANT: CPT | Performed by: PHYSICIAN ASSISTANT

## 2021-07-23 PROCEDURE — 36415 COLL VENOUS BLD VENIPUNCTURE: CPT | Performed by: EMERGENCY MEDICINE

## 2021-07-23 PROCEDURE — 94664 DEMO&/EVAL PT USE INHALER: CPT

## 2021-07-23 PROCEDURE — 93005 ELECTROCARDIOGRAM TRACING: CPT

## 2021-07-23 PROCEDURE — 72125 CT NECK SPINE W/O DYE: CPT

## 2021-07-23 PROCEDURE — 74177 CT ABD & PELVIS W/CONTRAST: CPT

## 2021-07-23 PROCEDURE — 84133 ASSAY OF URINE POTASSIUM: CPT | Performed by: PHYSICIAN ASSISTANT

## 2021-07-23 PROCEDURE — 84300 ASSAY OF URINE SODIUM: CPT | Performed by: PHYSICIAN ASSISTANT

## 2021-07-23 PROCEDURE — 82977 ASSAY OF GGT: CPT | Performed by: PHYSICIAN ASSISTANT

## 2021-07-23 PROCEDURE — 84484 ASSAY OF TROPONIN QUANT: CPT | Performed by: NURSE PRACTITIONER

## 2021-07-23 PROCEDURE — 82805 BLOOD GASES W/O2 SATURATION: CPT | Performed by: NURSE PRACTITIONER

## 2021-07-23 PROCEDURE — 84439 ASSAY OF FREE THYROXINE: CPT | Performed by: PHYSICIAN ASSISTANT

## 2021-07-23 PROCEDURE — 0BH18EZ INSERTION OF ENDOTRACHEAL AIRWAY INTO TRACHEA, VIA NATURAL OR ARTIFICIAL OPENING ENDOSCOPIC: ICD-10-PCS | Performed by: EMERGENCY MEDICINE

## 2021-07-23 PROCEDURE — 80053 COMPREHEN METABOLIC PANEL: CPT | Performed by: EMERGENCY MEDICINE

## 2021-07-23 PROCEDURE — 73030 X-RAY EXAM OF SHOULDER: CPT

## 2021-07-23 PROCEDURE — 96367 TX/PROPH/DG ADDL SEQ IV INF: CPT

## 2021-07-23 PROCEDURE — 81001 URINALYSIS AUTO W/SCOPE: CPT | Performed by: EMERGENCY MEDICINE

## 2021-07-23 PROCEDURE — 82436 ASSAY OF URINE CHLORIDE: CPT | Performed by: PHYSICIAN ASSISTANT

## 2021-07-23 PROCEDURE — 85049 AUTOMATED PLATELET COUNT: CPT | Performed by: PHYSICIAN ASSISTANT

## 2021-07-23 PROCEDURE — 80307 DRUG TEST PRSMV CHEM ANLYZR: CPT | Performed by: PHYSICIAN ASSISTANT

## 2021-07-23 PROCEDURE — 80048 BASIC METABOLIC PNL TOTAL CA: CPT | Performed by: PHYSICIAN ASSISTANT

## 2021-07-23 PROCEDURE — 5A1945Z RESPIRATORY VENTILATION, 24-96 CONSECUTIVE HOURS: ICD-10-PCS | Performed by: EMERGENCY MEDICINE

## 2021-07-23 PROCEDURE — 83735 ASSAY OF MAGNESIUM: CPT | Performed by: PHYSICIAN ASSISTANT

## 2021-07-23 PROCEDURE — 99291 CRITICAL CARE FIRST HOUR: CPT

## 2021-07-23 PROCEDURE — 87040 BLOOD CULTURE FOR BACTERIA: CPT | Performed by: EMERGENCY MEDICINE

## 2021-07-23 PROCEDURE — 4A133B1 MONITORING OF ARTERIAL PRESSURE, PERIPHERAL, PERCUTANEOUS APPROACH: ICD-10-PCS | Performed by: ANESTHESIOLOGY

## 2021-07-23 PROCEDURE — NC001 PR NO CHARGE: Performed by: ANESTHESIOLOGY

## 2021-07-23 PROCEDURE — 85025 COMPLETE CBC W/AUTO DIFF WBC: CPT | Performed by: EMERGENCY MEDICINE

## 2021-07-23 PROCEDURE — 82570 ASSAY OF URINE CREATININE: CPT | Performed by: PHYSICIAN ASSISTANT

## 2021-07-23 PROCEDURE — 85730 THROMBOPLASTIN TIME PARTIAL: CPT | Performed by: EMERGENCY MEDICINE

## 2021-07-23 PROCEDURE — 82805 BLOOD GASES W/O2 SATURATION: CPT | Performed by: EMERGENCY MEDICINE

## 2021-07-23 PROCEDURE — 99291 CRITICAL CARE FIRST HOUR: CPT | Performed by: PHYSICIAN ASSISTANT

## 2021-07-23 PROCEDURE — 36600 WITHDRAWAL OF ARTERIAL BLOOD: CPT

## 2021-07-23 PROCEDURE — 82140 ASSAY OF AMMONIA: CPT | Performed by: PHYSICIAN ASSISTANT

## 2021-07-23 PROCEDURE — 83605 ASSAY OF LACTIC ACID: CPT | Performed by: EMERGENCY MEDICINE

## 2021-07-23 RX ORDER — SODIUM CHLORIDE, SODIUM GLUCONATE, SODIUM ACETATE, POTASSIUM CHLORIDE, MAGNESIUM CHLORIDE, SODIUM PHOSPHATE, DIBASIC, AND POTASSIUM PHOSPHATE .53; .5; .37; .037; .03; .012; .00082 G/100ML; G/100ML; G/100ML; G/100ML; G/100ML; G/100ML; G/100ML
500 INJECTION, SOLUTION INTRAVENOUS ONCE
Status: COMPLETED | OUTPATIENT
Start: 2021-07-23 | End: 2021-07-23

## 2021-07-23 RX ORDER — ACETAMINOPHEN 160 MG/5ML
650 SUSPENSION, ORAL (FINAL DOSE FORM) ORAL EVERY 6 HOURS PRN
Status: DISCONTINUED | OUTPATIENT
Start: 2021-07-23 | End: 2021-08-04 | Stop reason: HOSPADM

## 2021-07-23 RX ORDER — FENTANYL CITRATE-0.9 % NACL/PF 10 MCG/ML
50 PLASTIC BAG, INJECTION (ML) INTRAVENOUS CONTINUOUS
Status: DISCONTINUED | OUTPATIENT
Start: 2021-07-23 | End: 2021-07-24

## 2021-07-23 RX ORDER — POTASSIUM CHLORIDE 20MEQ/15ML
20 LIQUID (ML) ORAL ONCE
Status: COMPLETED | OUTPATIENT
Start: 2021-07-23 | End: 2021-07-23

## 2021-07-23 RX ORDER — SODIUM CHLORIDE, SODIUM GLUCONATE, SODIUM ACETATE, POTASSIUM CHLORIDE, MAGNESIUM CHLORIDE, SODIUM PHOSPHATE, DIBASIC, AND POTASSIUM PHOSPHATE .53; .5; .37; .037; .03; .012; .00082 G/100ML; G/100ML; G/100ML; G/100ML; G/100ML; G/100ML; G/100ML
500 INJECTION, SOLUTION INTRAVENOUS ONCE
Status: COMPLETED | OUTPATIENT
Start: 2021-07-23 | End: 2021-07-24

## 2021-07-23 RX ORDER — HEPARIN SODIUM 5000 [USP'U]/ML
5000 INJECTION, SOLUTION INTRAVENOUS; SUBCUTANEOUS EVERY 8 HOURS SCHEDULED
Status: DISCONTINUED | OUTPATIENT
Start: 2021-07-23 | End: 2021-07-25

## 2021-07-23 RX ORDER — ROCURONIUM BROMIDE 10 MG/ML
INJECTION, SOLUTION INTRAVENOUS
Status: COMPLETED
Start: 2021-07-23 | End: 2021-07-23

## 2021-07-23 RX ORDER — FENTANYL CITRATE 50 UG/ML
50 INJECTION, SOLUTION INTRAMUSCULAR; INTRAVENOUS
Status: DISCONTINUED | OUTPATIENT
Start: 2021-07-23 | End: 2021-07-25

## 2021-07-23 RX ORDER — CEFTRIAXONE 2 G/50ML
2000 INJECTION, SOLUTION INTRAVENOUS ONCE
Status: COMPLETED | OUTPATIENT
Start: 2021-07-23 | End: 2021-07-23

## 2021-07-23 RX ORDER — DEXTROSE MONOHYDRATE 25 G/50ML
25 INJECTION, SOLUTION INTRAVENOUS ONCE
Status: COMPLETED | OUTPATIENT
Start: 2021-07-23 | End: 2021-07-23

## 2021-07-23 RX ORDER — NYSTATIN 100000 [USP'U]/G
POWDER TOPICAL 2 TIMES DAILY
Status: DISCONTINUED | OUTPATIENT
Start: 2021-07-23 | End: 2021-07-26

## 2021-07-23 RX ORDER — SODIUM CHLORIDE, SODIUM GLUCONATE, SODIUM ACETATE, POTASSIUM CHLORIDE, MAGNESIUM CHLORIDE, SODIUM PHOSPHATE, DIBASIC, AND POTASSIUM PHOSPHATE .53; .5; .37; .037; .03; .012; .00082 G/100ML; G/100ML; G/100ML; G/100ML; G/100ML; G/100ML; G/100ML
50 INJECTION, SOLUTION INTRAVENOUS CONTINUOUS
Status: DISCONTINUED | OUTPATIENT
Start: 2021-07-23 | End: 2021-07-24

## 2021-07-23 RX ORDER — FENTANYL CITRATE-0.9 % NACL/PF 10 MCG/ML
50 PLASTIC BAG, INJECTION (ML) INTRAVENOUS CONTINUOUS
Status: DISCONTINUED | OUTPATIENT
Start: 2021-07-23 | End: 2021-07-23

## 2021-07-23 RX ORDER — FENTANYL CITRATE 50 UG/ML
50 INJECTION, SOLUTION INTRAMUSCULAR; INTRAVENOUS
Status: DISCONTINUED | OUTPATIENT
Start: 2021-07-23 | End: 2021-07-23 | Stop reason: CLARIF

## 2021-07-23 RX ORDER — CHLORHEXIDINE GLUCONATE 0.12 MG/ML
15 RINSE ORAL EVERY 12 HOURS SCHEDULED
Status: DISCONTINUED | OUTPATIENT
Start: 2021-07-23 | End: 2021-07-23

## 2021-07-23 RX ORDER — MAGNESIUM SULFATE HEPTAHYDRATE 40 MG/ML
4 INJECTION, SOLUTION INTRAVENOUS ONCE
Status: COMPLETED | OUTPATIENT
Start: 2021-07-23 | End: 2021-07-23

## 2021-07-23 RX ORDER — CEFEPIME HYDROCHLORIDE 1 G/50ML
1000 INJECTION, SOLUTION INTRAVENOUS EVERY 24 HOURS
Status: DISCONTINUED | OUTPATIENT
Start: 2021-07-23 | End: 2021-07-26

## 2021-07-23 RX ORDER — LIDOCAINE HYDROCHLORIDE 10 MG/ML
1 INJECTION, SOLUTION EPIDURAL; INFILTRATION; INTRACAUDAL; PERINEURAL ONCE
Status: COMPLETED | OUTPATIENT
Start: 2021-07-23 | End: 2021-07-23

## 2021-07-23 RX ORDER — FAMOTIDINE 40 MG/5ML
10 POWDER, FOR SUSPENSION ORAL DAILY
Status: DISCONTINUED | OUTPATIENT
Start: 2021-07-23 | End: 2021-07-26

## 2021-07-23 RX ORDER — ETOMIDATE 2 MG/ML
20 INJECTION INTRAVENOUS ONCE
Status: COMPLETED | OUTPATIENT
Start: 2021-07-23 | End: 2021-07-23

## 2021-07-23 RX ADMIN — FENTANYL CITRATE 50 MCG: 50 INJECTION INTRAMUSCULAR; INTRAVENOUS at 21:52

## 2021-07-23 RX ADMIN — ETOMIDATE 20 MG: 20 INJECTION, SOLUTION INTRAVENOUS at 12:10

## 2021-07-23 RX ADMIN — SODIUM CHLORIDE, SODIUM GLUCONATE, SODIUM ACETATE, POTASSIUM CHLORIDE, MAGNESIUM CHLORIDE, SODIUM PHOSPHATE, DIBASIC, AND POTASSIUM PHOSPHATE 500 ML: .53; .5; .37; .037; .03; .012; .00082 INJECTION, SOLUTION INTRAVENOUS at 23:50

## 2021-07-23 RX ADMIN — NYSTATIN 2 APPLICATION: 100000 POWDER TOPICAL at 18:12

## 2021-07-23 RX ADMIN — POTASSIUM CHLORIDE 20 MEQ: 20 SOLUTION ORAL at 18:02

## 2021-07-23 RX ADMIN — METRONIDAZOLE 500 MG: 500 INJECTION, SOLUTION INTRAVENOUS at 23:29

## 2021-07-23 RX ADMIN — SODIUM CHLORIDE, SODIUM GLUCONATE, SODIUM ACETATE, POTASSIUM CHLORIDE, MAGNESIUM CHLORIDE, SODIUM PHOSPHATE, DIBASIC, AND POTASSIUM PHOSPHATE 500 ML: .53; .5; .37; .037; .03; .012; .00082 INJECTION, SOLUTION INTRAVENOUS at 20:40

## 2021-07-23 RX ADMIN — SODIUM CHLORIDE, SODIUM LACTATE, POTASSIUM CHLORIDE, AND CALCIUM CHLORIDE 1000 ML: .6; .31; .03; .02 INJECTION, SOLUTION INTRAVENOUS at 13:58

## 2021-07-23 RX ADMIN — FENTANYL CITRATE 50 MCG: 50 INJECTION INTRAMUSCULAR; INTRAVENOUS at 18:02

## 2021-07-23 RX ADMIN — FAMOTIDINE 10 MG: 40 POWDER, FOR SUSPENSION ORAL at 18:11

## 2021-07-23 RX ADMIN — FENTANYL CITRATE 50 MCG: 50 INJECTION INTRAMUSCULAR; INTRAVENOUS at 23:07

## 2021-07-23 RX ADMIN — HEPARIN SODIUM 5000 UNITS: 5000 INJECTION INTRAVENOUS; SUBCUTANEOUS at 21:52

## 2021-07-23 RX ADMIN — HEPARIN SODIUM 5000 UNITS: 5000 INJECTION INTRAVENOUS; SUBCUTANEOUS at 16:48

## 2021-07-23 RX ADMIN — FENTANYL CITRATE 50 MCG: 50 INJECTION INTRAMUSCULAR; INTRAVENOUS at 19:50

## 2021-07-23 RX ADMIN — SODIUM CHLORIDE, SODIUM GLUCONATE, SODIUM ACETATE, POTASSIUM CHLORIDE, MAGNESIUM CHLORIDE, SODIUM PHOSPHATE, DIBASIC, AND POTASSIUM PHOSPHATE 125 ML/HR: .53; .5; .37; .037; .03; .012; .00082 INJECTION, SOLUTION INTRAVENOUS at 16:17

## 2021-07-23 RX ADMIN — MAGNESIUM SULFATE HEPTAHYDRATE 4 G: 40 INJECTION, SOLUTION INTRAVENOUS at 18:12

## 2021-07-23 RX ADMIN — CEFTRIAXONE 2000 MG: 2 INJECTION, SOLUTION INTRAVENOUS at 13:30

## 2021-07-23 RX ADMIN — Medication 50 MCG/HR: at 17:35

## 2021-07-23 RX ADMIN — SODIUM CHLORIDE 0.2 MCG/KG/HR: 9 INJECTION, SOLUTION INTRAVENOUS at 22:43

## 2021-07-23 RX ADMIN — ROCURONIUM BROMIDE 50 MG: 50 INJECTION, SOLUTION INTRAVENOUS at 12:11

## 2021-07-23 RX ADMIN — FENTANYL CITRATE 50 MCG: 50 INJECTION INTRAMUSCULAR; INTRAVENOUS at 23:24

## 2021-07-23 RX ADMIN — CEFEPIME HYDROCHLORIDE 1000 MG: 1 INJECTION, SOLUTION INTRAVENOUS at 17:16

## 2021-07-23 RX ADMIN — LIDOCAINE HYDROCHLORIDE 1 ML: 10 INJECTION, SOLUTION EPIDURAL; INFILTRATION; INTRACAUDAL; PERINEURAL at 19:54

## 2021-07-23 RX ADMIN — IOHEXOL 100 ML: 350 INJECTION, SOLUTION INTRAVENOUS at 13:01

## 2021-07-23 RX ADMIN — SODIUM CHLORIDE, SODIUM GLUCONATE, SODIUM ACETATE, POTASSIUM CHLORIDE, MAGNESIUM CHLORIDE, SODIUM PHOSPHATE, DIBASIC, AND POTASSIUM PHOSPHATE 500 ML: .53; .5; .37; .037; .03; .012; .00082 INJECTION, SOLUTION INTRAVENOUS at 22:05

## 2021-07-23 RX ADMIN — DEXTROSE MONOHYDRATE 25 ML: 25 INJECTION, SOLUTION INTRAVENOUS at 23:43

## 2021-07-23 RX ADMIN — METRONIDAZOLE 500 MG: 500 INJECTION, SOLUTION INTRAVENOUS at 16:48

## 2021-07-23 RX ADMIN — SODIUM CHLORIDE, SODIUM LACTATE, POTASSIUM CHLORIDE, AND CALCIUM CHLORIDE 1000 ML: .6; .31; .03; .02 INJECTION, SOLUTION INTRAVENOUS at 13:30

## 2021-07-23 NOTE — ASSESSMENT & PLAN NOTE
· Likely 2/2 intravascular volume depletion/ maybe chronic component given chronically low serum bicarb  · Will aggressively fluid resuscitate  · Repeat labs at 1800, then frequency TBD from there

## 2021-07-23 NOTE — ACP (ADVANCE CARE PLANNING)
ACP Note  7/23/2021  1520    70 F with complex past medical history including B/L hydroureteronephrosis s/p stent placement s/p removal 7/20/21, NIDDM2 (A1c 6 1 4/3/21), 30 pack-year smoking history, hyponatremia, breast cancer stage 3a s/p L mastectomy 2017 recently found to have elevated alk phos, colitis, partial SBO, CKD stage 4, diverticulitis s/p colostomy s/p reversal, and multiple recent admissions during which she made herself DNR/DNI  Presented to [facility] ED 7/23 after being found down, GCS 6, intubated electively by ED for airway protection  Of note, she has refused discharge to rehab and left AMA from her 6/29 and 4/1 admissions  Met at bedside with daughter, Cherri Phoenix, to give update on patient's current condition and discuss events leading up to admission  Per Deniz Handley, patient is stubborn and does not take care of her self  Deniz Handley recalled that her mother had expressed a desire not to have her life prolonged artificially several times in the past, but to her knowledge had never completed a POLST form  We discussed how the patient had multiple recent admissions during which she made herself DNR/DNI, and Deniz Handley agreed that this code status should be maintained for now  Given that the patient seems to have improved with 24-48 hours of supportive care during her prior admissions, we discussed doing a 48 hour trial of mechanical ventilation and medical optimization prior to extubating with a plan to not reintubate if she is still unable to breathe on her own  Denizlauri Davidson said she would take some time to discuss next steps with her other family members       ACP time 30 minutes    Peggy High MD

## 2021-07-23 NOTE — ASSESSMENT & PLAN NOTE
Lab Results   Component Value Date    EGFR 10 07/23/2021    EGFR 21 07/10/2021    EGFR 18 07/09/2021    CREATININE 4 01 (H) 07/23/2021    CREATININE 2 21 (H) 07/10/2021    CREATININE 2 61 (H) 07/09/2021    Baseline Cr: variable: likely 0 7 to 1 1 per nephrology note last admission    Admission Cr: 4 01   Etiology:  Patient appears clinically dry but also has chronic hydronephrosis with stents and history of post obstructive uropathy  o Will obtain kidney ultrasound  o Obtain FENa  o Obtain urine Na, K, chloride and calculate UAG as patient has chronic bicarb loss (though may be 2/2 diarrhea)   Balanced crystalloid resuscitation   Hold off nephrology consult for now   [address] 66 discussion with family, undecided on RRT   Avoid nephrotoxic medications/hypotension

## 2021-07-23 NOTE — ED PROVIDER NOTES
Emergency Department Trauma Note  Liam Cross 70 y o  female MRN: [id number]  Unit/Bed#: /-01 Encounter: [id number]      Trauma Alert: Trauma Acuity: A  Model of Arrival: Mode of Arrival: ALS via Trauma Squad Name and Number: [address] [place] [address],[address] Team: Current Providers  Attending Provider: Connie Summers DO  Attending Provider: Devin Esquivel MD  Registered Nurse: Wesly Ponce RN  Registered Nurse: Juan Breen RN  Consultants: None      History of Present Illness     Chief Complaint:   Chief Complaint   Patient presents with    Altered Mental Status     patient found on floor today face down     HPI:  Liam Cross is a 70 y o  female who presents with see below  Mechanism:Details of Incident: Patient found face down on floor- had not been see by her care giver for 3 days; unk length of time down Injury Date: 07/23/21 (approximated due to not being seen by anyone for 3 days) Injury Time: 1100      Patient arrived to this emergency department via EMS  Patient is unresponsive upon arrival   Patient is able to open her eyes  Has no purposeful movement  Report from EMS is that patient was last seen on Wednesday  Care provider went to her home today and found the patient in the hallway on the floor laying on her left side  Unsure how long patient had been in that positioning  History provided by:  EMS personnel  Altered Mental Status  Presenting symptoms: unresponsiveness    Severity:  Unable to specify  Most recent episode:  2 days ago  Episode history:  Continuous  Timing:  Constant  Context: recent illness (Patient did have a recent admission to our facility )      Review of Systems   Unable to perform ROS: Patient unresponsive       Historical Information     Immunizations: There is no immunization history on file for this patient      Past Medical History:   Diagnosis Date    Acute cystitis with hematuria 6/29/2021    Diabetes mellitus ([facility] [place] [address] )     Diverticulitis     High anion gap metabolic acidosis 2/8/6763    Hyperthyroidism     Hypokalemia 4/2/2021    Severe sepsis ([address] ) 6/29/2021     No family history on file  Past Surgical History:   Procedure Laterality Date    APPENDECTOMY      COLOSTOMY      FL RETROGRADE PYELOGRAM  4/6/2021    MASTECTOMY      CO CYSTOURETHROSCOPY,URETER CATHETER Bilateral 4/6/2021    Procedure: CYSTOSCOPY RETROGRADE PYELOGRAM WITH INSERTION STENT URETERAL;  Surgeon: Danni Gonzalez MD;  Location[facility];  Service: Urology     Social History     Tobacco Use    Smoking status: Former Smoker    Smokeless tobacco: Never Used   Vaping Use    Vaping Use: Never used   Substance Use Topics    Alcohol use: Not Currently    Drug use: Not Currently     E-Cigarette/Vaping    E-Cigarette Use Never User      E-Cigarette/Vaping Substances       Family History: non-contributory    Meds/Allergies   Prior to Admission Medications   Prescriptions Last Dose Informant Patient Reported?  Taking?   acetaminophen (TYLENOL) 325 mg tablet   No No   Sig: Take 2 tablets (650 mg total) by mouth every 6 (six) hours as needed for mild pain   calcium carbonate (Tums) 500 mg chewable tablet   Yes No   Sig: Chew 2 tablets   glucosamine-chondroitin 500-400 MG tablet   Yes No   Sig: Take 1 tablet by mouth   Patient not taking: Reported on 6/30/2021   letrozole [facility]) 2 5 mg tablet   Yes No   Sig: Take 2 5 mg by mouth daily   loperamide (IMODIUM) 2 mg capsule   Yes No   Sig: Take 2 mg by mouth   Patient not taking: Reported on 6/29/2021   methimazole (TAPAZOLE) 5 mg tablet   Yes No   Sig: TAKE 1/2 TABLET BY MOUTH EVERY OTHER DAY   oxyCODONE-acetaminophen (PERCOCET) 5-325 mg per tablet   No No   Sig: Take 1 tablet by mouth every 4 (four) hours as needed for moderate painMax Daily Amount: 6 tablets   Patient not taking: Reported on 6/29/2021   pilocarpine (SALAGEN) 5 mg tablet   No No   Sig: Take 1 tablet (5 mg total) by mouth 3 (three) times a day   potassium chloride (MICRO-K) 10 MEQ CR capsule   No No   Sig: Take 2 capsules (20 mEq total) by mouth daily   sodium bicarbonate 650 mg tablet   No No   Sig: Take 1 tablet (650 mg total) by mouth 2 (two) times a day      Facility-Administered Medications: None       Allergies   Allergen Reactions    Latex Itching    Nuts - Food Allergy Other (See Comments)     [place] allergy    Chlorhexidine Rash    Penicillins Rash       PHYSICAL EXAM    PE limited by:  Unresponsiveness    Objective   Vitals:   First set: Temperature: (!) 97 2 °F (36 2 °C) (07/23/21 1206)  Pulse: (!) 128 (07/23/21 1205)  Respirations: (!) 28 (07/23/21 1205)  Blood Pressure: 138/66 (07/23/21 1205)  SpO2: 99 % (07/23/21 1205)  FiO2 (%): 40 (07/23/21 1500)    Primary Survey:   (A) Airway:  Compromised  (B) Breathing:  Diminished / bradypnea  (C) Circulation: Pulses:   normal  (D) Disabliity:  GCS 6  (E) Expose:  Completed    Secondary Survey: (Click on Physical Exam tab above)  Physical Exam  Vitals (Exam is severely limited by patient's ability to participate exam secondary to unresponsiveness) and nursing note reviewed  Constitutional:       Comments: Unresponsive   HENT:      Head: Atraumatic  Right Ear: External ear normal       Left Ear: External ear normal    Eyes:      General: Lids are normal    Neck:      Trachea: Trachea normal    Cardiovascular:      Rate and Rhythm: Tachycardia present  No extrasystoles are present  Pulses: No decreased pulses  Pulmonary:      Effort: Bradypnea present  Breath sounds: Normal breath sounds  No decreased breath sounds  Abdominal:      General: Abdomen is flat  Palpations: Abdomen is soft  Musculoskeletal:      Right shoulder: Normal         Arms:         Legs:    Skin:     General: Skin is warm  Neurological:      Mental Status: She is unresponsive  GCS: GCS eye subscore is 4  GCS verbal subscore is 1  GCS motor subscore is 1        Comments: GCS 6         Cervical spine cleared by clinical criteria? No (imaging required)      Invasive Devices     Peripheral Intravenous Line            Peripheral IV 07/23/21 Left Hand <1 day    Peripheral IV 07/23/21 Right Antecubital <1 day    Peripheral IV 07/23/21 Right Hand <1 day          Drain            NG/OG/Enteral Tube Orogastric 16 Fr Left mouth <1 day    Urethral Catheter Temperature probe 16 Fr  <1 day          Airway            ETT  Oral 7 mm <1 day                Lab Results:   Results Reviewed     Procedure Component Value Units Date/Time    TSH, 3rd generation with Free T4 reflex [[id number]] Collected: 07/23/21 1229    Lab Status: In process Specimen: Blood from Arm, Right Updated: 07/23/21 1610    Troponin I [[id number]]     Lab Status: No result Specimen: Blood     Lactic acid 2 Hours [[id number]]  (Abnormal) Collected: 07/23/21 1527    Lab Status: Final result Specimen: Blood from Arm, Right Updated: 07/23/21 1552     LACTIC ACID 6 6 mmol/L     Narrative:      Result may be elevated if tourniquet was used during collection  Platelet count [[id number]]     Lab Status: No result Specimen: Blood     Troponin I [[id number]]     Lab Status: No result Specimen: Blood     CK (with reflex to MB) [[id number]]     Lab Status: No result Specimen: Blood     Urine Microscopic [[id number]]  (Abnormal) Collected: 07/23/21 1321    Lab Status: Final result Specimen: Urine, [address] Catheter Updated: 07/23/21 1342     RBC, UA 4-10 /hpf      WBC, UA 10-20 /hpf      Epithelial Cells None Seen /hpf      Bacteria, UA None Seen /hpf      Hyaline Casts, UA 2-4 /lpf     Urine culture [[id number]] Collected: 07/23/21 1321    Lab Status:  In process Specimen: Urine, [address] Catheter Updated: 07/23/21 1341    UA w Reflex to Microscopic w Reflex to Culture [[id number]]  (Abnormal) Collected: 07/23/21 1321    Lab Status: Final result Specimen: Urine, [address] Catheter Updated: 07/23/21 1333     Color, UA Yellow     Clarity, UA Slightly Cloudy Specific Gravity, UA 1 015     pH, UA 5 5     Leukocytes, UA Trace     Nitrite, UA Negative     Protein, UA Trace mg/dl      Glucose, UA Negative mg/dl      Ketones, UA Negative mg/dl      Urobilinogen, UA 0 2 E U /dl      Bilirubin, UA Negative     Blood, UA Moderate    Blood gas, arterial [[id number]]  (Abnormal) Collected: 07/23/21 1319    Lab Status: Final result Specimen: Blood, Arterial from Radial, Right Updated: 07/23/21 1329     pH, Arterial 7 271     pCO2, Arterial 24 2 mm Hg      pO2, Arterial 205 7 mm Hg      HCO3, Arterial 10 9 mmol/L      Base Excess, Arterial -14 2 mmol/L      O2 Content, Arterial 18 0 mL/dL      O2 HGB,Arterial  98 4 %      SOURCE Radial, Right     LORENA TEST Yes    CKMB [[id number]]  (Abnormal) Collected: 07/23/21 1229    Lab Status: Final result Specimen: Blood from Arm, Right Updated: 07/23/21 1328     CK-MB Index 2 4 %      CK-MB 11 2 ng/mL     Novel Coronavirus (Covid-19),PCR [facility] [[id number]]  (Normal) Collected: 07/23/21 1230    Lab Status: Final result Specimen: Nares from Nasopharyngeal Swab Updated: 07/23/21 1326     SARS-CoV-2 Negative    Narrative: The specimen collection materials, transport medium, and/or testing methodology utilized in the production of these test results have been proven to be reliable in a limited validation with an abbreviated program under the Emergency Utilization Authorization provided by the FDA  Testing reported as "Presumptive positive" will be confirmed with secondary testing to ensure result accuracy  Clinical caution and judgement should be used with the interpretation of these results with consideration of the clinical impression and other laboratory testing  Testing reported as "Positive" or "Negative" has been proven to be accurate according to standard laboratory validation requirements  All testing is performed with control materials showing appropriate reactivity at standard intervals        NT-BNP PRO [[id number]]  (Abnormal) Collected: 07/23/21 1229    Lab Status: Final result Specimen: Blood from Arm, Right Updated: 07/23/21 1305     NT-proBNP 4,754 pg/mL     Lactic acid [[id number]]  (Abnormal) Collected: 07/23/21 1230    Lab Status: Final result Specimen: Blood from Arm, Right Updated: 07/23/21 1300     LACTIC ACID 8 1 mmol/L     Narrative:      Result may be elevated if tourniquet was used during collection      CK (with reflex to MB) [[id number]]  (Abnormal) Collected: 07/23/21 1229    Lab Status: Final result Specimen: Blood from Arm, Right Updated: 07/23/21 1300     Total  U/L     Troponin I [[id number]]  (Abnormal) Collected: 07/23/21 1229    Lab Status: Final result Specimen: Blood from Arm, Right Updated: 07/23/21 1259     Troponin I 3 47 ng/mL     APTT [[id number]]  (Normal) Collected: 07/23/21 1229    Lab Status: Final result Specimen: Blood from Arm, Right Updated: 07/23/21 1256     PTT 31 seconds     Protime-INR [[id number]]  (Abnormal) Collected: 07/23/21 1229    Lab Status: Final result Specimen: Blood from Arm, Right Updated: 07/23/21 1256     Protime 16 5 seconds      INR 1 36    Comprehensive metabolic panel [[id number]]  (Abnormal) Collected: 07/23/21 1229    Lab Status: Final result Specimen: Blood from Arm, Right Updated: 07/23/21 1255     Sodium 143 mmol/L      Potassium 4 0 mmol/L      Chloride 109 mmol/L      CO2 11 mmol/L      ANION GAP 23 mmol/L      BUN 31 mg/dL      Creatinine 4 01 mg/dL      Glucose 153 mg/dL      Calcium 8 7 mg/dL      Corrected Calcium 10 0 mg/dL      AST 59 U/L      ALT 24 U/L      Alkaline Phosphatase 189 U/L      Total Protein 7 9 g/dL      Albumin 2 4 g/dL      Total Bilirubin 0 62 mg/dL      eGFR 10 ml/min/1 73sq m     Narrative:      Gloria guidelines for Chronic Kidney Disease (CKD):     Stage 1 with normal or high GFR (GFR > 90 mL/min/1 73 square meters)    Stage 2 Mild CKD (GFR = 60-89 mL/min/1 73 square meters)    Stage 3A Moderate CKD (GFR = 45-59 mL/min/1 73 square meters)    Stage 3B Moderate CKD (GFR = 30-44 mL/min/1 73 square meters)    Stage 4 Severe CKD (GFR = 15-29 mL/min/1 73 square meters)    Stage 5 End Stage CKD (GFR <15 mL/min/1 73 square meters)  Note: GFR calculation is accurate only with a steady state creatinine    Blood culture #2 [[id number]] Collected: 07/23/21 1230    Lab Status: In process Specimen: Blood from Hand, Left Updated: 07/23/21 1242    Blood culture #1 [[id number]] Collected: 07/23/21 1229    Lab Status: In process Specimen: Blood from Arm, Right Updated: 07/23/21 1242    CBC and differential [[id number]]  (Abnormal) Collected: 07/23/21 1229    Lab Status: Final result Specimen: Blood from Arm, Right Updated: 07/23/21 1239     WBC 16 58 Thousand/uL      RBC 3 88 Million/uL      Hemoglobin 12 2 g/dL      Hematocrit 36 5 %      MCV 94 fL      MCH 31 4 pg      MCHC 33 4 g/dL      RDW 15 1 %      MPV 12 1 fL      Platelets 486 Thousands/uL      nRBC 0 /100 WBCs      Neutrophils Relative 88 %      Immat GRANS % 1 %      Lymphocytes Relative 4 %      Monocytes Relative 6 %      Eosinophils Relative 1 %      Basophils Relative 0 %      Neutrophils Absolute 14 60 Thousands/µL      Immature Grans Absolute 0 19 Thousand/uL      Lymphocytes Absolute 0 64 Thousands/µL      Monocytes Absolute 0 96 Thousand/µL      Eosinophils Absolute 0 15 Thousand/µL      Basophils Absolute 0 04 Thousands/µL     Procalcitonin with AM Reflex [[id number]] Collected: 07/23/21 1229    Lab Status: In process Specimen: Blood from Arm, Right Updated: 07/23/21 1236                 Imaging Studies:   Direct to CT: Yes  XR chest 1 view portable   Final Result by Bret Knox MD (07/23 3448)      Endotracheal tube as described  Workstation performed: [id number]         XR shoulder 2+ views LEFT   Final Result by Bret Knox MD (07/23 9687)      No acute osseous abnormality        Workstation performed: [id number]         TRAUMA - CT head wo contrast   Final Result by Flori Javed MD (07/23 1309)      No acute intracranial abnormality  Workstation performed: [id number]         TRAUMA - CT spine cervical wo contrast   Final Result by Sneha Osorio MD (07/23 1318)      No cervical spine fracture or traumatic malalignment  Workstation performed: [id number]         TRAUMA - CT chest abdomen pelvis w contrast   Final Result by Flori Javed MD (07/23 1328)      No acute injury within the chest, abdomen or pelvis  ET tube tip is located in the proximal right bronchus and can be retracted 3 cm  Chronic mild bilateral hydronephrosis without hydroureter or ureteral calculi  Mild thickening of numerous segments of small bowel in keeping with a nonspecific enteritis  No bowel obstruction  The study was marked in [facility] for immediate notification  Workstation performed: [id number]         CT recon only thoracic spine (no charge)   Final Result by Flori Javed MD (07/23 1329)      No fracture or traumatic subluxation  Workstation performed: [id number]         CT recon only lumbar spine (no charge)   Final Result by Flori Javed MD (07/23 1328)      No fracture or traumatic subluxation  Workstation performed: [id number]         XR chest 1 view portable   ED Interpretation by Lin Nath DO (07/23 1237)   No acute traumatic injury or evidence of pneumothorax  Endotracheal tube is at adrien  OG tube is in place  Final Result by Devan Cardoso MD (07/23 1241)      No acute cardiopulmonary disease  Low endotracheal tube, just above the adrien  Workstation performed: [id number]         XR Trauma pelvis ap only 1 or 2 vw   ED Interpretation by Lin Nath DO (07/23 1237)   No acute traumatic injury    Calcification noted within the pelvis      Final Result by Godfrey Quach MD (07/23 1333)   Mild degenerative arthritis both hips      No acute osseous abnormality  Workstation performed: [id number]               Procedures  Intubation    Date/Time: 7/23/2021 12:37 PM  Performed by: Connie Summers DO  Authorized by: Connie Summers DO     Patient location:  ED  Other Assisting Provider: No    Consent:     Consent obtained:  Emergent situation  Universal protocol:     Patient identity confirmed: Anonymous protocol, patient vented/unresponsive  Pre-procedure details:     Patient status:  Unresponsive    Pretreatment medications:  Etomidate    Paralytic: Rocuronium  Indications:     Indications for intubation: respiratory failure and airway protection    Procedure details:     Preoxygenation:  Nasal cannula    CPR in progress: no      Intubation method:  Oral    Oral intubation technique:  Glidescope    Tube size (mm):  7 0    Tube type:  Cuffed    Number of attempts:  1    Ventilation between attempts: no      Cricoid pressure: no      Tube visualized through cords: no    Placement assessment:     ETT to lip:  22    Tube secured with:  ETT koch    Breath sounds:  Equal    Placement verification: chest rise, CXR verification, direct visualization, equal breath sounds and ETCO2 detector      CXR findings:  ETT in proper place  Post-procedure details:     Patient tolerance of procedure: Tolerated well, no immediate complications             ED Course  ED Course as of Jul 23 1616   Fri Jul 23, 2021   1235 Patient intubated as documented  Chest x-ray is negative for acute process  Endotracheal tube is at the adrien  OG tube is in place  Pelvic film was negative for acute process      1237 Troponin elevated an EKG shows some ST segment depression in the lateral leads when compared to the EKG performed on June 29, 2021      1306 Multiple lab abnormalities noted  1306 Endotracheal tube readjusted      1310 Patient has been made a sepsis alert in addition to a trauma alert      Critical care team has been consulted  Cardiology has also been consult  1325 CT of the cervical spine and head are negative  Cervical spine is subsequently cleared as per [facility]  guidelines      1408 Patient is being evaluated by critical care team      1413 CT of thoracic and lumbar spine are also negative  CT of chest abdomen pelvis did not reveal any acute traumatic injury  MDM  Number of Diagnoses or Management Options  Acute on chronic renal failure ([facility] [address] ): established and worsening  Altered mental status: established and worsening  Contusion of left hip, initial encounter: new and requires workup  Contusion of left shoulder, initial encounter: new and requires workup  Encephalopathy: established and worsening  Metabolic acidosis: new and requires workup  NSTEMI (non-ST elevated myocardial infarction) [facility]): new and requires workup  Respiratory failure [facility]): new and requires workup  Sepsis [facility]): new and requires workup  Urinary tract infection: new and requires workup  Diagnosis management comments: Patient arrived in acute change in mental status  Patient was apparently last seen on Wednesday and was found on the floor in the hallway by a caregiver today just prior to arrival     While the causative reason for the patient's being on the floor may be medical, because she also had bruising about her left lateral shoulder and her left lateral hip patient was made a trauma alert upon arrival because of the decreased mentation  Additionally, patient's airway was consider tenuous secondary to her mental status/GCS and she was electively intubated for airway control      I have elected to proceed with a medical evaluation/trauma evaluation here so as to further evaluate the reasoning for the patient be found on the floor; trauma versus medical       Amount and/or Complexity of Data Reviewed  Decide to obtain previous medical records or to obtain history from someone other than the patient: yes  Independent visualization of images, tracings, or specimens: yes    Risk of Complications, Morbidity, and/or Mortality  Presenting problems: high  Diagnostic procedures: high  Management options: high  General comments: Patient presented to the ED and was found to be critically ill as demonstrated by the clinical history and primary physical evaluation  Pt had demonstrative findings and / or derangements of vital signs indicative for severe illness or injury  I personally performed bedside history and evaluation  Interventions to address these clinical needs were ordered/performed  These included, but not necessarily limited to, the ordering and subsequent review of lab studies, imaging and EKG  Please see chart with regards to specific resuscitative interventions and diagnostics  Due to a probability of clinically significant, life or limb threatening condition, the patient required my highest level of care, intervention and attention  I personally spent the documented time directly managing the patient  The critical care time included obtaining a history, examining the patient, ordering and review of studies, arranging urgent treatment with development of a management plan, evaluation of patient's response to treatment, reassessment, and, if warranted, discussions with other providers or consultants  Documentation to the medical record for continuity of care was also required  Patients records pertinent to the emergent presenting condition were reviewed as available  Family was updated as available and appropriate  This critical care time was performed to assess and manage the high probability of imminent, life-threatening deterioration that could result in multi-organ failure if not addressed  It was exclusive of separately billable procedures and treating other patients and teaching time   Please see MDM section and the rest of the note for further information on patient assessment, reassessment, interventions and treatment  Total time was 65 mins exclusive of separate billable procedures  Patient Progress  Patient progress: stable          Disposition  [facility] Transfer: No  Final diagnoses:    Altered mental status   Encephalopathy   Urinary tract infection   Sepsis ([facility])   Acute on chronic renal failure (HCC)   Respiratory failure (HCC)   Contusion of left shoulder, initial encounter   Contusion of left hip, initial encounter   NSTEMI (non-ST elevated myocardial infarction) ([facility])   Metabolic acidosis     Time reflects when diagnosis was documented in both MDM as applicable and the Disposition within this note     Time User Action Codes Description Comment    7/23/2021  2:10 PM Marlou Oddi Add [R41 82] Altered mental status     7/23/2021  2:10 PM Marlou Oddi Add [G93 40] Encephalopathy     7/23/2021  2:10 PM Marlou Oddi Add [N39 0] Urinary tract infection     7/23/2021  2:10 PM Marlou Oddi Add [A41 9] Sepsis ([facility])     7/23/2021  2:10 PM Marlou Oddi Add [N17 9,  N18 9] Acute on chronic renal failure ([facility])     7/23/2021  2:10 PM Marlou Oddi Add [J96 90] Respiratory failure ([facility])     7/23/2021  2:10 PM Marlou Oddi Add [S40 012A] Contusion of left shoulder, initial encounter     7/23/2021  2:11 PM Marlou Oddi Add [S70 02XA] Contusion of left hip, initial encounter     7/23/2021  2:11 PM Marlou Oddi Add [I21 4] NSTEMI (non-ST elevated myocardial infarction) ([facility])     7/23/2021  2:14 PM Marlou Oddi Add [T18 9] Metabolic acidosis       ED Disposition     ED Disposition Condition Date/Time Comment    Admit Stable Fri Jul 23, 2021  2:14 PM       Follow-up Information    None       Current Discharge Medication List      CONTINUE these medications which have NOT CHANGED    Details   acetaminophen (TYLENOL) 325 mg tablet Take 2 tablets (650 mg total) by mouth every 6 (six) hours as needed for mild pain  Qty: 30 tablet, Refills: 0    Associated Diagnoses: Parotiditis      calcium carbonate (Tums) 500 mg chewable tablet Chew 2 tablets      glucosamine-chondroitin 500-400 MG tablet Take 1 tablet by mouth      letrozole (FEMARA) 2 5 mg tablet Take 2 5 mg by mouth daily      loperamide (IMODIUM) 2 mg capsule Take 2 mg by mouth      methimazole (TAPAZOLE) 5 mg tablet TAKE 1/2 TABLET BY MOUTH EVERY OTHER DAY      oxyCODONE-acetaminophen (PERCOCET) 5-325 mg per tablet Take 1 tablet by mouth every 4 (four) hours as needed for moderate painMax Daily Amount: 6 tablets  Qty: 12 tablet, Refills: 0    Associated Diagnoses: Parotiditis; History of breast cancer      pilocarpine (SALAGEN) 5 mg tablet Take 1 tablet (5 mg total) by mouth 3 (three) times a day  Qty: 90 tablet, Refills: 0    Associated Diagnoses: Parotiditis      potassium chloride (MICRO-K) 10 MEQ CR capsule Take 2 capsules (20 mEq total) by mouth daily  Qty: 60 capsule, Refills: 0    Associated Diagnoses: Acute kidney injury (HCC)      sodium bicarbonate 650 mg tablet Take 1 tablet (650 mg total) by mouth 2 (two) times a day  Qty: 20 tablet, Refills: 0    Associated Diagnoses: High anion gap metabolic acidosis           No discharge procedures on file      PDMP Review       Value Time User    PDMP Reviewed  Yes 7/23/2021  3:46 PM Chase Wilcox PA-C          ED Provider  Electronically Signed by         Dion Sutherland DO  07/23/21 4192

## 2021-07-23 NOTE — ASSESSMENT & PLAN NOTE
· Etiology unclear  · Cardiology evaluated in ED  · EKG without ischemic change  · May be 2/2 time down with significant ZOYA  · Trend troponins  · Serial EKGs  · No ECHO in our system, will obtain to evaluate function, assess for RWMA

## 2021-07-23 NOTE — ASSESSMENT & PLAN NOTE
· Patient presented after being found down by caregiver, last seen/heard from 3 days ago  · Arrived to the emergency department GCS of 6 (4, 1T, 1) and therefore was intubated for airway protection  · CT head/cervical spine negative  · Likely secondary to severe metabolic derangements  · Due to history of cirrhosis, will obtain ammonia  · Obtain UDS  · Treat derangements as per below

## 2021-07-23 NOTE — H&P
[address] 1945, 70 y o  female MRN: [id number]  Unit/Bed#: -01 Encounter: [id number]  Primary Care Provider: Zaira Kaye MD   Date and time admitted to hospital: 7/23/2021 12:03 PM    * Acute metabolic encephalopathy  Assessment & Plan  · Patient presented after being found down by caregiver, last seen/heard from 3 days ago  · Arrived to the emergency department GCS of 6 (4, 1T, 1) and therefore was intubated for airway protection  · CT head/cervical spine negative  · Likely secondary to severe metabolic derangements  · Due to history of cirrhosis, will obtain ammonia  · Obtain UDS  · Treat derangements as per below    Acute on chronic kidney failure [facility])  Assessment & Plan  Lab Results   Component Value Date    EGFR 10 07/23/2021    EGFR 21 07/10/2021    EGFR 18 07/09/2021    CREATININE 4 01 (H) 07/23/2021    CREATININE 2 21 (H) 07/10/2021    CREATININE 2 61 (H) 07/09/2021    Baseline Cr: variable: likely 0 7 to 1 1 per nephrology note last admission    Admission Cr: 4 01   Etiology:  Patient appears clinically dry but also has chronic hydronephrosis with stents and history of post obstructive uropathy  o Will obtain kidney ultrasound  o Obtain FENa  o Obtain urine Na, K, chloride and calculate UAG as patient has chronic bicarb loss (though may be 2/2 diarrhea)   Balanced crystalloid resuscitation   Hold off nephrology consult for now   GOC discussion with family, undecided on RRT   Avoid nephrotoxic medications/hypotension        Acute respiratory failure with hypoxia (HCC)  Assessment & Plan  Intubated in the ED due to altered mental status  Vent Day 0  Current vent settings: 15/350/40% PEEP 6  Weaning goals for the next 24 hours: Leave intubated while mental status depressed  Continue low tidal volume, lung protective ventilation  Post intubation gas with metabolic acidosis with respiratory compensation, oxygenation never an issue  RASS goal -1 to 0; start fentanyl infusion if RASS increases  VAP bundle, though without Chlorhexidine as patient has documented allergy        High anion gap metabolic acidosis  Assessment & Plan  · Likely 2/2 intravascular volume depletion/ maybe chronic component given chronically low serum bicarb  · Will aggressively fluid resuscitate  · Repeat labs at 1800, then frequency TBD from there    Elevated troponin I level  Assessment & Plan  · Etiology unclear  · Cardiology evaluated in ED  · EKG without ischemic change  · May be 2/2 time down with significant ZOYA  · Trend troponins  · Serial EKGs  · No ECHO in our system, will obtain to evaluate function, assess for RWMA    Lactic acidosis  Assessment & Plan  · Likely 2/2 dehydration, slow clearance given ZOYA and history of cirrhosis  · Given sepsis fluids in ED  · Will continue aggressive fluids  · Recheck at 1800    Sinus tachycardia  Assessment & Plan  · Likely 2/2 hypovolemia/agitation  · Check EKG  · Check ECHO  · Cardiology following    Leukocytosis  Assessment & Plan  · Etiology unclear  · No fever, no bandemia  · No clear signs of infection on CT scan other than ? Enteritis  · Will cover with cefepime/flagyl (due to penicillin allergy)  · Check procal  · Expect elevation given ZOYA  · Follow up Blood cultures, urine cultures    Non-compliance  Assessment & Plan  · Patient with 3 admissions in the last year where she signed out AMA  · Also recommended post-acute rehab at last admission but patient refused this   · Goals of care discussions with family in further days  · Consider POLST if patient is able to participate in medical decisions in the next few days    Enteritis  Assessment & Plan  · Unclear, possibly chronic  · Will cover with cefepime/flagyl  · If patient with diarrhea consider C   Diff given recent healthcare contacts as well as stool cultures/O&P    Elevated alkaline phosphatase level  Assessment & Plan  · Seen on last admission  · Unclear etiology, biliary vs osseous given history of breast ca  · Will check gamma GT to rule in/out biliary cause  · RUQ ultrasound    History of breast cancer  Assessment & Plan  · Currently on Femara      Hydronephrosis  Assessment & Plan  · History of chronic hydronephrosis  · Recently had stents removed at [facility]  · CT showed relatively unchanged size of hydro with no hydroureter  · U/A reassuring as far as infection  · Check renal ultrasound    Diabetes mellitus (HCC)-resolved as of 7/23/2021  Assessment & Plan  Lab Results   Component Value Date    HGBA1C 4 8 07/09/2021       No results for input(s): POCGLU in the last 72 hours  Blood Sugar Average: Last 72 hrs:  · Last A1C well wi    Cirrhosis of liver (HCC)  Assessment & Plan  · Unclear etiology  · Obtain RUQ ultrasound  · Check Ammonia  · May be slow to clear lactic acid due to this and ZOYA    -------------------------------------------------------------------------------------------------------------  Chief Complaint: Unresponsive     History of Present Illness   HX and PE limited by: Molly Canela is a 70 y o  female who presents after being found down for up to 3 days by caregiver  Past medical history of breast cancer status post left mastectomy currently on Femera, hyperthyroidism, chronic obstructive uropathy with bilateral renal stents, CKD stage 2, diverticulitis status post Iman's procedure for years ago with subsequent reversal, and 3 recent admissions for acute kidney injury all of which she left AMA  Information is all obtained via chart review case patient was unresponsive and subsequently intubated so therefore is unable to provide any medical history  Today, 7-23, patient was found face down on the floor by her caregiver  Last time she was seen or heard from was 3 days ago  She was lying on her left side and was brought in as a trauma alert    Per ER documentation, patient was a GCS of 6 on arrival this spontaneous eye opening but otherwise unresponsive  She also had agonal appearing breathing, therefore patient was emergently intubated  Only areas of trauma appear to be non blanchable erythema on the left shoulder and left hip  She had a CT head, C-spine, chest, abdomen, and pelvis as well as left shoulder and hip x-ray  These were all negative for traumatic injury  Only pertinent findings were nonspecific enteritis and chronic mild bilateral hydronephrosis  On laboratory evaluation, she was found to have profound metabolic acidosis with a lactic acid of 8 1, profound acute kidney injury, elevated troponin at 3 47, and leukocytosis of 16 5 without bandemia  Patient was afebrile, mildly tachycardic and initially normotensive, but subsequently hypertensive in the emergency department  She was given IV fluids and Rocephin in the emergency department  She was referred for ICU admission  On further chart review, patient was found to be level 3, DNR DNI on previous admissions which year  She had also been referred for rehab, but subsequently refused placement and signed out against medical advice in the last 3 admissions  Dr Lidia Lamas spoke with patient's daughter at bedside who went home to check on presence of advanced directive/living Will  Did confirm level 3 DNR/I code status but would like to continue treatment now that patient is intubated with goal liberation in next 24-48 hours with ongoing discussion with family    History obtained from child, chart review and unobtainable from patient due to intubated  -------------------------------------------------------------------------------------------------------------  Dispo: Admit to Critical Care     Code Status: Level 3 - DNAR and DNI  --------------------------------------------------------------------------------------------------------------  Review of Systems    Review of systems was unable to be performed secondary to Intubation    Physical Exam  Vitals and nursing note reviewed  Constitutional:       Comments: Cachectic, elderly, Unresponsive, intubated and being mechanically ventilated in emergency department stretcher   HENT:      Head: Normocephalic and atraumatic  No raccoon eyes, Porter's sign or right periorbital erythema  Comments: No obvious fractures, crepitus of the face     Nose: Nose normal       Mouth/Throat:      Mouth: Mucous membranes are dry  Comments: Dry, cracked mucous membranes, no blood in oropharynx or signs of trauma/dental malocclusion  Eyes:      General: No scleral icterus  Comments: Pupils 3 mm and unreactive, patient was given rocuronium for intubation  Neck:      Comments: C-collar removed, step-offs or deformities, but unable to assess for tenderness  Cardiovascular:      Rate and Rhythm: Regular rhythm  Tachycardia present  Pulses: Normal pulses  Heart sounds: Normal heart sounds  No murmur heard  Pulmonary:      Effort: No respiratory distress  Breath sounds: No wheezing, rhonchi or rales  Comments: Mechanical breath sounds    Scant, clear secretions from endotracheal tube    No obvious chest wall trauma, no crepitus, subq emphysema, flail segment, or bruising  Abdominal:      General: Bowel sounds are normal  There is no distension  Palpations: Abdomen is soft  Comments: Midline incision, no signs of hernia, unable to assess or tenderness   Genitourinary:     Comments: Deferred  Musculoskeletal:        Arms:       Cervical back: Neck supple  Legs:       Comments: Full passive range of motion of all 4 extremities, no obvious deformities   Skin:     General: Skin is dry  Capillary Refill: Capillary refill takes more than 3 seconds     Neurological:      Comments: GCS 3 T        --------------------------------------------------------------------------------------------------------------  Vitals:   Vitals:    07/23/21 1415 07/23/21 1430 07/23/21 1500 07/23/21 1515   BP: (!) 185/107 (!) 180/80 (!) 180/84 (!) 172/79   Pulse: (!) 113 (!) 109 (!) 114    Resp: 15 15 16    Temp:       TempSrc:       SpO2: 100% 100% 99%    Weight:         Temp  Min: 97 2 °F (36 2 °C)  Max: 97 2 °F (36 2 °C)        Body mass index is 27 43 kg/m²      Laboratory and Diagnostics:  Results from last 7 days   Lab Units 07/23/21  1229   WBC Thousand/uL 16 58*   HEMOGLOBIN g/dL 12 2   HEMATOCRIT % 36 5   PLATELETS Thousands/uL 233   NEUTROS PCT % 88*   MONOS PCT % 6     Results from last 7 days   Lab Units 07/23/21  1229   SODIUM mmol/L 143   POTASSIUM mmol/L 4 0   CHLORIDE mmol/L 109*   CO2 mmol/L 11*   ANION GAP mmol/L 23*   BUN mg/dL 31*   CREATININE mg/dL 4 01*   CALCIUM mg/dL 8 7   GLUCOSE RANDOM mg/dL 153*   ALT U/L 24   AST U/L 59*   ALK PHOS U/L 189*   ALBUMIN g/dL 2 4*   TOTAL BILIRUBIN mg/dL 0 62          Results from last 7 days   Lab Units 07/23/21  1229   INR  1 36*   PTT seconds 31      Results from last 7 days   Lab Units 07/23/21  1229   TROPONIN I ng/mL 3 47*     Results from last 7 days   Lab Units 07/23/21  1527 07/23/21  1230   LACTIC ACID mmol/L 6 6* 8 1*     ABG:  Results from last 7 days   Lab Units 07/23/21  1319   PH ART  7 271*   PCO2 ART mm Hg 24 2*   PO2 ART mm Hg 205 7*   HCO3 ART mmol/L 10 9*   BASE EXC ART mmol/L -14 2   ABG SOURCE  Radial, Right     VBG:  Results from last 7 days   Lab Units 07/23/21  1319   ABG SOURCE  Radial, Right           Micro:        EKG: Pending  Imaging: I have personally reviewed pertinent films in PACS      Historical Information   Past Medical History:   Diagnosis Date    Acute cystitis with hematuria 6/29/2021    Diabetes mellitus [facility] [address] )     Diverticulitis     High anion gap metabolic acidosis 7/9/9162    Hyperthyroidism     Hypokalemia 4/2/2021    Severe sepsis ([facility] [address] ) 6/29/2021     Past Surgical History:   Procedure Laterality Date    APPENDECTOMY      COLOSTOMY      FL RETROGRADE PYELOGRAM  4/6/2021    MASTECTOMY      AL CYSTOURETHROSCOPY,URETER CATHETER Bilateral 4/6/2021    Procedure: CYSTOSCOPY RETROGRADE PYELOGRAM WITH INSERTION STENT URETERAL;  Surgeon: Janiya Maldonado MD;  Location: [facility];  Service: Urology     Social History   Social History     Substance and Sexual Activity   Alcohol Use Not Currently     Social History     Substance and Sexual Activity   Drug Use Not Currently     Social History     Tobacco Use   Smoking Status Former Smoker   Smokeless Tobacco Never Used     Exercise History: Lives alone and able to complete ADLs  Family History:   No family history on file  I have reviewed this patient's family history and commented on sigificant items within the HPI      Medications:  Current Facility-Administered Medications   Medication Dose Route Frequency    acetaminophen (TYLENOL) oral suspension 650 mg  650 mg Oral Q6H PRN    cefepime (MAXIPIME) IVPB (premix in dextrose) 1,000 mg 50 mL  1,000 mg Intravenous Q24H    famotidine (PEPCID) oral suspension 10 mg  10 mg Oral Daily    fentaNYL 1000 mcg in sodium chloride 0 9% 100mL infusion  50 mcg/hr Intravenous Continuous    fentanyl citrate (PF) 100 MCG/2ML 50 mcg  50 mcg Intravenous Q1H PRN    heparin (porcine) subcutaneous injection 5,000 Units  5,000 Units Subcutaneous Q8H [address]    metroNIDAZOLE (FLAGYL) IVPB (premix) 500 mg 100 mL  500 mg Intravenous Q8H    multi-electrolyte (PLASMALYTE-A/ISOLYTE-S PH 7 4) IV solution  125 mL/hr Intravenous Continuous     Home medications:  Prior to Admission Medications   Prescriptions Last Dose Informant Patient Reported?  Taking?   acetaminophen (TYLENOL) 325 mg tablet   No No   Sig: Take 2 tablets (650 mg total) by mouth every 6 (six) hours as needed for mild pain   calcium carbonate (Tums) 500 mg chewable tablet   Yes No   Sig: Chew 2 tablets   glucosamine-chondroitin 500-400 MG tablet   Yes No   Sig: Take 1 tablet by mouth   Patient not taking: Reported on 6/30/2021   letrozole [facility]) 2 5 mg tablet   Yes No   Sig: Take 2 5 mg by mouth daily loperamide (IMODIUM) 2 mg capsule   Yes No   Sig: Take 2 mg by mouth   Patient not taking: Reported on 6/29/2021   methimazole (TAPAZOLE) 5 mg tablet   Yes No   Sig: TAKE 1/2 TABLET BY MOUTH EVERY OTHER DAY   oxyCODONE-acetaminophen (PERCOCET) 5-325 mg per tablet   No No   Sig: Take 1 tablet by mouth every 4 (four) hours as needed for moderate painMax Daily Amount: 6 tablets   Patient not taking: Reported on 6/29/2021   pilocarpine (SALAGEN) 5 mg tablet   No No   Sig: Take 1 tablet (5 mg total) by mouth 3 (three) times a day   potassium chloride (MICRO-K) 10 MEQ CR capsule   No No   Sig: Take 2 capsules (20 mEq total) by mouth daily   sodium bicarbonate 650 mg tablet   No No   Sig: Take 1 tablet (650 mg total) by mouth 2 (two) times a day      Facility-Administered Medications: None     Allergies: Allergies   Allergen Reactions    Latex Itching    Nuts - Food Allergy Other (See Comments)     [place] allergy    Chlorhexidine Rash    Penicillins Rash       ------------------------------------------------------------------------------------------------------------  Advance Directive and Living Will:      Power of :    POLST:    ------------------------------------------------------------------------------------------------------------  Anticipated Length of Stay is > 2 midnights    Care Time Delivered:   Upon my evaluation, this patient had a high probability of imminent or life-threatening deterioration due to ZOYA, lactic acidosis, encephalopathy, which required my direct attention, intervention, and personal management  I have personally provided 36 minutes (1315 to 443) of critical care time, exclusive of procedures, teaching, family meetings, and any prior time recorded by providers other than myself  Agus Stout PA-C        Portions of the record may have been created with voice recognition software    Occasional wrong word or "sound a like" substitutions may have occurred due to the inherent limitations of voice recognition software    Read the chart carefully and recognize, using context, where substitutions have occurred

## 2021-07-23 NOTE — ASSESSMENT & PLAN NOTE
· Unclear, possibly chronic  · Will cover with cefepime/flagyl  · If patient with diarrhea consider C   Diff given recent healthcare contacts as well as stool cultures/O&P

## 2021-07-23 NOTE — RESPIRATORY THERAPY NOTE
RT Protocol Note  Rafael Romano 70 y o  female MRN: [id number]  Unit/Bed#: -01 Encounter: [id number]    Assessment    Principal Problem:    Acute metabolic encephalopathy  Active Problems:    Acute on chronic kidney failure ([facility] )    History of breast cancer    Diabetes mellitus ([place] [address] )    Cirrhosis of liver (HCC)    Hydronephrosis    High anion gap metabolic acidosis    Elevated troponin I level    Sinus tachycardia    Acute respiratory failure with hypoxia (HCC)    Lactic acidosis    Elevated alkaline phosphatase level    Leukocytosis    Enteritis    Non-compliance      Home Pulmonary Medications:         Past Medical History:   Diagnosis Date    Acute cystitis with hematuria 6/29/2021    Diabetes mellitus ([place] [address] )     Diverticulitis     High anion gap metabolic acidosis 9/8/6506    Hyperthyroidism     Hypokalemia 4/2/2021    Severe sepsis ([place] [address] ) 6/29/2021     Social History     Socioeconomic History    Marital status: Single     Spouse name: Not on file    Number of children: Not on file    Years of education: Not on file    Highest education level: Not on file   Occupational History    Not on file   Tobacco Use    Smoking status: Former Smoker    Smokeless tobacco: Never Used   Vaping Use    Vaping Use: Never used   Substance and Sexual Activity    Alcohol use: Not Currently    Drug use: Not Currently    Sexual activity: Not on file   Other Topics Concern    Not on file   Social History Narrative    Not on file     Social Determinants of Health     Financial Resource Strain:     Difficulty of Paying Living Expenses:    Food Insecurity:     Worried About Running Out of Food in the Last Year:     Ran Out of Food in the Last Year:    Transportation Needs:     Lack of Transportation (Medical):      Lack of Transportation (Non-Medical):    Physical Activity:     Days of Exercise per Week:     Minutes of Exercise per Session:    Stress:     Feeling of Stress :    Social Connections:     Frequency of Communication with Friends and Family:     Frequency of Social Gatherings with Friends and Family:     Attends [employer] Services:     Active Member of Clubs or Organizations:     Attends Club or Organization Meetings:     Marital Status:    Intimate Partner Violence:     Fear of Current or Ex-Partner:     Emotionally Abused:     Physically Abused:     Sexually Abused:        Subjective         Objective    Physical Exam:   Assessment Type: (P) Assess only  General Appearance: (P) Sedated  Respiratory Pattern: (P) Assisted  Chest Assessment: (P) Chest expansion symmetrical  Bilateral Breath Sounds: (P) Diminished  O2 Device: (P) 40% vent    Vitals:  Blood pressure (!) 172/79, pulse (!) 114, temperature (!) 97 2 °F (36 2 °C), temperature source Tympanic, resp  rate 16, weight 63 7 kg (140 lb 6 9 oz), SpO2 99 %  Results from last 7 days   Lab Units 07/23/21  1319   PH ART  7 271*   PCO2 ART mm Hg 24 2*   PO2 ART mm Hg 205 7*   HCO3 ART mmol/L 10 9*   BASE EXC ART mmol/L -14 2   O2 CONTENT ART mL/dL 18 0   O2 HGB, ARTERIAL % 98 4*   ABG SOURCE  Radial, Right   LORENA TEST  Yes       Imaging and other studies: I have personally reviewed pertinent reports  O2 Device: (P) 40% vent     Plan    Respiratory Plan: (P) Vent/NIV/HFNC        Resp Comments: (P) Pt found down and unresponsive at home  Intubated upon arrival to ed due to inability to protect her airway  Vent mode APV/CMV 18 400 100 6  FIO2 titrated to 40 and Rate to 15 after ABG  Pt transport to ct and xray then   Volume decreased to 350  Pt is stable and starting to breath above the vent  Daughter at bedside in ed states she doesnt believe her mom wants this  Continue to monitor , possible extubation to comfort

## 2021-07-23 NOTE — ASSESSMENT & PLAN NOTE
· Seen on last admission  · Unclear etiology, biliary vs osseous given history of breast ca  · Will check gamma GT to rule in/out biliary cause  · RUQ ultrasound

## 2021-07-23 NOTE — ASSESSMENT & PLAN NOTE
· Unclear etiology  · Obtain RUQ ultrasound  · Check Ammonia  · May be slow to clear lactic acid due to this and ZOYA

## 2021-07-23 NOTE — ASSESSMENT & PLAN NOTE
· History of chronic hydronephrosis  · Recently had stents removed at [facility]  · CT showed relatively unchanged size of hydro with no hydroureter  · U/A reassuring as far as infection  · Check renal ultrasound

## 2021-07-23 NOTE — ASSESSMENT & PLAN NOTE
· Likely 2/2 dehydration, slow clearance given ZOYA and history of cirrhosis  · Given sepsis fluids in ED  · Will continue aggressive fluids  · Recheck at 1800

## 2021-07-23 NOTE — ASSESSMENT & PLAN NOTE
· Patient with 3 admissions in the last year where she signed out AMA  · Also recommended post-acute rehab at last admission but patient refused this   · Goals of care discussions with family in further days  · Consider POLST if patient is able to participate in medical decisions in the next few days

## 2021-07-23 NOTE — ASSESSMENT & PLAN NOTE
· Etiology unclear  · No fever, no bandemia  · No clear signs of infection on CT scan other than ?  Enteritis  · Will cover with cefepime/flagyl (due to penicillin allergy)  · Check procal  · Expect elevation given ZOYA  · Follow up Blood cultures, urine cultures

## 2021-07-23 NOTE — PLAN OF CARE
Problem: PAIN - ADULT  Goal: Verbalizes/displays adequate comfort level or baseline comfort level  Description: Interventions:  - Encourage patient to monitor pain and request assistance  - Assess pain using appropriate pain scale  - Administer analgesics based on type and severity of pain and evaluate response  - Implement non-pharmacological measures as appropriate and evaluate response  - Consider cultural and social influences on pain and pain management  - Notify physician/advanced practitioner if interventions unsuccessful or patient reports new pain  Outcome: Progressing     Problem: INFECTION - ADULT  Goal: Absence or prevention of progression during hospitalization  Description: INTERVENTIONS:  - Assess and monitor for signs and symptoms of infection  - Monitor lab/diagnostic results  - Monitor all insertion sites, i e  indwelling lines, tubes, and drains  - Monitor endotracheal if appropriate and nasal secretions for changes in amount and color  - Hutchins appropriate cooling/warming therapies per order  - Administer medications as ordered  - Instruct and encourage patient and family to use good hand hygiene technique  - Identify and instruct in appropriate isolation precautions for identified infection/condition  Outcome: Progressing  Goal: Absence of fever/infection during neutropenic period  Description: INTERVENTIONS:  - Monitor WBC    Outcome: Progressing     Problem: SAFETY ADULT  Goal: Patient will remain free of falls  Description: INTERVENTIONS:  - Educate patient/family on patient safety including physical limitations  - Instruct patient to call for assistance with activity   - Consult OT/PT to assist with strengthening/mobility   - Keep Call bell within reach  - Keep bed low and locked with side rails adjusted as appropriate  - Keep care items and personal belongings within reach  - Initiate and maintain comfort rounds  - Make Fall Risk Sign visible to staff  - Offer Toileting in advance of need  - Initiate/Maintain alarm  - Obtain necessary fall risk management equipment  - Apply yellow socks and bracelet for high fall risk patients  - Consider moving patient to room near nurses station  Outcome: Progressing  Goal: Maintain or return to baseline ADL function  Description: INTERVENTIONS:  -  Assess patient's ability to carry out ADLs; assess patient's baseline for ADL function and identify physical deficits which impact ability to perform ADLs (bathing, care of mouth/teeth, toileting, grooming, dressing, etc )  - Assess/evaluate cause of self-care deficits   - Assess range of motion  - Assess patient's mobility; develop plan if impaired  - Assess patient's need for assistive devices and provide as appropriate  - Encourage maximum independence but intervene and supervise when necessary  - Involve family in performance of ADLs  - Assess for home care needs following discharge   - Consider OT consult to assist with ADL evaluation and planning for discharge  - Provide patient education as appropriate  Outcome: Progressing  Goal: Maintains/Returns to pre admission functional level  Description: INTERVENTIONS:  - Perform BMAT or MOVE assessment daily    - Set and communicate daily mobility goal to care team and patient/family/caregiver     - Collaborate with rehabilitation services on mobility goals if consulted  - Perform Range of Motion   - Reposition patient  - Dangle patient   - Stand patient  - Ambulate patient   - Out of bed to chair   - Out of bed for meals   - Out of bed for toileting  - Record patient progress and toleration of activity level   Outcome: Progressing     Problem: DISCHARGE PLANNING  Goal: Discharge to home or other facility with appropriate resources  Description: INTERVENTIONS:  - Identify barriers to discharge w/patient and caregiver  - Arrange for needed discharge resources and transportation as appropriate  - Identify discharge learning needs (meds, wound care, etc )  - Arrange for interpretive services to assist at discharge as needed  - Refer to Case Management Department for coordinating discharge planning if the patient needs post-hospital services based on physician/advanced practitioner order or complex needs related to functional status, cognitive ability, or social support system  Outcome: Progressing     Problem: Knowledge Deficit  Goal: Patient/family/caregiver demonstrates understanding of disease process, treatment plan, medications, and discharge instructions  Description: Complete learning assessment and assess knowledge base    Interventions:  - Provide teaching at level of understanding  - Provide teaching via preferred learning methods  Outcome: Progressing

## 2021-07-23 NOTE — ASSESSMENT & PLAN NOTE
Intubated in the ED due to altered mental status  Vent Day 0  Current vent settings: 15/350/40% PEEP 6  Weaning goals for the next 24 hours: Leave intubated while mental status depressed  Continue low tidal volume, lung protective ventilation  Post intubation gas with metabolic acidosis with respiratory compensation, oxygenation never an issue  RASS goal -1 to 0; start fentanyl infusion if RASS increases  VAP bundle, though without Chlorhexidine as patient has documented allergy

## 2021-07-23 NOTE — PROCEDURES
Arterial Line Insertion    Date/Time: 7/23/2021 7:20 PM  Performed by: SIMON Lucas  Authorized by: SIMON Lucas     Patient location:  ICU  Other Assisting Provider: No    Consent:     Consent obtained:  Written    Consent given by:  Healthcare agent (Written consent obtained by Dr Edwin Beal from patient's Aldo Aaron)    Risks discussed:  Bleeding, infection, pain, ischemia and repeat procedure  Universal protocol:     Procedure explained and questions answered to patient or proxy's satisfaction: yes      Relevant documents present and verified: yes      Test results available and properly labeled: yes      Radiology Images displayed and confirmed  If images not available, report reviewed: yes      Required blood products, implants, devices, and special equipment available: yes      Site/side marked: yes      Immediately prior to procedure a time out was called: yes      Patient identity confirmed:  Arm band, provided demographic data, hospital-assigned identification number and anonymous protocol, patient vented/unresponsive  Indications:     Indications: hemodynamic monitoring, multiple ABGs, continuous blood pressure monitoring and frequent labs / infusion    Pre-procedure details:     Skin preparation:  Chlorhexidine    Preparation: Patient was prepped and draped in sterile fashion    Sedation:     Sedation type: already on fentanyl infusion  Anesthesia (see MAR for exact dosages):      Anesthesia method:  Local infiltration    Local anesthetic:  Lidocaine 1% w/o epi  Procedure details:     Location / Tip of Catheter:  Radial    Laterality:  Right    William's test performed: yes      William's test abnormal: no      Needle gauge:  20 G    Placement technique:  Ultrasound guided and Seldinger    Ultrasound image availability:  Not saved    Sterile ultrasound techniques: Sterile gel and sterile probe covers were used      Number of attempts:  1    Successful placement: yes Transducer: waveform confirmed    Post-procedure details:     Post-procedure:  Sterile dressing applied, sutured and wrist guard applied    CMS:  Normal and unchanged    Patient tolerance of procedure:   Tolerated well, no immediate complications

## 2021-07-23 NOTE — ASSESSMENT & PLAN NOTE
Lab Results   Component Value Date    HGBA1C 4 8 07/09/2021       No results for input(s): POCGLU in the last 72 hours      Blood Sugar Average: Last 72 hrs:  · Last A1C well wi

## 2021-07-24 ENCOUNTER — APPOINTMENT (INPATIENT)
Dept: RADIOLOGY | Facility: HOSPITAL | Age: 76
DRG: 682 | End: 2021-07-24
Payer: MEDICARE

## 2021-07-24 PROBLEM — I95.9 HYPOTENSION: Status: ACTIVE | Noted: 2021-07-24

## 2021-07-24 LAB
ALBUMIN SERPL BCP-MCNC: 2.2 G/DL (ref 3.5–5)
ALP SERPL-CCNC: 134 U/L (ref 46–116)
ALT SERPL W P-5'-P-CCNC: 22 U/L (ref 12–78)
AMMONIA PLAS-SCNC: 53 UMOL/L (ref 11–35)
ANION GAP SERPL CALCULATED.3IONS-SCNC: 16 MMOL/L (ref 4–13)
ANION GAP SERPL CALCULATED.3IONS-SCNC: 17 MMOL/L (ref 4–13)
ANION GAP SERPL CALCULATED.3IONS-SCNC: 17 MMOL/L (ref 4–13)
AST SERPL W P-5'-P-CCNC: 58 U/L (ref 5–45)
BACTERIA UR CULT: NORMAL
BASE EXCESS BLDA CALC-SCNC: -9.3 MMOL/L
BASOPHILS # BLD AUTO: 0.08 THOUSANDS/ΜL (ref 0–0.1)
BASOPHILS NFR BLD AUTO: 1 % (ref 0–1)
BILIRUB DIRECT SERPL-MCNC: 0.27 MG/DL (ref 0–0.2)
BILIRUB SERPL-MCNC: 0.55 MG/DL (ref 0.2–1)
BUN SERPL-MCNC: 26 MG/DL (ref 5–25)
BUN SERPL-MCNC: 28 MG/DL (ref 5–25)
BUN SERPL-MCNC: 32 MG/DL (ref 5–25)
CA-I BLD-SCNC: 1.08 MMOL/L (ref 1.12–1.32)
CALCIUM SERPL-MCNC: 7.8 MG/DL (ref 8.3–10.1)
CALCIUM SERPL-MCNC: 7.9 MG/DL (ref 8.3–10.1)
CALCIUM SERPL-MCNC: 8 MG/DL (ref 8.3–10.1)
CHLORIDE SERPL-SCNC: 109 MMOL/L (ref 100–108)
CK MB SERPL-MCNC: 22.8 NG/ML (ref 0–5)
CK MB SERPL-MCNC: 3.9 % (ref 0–2.5)
CK SERPL-CCNC: 591 U/L (ref 26–192)
CO2 SERPL-SCNC: 15 MMOL/L (ref 21–32)
CO2 SERPL-SCNC: 15 MMOL/L (ref 21–32)
CO2 SERPL-SCNC: 16 MMOL/L (ref 21–32)
CREAT SERPL-MCNC: 2.84 MG/DL (ref 0.6–1.3)
CREAT SERPL-MCNC: 2.87 MG/DL (ref 0.6–1.3)
CREAT SERPL-MCNC: 3.08 MG/DL (ref 0.6–1.3)
EOSINOPHIL # BLD AUTO: 0.05 THOUSAND/ΜL (ref 0–0.61)
EOSINOPHIL NFR BLD AUTO: 0 % (ref 0–6)
ERYTHROCYTE [DISTWIDTH] IN BLOOD BY AUTOMATED COUNT: 15.1 % (ref 11.6–15.1)
FOLATE SERPL-MCNC: 5 NG/ML (ref 3.1–17.5)
GFR SERPL CREATININE-BSD FRML MDRD: 14 ML/MIN/1.73SQ M
GFR SERPL CREATININE-BSD FRML MDRD: 16 ML/MIN/1.73SQ M
GFR SERPL CREATININE-BSD FRML MDRD: 16 ML/MIN/1.73SQ M
GLUCOSE SERPL-MCNC: 114 MG/DL (ref 65–140)
GLUCOSE SERPL-MCNC: 126 MG/DL (ref 65–140)
GLUCOSE SERPL-MCNC: 67 MG/DL (ref 65–140)
GLUCOSE SERPL-MCNC: 75 MG/DL (ref 65–140)
GLUCOSE SERPL-MCNC: 76 MG/DL (ref 65–140)
GLUCOSE SERPL-MCNC: 89 MG/DL (ref 65–140)
GLUCOSE SERPL-MCNC: 90 MG/DL (ref 65–140)
GLUCOSE SERPL-MCNC: 97 MG/DL (ref 65–140)
HCO3 BLDA-SCNC: 14.4 MMOL/L (ref 22–28)
HCT VFR BLD AUTO: 32.2 % (ref 34.8–46.1)
HGB BLD-MCNC: 10.5 G/DL (ref 11.5–15.4)
IMM GRANULOCYTES # BLD AUTO: 0.1 THOUSAND/UL (ref 0–0.2)
IMM GRANULOCYTES NFR BLD AUTO: 1 % (ref 0–2)
LACTATE SERPL-SCNC: 1.7 MMOL/L (ref 0.5–2)
LACTATE SERPL-SCNC: 2.4 MMOL/L (ref 0.5–2)
LACTATE SERPL-SCNC: 2.7 MMOL/L (ref 0.5–2)
LYMPHOCYTES # BLD AUTO: 2.11 THOUSANDS/ΜL (ref 0.6–4.47)
LYMPHOCYTES NFR BLD AUTO: 12 % (ref 14–44)
MAGNESIUM SERPL-MCNC: 2.7 MG/DL (ref 1.6–2.6)
MAGNESIUM SERPL-MCNC: 3.2 MG/DL (ref 1.6–2.6)
MCH RBC QN AUTO: 30 PG (ref 26.8–34.3)
MCHC RBC AUTO-ENTMCNC: 32.6 G/DL (ref 31.4–37.4)
MCV RBC AUTO: 92 FL (ref 82–98)
MONOCYTES # BLD AUTO: 1.13 THOUSAND/ΜL (ref 0.17–1.22)
MONOCYTES NFR BLD AUTO: 7 % (ref 4–12)
NEUTROPHILS # BLD AUTO: 13.93 THOUSANDS/ΜL (ref 1.85–7.62)
NEUTS SEG NFR BLD AUTO: 79 % (ref 43–75)
NRBC BLD AUTO-RTO: 0 /100 WBCS
O2 CT BLDA-SCNC: 16 ML/DL (ref 16–23)
OXYHGB MFR BLDA: 98.1 % (ref 94–97)
PCO2 BLDA: 25.2 MM HG (ref 36–44)
PH BLDA: 7.37 [PH] (ref 7.35–7.45)
PHOSPHATE SERPL-MCNC: 2.5 MG/DL (ref 2.3–4.1)
PLATELET # BLD AUTO: 214 THOUSANDS/UL (ref 149–390)
PMV BLD AUTO: 12.2 FL (ref 8.9–12.7)
PO2 BLDA: 144.6 MM HG (ref 75–129)
POTASSIUM SERPL-SCNC: 3.4 MMOL/L (ref 3.5–5.3)
POTASSIUM SERPL-SCNC: 3.8 MMOL/L (ref 3.5–5.3)
POTASSIUM SERPL-SCNC: 3.9 MMOL/L (ref 3.5–5.3)
PROCALCITONIN SERPL-MCNC: 0.51 NG/ML
PROT SERPL-MCNC: 6.5 G/DL (ref 6.4–8.2)
RBC # BLD AUTO: 3.5 MILLION/UL (ref 3.81–5.12)
SODIUM SERPL-SCNC: 141 MMOL/L (ref 136–145)
SPECIMEN SOURCE: ABNORMAL
T3FREE SERPL-MCNC: 2.43 PG/ML (ref 2.3–4.2)
T4 FREE SERPL-MCNC: 1.37 NG/DL (ref 0.76–1.46)
TROPONIN I SERPL-MCNC: 6.52 NG/ML
TROPONIN I SERPL-MCNC: 6.58 NG/ML
TROPONIN I SERPL-MCNC: 6.84 NG/ML
TROPONIN I SERPL-MCNC: 6.98 NG/ML
VIT B12 SERPL-MCNC: 542 PG/ML (ref 100–900)
WBC # BLD AUTO: 17.4 THOUSAND/UL (ref 4.31–10.16)

## 2021-07-24 PROCEDURE — 84100 ASSAY OF PHOSPHORUS: CPT | Performed by: NURSE PRACTITIONER

## 2021-07-24 PROCEDURE — NC001 PR NO CHARGE: Performed by: NURSE PRACTITIONER

## 2021-07-24 PROCEDURE — 80048 BASIC METABOLIC PNL TOTAL CA: CPT | Performed by: PHYSICIAN ASSISTANT

## 2021-07-24 PROCEDURE — 84484 ASSAY OF TROPONIN QUANT: CPT | Performed by: NURSE PRACTITIONER

## 2021-07-24 PROCEDURE — 94760 N-INVAS EAR/PLS OXIMETRY 1: CPT

## 2021-07-24 PROCEDURE — 80076 HEPATIC FUNCTION PANEL: CPT | Performed by: NURSE PRACTITIONER

## 2021-07-24 PROCEDURE — NC001 PR NO CHARGE: Performed by: ANESTHESIOLOGY

## 2021-07-24 PROCEDURE — 71045 X-RAY EXAM CHEST 1 VIEW: CPT

## 2021-07-24 PROCEDURE — 84481 FREE ASSAY (FT-3): CPT | Performed by: PHYSICIAN ASSISTANT

## 2021-07-24 PROCEDURE — 84145 PROCALCITONIN (PCT): CPT | Performed by: ANESTHESIOLOGY

## 2021-07-24 PROCEDURE — 82140 ASSAY OF AMMONIA: CPT | Performed by: PHYSICIAN ASSISTANT

## 2021-07-24 PROCEDURE — 84484 ASSAY OF TROPONIN QUANT: CPT | Performed by: PHYSICIAN ASSISTANT

## 2021-07-24 PROCEDURE — 82330 ASSAY OF CALCIUM: CPT | Performed by: NURSE PRACTITIONER

## 2021-07-24 PROCEDURE — 83605 ASSAY OF LACTIC ACID: CPT | Performed by: NURSE PRACTITIONER

## 2021-07-24 PROCEDURE — 93005 ELECTROCARDIOGRAM TRACING: CPT

## 2021-07-24 PROCEDURE — 94003 VENT MGMT INPAT SUBQ DAY: CPT

## 2021-07-24 PROCEDURE — 87081 CULTURE SCREEN ONLY: CPT | Performed by: PHYSICIAN ASSISTANT

## 2021-07-24 PROCEDURE — 82805 BLOOD GASES W/O2 SATURATION: CPT | Performed by: NURSE PRACTITIONER

## 2021-07-24 PROCEDURE — 82948 REAGENT STRIP/BLOOD GLUCOSE: CPT

## 2021-07-24 PROCEDURE — 84439 ASSAY OF FREE THYROXINE: CPT | Performed by: PHYSICIAN ASSISTANT

## 2021-07-24 PROCEDURE — 85025 COMPLETE CBC W/AUTO DIFF WBC: CPT | Performed by: PHYSICIAN ASSISTANT

## 2021-07-24 PROCEDURE — 82746 ASSAY OF FOLIC ACID SERUM: CPT | Performed by: PHYSICIAN ASSISTANT

## 2021-07-24 PROCEDURE — 36556 INSERT NON-TUNNEL CV CATH: CPT | Performed by: NURSE PRACTITIONER

## 2021-07-24 PROCEDURE — 82553 CREATINE MB FRACTION: CPT | Performed by: NURSE PRACTITIONER

## 2021-07-24 PROCEDURE — 82550 ASSAY OF CK (CPK): CPT | Performed by: NURSE PRACTITIONER

## 2021-07-24 PROCEDURE — 82607 VITAMIN B-12: CPT | Performed by: PHYSICIAN ASSISTANT

## 2021-07-24 PROCEDURE — 99291 CRITICAL CARE FIRST HOUR: CPT | Performed by: NURSE PRACTITIONER

## 2021-07-24 PROCEDURE — 02HV33Z INSERTION OF INFUSION DEVICE INTO SUPERIOR VENA CAVA, PERCUTANEOUS APPROACH: ICD-10-PCS | Performed by: ANESTHESIOLOGY

## 2021-07-24 PROCEDURE — 83735 ASSAY OF MAGNESIUM: CPT | Performed by: PHYSICIAN ASSISTANT

## 2021-07-24 RX ORDER — POTASSIUM CHLORIDE 20MEQ/15ML
20 LIQUID (ML) ORAL ONCE
Status: COMPLETED | OUTPATIENT
Start: 2021-07-24 | End: 2021-07-24

## 2021-07-24 RX ORDER — SODIUM CHLORIDE, SODIUM GLUCONATE, SODIUM ACETATE, POTASSIUM CHLORIDE, MAGNESIUM CHLORIDE, SODIUM PHOSPHATE, DIBASIC, AND POTASSIUM PHOSPHATE .53; .5; .37; .037; .03; .012; .00082 G/100ML; G/100ML; G/100ML; G/100ML; G/100ML; G/100ML; G/100ML
125 INJECTION, SOLUTION INTRAVENOUS CONTINUOUS
Status: DISCONTINUED | OUTPATIENT
Start: 2021-07-24 | End: 2021-07-24

## 2021-07-24 RX ORDER — DEXTROSE, SODIUM CHLORIDE, SODIUM LACTATE, POTASSIUM CHLORIDE, AND CALCIUM CHLORIDE 5; .6; .31; .03; .02 G/100ML; G/100ML; G/100ML; G/100ML; G/100ML
100 INJECTION, SOLUTION INTRAVENOUS CONTINUOUS
Status: DISCONTINUED | OUTPATIENT
Start: 2021-07-24 | End: 2021-07-26

## 2021-07-24 RX ORDER — VASOPRESSIN 20 U/ML
INJECTION PARENTERAL
Status: DISPENSED
Start: 2021-07-24 | End: 2021-07-24

## 2021-07-24 RX ORDER — PROPOFOL 10 MG/ML
5-50 INJECTION, EMULSION INTRAVENOUS
Status: DISCONTINUED | OUTPATIENT
Start: 2021-07-24 | End: 2021-07-24

## 2021-07-24 RX ORDER — PROPOFOL 10 MG/ML
INJECTION, EMULSION INTRAVENOUS
Status: COMPLETED
Start: 2021-07-24 | End: 2021-07-24

## 2021-07-24 RX ORDER — ALBUMIN, HUMAN INJ 5% 5 %
12.5 SOLUTION INTRAVENOUS ONCE
Status: COMPLETED | OUTPATIENT
Start: 2021-07-24 | End: 2021-07-24

## 2021-07-24 RX ORDER — ALBUMIN, HUMAN INJ 5% 5 %
SOLUTION INTRAVENOUS
Status: COMPLETED
Start: 2021-07-24 | End: 2021-07-24

## 2021-07-24 RX ORDER — FENTANYL CITRATE 50 UG/ML
25 INJECTION, SOLUTION INTRAMUSCULAR; INTRAVENOUS ONCE
Status: COMPLETED | OUTPATIENT
Start: 2021-07-24 | End: 2021-07-24

## 2021-07-24 RX ORDER — VANCOMYCIN HYDROCHLORIDE 1 G/200ML
15 INJECTION, SOLUTION INTRAVENOUS ONCE
Status: COMPLETED | OUTPATIENT
Start: 2021-07-24 | End: 2021-07-24

## 2021-07-24 RX ORDER — NOREPINEPHRINE BITARTRATE 1 MG/ML
INJECTION, SOLUTION INTRAVENOUS
Status: DISPENSED
Start: 2021-07-24 | End: 2021-07-24

## 2021-07-24 RX ORDER — SODIUM CHLORIDE, SODIUM GLUCONATE, SODIUM ACETATE, POTASSIUM CHLORIDE, MAGNESIUM CHLORIDE, SODIUM PHOSPHATE, DIBASIC, AND POTASSIUM PHOSPHATE .53; .5; .37; .037; .03; .012; .00082 G/100ML; G/100ML; G/100ML; G/100ML; G/100ML; G/100ML; G/100ML
500 INJECTION, SOLUTION INTRAVENOUS ONCE
Status: COMPLETED | OUTPATIENT
Start: 2021-07-24 | End: 2021-07-24

## 2021-07-24 RX ADMIN — PROPOFOL 15 MCG/KG/MIN: 10 INJECTION, EMULSION INTRAVENOUS at 06:58

## 2021-07-24 RX ADMIN — HEPARIN SODIUM 5000 UNITS: 5000 INJECTION INTRAVENOUS; SUBCUTANEOUS at 06:10

## 2021-07-24 RX ADMIN — SODIUM CHLORIDE, SODIUM GLUCONATE, SODIUM ACETATE, POTASSIUM CHLORIDE, MAGNESIUM CHLORIDE, SODIUM PHOSPHATE, DIBASIC, AND POTASSIUM PHOSPHATE 100 ML/HR: .53; .5; .37; .037; .03; .012; .00082 INJECTION, SOLUTION INTRAVENOUS at 02:44

## 2021-07-24 RX ADMIN — PROPOFOL 10 MCG/KG/MIN: 10 INJECTION, EMULSION INTRAVENOUS at 06:53

## 2021-07-24 RX ADMIN — FENTANYL CITRATE 50 MCG: 50 INJECTION INTRAMUSCULAR; INTRAVENOUS at 09:43

## 2021-07-24 RX ADMIN — ALBUMIN, HUMAN INJ 5%: 5 SOLUTION at 00:49

## 2021-07-24 RX ADMIN — Medication 50 MCG/HR: at 03:09

## 2021-07-24 RX ADMIN — SODIUM CHLORIDE, SODIUM GLUCONATE, SODIUM ACETATE, POTASSIUM CHLORIDE, MAGNESIUM CHLORIDE, SODIUM PHOSPHATE, DIBASIC, AND POTASSIUM PHOSPHATE 125 ML/HR: .53; .5; .37; .037; .03; .012; .00082 INJECTION, SOLUTION INTRAVENOUS at 09:03

## 2021-07-24 RX ADMIN — NYSTATIN 1 APPLICATION: 100000 POWDER TOPICAL at 17:06

## 2021-07-24 RX ADMIN — CEFEPIME HYDROCHLORIDE 1000 MG: 1 INJECTION, SOLUTION INTRAVENOUS at 15:59

## 2021-07-24 RX ADMIN — METRONIDAZOLE 500 MG: 500 INJECTION, SOLUTION INTRAVENOUS at 23:21

## 2021-07-24 RX ADMIN — DEXTROSE, SODIUM CHLORIDE, SODIUM LACTATE, POTASSIUM CHLORIDE, AND CALCIUM CHLORIDE 100 ML/HR: 5; .6; .31; .03; .02 INJECTION, SOLUTION INTRAVENOUS at 14:20

## 2021-07-24 RX ADMIN — NOREPINEPHRINE BITARTRATE 2 MCG/MIN: 1 INJECTION INTRAVENOUS at 00:55

## 2021-07-24 RX ADMIN — HEPARIN SODIUM 5000 UNITS: 5000 INJECTION INTRAVENOUS; SUBCUTANEOUS at 15:58

## 2021-07-24 RX ADMIN — METRONIDAZOLE 500 MG: 500 INJECTION, SOLUTION INTRAVENOUS at 15:59

## 2021-07-24 RX ADMIN — ALBUMIN HUMAN: 0.05 INJECTION, SOLUTION INTRAVENOUS at 00:49

## 2021-07-24 RX ADMIN — FENTANYL CITRATE 25 MCG: 50 INJECTION INTRAMUSCULAR; INTRAVENOUS at 23:20

## 2021-07-24 RX ADMIN — SODIUM CHLORIDE, SODIUM GLUCONATE, SODIUM ACETATE, POTASSIUM CHLORIDE, MAGNESIUM CHLORIDE, SODIUM PHOSPHATE, DIBASIC, AND POTASSIUM PHOSPHATE 500 ML: .53; .5; .37; .037; .03; .012; .00082 INJECTION, SOLUTION INTRAVENOUS at 00:48

## 2021-07-24 RX ADMIN — METRONIDAZOLE 500 MG: 500 INJECTION, SOLUTION INTRAVENOUS at 07:58

## 2021-07-24 RX ADMIN — VANCOMYCIN HYDROCHLORIDE 1000 MG: 1 INJECTION, SOLUTION INTRAVENOUS at 09:14

## 2021-07-24 RX ADMIN — NYSTATIN 1 APPLICATION: 100000 POWDER TOPICAL at 09:14

## 2021-07-24 RX ADMIN — FENTANYL CITRATE 25 MCG: 50 INJECTION INTRAMUSCULAR; INTRAVENOUS at 01:38

## 2021-07-24 RX ADMIN — DEXTROSE, SODIUM CHLORIDE, SODIUM LACTATE, POTASSIUM CHLORIDE, AND CALCIUM CHLORIDE 100 ML/HR: 5; .6; .31; .03; .02 INJECTION, SOLUTION INTRAVENOUS at 23:22

## 2021-07-24 RX ADMIN — POTASSIUM CHLORIDE 20 MEQ: 20 SOLUTION ORAL at 00:33

## 2021-07-24 RX ADMIN — NOREPINEPHRINE BITARTRATE 7 MCG/MIN: 1 INJECTION INTRAVENOUS at 19:43

## 2021-07-24 RX ADMIN — NOREPINEPHRINE BITARTRATE 8 MCG/MIN: 1 INJECTION INTRAVENOUS at 10:25

## 2021-07-24 RX ADMIN — NOREPINEPHRINE BITARTRATE 10 MCG/MIN: 1 INJECTION INTRAVENOUS at 02:44

## 2021-07-24 NOTE — ASSESSMENT & PLAN NOTE
Lab Results   Component Value Date    EGFR 14 07/23/2021    EGFR 13 07/23/2021    EGFR 10 07/23/2021    CREATININE 3 08 (H) 07/23/2021    CREATININE 3 45 (H) 07/23/2021    CREATININE 4 01 (H) 07/23/2021    Baseline Cr: variable: likely 0 7 to 1 1 per nephrology note last admission    Admission Cr: 4 01   Etiology:  Patient appears clinically dry but also has chronic hydronephrosis with stents and history of post obstructive uropathy  o Will obtain kidney ultrasound  o FENa calculated and indicates prerenal  o Urine Na, K, chloride obtained - UAG positive; 4 8 mEq/L  - Of note, patient has chronic bicarb loss (though may be 2/2 diarrhea)   Balanced crystalloid resuscitation   Consider nephrology consult   [address] discussion with family, undecided on RRT   Avoid nephrotoxic medications/hypotension

## 2021-07-24 NOTE — RESPIRATORY THERAPY NOTE
RT Ventilator Management Note  Christi Deeds 70 y o  female MRN: [id number]  Unit/Bed#: -01 Encounter: [id number]      Daily Screen       7/24/2021  1230 7/24/2021  1301          Patient safety screen outcome[de-identified]  --  --      Spont breathing trial outcome[de-identified]  Passed  (Pended)   --      Name of Medical Team Notified[de-identified]  PA  RN  (Pended)   --      Preparing to extubate/ Notify Nurse:  Yes  (Pended)   --      Extubation order obtained:  Yes  (Pended)   --      Consider Cuff Test:  Yes  (Pended)   --      Patient extubated:  Yes  (Pended)   --              Physical Exam:        Pt extubated and placed on 3 L NC  BS coarse, no WOB/Distress  RN at bedside, MD came in to assess  Vent pulled from room

## 2021-07-24 NOTE — RESPIRATORY THERAPY NOTE
RT Ventilator Management Note  Jamison Billings 70 y o  female MRN: [id number]  Unit/Bed#: -01 Encounter: [id number]      Daily Screen    No data found in the last 10 encounters  Physical Exam:   RT assumed care of 74yo pt requiring full vent support for airway protection after being found unresponsive at home (unknown downtime)  Pt reported to be on APV//15 +6 @ 40% -  Pt found on P-CMV 6  +6 @ 40%  - ABG drawn s/p A-line insertion : 7 28/30/172/14/99%  No documentation as to vent mode change  Pt returned to APV/CMV settings - tolerating well with adequate mechanics/some added breaths  Plan to repeat ABG ~2200hrs  BS dim with scant thin secretions  VSS/SpO2 99%  Plan to maintain full support overnight with possible transition to PSV in AM to evaluate for wean vs comfort measures  Pt became agitated/asynchronous -  Placed into PSV 8/6 @ 40% with improvement  Pt calm at this time  Mechanics adequate

## 2021-07-24 NOTE — ASSESSMENT & PLAN NOTE
· Unclear etiology  · Present on CT imaging  Also present of previous CT scan on 04/01/2021    · Obtain RUQ ultrasound  · Check Ammonia: 56  · May be slow to clear lactic acid due to this and ZOYA  · Monitor for hypoglycemia  · INR 1 36

## 2021-07-24 NOTE — RESPIRATORY THERAPY NOTE
RT Ventilator Management Note  Rafael Romano 70 y o  female MRN: [id number]  Unit/Bed#: -01 Encounter: [id number]      Daily Screen       7/24/2021  0820 7/24/2021  0858          Patient safety screen outcome[de-identified]  Passed  (Pended)   --      Not Ready for Weaning due to[de-identified]  --  --              Physical Exam:        Pt currently on SPONT 6/6 40%  Tolerating well  Plan is to dc sedation and plan for extubation  Awaiting now for further directions

## 2021-07-24 NOTE — PROGRESS NOTES
[address]  Progress Note - Ganesh Che 1945, 70 y o  female MRN: [id number]  Unit/Bed#: -01 Encounter: [id number]  Primary Care Provider: Kira Dang MD   Date and time admitted to hospital: 7/23/2021 12:03 PM    * Acute metabolic encephalopathy  Assessment & Plan  · Patient presented after being found down by caregiver, last seen/heard from 3 days ago  · Arrived to the emergency department GCS of 6 (4, 1T, 1) and therefore was intubated for airway protection  · CT head/cervical spine negative  · Likely secondary to severe metabolic derangements  · Due to history of cirrhosis, will obtain ammonia  · UDS negative  · Monitor for hypoglycemia  · Treat derangements as per below    Acute on chronic kidney failure [facility])  Assessment & Plan  Lab Results   Component Value Date    EGFR 14 07/23/2021    EGFR 13 07/23/2021    EGFR 10 07/23/2021    CREATININE 3 08 (H) 07/23/2021    CREATININE 3 45 (H) 07/23/2021    CREATININE 4 01 (H) 07/23/2021    Baseline Cr: variable: likely 0 7 to 1 1 per nephrology note last admission    Admission Cr: 4 01   Etiology:  Patient appears clinically dry but also has chronic hydronephrosis with stents and history of post obstructive uropathy  o Will obtain kidney ultrasound  o FENa calculated and indicates prerenal  o Urine Na, K, chloride obtained - UAG positive; 4 8 mEq/L  - Of note, patient has chronic bicarb loss (though may be 2/2 diarrhea)   Balanced crystalloid resuscitation   Consider nephrology consult   [address] discussion with family, undecided on RRT   Avoid nephrotoxic medications/hypotension        Acute respiratory failure with hypoxia (HCC)  Assessment & Plan  Intubated in the ED due to altered mental status  Vent Day 1  Current vent settings: PS 6/6 40%  · Weaning goals for the next 24 hours: Leave intubated while mental status depressed  Continue low tidal volume, lung protective ventilation  Post intubation gas with metabolic acidosis with respiratory compensation, oxygenation never an issue  Sedation with Fentanyl for RASS goal -1 to 0  · Precedex added later on 07/23 for restlessness and agitation  Now discontinued  VAP bundle, though without Chlorhexidine as patient has documented allergy    High anion gap metabolic acidosis  Assessment & Plan  · Likely 2/2 intravascular volume depletion/ maybe chronic component given chronically low serum bicarb  · Aggressively fluid resuscitate  · Trend BMP/electrolytes Q 6 hours    Hypotension  Assessment & Plan  · Not present on admission  · Likely due to medication/sedation  Post intubation sedation was held  Started fentanyl gtt due to restlessness approximately 5 hours after intubation  · Continue IV fluid resuscitation  Given additional boluses of fluid and albumen  · Sedation held and blood pressure improved however sedation needed to be restarted due to agitation and restlessness  · After second episode of hypotension, dicussed with patient's daughter possible need to pressors to support blood pressure as she was no longer responding to sedation on hold and additional boluses of fluids  Given the need for pressors, central line insertion was discussed with patient's daughter  Discussed procedure, risks and benefits  All questions were answered to her satisfaction  Attending aware of same     · Central line placed 07/24  · Continue levophed for MAP goal > 65  · Consider starting vaso if escalating levo requirements  · Continue sedation but caution given hypotension    Elevated troponin I level  Assessment & Plan  · Etiology unclear  · Cardiology evaluated in ED  · EKG without ischemic change  · Repeat EKG - NSR without ischemic change, prolonged QTC of 514 > 531  · May be 2/2 time down with significant ZOYA  · Trend troponins  · Serial EKGs  · No ECHO in our system, will obtain to evaluate function, assess for RWMA    Lactic acidosis  Assessment & Plan  · Likely 2/2 dehydration, slow clearance given ZOYA and history of cirrhosis  · Given sepsis fluids in ED  · Continue aggressive fluids  · Trend lactic acid till less than 2 0    Sinus tachycardia  Assessment & Plan  · Likely 2/2 hypovolemia/agitation  · Improved rate with fluid resuscitation; now NSR with prolonged QTC  · Check EKG  · Check ECHO    Leukocytosis  Assessment & Plan  · Etiology unclear  · No fever, no bandemia  · No clear signs of infection on CT scan other than ? Enteritis  · Will cover with cefepime/flagyl (due to penicillin allergy)  · Check procal  · Expect elevation given ZOYA  · Follow up Blood cultures, urine cultures  · Trend WBCs and fever curve    Elevated CK  Assessment & Plan  · Likely elevated due to unknown down time in the setting of dehydration  · CK elevated on 470 on admission  · Continue fluid resuscitation  · Trend CK: down trending; 408    Elevated alkaline phosphatase level  Assessment & Plan  · Seen on last admission  · At previous discharge (07/10/2021) alk phos was 128  · Alk phos 189 on present admission (07/23/2021)  · Previous labs from 04/2021 show normal alk phos  · Unclear etiology, biliary vs osseous given history of breast ca  · Check gamma GT to rule in/out biliary cause  · GGT elevated at 198  Given that both alk phos and GGT are elevated, eitology likely related to liver disease rather than osseous eitiology  · Trend LFTS  · RUQ ultrasound    Enteritis  Assessment & Plan  · Unclear, possibly chronic  · Will cover with cefepime/flagyl  · If patient with diarrhea consider C   Diff given recent healthcare contacts as well as stool cultures/O&P    Non-compliance  Assessment & Plan  · Patient with 3 admissions in the last year where she signed out AMA  · Also recommended post-acute rehab at last admission but patient refused this   · Goals of care discussions with family in further days  · Consider POLST if patient is able to participate in medical decisions in the next few days    History of breast cancer  Assessment & Plan  · History of stage IIIa L IDC, 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  S/p left mastectomy  · Currently on Femara    Hydronephrosis  Assessment & Plan  · History of chronic hydronephrosis  · Recently had stents removed at [facility]  · CT showed relatively unchanged size of hydro with no hydroureter  · U/A reassuring as far as infection; trace leukocytes, negative nitrites, WBCs 10-20, no bacteria  Urine culture pending  · Check renal ultrasound    Type 2 diabetes mellitus [facility])  Assessment & Plan  Lab Results   Component Value Date    HGBA1C 4 8 07/09/2021       No results for input(s): POCGLU in the last 72 hours  Blood Sugar Average: Last 72 hrs:  · A1C checked on last admission on 07/09/2021 - 4 8  · Documented history of DM type 2 found in chart review  Patient does not take medications for DM at home  · Monitor accuchecks Q 6 hours, insulin per sliding scale algorithm 2  · Hypoglycemia protocol - required treatment for glucose of 67 last evening  1/2 amp dextrose given and repeat accucheck 126    Cirrhosis of liver ([facility] [address] )  Assessment & Plan  · Unclear etiology  · Present on CT imaging  Also present of previous CT scan on 04/01/2021  · Obtain RUQ ultrasound  · Check Ammonia: 56  · May be slow to clear lactic acid due to this and ZOYA  · Monitor for hypoglycemia  · INR 1 36    Hyperthyroidism  Assessment & Plan  · History of hyperthyroidism  Takes methimazole 2 5 mg EOD at home  · TSH 5 55 on admission, T4 1 24  · Labs reviewed in care everywhere and from previous admission in June 2021 - euthyroid state    ----------------------------------------------------------------------------------------  HPI/24hr events: Patient is a 70 y o  male with complicated past medical history and multiple co-morbidities presented to [address] [place] [address] yesterday (07/23/2021) after being found down for unknown time (last seen/heard from 3 days prior) with GCS 6, intubated in ED for airway protection   Admitted to Critical care with encephalopathy, metabolic acidosis, electrolyte derangments, lactic acidosis, elevated troponin, ZOYA on CKD 4, leukocytosis, enteritis, hydroureteronephrosis, elevated alk phos  Received aggressive fluid resuscitation and was started on Cefepime/Flagyl  Started on fentanyl gtt for sedation/pain and later in the evening required addition of precedex  Initially pt was hypertensive but then became hypotensive with increase in sedation  Arterial and central lines placed  Levophed started  Total fluid boluses given for hypotension was 2 L isolyte and 250 ml albumen  Lactic acidosis improving; currently 2 4  Glucose of 67 at 2335 treated per hypoglycemia protocol with 1/2 amp of dextrose  Glucose improved to 126  Troponin remains elevated; 6 98  EKG without ischemic changes however prolonged QTC present  Trial on multiple vent settings including P-SIMV and APV/CMV  Changed to spontaneous mode at approximately 0320 - tolerated well and agitation lessened  Patient's daughter remained at bedside overnight and was provided with ongoing updates      Disposition: Continue Critical Care   Code Status: Level 3 - DNAR and DNI  ---------------------------------------------------------------------------------------  SUBJECTIVE    Review of Systems  Review of systems was unable to be performed secondary to encephalopathy and intubation   ---------------------------------------------------------------------------------------  OBJECTIVE    Vitals   Vitals:    21 0300 21 0330 21 0400 21 0430   BP: 96/53 105/64 108/54 111/53   Pulse: 78 81 75 78   Resp: 20 13 (!) 10 21   Temp: (!) 97 2 °F (36 2 °C) (!) 97 2 °F (36 2 °C) (!) 97 3 °F (36 3 °C) (!) 97 3 °F (36 3 °C)   TempSrc:       SpO2: 100% 99% 98% 100%   Weight:         Temp (24hrs), Av 4 °F (36 3 °C), Min:97 2 °F (36 2 °C), Max:97 9 °F (36 6 °C)  Current: Temperature: (!) 97 3 °F (36 3 °C)  Arterial Line BP: 127/61  Arterial Line MAP (mmHg): 82 mmHg    Respiratory:  SpO2: SpO2: 100 %, SpO2 Activity: SpO2 Activity: At Rest, SpO2 Device: O2 Device: Ventilator       Invasive/non-invasive ventilation settings   Respiratory    Lab Data (Last 4 hours)    None         O2/Vent Data (Last 4 hours)    None                Physical Exam  Vitals reviewed  Constitutional:       General: She is not in acute distress  Appearance: She is well-developed  She is ill-appearing  She is not diaphoretic  Interventions: She is sedated, intubated and restrained  Comments: Frail and chronically ill appearing   HENT:      Head: Normocephalic and atraumatic  Nose: Nose normal       Mouth/Throat:      Mouth: Mucous membranes are moist       Pharynx: Oropharynx is clear  Comments: Suctioned via ETT for small amounts thin clear white sputum  Eyes:      General: No scleral icterus  Conjunctiva/sclera: Conjunctivae normal       Pupils: Pupils are equal, round, and reactive to light  Neck:      Vascular: No JVD  Cardiovascular:      Rate and Rhythm: Normal rate and regular rhythm  Pulses: Normal pulses  Heart sounds: Normal heart sounds  No murmur heard  No friction rub  Pulmonary:      Effort: Pulmonary effort is normal  No respiratory distress  She is intubated  Breath sounds: Rhonchi present  Abdominal:      General: Bowel sounds are normal       Palpations: Abdomen is soft  There is no mass  Tenderness: There is no abdominal tenderness  There is no guarding or rebound  Musculoskeletal:         General: No tenderness or deformity  Normal range of motion  Cervical back: Normal range of motion and neck supple  Skin:     General: Skin is warm and dry  Capillary Refill: Capillary refill takes less than 2 seconds  Coloration: Skin is not pale  Findings: No erythema or rash  Neurological:      General: No focal deficit present  Mental Status: She is confused  GCS: GCS eye subscore is 4  GCS verbal subscore is 1  GCS motor subscore is 5  Cranial Nerves: No cranial nerve deficit  Motor: No seizure activity  Comments: Moving all extremities and opening eyes spontaneous  Purposefully reaches for endotracheal tube at times  Does not follow commands  Mental status waxing/waning           Laboratory and Diagnostics:    07/24 AM labs pending at time of note    Results from last 7 days   Lab Units 07/23/21 1754 07/23/21  1229   WBC Thousand/uL  --  16 58*   HEMOGLOBIN g/dL  --  12 2   HEMATOCRIT %  --  36 5   PLATELETS Thousands/uL 206 233   NEUTROS PCT %  --  88*   MONOS PCT %  --  6     Results from last 7 days   Lab Units 07/23/21 2329 07/23/21  1644 07/23/21  1229   SODIUM mmol/L 141 139 143   POTASSIUM mmol/L 3 8 3 8 4 0   CHLORIDE mmol/L 109* 108 109*   CO2 mmol/L 15* 13* 11*   ANION GAP mmol/L 17* 18* 23*   BUN mg/dL 32* 30* 31*   CREATININE mg/dL 3 08* 3 45* 4 01*   CALCIUM mg/dL 8 0* 8 5 8 7   GLUCOSE RANDOM mg/dL 76 164* 153*   ALT U/L  --   --  24   AST U/L  --   --  59*   ALK PHOS U/L  --   --  189*   ALBUMIN g/dL  --   --  2 4*   TOTAL BILIRUBIN mg/dL  --   --  0 62     Results from last 7 days   Lab Units 07/23/21 2329 07/23/21  1644   MAGNESIUM mg/dL 3 2* 1 4*   PHOSPHORUS mg/dL  --  4 5*      Results from last 7 days   Lab Units 07/23/21  1229   INR  1 36*   PTT seconds 31      Results from last 7 days   Lab Units 07/24/21 0319 07/23/21 2329 07/23/21 2118 07/23/21  1754 07/23/21  1644 07/23/21  1229   TROPONIN I ng/mL 6 98* 6 84* 5 92* 3 80* 5 14* 3 47*     Results from last 7 days   Lab Units 07/24/21 0319 07/23/21 2329 07/23/21 2118 07/23/21  1935 07/23/21  1754 07/23/21  1527 07/23/21  1230   LACTIC ACID mmol/L 2 4* 2 7* 2 8* 4 4* 4 3* 6 6* 8 1*     ABG:  Results from last 7 days   Lab Units 07/23/21  2329   PH ART  7 359   PCO2 ART mm Hg 25 2*   PO2 ART mm Hg 155 8*   HCO3 ART mmol/L 13 9*   BASE EXC ART mmol/L -10 0   ABG SOURCE  Line, Arterial     VBG:  Results from last 7 days   Lab Units 07/23/21  2329   ABG SOURCE  Line, Arterial     Results from last 7 days   Lab Units 07/23/21  1229   PROCALCITONIN ng/ml 0 14       Micro  Results from last 7 days   Lab Units 07/23/21  1230 07/23/21  1229   BLOOD CULTURE  Received in Microbiology Lab  Culture in Progress  Received in Microbiology Lab  Culture in Progress  EKG: NSR rate 70-90s, prolonged QTC of 531  Imaging: I have personally reviewed pertinent reports  and I have personally reviewed pertinent films in PACS    Intake and Output  I/O       07/22 0701 - 07/23 0700 07/23 0701 - 07/24 0700    I V  (mL/kg)  3087 7 (48 5)    IV Piggyback  2648  3    Total Intake(mL/kg)  5736 (90)    Urine (mL/kg/hr)  440    Emesis/NG output  130    Total Output  570    Net  +5166              UOP approximately 0 5 ml/kg/hr    Height and Weights         Body mass index is 27 43 kg/m²  Weight (last 2 days)     Date/Time   Weight    07/23/21 12:05:39   63 7 (140 43)                Nutrition       Diet Orders   (From admission, onward)             Start     Ordered    07/23/21 1526  Diet NPO  Diet effective now     Question Answer Comment   Diet Type NPO    RD to adjust diet per protocol?  Yes        07/23/21 1525                  Active Medications  Scheduled Meds:  Current Facility-Administered Medications   Medication Dose Route Frequency Provider Last Rate    acetaminophen  650 mg Oral Q6H PRN Nohemi Segura PA-C      cefepime  1,000 mg Intravenous Q24H Nohemi Segura PA-C Stopped (07/23/21 6355)    famotidine           famotidine  10 mg Oral Daily Nohemi Segura PA-C      fentaNYL  50 mcg/hr Intravenous Continuous Sho Beckham MD 50 mcg/hr (07/24/21 0309)    fentanyl citrate (PF)  50 mcg Intravenous Q1H PRN Sho Beckham MD      heparin (porcine)  5,000 Units Subcutaneous [facility] Nohemi Segura PA-C      insulin lispro  1-5 Units Subcutaneous Q6H [facility] SIMON Hall      metroNIDAZOLE  500 mg Intravenous Q8H Nohemi Segura POLY Stopped (07/24/21 0001)    multi-electrolyte  50 mL/hr Intravenous Continuous Jenilee Raibeck, CRNP 50 mL/hr (07/24/21 0451)    norepinephrine           norepinephrine  1-30 mcg/min Intravenous Titrated Jenilee Raibeck, CRNP 8 mcg/min (07/24/21 0451)    nystatin   Topical BID Kev Hanson PA-C      vasopressin           vasopressin (PITRESSIN) in 0 9 % sodium chloride 100 mL FOR ORGAN DONATION ONLY  0 02 Units/min Intravenous Continuous Jenilee Raibeck, CRNP       Continuous Infusions:  fentaNYL, 50 mcg/hr, Last Rate: 50 mcg/hr (07/24/21 0309)  multi-electrolyte, 50 mL/hr, Last Rate: 50 mL/hr (07/24/21 0451)  norepinephrine, 1-30 mcg/min, Last Rate: 8 mcg/min (07/24/21 0451)  vasopressin (PITRESSIN) in 0 9 % sodium chloride 100 mL FOR ORGAN DONATION ONLY, 0 02 Units/min      PRN Meds:   acetaminophen, 650 mg, Q6H PRN  fentanyl citrate (PF), 50 mcg, Q1H PRN        Invasive Devices Review  Invasive Devices     Central Venous Catheter Line            CVC Central Lines 07/24/21 Triple 16cm <1 day          Peripheral Intravenous Line            Peripheral IV 07/23/21 Dorsal (posterior); Right Hand <1 day    Peripheral IV 07/24/21 Proximal;Right;Ventral (anterior) Forearm <1 day          Arterial Line            Arterial Line 07/23/21 Radial <1 day          Drain            NG/OG/Enteral Tube Orogastric 16 Fr Left mouth <1 day    Urethral Catheter Temperature probe 16 Fr  <1 day          Airway            ETT  Oral 7 mm <1 day                Rationale for remaining devices:   Central line for use of pressors, poor peripheral IV access, and frequent blood draws  Arterial line for continuous blood pressure monitoring and frequent ABGS  Way catheter for accurate urine output    ETT/vent for inability to protect airway   ---------------------------------------------------------------------------------------  Advance Directive and Living Will:      Power of :    POLST: ---------------------------------------------------------------------------------------  Care Time Delivered:   Upon my evaluation, this patient had a high probability of imminent or life-threatening deterioration due to ZOYA on CKD, elevated troponin, metabolic acidosis, lactic acidosis, encephalopathy, hypotension, which required my direct attention, intervention, and personal management  I have personally provided 45 minutes (0045 to 0130) of critical care time, exclusive of procedures, teaching, family meetings, and any prior time recorded by providers other than myself  SIMON Castellano      Portions of the record may have been created with voice recognition software  Occasional wrong word or "sound a like" substitutions may have occurred due to the inherent limitations of voice recognition software    Read the chart carefully and recognize, using context, where substitutions have occurred

## 2021-07-24 NOTE — ACP (ADVANCE CARE PLANNING)
ACP Note  7-24-21  8:59    Met with daughter Kayla Dowd at bedside to discuss overnight events and plan of care  In particular we discussed a relative improvement in her laboratory value since admission, and her unexpected hypotension and agitation last night which required another central line placement for vasopressors  Kayla Dowd reported that her mother would probably be angry with her if she knew that she had allowed another medical procedure to be done on her  We discussed the possibility of prolonging her life as long as possible without regard to quality, versus giving her as much quality as possible without regard to quantity of life  Given that her mother had often expressed that she would not want any invasive means taken to prolong her life, and that she had made herself DNR/DNI on her last 3 hospital admissions, and that she had refused admission to SNF, we agreed that she would likely not want any more invasive means taken to prolong her life  Given that patient has begun to show signs of discomfort while on mechanical ventilation, and that she may or may not show any additional signs of improvement over the next 24 hours, we jointly agreed to proceed with extubation this afternoon with a plan to transition to [address] [place] [address] if she is not able to breath on her own  Planned to stop sedation and awaken patient upon arrival of other family members        ACP time 17 minutes  Gino Mosher MD

## 2021-07-24 NOTE — PLAN OF CARE
Problem: PAIN - ADULT  Goal: Verbalizes/displays adequate comfort level or baseline comfort level  Description: Interventions:  - Encourage patient to monitor pain and request assistance  - Assess pain using appropriate pain scale  - Administer analgesics based on type and severity of pain and evaluate response  - Implement non-pharmacological measures as appropriate and evaluate response  - Consider cultural and social influences on pain and pain management  - Notify physician/advanced practitioner if interventions unsuccessful or patient reports new pain  7/24/2021 0917 by Carrol Garibay RN  Outcome: Progressing  7/24/2021 0917 by Carrol Garibay RN  Outcome: Progressing     Problem: INFECTION - ADULT  Goal: Absence or prevention of progression during hospitalization  Description: INTERVENTIONS:  - Assess and monitor for signs and symptoms of infection  - Monitor lab/diagnostic results  - Monitor all insertion sites, i e  indwelling lines, tubes, and drains  - Monitor endotracheal if appropriate and nasal secretions for changes in amount and color  - [facility] appropriate cooling/warming therapies per order  - Administer medications as ordered  - Instruct and encourage patient and family to use good hand hygiene technique  - Identify and instruct in appropriate isolation precautions for identified infection/condition  7/24/2021 0917 by Carrol Garibay RN  Outcome: Progressing  7/24/2021 0917 by Carrol Garibay RN  Outcome: Progressing  Goal: Absence of fever/infection during neutropenic period  Description: INTERVENTIONS:  - Monitor WBC    7/24/2021 0917 by Carrol Garibay RN  Outcome: Progressing  7/24/2021 0917 by Carrol Garibay RN  Outcome: Progressing     Problem: SAFETY ADULT  Goal: Patient will remain free of falls  Description: INTERVENTIONS:  - Educate patient/family on patient safety including physical limitations  - Instruct patient to call for assistance with activity   - Consult OT/PT to assist with strengthening/mobility   - Keep Call bell within reach  - Keep bed low and locked with side rails adjusted as appropriate  - Keep care items and personal belongings within reach  - Initiate and maintain comfort rounds  - Make Fall Risk Sign visible to staff  - Initiate/Maintain bed alarm  - Apply yellow socks and bracelet for high fall risk patients  - Consider moving patient to room near nurses station  7/24/2021 0917 by Qiana Johnson RN  Outcome: Progressing  7/24/2021 0917 by Qiana Johnson RN  Outcome: Progressing  Goal: Maintain or return to baseline ADL function  Description: INTERVENTIONS:  -  Assess patient's ability to carry out ADLs; assess patient's baseline for ADL function and identify physical deficits which impact ability to perform ADLs (bathing, care of mouth/teeth, toileting, grooming, dressing, etc )  - Assess/evaluate cause of self-care deficits   - Assess range of motion  - Assess patient's mobility; develop plan if impaired  - Assess patient's need for assistive devices and provide as appropriate  - Encourage maximum independence but intervene and supervise when necessary  - Involve family in performance of ADLs  - Assess for home care needs following discharge   - Consider OT consult to assist with ADL evaluation and planning for discharge  - Provide patient education as appropriate  7/24/2021 0917 by Qiana Johnson RN  Outcome: Progressing  7/24/2021 0917 by Qiana Johnson RN  Outcome: Progressing  Goal: Maintains/Returns to pre admission functional level  Description: INTERVENTIONS:  - Perform BMAT or MOVE assessment daily    - Set and communicate daily mobility goal to care team and patient/family/caregiver  - Collaborate with rehabilitation services on mobility goals if consulted  - Perform Range of Motion 3 times a day  - Reposition patient every 2 hours    - Record patient progress and toleration of activity level   7/24/2021 0917 by Qiana Johnson RN  Outcome: Progressing  7/24/2021 9661 by Kei Nissen, RN  Outcome: Progressing     Problem: DISCHARGE PLANNING  Goal: Discharge to home or other facility with appropriate resources  Description: INTERVENTIONS:  - Identify barriers to discharge w/patient and caregiver  - Arrange for needed discharge resources and transportation as appropriate  - Identify discharge learning needs (meds, wound care, etc )  - Arrange for interpretive services to assist at discharge as needed  - Refer to Case Management Department for coordinating discharge planning if the patient needs post-hospital services based on physician/advanced practitioner order or complex needs related to functional status, cognitive ability, or social support system  7/24/2021 0917 by Griffin Nissen, RN  Outcome: Progressing  7/24/2021 0917 by Griffin Nissen, RN  Outcome: Progressing     Problem: Knowledge Deficit  Goal: Patient/family/caregiver demonstrates understanding of disease process, treatment plan, medications, and discharge instructions  Description: Complete learning assessment and assess knowledge base    Interventions:  - Provide teaching at level of understanding  - Provide teaching via preferred learning methods  7/24/2021 0917 by Griffin Nissen, RN  Outcome: Progressing  7/24/2021 0917 by Griffin Nissen, RN  Outcome: Progressing       Problem: MOBILITY - ADULT  Goal: Maintain or return to baseline ADL function  Description: INTERVENTIONS:  -  Assess patient's ability to carry out ADLs; assess patient's baseline for ADL function and identify physical deficits which impact ability to perform ADLs (bathing, care of mouth/teeth, toileting, grooming, dressing, etc )  - Assess/evaluate cause of self-care deficits   - Assess range of motion  - Assess patient's mobility; develop plan if impaired  - Assess patient's need for assistive devices and provide as appropriate  - Encourage maximum independence but intervene and supervise when necessary  - Involve family in performance of ADLs  - Assess for home care needs following discharge   - Consider OT consult to assist with ADL evaluation and planning for discharge  - Provide patient education as appropriate  7/24/2021 0917 by Arin Poon RN  Outcome: Progressing  7/24/2021 0917 by Arin Poon RN  Outcome: Progressing       Problem: Nutrition/Hydration-ADULT  Goal: Nutrient/Hydration intake appropriate for improving, restoring or maintaining nutritional needs  Description: Monitor and assess patient's nutrition/hydration status for malnutrition  Collaborate with interdisciplinary team and initiate plan and interventions as ordered  Monitor patient's weight and dietary intake as ordered or per policy  Utilize nutrition screening tool and intervene as necessary  Determine patient's food preferences and provide high-protein, high-caloric foods as appropriate       INTERVENTIONS:  - Monitor oral intake, urinary output, labs, and treatment plans  - Assess nutrition and hydration status and recommend course of action  - Evaluate amount of meals eaten  - Assist patient with eating if necessary   - Allow adequate time for meals  - Recommend/ encourage appropriate diets, oral nutritional supplements, and vitamin/mineral supplements  - Order, calculate, and assess calorie counts as needed  - Recommend, monitor, and adjust tube feedings and TPN/PPN based on assessed needs  - Assess need for intravenous fluids  - Provide specific nutrition/hydration education as appropriate  - Include patient/family/caregiver in decisions related to nutrition  7/24/2021 0917 by Arin Poon RN  Outcome: Progressing  7/24/2021 0917 by Arin Poon RN  Outcome: Progressing

## 2021-07-24 NOTE — ASSESSMENT & PLAN NOTE
· History of chronic hydronephrosis  · Recently had stents removed at [facility]  · CT showed relatively unchanged size of hydro with no hydroureter  · U/A reassuring as far as infection; trace leukocytes, negative nitrites, WBCs 10-20, no bacteria  Urine culture pending    · Check renal ultrasound

## 2021-07-24 NOTE — ACP (ADVANCE CARE PLANNING)
Critical Care Advanced Care Planning Note  Enrrique Mcdowell 70 y o  female MRN: [id number]  Unit/Bed#: -01 Encounter: [id number]    Enrrique Mcdowell is a 70 y o  female requiring critical care evaluation and advanced care planning  The patient has chronic comorbidities, including but not limited to breast cancer stage IIIa s/p left mastectomy (2017), CKD stage IV, bilateral hydroureteronephrosis s/p stent placement and removal on 07/20/21, NIDDM2, colitis, partial SBO, diverticulitis s/p moncada's s/p reversal, hyponatremia, elevated alk phos, hyperthyroidism, which is now further complicated by the following acute conditions: acute metabolic encephalopathy requiring intubation due to GCS 6, ZOYA atop CKD stage IV, high anion gap metabolic acidosis, multiple metabolic derangements, and now with hypotension  Due to the severity of the patient's acute condition and/or the extent of chronic conditions, additional conversations pertaining to advanced care planning were required  Today's discussion, which was held in a face-to-face meeting, included patient's daughter Shama Madden, and it was established that all stake holders understood the rationale for the advanced care planning  The patient was unable to participate in the discussion due to encephalopathy and intubation  Summary of Discussion:  Met at bedside with patient's daughter, Shama Madden, to give update on patient's currently condition including but not limited to hypotension not responsive to IVF  Discussed starting pressors should blood pressure remain low and that it is recommended that they be infused in a central line  Per Heather Bo, patient does not have a living will and that she is "stuborn"  Heather Bo also stated that she would like to give her mother an opportunity and that if it meant she would require a central line to be inserted she is willing to consent for her mother   Will continue same level of code status which is DNR/DNI-accepts intubation at this time  Plan to insert central line for purpose of pressors  Discussed above conversation also with attending, Dr Margarita Garrison  Total time spent, (20) minutes (0040 to 0100)        CODE STATUS: DNR/DNI - Level 3  POA:    POLST:        SIGNATURE: SIMON Marsh  DATE: July 24, 2021  TIME: 2:27 AM

## 2021-07-24 NOTE — ASSESSMENT & PLAN NOTE
· Likely 2/2 hypovolemia/agitation  · Improved rate with fluid resuscitation; now NSR with prolonged QTC  · Check EKG  · Check ECHO

## 2021-07-24 NOTE — ASSESSMENT & PLAN NOTE
· Seen on last admission  · At previous discharge (07/10/2021) alk phos was 128  · Alk phos 189 on present admission (07/23/2021)  · Previous labs from 04/2021 show normal alk phos  · Unclear etiology, biliary vs osseous given history of breast ca  · Check gamma GT to rule in/out biliary cause  · GGT elevated at 198   Given that both alk phos and GGT are elevated, eitology likely related to liver disease rather than osseous eitiology  · Trend LFTS  · RUQ ultrasound

## 2021-07-24 NOTE — ASSESSMENT & PLAN NOTE
· Not present on admission  · Likely due to medication/sedation  Post intubation sedation was held  Started fentanyl gtt due to restlessness approximately 5 hours after intubation  · Continue IV fluid resuscitation  Given additional boluses of fluid and albumen  · Sedation held and blood pressure improved however sedation needed to be restarted due to agitation and restlessness  · After second episode of hypotension, dicussed with patient's daughter possible need to pressors to support blood pressure as she was no longer responding to sedation on hold and additional boluses of fluids  Given the need for pressors, central line insertion was discussed with patient's daughter  Discussed procedure, risks and benefits  All questions were answered to her satisfaction  Attending aware of same     · Central line placed 07/24  · Continue levophed for MAP goal > 65  · Consider starting vaso if escalating levo requirements  · Continue sedation but caution given hypotension

## 2021-07-24 NOTE — ASSESSMENT & PLAN NOTE
Intubated in the ED due to altered mental status  Vent Day 1  Current vent settings: PS 6/6 40%  · Weaning goals for the next 24 hours: Leave intubated while mental status depressed  Continue low tidal volume, lung protective ventilation  Post intubation gas with metabolic acidosis with respiratory compensation, oxygenation never an issue  Sedation with Fentanyl for RASS goal -1 to 0  · Precedex added later on 07/23 for restlessness and agitation  Now discontinued    VAP bundle, though without Chlorhexidine as patient has documented allergy

## 2021-07-24 NOTE — ASSESSMENT & PLAN NOTE
· History of hyperthyroidism  Takes methimazole 2 5 mg EOD at home    · TSH 5 55 on admission, T4 1 24  · Labs reviewed in care everywhere and from previous admission in June 2021 - euthyroid state

## 2021-07-24 NOTE — ASSESSMENT & PLAN NOTE
· Patient presented after being found down by caregiver, last seen/heard from 3 days ago  · Arrived to the emergency department GCS of 6 (4, 1T, 1) and therefore was intubated for airway protection  · CT head/cervical spine negative  · Likely secondary to severe metabolic derangements  · Due to history of cirrhosis, will obtain ammonia  · UDS negative  · Monitor for hypoglycemia  · Treat derangements as per below

## 2021-07-24 NOTE — ASSESSMENT & PLAN NOTE
· Etiology unclear  · No fever, no bandemia  · No clear signs of infection on CT scan other than ?  Enteritis  · Will cover with cefepime/flagyl (due to penicillin allergy)  · Check procal  · Expect elevation given ZOYA  · Follow up Blood cultures, urine cultures  · Trend WBCs and fever curve

## 2021-07-24 NOTE — PROCEDURES
Central Line Insertion    Date/Time: 7/24/2021 1:30 AM  Performed by: SIMON Paris  Authorized by: SIMON Paris     Patient location:  ICU  Consent:     Consent obtained:  Written and emergent situation    Consent given by:  Healthcare agent (consent obtained from Lilibeth Vail)    Risks discussed:  Arterial puncture, bleeding, infection, incorrect placement, nerve damage and pneumothorax    Alternatives discussed:  No treatment  Universal protocol:     Procedure explained and questions answered to patient or proxy's satisfaction: yes      Relevant documents present and verified: yes      Test results available and properly labeled: yes      Radiology Images displayed and confirmed  If images not available, report reviewed: yes      Required blood products, implants, devices, and special equipment available: yes      Site/side marked: yes      Immediately prior to procedure, a time out was called: yes      Patient identity confirmed:  Hospital-assigned identification number, anonymous protocol, patient vented/unresponsive, arm band and provided demographic data  Pre-procedure details:     Hand hygiene: Hand hygiene performed prior to insertion      Sterile barrier technique: All elements of maximal sterile technique followed      Skin preparation:  ChloraPrep    Skin preparation agent: Skin preparation agent completely dried prior to procedure    Indications:     Central line indications: medications requiring central line and no peripheral vascular access    Anesthesia (see MAR for exact dosages):      Anesthesia method:  Local infiltration    Local anesthetic:  Lidocaine 1% w/o epi  Procedure details:     Location:  Right internal jugular    Vessel type: vein      Laterality:  Right    Approach: percutaneous technique used      Patient position:  Reverse Trendelenburg    Catheter type:  Triple lumen 16cm    Catheter size:  7 Fr    Landmarks identified: yes      Ultrasound guidance: yes Ultrasound image availability:  Still images obtained and images available in PACS    Sterile ultrasound techniques: Sterile gel and sterile probe covers were used      Manometry confirmation: yes      Number of attempts:  1    Successful placement: yes      Vessel of catheter tip end:  Proximal SVC  Post-procedure details:     Post-procedure:  Dressing applied and line sutured    Assessment:  Blood return through all ports, no pneumothorax on x-ray, free fluid flow and placement verified by x-ray    Post-procedure complications: none      Patient tolerance of procedure: Tolerated well, no immediate complications  Comments:      Written consent obtained from patient's daughter-Emily Aaron  Dr Edwin Beal, attending physician, aware of plan for procedure prior to start

## 2021-07-24 NOTE — ASSESSMENT & PLAN NOTE
· Likely elevated due to unknown down time in the setting of dehydration  · CK elevated on 470 on admission  · Continue fluid resuscitation  · Trend CK: down trending; 408

## 2021-07-24 NOTE — ASSESSMENT & PLAN NOTE
Lab Results   Component Value Date    HGBA1C 4 8 07/09/2021       No results for input(s): POCGLU in the last 72 hours  Blood Sugar Average: Last 72 hrs:  · A1C checked on last admission on 07/09/2021 - 4 8  · Documented history of DM type 2 found in chart review  Patient does not take medications for DM at home  · Monitor accuchecks Q 6 hours, insulin per sliding scale algorithm 2  · Hypoglycemia protocol - required treatment for glucose of 67 last evening   1/2 amp dextrose given and repeat accucheck 126

## 2021-07-24 NOTE — ASSESSMENT & PLAN NOTE
· Likely 2/2 dehydration, slow clearance given ZOYA and history of cirrhosis  · Given sepsis fluids in ED  · Continue aggressive fluids  · Trend lactic acid till less than 2 0

## 2021-07-24 NOTE — ASSESSMENT & PLAN NOTE
· Etiology unclear  · Cardiology evaluated in ED  · EKG without ischemic change  · Repeat EKG - NSR without ischemic change, prolonged QTC of 514 > 531  · May be 2/2 time down with significant ZOYA  · Trend troponins  · Serial EKGs  · No ECHO in our system, will obtain to evaluate function, assess for RWMA

## 2021-07-24 NOTE — ASSESSMENT & PLAN NOTE
· Likely 2/2 intravascular volume depletion/ maybe chronic component given chronically low serum bicarb  · Aggressively fluid resuscitate  · Trend BMP/electrolytes Q 6 hours

## 2021-07-25 PROBLEM — K11.20 PAROTIDITIS: Chronic | Status: ACTIVE | Noted: 2021-05-07

## 2021-07-25 PROBLEM — K56.600 PARTIAL SMALL BOWEL OBSTRUCTION (HCC): Status: RESOLVED | Noted: 2021-06-29 | Resolved: 2021-07-25

## 2021-07-25 PROBLEM — I10 ESSENTIAL HYPERTENSION: Chronic | Status: ACTIVE | Noted: 2017-04-20

## 2021-07-25 PROBLEM — E11.9 TYPE 2 DIABETES MELLITUS WITHOUT COMPLICATION, WITHOUT LONG-TERM CURRENT USE OF INSULIN (HCC): Status: ACTIVE | Noted: 2017-04-20

## 2021-07-25 PROBLEM — E11.9 TYPE 2 DIABETES MELLITUS WITHOUT COMPLICATION, WITHOUT LONG-TERM CURRENT USE OF INSULIN (HCC): Chronic | Status: ACTIVE | Noted: 2017-04-20

## 2021-07-25 PROBLEM — R74.8 ELEVATED CK: Status: RESOLVED | Noted: 2021-07-23 | Resolved: 2021-07-25

## 2021-07-25 PROBLEM — I50.32 CHRONIC DIASTOLIC HEART FAILURE (HCC): Status: ACTIVE | Noted: 2017-10-18

## 2021-07-25 PROBLEM — N13.30 HYDRONEPHROSIS: Chronic | Status: ACTIVE | Noted: 2021-04-05

## 2021-07-25 PROBLEM — R00.0 SINUS TACHYCARDIA: Status: RESOLVED | Noted: 2021-07-23 | Resolved: 2021-07-25

## 2021-07-25 PROBLEM — M85.88 OSTEOPENIA OF LUMBAR SPINE: Status: ACTIVE | Noted: 2019-11-14

## 2021-07-25 PROBLEM — K74.60 CIRRHOSIS OF LIVER (HCC): Chronic | Status: ACTIVE | Noted: 2021-04-01

## 2021-07-25 PROBLEM — E05.90 HYPERTHYROIDISM: Chronic | Status: ACTIVE | Noted: 2021-04-01

## 2021-07-25 PROBLEM — C50.512 MALIGNANT NEOPLASM OF LOWER-OUTER QUADRANT OF LEFT FEMALE BREAST (HCC): Status: ACTIVE | Noted: 2017-05-05

## 2021-07-25 PROBLEM — I10 ESSENTIAL HYPERTENSION: Status: ACTIVE | Noted: 2017-04-20

## 2021-07-25 PROBLEM — K52.9 COLITIS, ACUTE: Status: RESOLVED | Noted: 2021-06-29 | Resolved: 2021-07-25

## 2021-07-25 PROBLEM — Z85.3 HISTORY OF BREAST CANCER: Chronic | Status: ACTIVE | Noted: 2021-04-01

## 2021-07-25 PROBLEM — R07.9 CHEST PAIN: Status: RESOLVED | Noted: 2021-04-01 | Resolved: 2021-07-25

## 2021-07-25 PROBLEM — E83.42 HYPOMAGNESEMIA: Status: RESOLVED | Noted: 2021-04-07 | Resolved: 2021-07-25

## 2021-07-25 PROBLEM — M85.88 OSTEOPENIA OF LUMBAR SPINE: Chronic | Status: ACTIVE | Noted: 2019-11-14

## 2021-07-25 PROBLEM — N31.9 NEUROGENIC BLADDER: Status: ACTIVE | Noted: 2021-04-13

## 2021-07-25 PROBLEM — K59.00 CONSTIPATION: Status: RESOLVED | Noted: 2021-04-01 | Resolved: 2021-07-25

## 2021-07-25 PROBLEM — Z87.891 HISTORY OF TOBACCO ABUSE: Status: ACTIVE | Noted: 2018-02-27

## 2021-07-25 PROBLEM — C50.512 MALIGNANT NEOPLASM OF LOWER-OUTER QUADRANT OF LEFT FEMALE BREAST (HCC): Status: RESOLVED | Noted: 2017-05-05 | Resolved: 2021-07-25

## 2021-07-25 PROBLEM — E87.1 HYPONATREMIA: Status: RESOLVED | Noted: 2021-05-09 | Resolved: 2021-07-25

## 2021-07-25 PROBLEM — E87.2 LACTIC ACIDOSIS: Status: RESOLVED | Noted: 2021-07-23 | Resolved: 2021-07-25

## 2021-07-25 PROBLEM — Z87.891 HISTORY OF TOBACCO ABUSE: Chronic | Status: ACTIVE | Noted: 2018-02-27

## 2021-07-25 PROBLEM — Z91.19 NON-COMPLIANCE: Chronic | Status: ACTIVE | Noted: 2021-07-23

## 2021-07-25 PROBLEM — I50.32 CHRONIC DIASTOLIC HEART FAILURE (HCC): Chronic | Status: ACTIVE | Noted: 2017-10-18

## 2021-07-25 PROBLEM — J96.01 ACUTE RESPIRATORY FAILURE WITH HYPOXIA (HCC): Status: RESOLVED | Noted: 2021-07-23 | Resolved: 2021-07-25

## 2021-07-25 PROBLEM — N31.9 NEUROGENIC BLADDER: Chronic | Status: ACTIVE | Noted: 2021-04-13

## 2021-07-25 PROBLEM — K11.20 PAROTIDITIS: Chronic | Status: RESOLVED | Noted: 2021-05-07 | Resolved: 2021-07-25

## 2021-07-25 LAB
ALBUMIN SERPL BCP-MCNC: 1.9 G/DL (ref 3.5–5)
ALP SERPL-CCNC: 132 U/L (ref 46–116)
ALT SERPL W P-5'-P-CCNC: 22 U/L (ref 12–78)
AMMONIA PLAS-SCNC: 33 UMOL/L (ref 11–35)
ANION GAP SERPL CALCULATED.3IONS-SCNC: 14 MMOL/L (ref 4–13)
ARTERIAL PATENCY WRIST A: YES
AST SERPL W P-5'-P-CCNC: 67 U/L (ref 5–45)
BASE EXCESS BLDA CALC-SCNC: -8.2 MMOL/L
BASOPHILS # BLD AUTO: 0.07 THOUSANDS/ΜL (ref 0–0.1)
BASOPHILS NFR BLD AUTO: 1 % (ref 0–1)
BILIRUB SERPL-MCNC: 0.61 MG/DL (ref 0.2–1)
BODY TEMPERATURE: ABNORMAL DEGREES FEHRENHEIT
BUN SERPL-MCNC: 26 MG/DL (ref 5–25)
CALCIUM ALBUM COR SERPL-MCNC: 9.9 MG/DL (ref 8.3–10.1)
CALCIUM SERPL-MCNC: 8.2 MG/DL (ref 8.3–10.1)
CHLORIDE SERPL-SCNC: 113 MMOL/L (ref 100–108)
CK MB SERPL-MCNC: 12.3 NG/ML (ref 0–5)
CK MB SERPL-MCNC: 2.5 % (ref 0–2.5)
CK SERPL-CCNC: 502 U/L (ref 26–192)
CO2 SERPL-SCNC: 17 MMOL/L (ref 21–32)
CREAT SERPL-MCNC: 2.69 MG/DL (ref 0.6–1.3)
EOSINOPHIL # BLD AUTO: 0.14 THOUSAND/ΜL (ref 0–0.61)
EOSINOPHIL NFR BLD AUTO: 1 % (ref 0–6)
ERYTHROCYTE [DISTWIDTH] IN BLOOD BY AUTOMATED COUNT: 15.5 % (ref 11.6–15.1)
GFR SERPL CREATININE-BSD FRML MDRD: 17 ML/MIN/1.73SQ M
GLUCOSE SERPL-MCNC: 103 MG/DL (ref 65–140)
GLUCOSE SERPL-MCNC: 113 MG/DL (ref 65–140)
GLUCOSE SERPL-MCNC: 116 MG/DL (ref 65–140)
GLUCOSE SERPL-MCNC: 130 MG/DL (ref 65–140)
HCO3 BLDA-SCNC: 15.1 MMOL/L (ref 22–28)
HCT VFR BLD AUTO: 30.5 % (ref 34.8–46.1)
HGB BLD-MCNC: 10.1 G/DL (ref 11.5–15.4)
IMM GRANULOCYTES # BLD AUTO: 0.03 THOUSAND/UL (ref 0–0.2)
IMM GRANULOCYTES NFR BLD AUTO: 0 % (ref 0–2)
LYMPHOCYTES # BLD AUTO: 1.85 THOUSANDS/ΜL (ref 0.6–4.47)
LYMPHOCYTES NFR BLD AUTO: 17 % (ref 14–44)
MAGNESIUM SERPL-MCNC: 2.5 MG/DL (ref 1.6–2.6)
MCH RBC QN AUTO: 30.6 PG (ref 26.8–34.3)
MCHC RBC AUTO-ENTMCNC: 33.1 G/DL (ref 31.4–37.4)
MCV RBC AUTO: 92 FL (ref 82–98)
MONOCYTES # BLD AUTO: 0.91 THOUSAND/ΜL (ref 0.17–1.22)
MONOCYTES NFR BLD AUTO: 9 % (ref 4–12)
MRSA NOSE QL CULT: NORMAL
NEUTROPHILS # BLD AUTO: 7.71 THOUSANDS/ΜL (ref 1.85–7.62)
NEUTS SEG NFR BLD AUTO: 72 % (ref 43–75)
NON VENT ROOM AIR: ABNORMAL %
NRBC BLD AUTO-RTO: 0 /100 WBCS
O2 CT BLDA-SCNC: 14.5 ML/DL (ref 16–23)
OXYHGB MFR BLDA: 92.1 % (ref 94–97)
PCO2 BLDA: 25.3 MM HG (ref 36–44)
PH BLDA: 7.39 [PH] (ref 7.35–7.45)
PHOSPHATE SERPL-MCNC: 2.2 MG/DL (ref 2.3–4.1)
PLATELET # BLD AUTO: 161 THOUSANDS/UL (ref 149–390)
PMV BLD AUTO: 11.8 FL (ref 8.9–12.7)
PO2 BLDA: 65.7 MM HG (ref 75–129)
POTASSIUM SERPL-SCNC: 3.5 MMOL/L (ref 3.5–5.3)
PROCALCITONIN SERPL-MCNC: 0.56 NG/ML
PROT SERPL-MCNC: 5.9 G/DL (ref 6.4–8.2)
RBC # BLD AUTO: 3.3 MILLION/UL (ref 3.81–5.12)
SODIUM SERPL-SCNC: 144 MMOL/L (ref 136–145)
SPECIMEN SOURCE: ABNORMAL
WBC # BLD AUTO: 10.71 THOUSAND/UL (ref 4.31–10.16)

## 2021-07-25 PROCEDURE — 82805 BLOOD GASES W/O2 SATURATION: CPT | Performed by: PHYSICIAN ASSISTANT

## 2021-07-25 PROCEDURE — 84100 ASSAY OF PHOSPHORUS: CPT | Performed by: PHYSICIAN ASSISTANT

## 2021-07-25 PROCEDURE — 85025 COMPLETE CBC W/AUTO DIFF WBC: CPT | Performed by: PHYSICIAN ASSISTANT

## 2021-07-25 PROCEDURE — 82140 ASSAY OF AMMONIA: CPT | Performed by: PHYSICIAN ASSISTANT

## 2021-07-25 PROCEDURE — 82948 REAGENT STRIP/BLOOD GLUCOSE: CPT

## 2021-07-25 PROCEDURE — 84145 PROCALCITONIN (PCT): CPT | Performed by: ANESTHESIOLOGY

## 2021-07-25 PROCEDURE — 82550 ASSAY OF CK (CPK): CPT | Performed by: ANESTHESIOLOGY

## 2021-07-25 PROCEDURE — 99497 ADVNCD CARE PLAN 30 MIN: CPT | Performed by: ANESTHESIOLOGY

## 2021-07-25 PROCEDURE — 83735 ASSAY OF MAGNESIUM: CPT | Performed by: PHYSICIAN ASSISTANT

## 2021-07-25 PROCEDURE — 99233 SBSQ HOSP IP/OBS HIGH 50: CPT | Performed by: ANESTHESIOLOGY

## 2021-07-25 PROCEDURE — 36600 WITHDRAWAL OF ARTERIAL BLOOD: CPT

## 2021-07-25 PROCEDURE — 94760 N-INVAS EAR/PLS OXIMETRY 1: CPT

## 2021-07-25 PROCEDURE — 80053 COMPREHEN METABOLIC PANEL: CPT | Performed by: PHYSICIAN ASSISTANT

## 2021-07-25 PROCEDURE — 82553 CREATINE MB FRACTION: CPT | Performed by: ANESTHESIOLOGY

## 2021-07-25 RX ORDER — ONDANSETRON 2 MG/ML
4 INJECTION INTRAMUSCULAR; INTRAVENOUS ONCE
Status: DISCONTINUED | OUTPATIENT
Start: 2021-07-25 | End: 2021-07-26

## 2021-07-25 RX ORDER — POTASSIUM CHLORIDE 20 MEQ/1
40 TABLET, EXTENDED RELEASE ORAL ONCE
Status: DISCONTINUED | OUTPATIENT
Start: 2021-07-25 | End: 2021-07-25

## 2021-07-25 RX ORDER — POTASSIUM CHLORIDE 14.9 MG/ML
20 INJECTION INTRAVENOUS
Status: COMPLETED | OUTPATIENT
Start: 2021-07-25 | End: 2021-07-25

## 2021-07-25 RX ADMIN — POTASSIUM CHLORIDE 20 MEQ: 14.9 INJECTION, SOLUTION INTRAVENOUS at 19:57

## 2021-07-25 RX ADMIN — CEFEPIME HYDROCHLORIDE 1000 MG: 1 INJECTION, SOLUTION INTRAVENOUS at 16:06

## 2021-07-25 RX ADMIN — POTASSIUM CHLORIDE 20 MEQ: 14.9 INJECTION, SOLUTION INTRAVENOUS at 21:09

## 2021-07-25 RX ADMIN — NYSTATIN: 100000 POWDER TOPICAL at 17:01

## 2021-07-25 RX ADMIN — METRONIDAZOLE 500 MG: 500 INJECTION, SOLUTION INTRAVENOUS at 16:06

## 2021-07-25 RX ADMIN — DEXTROSE, SODIUM CHLORIDE, SODIUM LACTATE, POTASSIUM CHLORIDE, AND CALCIUM CHLORIDE 100 ML/HR: 5; .6; .31; .03; .02 INJECTION, SOLUTION INTRAVENOUS at 08:41

## 2021-07-25 RX ADMIN — FAMOTIDINE 10 MG: 40 POWDER, FOR SUSPENSION ORAL at 08:06

## 2021-07-25 RX ADMIN — NYSTATIN: 100000 POWDER TOPICAL at 08:05

## 2021-07-25 RX ADMIN — DEXTROSE, SODIUM CHLORIDE, SODIUM LACTATE, POTASSIUM CHLORIDE, AND CALCIUM CHLORIDE 100 ML/HR: 5; .6; .31; .03; .02 INJECTION, SOLUTION INTRAVENOUS at 18:15

## 2021-07-25 RX ADMIN — METRONIDAZOLE 500 MG: 500 INJECTION, SOLUTION INTRAVENOUS at 23:18

## 2021-07-25 RX ADMIN — NOREPINEPHRINE BITARTRATE 5 MCG/MIN: 1 INJECTION INTRAVENOUS at 09:41

## 2021-07-25 RX ADMIN — METRONIDAZOLE 500 MG: 500 INJECTION, SOLUTION INTRAVENOUS at 08:04

## 2021-07-25 NOTE — ASSESSMENT & PLAN NOTE
· Patient with 3 admissions in the last year where she signed out AMA  · Also recommended post-acute rehab at last admission but patient refused this   · Goals of care discussions with family in further days  · 07/25 POLST completed by patient - comfort measures only selected  · Consult to Case management for home hospice versus hospice house candidacy/placement?

## 2021-07-25 NOTE — ASSESSMENT & PLAN NOTE
Lab Results   Component Value Date    EGFR 16 07/24/2021    EGFR 16 07/24/2021    EGFR 14 07/23/2021    CREATININE 2 87 (H) 07/24/2021    CREATININE 2 84 (H) 07/24/2021    CREATININE 3 08 (H) 07/23/2021    Baseline Cr: variable: likely 0 7 to 1 1 per nephrology note last admission    Admission Cr: 4 01   Etiology:  Patient appears clinically dry but also has chronic hydronephrosis with stents and history of post obstructive uropathy  o Will obtain kidney ultrasound  o FENa calculated and indicates prerenal  o Urine Na, K, chloride obtained - UAG positive; 4 8 mEq/L  - Of note, patient has chronic bicarb loss (though may be 2/2 diarrhea)   Balanced crystalloid resuscitation on admission  Now on D5LR     Consider nephrology consult   Bygget 64 discussion with family, undecided on RRT   Avoid nephrotoxic medications/hypotension   Monitor UOP

## 2021-07-25 NOTE — ASSESSMENT & PLAN NOTE
· Etiology unclear  · Cardiology evaluated in ED  · EKG without ischemic change  · Repeat EKG - NSR without ischemic change, prolonged QTC of 514 > 531  · May be 2/2 time down with significant ZOYA  · Troponin peaked at 6 9  · Serial EKGs  · No ECHO in our system, will obtain to evaluate function, assess for RWMA

## 2021-07-25 NOTE — ASSESSMENT & PLAN NOTE
· Not present on admission  · Likely due to medication/sedation  Post intubation sedation was held  Started fentanyl gtt due to restlessness approximately 5 hours after intubation  · Required additional IVF boluses and albumen  · Increased hypotension noted with precedex infusion and prn fentanyl  Required insertion of central line and levophed to be started    · Continue central line for levophed  · Continue levophed for MAP goal > 65

## 2021-07-25 NOTE — ASSESSMENT & PLAN NOTE
· History of chronic hydronephrosis  · Recently had stents removed at [facility] 07/20/21  · CT showed relatively unchanged size of hydro with no hydroureter  · U/A reassuring as far as infection; trace leukocytes, negative nitrites, WBCs 10-20, no bacteria  Urine culture pending    · Check renal ultrasound  · Monitor UOP

## 2021-07-25 NOTE — ASSESSMENT & PLAN NOTE
Intubated in the ED due to altered mental status on 07/23  Post intubation gas with metabolic acidosis with respiratory compensation, oxygenation never an issue  Extubated 07/24 without issue  Weaned to room air  DNR/DNI  Continue supportive care  If patient requires additional non-invasive respiratory support then plan to trial HFNC but would not start BiPAP

## 2021-07-25 NOTE — ASSESSMENT & PLAN NOTE
· Likely 2/2 hypovolemia/agitation  · Improved rate with fluid resuscitation  · Trend EKG  · Check ECHO

## 2021-07-25 NOTE — ACP (ADVANCE CARE PLANNING)
ACP Note  7/25/2021  1045    Patient with improved mental status overnight  Said "no" when RN attempted to give shot of HSQ  This morning she opens eyes to voice and participates in conversation speaking in 2-3 word sentences  She is alert and oriented to self, person (knows president), place (knows name of hospital), and time (knows month but says year is 2002)  I spoke with the patient and her daughter at bedside extensively this morning  We discussed her progress since admission as well as the ongoing ZOYA and continued pressor requirement  We repeatedly asked the patient whether she wants any more medical care, and the said no every time  When asked what she wants, all she said was that she wants "to go home "    I discussed with the patient's daughter, Neyda Burger, how the best option for her would likely be home with hospice, if she is able to recover to that point  I suspect that the likelihood of this is relatively low, and that inpatient hospice versus changing to CMO status would be the best options  Given the patient's improvement over the last 24 hours, we agreed to continue supportive care for one more day to see if the patient is able to recover more and express her wishes more clearly tomorrow  Neyda Burger expressed concern over making end-of-life decisions for her mother and fearing that she'd have to live with the regret of making the wrong decision  While the patient remains sleepy, she is able to articulate what she does and does not want  The wishes that she expressed to us today are consistent with those that are evident from her chart and from those that she had previously conveyed to her daughter and friends  As such, we will continue medical management of her hypotension, acute kidney injury, and possible infection  We will however begin to scale back medical treatments that cause her discomfort  We discussed the risks of DVT and PE and will plan to discontinue SCDs and HSQ   Her code status will remain DNR DNI, with no BIPAP and no dialysis  ACP Time 25 minutes    Cristina Thomas MD    Addendum at [phone]:   Patient seen again in afternoon  Discussed goals of care with daughter at bedside and filled out PA POLST form  Patient remains alert and oriented to person, self, place, and time with the exception of year, however daughter agrees that she is at her baseline in terms of judgement and decision making capacity  We discussed all items on the PA POLST form including the scope of medical interventions ranging from [address] to full treatment  She was firm in her request for CMO  The patient has a friend coming to visit her tomorrow  After that time, she wishes to transition to comfort measures only  I explained that this may mean she will pass away during this hospitalization and not get to go home, and she continued to request CMO  Carson Villareal asked her mother to repeat back her understanding of what "CMO" meant, and she was able to do so accurately       Plan:  -Continue current treatment regimen, including pressors and antibiotics until tomorrow  -Transition to [address] after visit from friend  -Visitors allowed any time of day per hospital policy for end-of-life situations  -Ice chips and water OK  -Case management consult for hospice services - inpatient versus home hospice

## 2021-07-25 NOTE — ASSESSMENT & PLAN NOTE
· History of hyperthyroidism  Takes methimazole 2 5 mg EOD at home    · TSH 5 55 on admission, Free T4 1 37, Free T3 2 43  · Labs reviewed in care everywhere and from previous admission in June 2021 - euthyroid state

## 2021-07-25 NOTE — ASSESSMENT & PLAN NOTE
· Likely 2/2 intravascular volume depletion/ maybe chronic component given chronically low serum bicarb  · Aggressively fluid resuscitated on admission  Metabolic acidosis improving    · Continue to trend BMP/electrolytes

## 2021-07-25 NOTE — ASSESSMENT & PLAN NOTE
· Likely elevated due to unknown down time in the setting of dehydration  · CK elevated on 470 on admission  · Continue IVFs  · Trend CK: 470 > 408 > 591, repeat pending this AM

## 2021-07-25 NOTE — PROGRESS NOTES
[address]  Progress Note - Jamison Billings 1945, 70 y o  female MRN: [id number]  Unit/Bed#: -01 Encounter: [id number]  Primary Care Provider: Laura Rutledge MD   Date and time admitted to hospital: 7/23/2021 12:03 PM    * Acute metabolic encephalopathy  Assessment & Plan  · Patient presented after being found down by caregiver, last seen/heard from 3 days ago  · Arrived to the emergency department GCS of 6 (4, 1T, 1) and therefore was intubated for airway protection  · CT head/cervical spine negative  · Likely secondary to severe metabolic derangements  · Due to history of cirrhosis, check ammonia; 56 > 53  · UDS negative  · Monitor for hypoglycemia  · Treat derangements as per below  · Post extubation on 07/25, mental status slowing improving but still having periods of restlessness  Overall, agitation is less than during intubation  · NPO due to mental status  Will need evaluation by SLP  Acute on chronic kidney failure [facility])  Assessment & Plan  Lab Results   Component Value Date    EGFR 16 07/24/2021    EGFR 16 07/24/2021    EGFR 14 07/23/2021    CREATININE 2 87 (H) 07/24/2021    CREATININE 2 84 (H) 07/24/2021    CREATININE 3 08 (H) 07/23/2021    Baseline Cr: variable: likely 0 7 to 1 1 per nephrology note last admission    Admission Cr: 4 01   Etiology:  Patient appears clinically dry but also has chronic hydronephrosis with stents and history of post obstructive uropathy  o Will obtain kidney ultrasound  o FENa calculated and indicates prerenal  o Urine Na, K, chloride obtained - UAG positive; 4 8 mEq/L  - Of note, patient has chronic bicarb loss (though may be 2/2 diarrhea)   Balanced crystalloid resuscitation on admission  Now on D5LR     Consider nephrology consult   [address] discussion with family, undecided on RRT   Avoid nephrotoxic medications/hypotension   Monitor UOP    Acute respiratory failure with hypoxia [facility])  Assessment & Plan  Intubated in the ED due to altered mental status on 07/23  Post intubation gas with metabolic acidosis with respiratory compensation, oxygenation never an issue  Extubated 07/24 without issue  Weaned to room air  DNR/DNI  Continue supportive care  If patient requires additional non-invasive respiratory support then plan to trial HFNC but would not start BiPAP  High anion gap metabolic acidosis  Assessment & Plan  · Likely 2/2 intravascular volume depletion/ maybe chronic component given chronically low serum bicarb  · Aggressively fluid resuscitated on admission  Metabolic acidosis improving  · Continue to trend BMP/electrolytes    Hypotension  Assessment & Plan  · Not present on admission  · Likely due to medication/sedation  Post intubation sedation was held  Started fentanyl gtt due to restlessness approximately 5 hours after intubation  · Required additional IVF boluses and albumen  · Increased hypotension noted with precedex infusion and prn fentanyl  Required insertion of central line and levophed to be started    · Continue central line for levophed  · Continue levophed for MAP goal > 65    Elevated troponin I level  Assessment & Plan  · Etiology unclear  · Cardiology evaluated in ED  · EKG without ischemic change  · Repeat EKG - NSR without ischemic change, prolonged QTC of 514 > 531  · May be 2/2 time down with significant ZOYA  · Troponin peaked at 6 9  · Serial EKGs  · No ECHO in our system, will obtain to evaluate function, assess for RWMA    Lactic acidosis-resolved as of 7/25/2021  Assessment & Plan  · Likely 2/2 dehydration, slow clearance given ZOYA and history of cirrhosis  · Given sepsis fluids in ED  · Continued on aggressive fluids on admission  · Trend lactic acid till less than 2 0  · Cleared on 07/24    Sinus tachycardia  Assessment & Plan  · Likely 2/2 hypovolemia/agitation  · Improved rate with fluid resuscitation  · Trend EKG  · Check ECHO    Leukocytosis  Assessment & Plan  · Etiology unclear  · No fever, no bandemia  · No clear signs of infection on CT scan other than ? Enteritis  · Check abdominal ultrasound  · Will cover with cefepime/flagyl (due to penicillin allergy)  · Trend procal: 0 14 > 0 51  · Expect elevation given ZOYA  · Follow up Blood cultures, urine cultures  · Trend WBCs and fever curve    Elevated CK  Assessment & Plan  · Likely elevated due to unknown down time in the setting of dehydration  · CK elevated on 470 on admission  · Continue IVFs  · Trend CK: 470 > 408 > 591, repeat pending this AM    Elevated alkaline phosphatase level  Assessment & Plan  · Seen on last admission  · At previous discharge (07/10/2021) alk phos was 128  · Alk phos 189 on present admission (07/23/2021)  · Previous labs from 04/2021 show normal alk phos  · Unclear etiology, biliary vs osseous given history of breast ca  · Check gamma GT to rule in/out biliary cause  · GGT elevated at 198  Given that both alk phos and GGT are elevated, eitology likely related to liver disease rather than osseous eitiology  · Trend LFTS  · RUQ ultrasound pending    Enteritis  Assessment & Plan  · Unclear, possibly chronic  · Will cover with cefepime/flagyl  · If patient with diarrhea consider C  Diff given recent healthcare contacts as well as stool cultures/O&P    Non-compliance  Assessment & Plan  · Patient with 3 admissions in the last year where she signed out AMA  · Also recommended post-acute rehab at last admission but patient refused this   · Goals of care discussions with family in further days  · Consider POLST if patient is able to participate in medical decisions in the next few days    History of breast cancer  Assessment & Plan  · History of stage IIIa L IDC, 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  S/p left mastectomy    · Currently on Femara    Hydronephrosis  Assessment & Plan  · History of chronic hydronephrosis  · Recently had stents removed at [facility] 07/20/21  · CT showed relatively unchanged size of hydro with no hydroureter  · U/A reassuring as far as infection; trace leukocytes, negative nitrites, WBCs 10-20, no bacteria  Urine culture pending  · Check renal ultrasound  · Monitor UOP    Type 2 diabetes mellitus [facility])  Assessment & Plan  Lab Results   Component Value Date    HGBA1C 4 8 07/09/2021       Recent Labs     07/24/21  0553 07/24/21  1206 07/24/21  1706 07/24/21  2340   POCGLU 90 75 114 103       Blood Sugar Average: Last 72 hrs:  · A1C checked on last admission on 07/09/2021 - 4 8  · Documented history of DM type 2 found in chart review  Patient does not take medications for DM at home  · Monitor accuchecks Q 6 hours, insulin per sliding scale algorithm 2  · Hypoglycemia protocol    Cirrhosis of liver (HCC)  Assessment & Plan  · Unclear etiology  · Present on CT imaging  Also present of previous CT scan on 04/01/2021  · Obtain RUQ ultrasound  · Admission ammonia 56  · May be slow to clear lactic acid due to this and ZOYA  Lactic acidosis resolved 07/24  · Monitor for hypoglycemia  · INR 1 36    Hyperthyroidism  Assessment & Plan  · History of hyperthyroidism  Takes methimazole 2 5 mg EOD at home  · TSH 5 55 on admission, Free T4 1 37, Free T3 2 43  · Labs reviewed in care everywhere and from previous admission in June 2021 - euthyroid state    ----------------------------------------------------------------------------------------  HPI/24hr events: Patient extubated and weaned to room air yesterday (07/24)  Continues with encephalopathy and restlessness however now will answer simple yes/no questions at times  Sleeping in short intervals of time since last evening  Fentanyl 25 mcg IV x1 given for back pain causing restlessness  Improved sleep post medication administration  Levophed currently at 5 mcg/min (range 3-5 mcg/min overnight)  D5LR at 100 ml/hr  UOP approximately 28 ml/hr  Denies pain and shortness of breath at this time       Disposition: Continue Critical Care   Code Status: Level 3 - DNAR and DNI  ---------------------------------------------------------------------------------------  SUBJECTIVE    Review of Systems  Review of systems was unable to be performed secondary to encephalopathy  ---------------------------------------------------------------------------------------  OBJECTIVE    Vitals   Vitals:    21 0504 21 0516 21 0531 21 0545   BP: (!) 87/57 (!) 101/49 100/51 106/56   Pulse: 94 93 95 101   Resp: 21 (!) 23 (!) 30 18   Temp: 98 4 °F (36 9 °C) 98 4 °F (36 9 °C) 98 4 °F (36 9 °C) 98 4 °F (36 9 °C)   TempSrc:       SpO2: 96% 96% 96% 94%   Weight:         Temp (24hrs), Av 8 °F (37 1 °C), Min:97 9 °F (36 6 °C), Max:99 5 °F (37 5 °C)  Current: Temperature: 98 4 °F (36 9 °C)  Arterial Line BP: 130/57  Arterial Line MAP (mmHg): 81 mmHg    Respiratory:  SpO2: SpO2: 95 %, SpO2 Activity: SpO2 Activity: At Rest, SpO2 Device: O2 Device: None (Room air)       Invasive/non-invasive ventilation settings   Respiratory    Lab Data (Last 4 hours)    None         O2/Vent Data (Last 4 hours)    None                Physical Exam  Vitals reviewed  Constitutional:       General: She is not in acute distress  Appearance: She is not diaphoretic  Comments: Chronically ill and frail appearing  HENT:      Head: Normocephalic and atraumatic  Mouth/Throat:      Mouth: Mucous membranes are moist       Pharynx: Oropharynx is clear  Eyes:      General: No scleral icterus  Conjunctiva/sclera: Conjunctivae normal       Pupils: Pupils are equal, round, and reactive to light  Neck:      Vascular: No JVD  Cardiovascular:      Rate and Rhythm: Normal rate and regular rhythm  Heart sounds: Normal heart sounds  No murmur heard  No friction rub  Pulmonary:      Effort: Pulmonary effort is normal  No respiratory distress  Breath sounds: No wheezing or rales        Comments: Diminished breath sounds  Abdominal:      General: Bowel sounds are normal  There is no distension  Palpations: Abdomen is soft  There is no mass  Tenderness: There is no abdominal tenderness  There is no guarding or rebound  Musculoskeletal:         General: No tenderness or deformity  Normal range of motion  Cervical back: Normal range of motion and neck supple  Skin:     General: Skin is warm and dry  Capillary Refill: Capillary refill takes less than 2 seconds  Coloration: Skin is not pale  Findings: No erythema or rash  Neurological:      General: No focal deficit present  Mental Status: She is easily aroused  She is lethargic  GCS: GCS eye subscore is 4  GCS verbal subscore is 3  GCS motor subscore is 5  Cranial Nerves: No cranial nerve deficit     Psychiatric:         Behavior: Behavior normal          Laboratory and Diagnostics:  Results from last 7 days   Lab Units 07/24/21  0540 07/23/21  1754 07/23/21  1229   WBC Thousand/uL 17 40*  --  16 58*   HEMOGLOBIN g/dL 10 5*  --  12 2   HEMATOCRIT % 32 2*  --  36 5   PLATELETS Thousands/uL 214 206 233   NEUTROS PCT % 79*  --  88*   MONOS PCT % 7  --  6     Results from last 7 days   Lab Units 07/24/21  1310 07/24/21  0540 07/23/21  2329 07/23/21  1644 07/23/21  1229   SODIUM mmol/L 141 141 141 139 143   POTASSIUM mmol/L 3 4* 3 9 3 8 3 8 4 0   CHLORIDE mmol/L 109* 109* 109* 108 109*   CO2 mmol/L 15* 16* 15* 13* 11*   ANION GAP mmol/L 17* 16* 17* 18* 23*   BUN mg/dL 26* 28* 32* 30* 31*   CREATININE mg/dL 2 87* 2 84* 3 08* 3 45* 4 01*   CALCIUM mg/dL 7 9* 7 8* 8 0* 8 5 8 7   GLUCOSE RANDOM mg/dL 89 97 76 164* 153*   ALT U/L  --  22  --   --  24   AST U/L  --  58*  --   --  59*   ALK PHOS U/L  --  134*  --   --  189*   ALBUMIN g/dL  --  2 2*  --   --  2 4*   TOTAL BILIRUBIN mg/dL  --  0 55  --   --  0 62     Results from last 7 days   Lab Units 07/24/21  0540 07/23/21  2329 07/23/21  1644   MAGNESIUM mg/dL 2 7* 3 2* 1 4*   PHOSPHORUS mg/dL 2 5  --  4 5*      Results from last 7 days   Lab Units 07/23/21  1229   INR  1 36*   PTT seconds 31      Results from last 7 days   Lab Units 07/24/21  1310 07/24/21  0540 07/24/21  0319 07/23/21  2329 07/23/21  2118 07/23/21  1754 07/23/21  1644   TROPONIN I ng/mL 6 58* 6 52* 6 98* 6 84* 5 92* 3 80* 5 14*     Results from last 7 days   Lab Units 07/24/21  0540 07/24/21  0319 07/23/21  2329 07/23/21 2118 07/23/21  1935 07/23/21  1754 07/23/21  1527   LACTIC ACID mmol/L 1 7 2 4* 2 7* 2 8* 4 4* 4 3* 6 6*     ABG:  Results from last 7 days   Lab Units 07/24/21  0542   PH ART  7 374   PCO2 ART mm Hg 25 2*   PO2 ART mm Hg 144 6*   HCO3 ART mmol/L 14 4*   BASE EXC ART mmol/L -9 3   ABG SOURCE  Artery     VBG:  Results from last 7 days   Lab Units 07/24/21  0542   ABG SOURCE  Artery     Results from last 7 days   Lab Units 07/24/21  0540 07/23/21  1229   PROCALCITONIN ng/ml 0 51* 0 14       Micro  Results from last 7 days   Lab Units 07/23/21  1321 07/23/21  1230 07/23/21  1229   BLOOD CULTURE   --  No Growth at 24 hrs  No Growth at 24 hrs  URINE CULTURE  No Growth <1000 cfu/mL  --   --        EKG: NSR on telemetry  Imaging: I have personally reviewed pertinent reports  and I have personally reviewed pertinent films in PACS  No new imaging  TTE and abdominal ultrasounds pending  Intake and Output  I/O       07/23 0701 - 07/24 0700 07/24 0701 - 07/25 0700    I V  (mL/kg) 3370 5 (52 9) 3110 7 (48 8)    NG/GT 30     IV Piggyback 2648  3 400    Total Intake(mL/kg) 6048 9 (95) 3510 7 (55 1)    Urine (mL/kg/hr) 515 615 (0 4)    Emesis/NG output 130 0    Total Output 645 615    Net +5403 9 +2895 7                Height and Weights         Body mass index is 27 43 kg/m²    Weight (last 2 days)     Date/Time   Weight    07/24/21 0600   63 7 (140 43)    07/23/21 12:05:39   63 7 (140 43)                Nutrition       Diet Orders   (From admission, onward)             Start     Ordered    07/23/21 1526  Diet NPO  Diet effective now     Question Answer Comment   Diet Type NPO RD to adjust diet per protocol? Yes        07/23/21 1525                  Active Medications  Scheduled Meds:  Current Facility-Administered Medications   Medication Dose Route Frequency Provider Last Rate    acetaminophen  650 mg Oral Q6H PRN Carson Burkett PA-C      cefepime  1,000 mg Intravenous Q24H Carson Burkett PA-C 1,000 mg (07/24/21 1559)    dextrose 5% lactated ringer's  100 mL/hr Intravenous Continuous Gino Mosher  mL/hr (07/24/21 2322)    famotidine  10 mg Oral Daily Carson Burkett PA-C      fentanyl citrate (PF)  50 mcg Intravenous Q1H PRN Gino Mosher MD      heparin (porcine)  5,000 Units Subcutaneous [facility] Carson Burkett [place]      insulin lispro  1-5 Units Subcutaneous Q6H [address] SIMON [place]      metroNIDAZOLE  500 mg Intravenous Q8H Carson Burkett PA-C Stopped (07/25/21 0000)    norepinephrine  1-30 mcg/min Intravenous Titrated SIMON Hall 5 mcg/min (07/25/21 0447)    nystatin   Topical BID Carson Burkett PA-C       Continuous Infusions:  dextrose 5% lactated ringer's, 100 mL/hr, Last Rate: 100 mL/hr (07/24/21 2322)  norepinephrine, 1-30 mcg/min, Last Rate: 5 mcg/min (07/25/21 0447)      PRN Meds:   acetaminophen, 650 mg, Q6H PRN  fentanyl citrate (PF), 50 mcg, Q1H PRN        Invasive Devices Review  Invasive Devices     Central Venous Catheter Line            CVC Central Lines 07/24/21 Triple 16cm 1 day          Peripheral Intravenous Line            Peripheral IV 07/23/21 Dorsal (posterior); Right Hand 1 day    Peripheral IV 07/24/21 Proximal;Right;Ventral (anterior) Forearm 1 day          Drain            Urethral Catheter Temperature probe 16 Fr  1 day                Rationale for remaining devices: Maintain RIJ CVC  for medications that require central line   Maintain mathis for accurate I&O     ---------------------------------------------------------------------------------------  Advance Directive and Living Will:      Power of :    POLST: ---------------------------------------------------------------------------------------  Care Time Delivered:   No Critical Care time spent       [facility]      Portions of the record may have been created with voice recognition software  Occasional wrong word or "sound a like" substitutions may have occurred due to the inherent limitations of voice recognition software    Read the chart carefully and recognize, using context, where substitutions have occurred

## 2021-07-25 NOTE — ASSESSMENT & PLAN NOTE
· History of hyperthyroidism  Takes methimazole 2 5 mg EOD at home    · TSH 5 55 on admission, Free T4 1 37, Free T3 2 43  · Labs reviewed in care everywhere and from previous admission in June 2021 - euthyroid state  · Methimazole currently on hold

## 2021-07-25 NOTE — ASSESSMENT & PLAN NOTE
· Seen on last admission  · At previous discharge (07/10/2021) alk phos was 128  · Alk phos 189 on present admission (07/23/2021)  · Previous labs from 04/2021 show normal alk phos  · Unclear etiology, biliary vs osseous given history of breast ca  · Check gamma GT to rule in/out biliary cause  · GGT elevated at 198   Given that both alk phos and GGT are elevated, eitology likely related to liver disease rather than osseous eitiology  · Trend LFTS  · RUQ ultrasound pending

## 2021-07-25 NOTE — ASSESSMENT & PLAN NOTE
· Not present on admission  · Likely due to medication/sedation  Post intubation sedation was held  Started fentanyl gtt due to restlessness approximately 5 hours after intubation  · Required additional IVF boluses and albumen  · Increased hypotension noted with precedex infusion and prn fentanyl  Required insertion of central line and levophed to be started    · Continue central line for levophed  · Continue levophed for MAP goal > 65  · Discontinued 07/25

## 2021-07-25 NOTE — ASSESSMENT & PLAN NOTE
· Patient presented after being found down by caregiver, last seen/heard from 3 days ago  · Arrived to the emergency department GCS of 6 (4, 1T, 1) and therefore was intubated for airway protection  · CT head/cervical spine negative  · Likely secondary to severe metabolic derangements  · Due to history of cirrhosis, check ammonia; 56 > 53  · UDS negative  · Monitor for hypoglycemia  · Treat derangements as per below  · Post extubation on 07/25, mental status slowing improving but still having periods of restlessness  Overall, agitation is less than during intubation  · NPO due to mental status  Will need evaluation by SLP

## 2021-07-25 NOTE — ASSESSMENT & PLAN NOTE
· Patient presented after being found down by caregiver, last seen/heard from 3 days ago  · Arrived to the emergency department GCS of 6 (4, 1T, 1) and therefore was intubated for airway protection  · CT head/cervical spine negative  · Likely secondary to severe metabolic derangements  · Due to history of cirrhosis, check ammonia; 56 > 53  · UDS negative  · Monitor for hypoglycemia  · Treat derangements as per below  · Post extubation on 07/25, mental status slowing improving but still having periods of restlessness  Overall, agitation is less than during intubation  · NPO due to mental status  SLP evaluation pending    · Mental status improving - patient was able to participate in goals of care discussion and completed POLST

## 2021-07-25 NOTE — ASSESSMENT & PLAN NOTE
· Likely 2/2 dehydration, slow clearance given ZOYA and history of cirrhosis  · Given sepsis fluids in ED  · Continued on aggressive fluids on admission  · Trend lactic acid till less than 2 0  · Cleared on 07/24

## 2021-07-25 NOTE — ASSESSMENT & PLAN NOTE
· Etiology unclear  · No fever, no bandemia  · No clear signs of infection on CT scan other than ?  Enteritis  · Check abdominal ultrasound  · Will cover with cefepime/flagyl (due to penicillin allergy)  · Trend procal: 0 14 > 0 51 > 0 56  · Expect elevation given ZOYA  · Blood cultures x2 - NGTD  · Urine culture negative  · Trend WBCs and fever curve

## 2021-07-25 NOTE — ASSESSMENT & PLAN NOTE
· Etiology unclear  · No fever, no bandemia  · No clear signs of infection on CT scan other than ?  Enteritis  · Check abdominal ultrasound  · Will cover with cefepime/flagyl (due to penicillin allergy)  · Trend procal: 0 14 > 0 51  · Expect elevation given ZOYA  · Follow up Blood cultures, urine cultures  · Trend WBCs and fever curve

## 2021-07-25 NOTE — ASSESSMENT & PLAN NOTE
Lab Results   Component Value Date    EGFR 17 07/25/2021    EGFR 16 07/24/2021    EGFR 16 07/24/2021    CREATININE 2 69 (H) 07/25/2021    CREATININE 2 87 (H) 07/24/2021    CREATININE 2 84 (H) 07/24/2021    Baseline Cr: variable: likely 0 7 to 1 1 per nephrology note last admission    Admission Cr: 4 01   Etiology:  Patient appears clinically dry but also has chronic hydronephrosis with stents and history of post obstructive uropathy  o Will obtain kidney ultrasound  o FENa calculated and indicates prerenal  o Urine Na, K, chloride obtained - UAG positive; 4 8 mEq/L  - Of note, patient has chronic bicarb loss (though may be 2/2 diarrhea)   Balanced crystalloid resuscitation on admission  Now on D5LR     Consider nephrology consult   Avoid nephrotoxic medications/hypotension   Monitor UOP

## 2021-07-25 NOTE — ASSESSMENT & PLAN NOTE
Lab Results   Component Value Date    HGBA1C 4 8 07/09/2021       Recent Labs     07/24/21  0553 07/24/21  1206 07/24/21  1706 07/24/21  2340   POCGLU 90 75 114 103       Blood Sugar Average: Last 72 hrs:  · A1C checked on last admission on 07/09/2021 - 4 8  · Documented history of DM type 2 found in chart review  Patient does not take medications for DM at home    · Monitor accuchecks Q 6 hours, insulin per sliding scale algorithm 2  · Hypoglycemia protocol

## 2021-07-25 NOTE — ASSESSMENT & PLAN NOTE
· History of chronic hydronephrosis  · Recently had stents removed at [facility] 07/20/21  · CT showed relatively unchanged size of hydro with no hydroureter  · U/A reassuring as far as infection; trace leukocytes, negative nitrites, WBCs 10-20, no bacteria  Urine culture pending    · Check renal ultrasound  · Monitor UOP

## 2021-07-25 NOTE — ASSESSMENT & PLAN NOTE
· Unclear etiology  · Present on CT imaging  Also present of previous CT scan on 04/01/2021  · Obtain RUQ ultrasound  · Admission ammonia 56  · May be slow to clear lactic acid due to this and ZOYA  Lactic acidosis resolved 07/24    · Monitor for hypoglycemia  · INR 1 36

## 2021-07-25 NOTE — ASSESSMENT & PLAN NOTE
Lab Results   Component Value Date    HGBA1C 4 8 07/09/2021       Recent Labs     07/24/21  2340 07/25/21  1143 07/25/21  1558 07/25/21  2316   POCGLU 103 130 113 87       Blood Sugar Average: Last 72 hrs:  · A1C checked on last admission on 07/09/2021 - 4 8  · Documented history of DM type 2 found in chart review  Patient does not take medications for DM at home    · Monitor accuchecks Q 6 hours, insulin per sliding scale algorithm 2  · Hypoglycemia protocol

## 2021-07-26 PROBLEM — Z51.5 END OF LIFE CARE: Status: ACTIVE | Noted: 2021-07-26

## 2021-07-26 PROBLEM — I95.9 HYPOTENSION: Status: RESOLVED | Noted: 2021-07-24 | Resolved: 2021-07-26

## 2021-07-26 LAB
ALBUMIN SERPL BCP-MCNC: 1.7 G/DL (ref 3.5–5)
ALP SERPL-CCNC: 122 U/L (ref 46–116)
ALT SERPL W P-5'-P-CCNC: 26 U/L (ref 12–78)
ANION GAP SERPL CALCULATED.3IONS-SCNC: 10 MMOL/L (ref 4–13)
AST SERPL W P-5'-P-CCNC: 58 U/L (ref 5–45)
ATRIAL RATE: 102 BPM
ATRIAL RATE: 116 BPM
ATRIAL RATE: 129 BPM
ATRIAL RATE: 133 BPM
ATRIAL RATE: 75 BPM
ATRIAL RATE: 75 BPM
ATRIAL RATE: 93 BPM
BASOPHILS # BLD AUTO: 0.03 THOUSANDS/ΜL (ref 0–0.1)
BASOPHILS NFR BLD AUTO: 0 % (ref 0–1)
BILIRUB SERPL-MCNC: 0.6 MG/DL (ref 0.2–1)
BUN SERPL-MCNC: 21 MG/DL (ref 5–25)
CALCIUM ALBUM COR SERPL-MCNC: 10.1 MG/DL (ref 8.3–10.1)
CALCIUM SERPL-MCNC: 8.3 MG/DL (ref 8.3–10.1)
CHLORIDE SERPL-SCNC: 115 MMOL/L (ref 100–108)
CO2 SERPL-SCNC: 19 MMOL/L (ref 21–32)
CREAT SERPL-MCNC: 2.49 MG/DL (ref 0.6–1.3)
EOSINOPHIL # BLD AUTO: 0.25 THOUSAND/ΜL (ref 0–0.61)
EOSINOPHIL NFR BLD AUTO: 4 % (ref 0–6)
ERYTHROCYTE [DISTWIDTH] IN BLOOD BY AUTOMATED COUNT: 15.6 % (ref 11.6–15.1)
GFR SERPL CREATININE-BSD FRML MDRD: 19 ML/MIN/1.73SQ M
GLUCOSE SERPL-MCNC: 87 MG/DL (ref 65–140)
GLUCOSE SERPL-MCNC: 92 MG/DL (ref 65–140)
GLUCOSE SERPL-MCNC: 95 MG/DL (ref 65–140)
HCT VFR BLD AUTO: 30.5 % (ref 34.8–46.1)
HGB BLD-MCNC: 9.9 G/DL (ref 11.5–15.4)
IMM GRANULOCYTES # BLD AUTO: 0.03 THOUSAND/UL (ref 0–0.2)
IMM GRANULOCYTES NFR BLD AUTO: 0 % (ref 0–2)
LYMPHOCYTES # BLD AUTO: 1.23 THOUSANDS/ΜL (ref 0.6–4.47)
LYMPHOCYTES NFR BLD AUTO: 17 % (ref 14–44)
MCH RBC QN AUTO: 29.8 PG (ref 26.8–34.3)
MCHC RBC AUTO-ENTMCNC: 32.5 G/DL (ref 31.4–37.4)
MCV RBC AUTO: 92 FL (ref 82–98)
MONOCYTES # BLD AUTO: 0.58 THOUSAND/ΜL (ref 0.17–1.22)
MONOCYTES NFR BLD AUTO: 8 % (ref 4–12)
NEUTROPHILS # BLD AUTO: 5 THOUSANDS/ΜL (ref 1.85–7.62)
NEUTS SEG NFR BLD AUTO: 71 % (ref 43–75)
NRBC BLD AUTO-RTO: 0 /100 WBCS
P AXIS: -73 DEGREES
P AXIS: -86 DEGREES
P AXIS: 66 DEGREES
P AXIS: 70 DEGREES
P AXIS: 72 DEGREES
P AXIS: 76 DEGREES
P AXIS: 80 DEGREES
PLATELET # BLD AUTO: 146 THOUSANDS/UL (ref 149–390)
PMV BLD AUTO: 11.9 FL (ref 8.9–12.7)
POTASSIUM SERPL-SCNC: 3.4 MMOL/L (ref 3.5–5.3)
PR INTERVAL: 104 MS
PR INTERVAL: 136 MS
PR INTERVAL: 144 MS
PR INTERVAL: 146 MS
PR INTERVAL: 146 MS
PR INTERVAL: 150 MS
PR INTERVAL: 152 MS
PROT SERPL-MCNC: 5.6 G/DL (ref 6.4–8.2)
QRS AXIS: -13 DEGREES
QRS AXIS: -34 DEGREES
QRS AXIS: -48 DEGREES
QRS AXIS: -49 DEGREES
QRS AXIS: -83 DEGREES
QRS AXIS: 20 DEGREES
QRS AXIS: 26 DEGREES
QRSD INTERVAL: 102 MS
QRSD INTERVAL: 102 MS
QRSD INTERVAL: 82 MS
QRSD INTERVAL: 94 MS
QRSD INTERVAL: 96 MS
QT INTERVAL: 324 MS
QT INTERVAL: 328 MS
QT INTERVAL: 360 MS
QT INTERVAL: 406 MS
QT INTERVAL: 414 MS
QT INTERVAL: 474 MS
QT INTERVAL: 476 MS
QTC INTERVAL: 474 MS
QTC INTERVAL: 488 MS
QTC INTERVAL: 500 MS
QTC INTERVAL: 514 MS
QTC INTERVAL: 529 MS
QTC INTERVAL: 529 MS
QTC INTERVAL: 531 MS
RBC # BLD AUTO: 3.32 MILLION/UL (ref 3.81–5.12)
SODIUM SERPL-SCNC: 144 MMOL/L (ref 136–145)
T WAVE AXIS: 54 DEGREES
T WAVE AXIS: 65 DEGREES
T WAVE AXIS: 67 DEGREES
T WAVE AXIS: 75 DEGREES
T WAVE AXIS: 79 DEGREES
T WAVE AXIS: 82 DEGREES
T WAVE AXIS: 88 DEGREES
VENTRICULAR RATE: 102 BPM
VENTRICULAR RATE: 116 BPM
VENTRICULAR RATE: 129 BPM
VENTRICULAR RATE: 133 BPM
VENTRICULAR RATE: 75 BPM
VENTRICULAR RATE: 75 BPM
VENTRICULAR RATE: 93 BPM
WBC # BLD AUTO: 7.12 THOUSAND/UL (ref 4.31–10.16)

## 2021-07-26 PROCEDURE — 94760 N-INVAS EAR/PLS OXIMETRY 1: CPT

## 2021-07-26 PROCEDURE — 93010 ELECTROCARDIOGRAM REPORT: CPT | Performed by: INTERNAL MEDICINE

## 2021-07-26 PROCEDURE — 99233 SBSQ HOSP IP/OBS HIGH 50: CPT | Performed by: ANESTHESIOLOGY

## 2021-07-26 PROCEDURE — 80053 COMPREHEN METABOLIC PANEL: CPT | Performed by: NURSE PRACTITIONER

## 2021-07-26 PROCEDURE — 85025 COMPLETE CBC W/AUTO DIFF WBC: CPT | Performed by: NURSE PRACTITIONER

## 2021-07-26 PROCEDURE — 82948 REAGENT STRIP/BLOOD GLUCOSE: CPT

## 2021-07-26 RX ORDER — OXYCODONE HYDROCHLORIDE 5 MG/1
5 TABLET ORAL EVERY 2 HOUR PRN
Status: DISCONTINUED | OUTPATIENT
Start: 2021-07-26 | End: 2021-07-27

## 2021-07-26 RX ORDER — LORAZEPAM 0.5 MG/1
0.5 TABLET ORAL EVERY 2 HOUR PRN
Status: DISCONTINUED | OUTPATIENT
Start: 2021-07-26 | End: 2021-07-26

## 2021-07-26 RX ORDER — ONDANSETRON 2 MG/ML
4 INJECTION INTRAMUSCULAR; INTRAVENOUS ONCE
Status: COMPLETED | OUTPATIENT
Start: 2021-07-26 | End: 2021-07-26

## 2021-07-26 RX ORDER — GLYCOPYRROLATE 0.2 MG/ML
0.1 INJECTION INTRAMUSCULAR; INTRAVENOUS EVERY 4 HOURS PRN
Status: DISCONTINUED | OUTPATIENT
Start: 2021-07-26 | End: 2021-08-01

## 2021-07-26 RX ORDER — POTASSIUM CHLORIDE 29.8 MG/ML
40 INJECTION INTRAVENOUS ONCE
Status: COMPLETED | OUTPATIENT
Start: 2021-07-26 | End: 2021-07-26

## 2021-07-26 RX ORDER — METOCLOPRAMIDE HYDROCHLORIDE 5 MG/ML
5 INJECTION INTRAMUSCULAR; INTRAVENOUS EVERY 6 HOURS PRN
Status: DISCONTINUED | OUTPATIENT
Start: 2021-07-26 | End: 2021-07-28

## 2021-07-26 RX ORDER — METOCLOPRAMIDE HYDROCHLORIDE 5 MG/ML
5 INJECTION INTRAMUSCULAR; INTRAVENOUS ONCE
Status: COMPLETED | OUTPATIENT
Start: 2021-07-26 | End: 2021-07-26

## 2021-07-26 RX ORDER — CALCIUM CARBONATE 200(500)MG
500 TABLET,CHEWABLE ORAL DAILY PRN
Status: DISCONTINUED | OUTPATIENT
Start: 2021-07-26 | End: 2021-07-27

## 2021-07-26 RX ORDER — LORAZEPAM 0.5 MG/1
0.5 TABLET ORAL EVERY 4 HOURS PRN
Status: DISCONTINUED | OUTPATIENT
Start: 2021-07-26 | End: 2021-07-26

## 2021-07-26 RX ORDER — LORAZEPAM 2 MG/ML
1 INJECTION INTRAMUSCULAR
Status: DISCONTINUED | OUTPATIENT
Start: 2021-07-26 | End: 2021-07-26

## 2021-07-26 RX ORDER — BISACODYL 10 MG
10 SUPPOSITORY, RECTAL RECTAL DAILY PRN
Status: DISCONTINUED | OUTPATIENT
Start: 2021-07-26 | End: 2021-08-04 | Stop reason: HOSPADM

## 2021-07-26 RX ADMIN — METOCLOPRAMIDE HYDROCHLORIDE 5 MG: 5 INJECTION INTRAMUSCULAR; INTRAVENOUS at 08:18

## 2021-07-26 RX ADMIN — FAMOTIDINE 10 MG: 40 POWDER, FOR SUSPENSION ORAL at 08:18

## 2021-07-26 RX ADMIN — METOCLOPRAMIDE HYDROCHLORIDE 5 MG: 5 INJECTION INTRAMUSCULAR; INTRAVENOUS at 20:37

## 2021-07-26 RX ADMIN — DEXTROSE, SODIUM CHLORIDE, SODIUM LACTATE, POTASSIUM CHLORIDE, AND CALCIUM CHLORIDE 100 ML/HR: 5; .6; .31; .03; .02 INJECTION, SOLUTION INTRAVENOUS at 13:37

## 2021-07-26 RX ADMIN — NYSTATIN 1 APPLICATION: 100000 POWDER TOPICAL at 08:34

## 2021-07-26 RX ADMIN — POTASSIUM CHLORIDE 40 MEQ: 29.8 INJECTION, SOLUTION INTRAVENOUS at 05:37

## 2021-07-26 RX ADMIN — ONDANSETRON 4 MG: 2 INJECTION INTRAMUSCULAR; INTRAVENOUS at 05:37

## 2021-07-26 RX ADMIN — CALCIUM CARBONATE (ANTACID) CHEW TAB 500 MG 500 MG: 500 CHEW TAB at 19:16

## 2021-07-26 RX ADMIN — METRONIDAZOLE 500 MG: 500 INJECTION, SOLUTION INTRAVENOUS at 08:35

## 2021-07-26 RX ADMIN — DEXTROSE, SODIUM CHLORIDE, SODIUM LACTATE, POTASSIUM CHLORIDE, AND CALCIUM CHLORIDE 100 ML/HR: 5; .6; .31; .03; .02 INJECTION, SOLUTION INTRAVENOUS at 03:10

## 2021-07-26 NOTE — ASSESSMENT & PLAN NOTE
· Likely 2/2 intravascular volume depletion/ maybe chronic component given chronically low serum bicarb  · Aggressively fluid resuscitated on admission  Metabolic acidosis improving    · Continue to trend BMP/electrolytes    Pt now comfort measures only

## 2021-07-26 NOTE — ASSESSMENT & PLAN NOTE
· History of chronic hydronephrosis  · Recently had stents removed at  07/20/21  · CT showed relatively unchanged size of hydro with no hydroureter  · U/A reassuring as far as infection; trace leukocytes, negative nitrites, WBCs 10-20, no bacteria  Urine culture NGTD    · Monitor UOP    Pt now comfort measures only

## 2021-07-26 NOTE — ASSESSMENT & PLAN NOTE
· Pt transitioned to comfort measures only  · Not a candidate for inpatient hospice  · Case management looking into placement at nearby SNF  · Oxycodone discontinued due to adverse reaction in past  Will try Dilaudid 1 mg PO PRN, tylenol prn  · Benzos not ordered due to adverse reactions- consider Zyprexa prn if needed  · Cont mathis

## 2021-07-26 NOTE — ASSESSMENT & PLAN NOTE
· Patient with 3 admissions in the last year where she signed out AMA  · Also recommended post-acute rehab at last admission but patient refused this   · Goals of care discussions with family in further days  · 07/25 POLST completed by patient - comfort measures only selected  · Consult to Case management for home hospice versus hospice house candidacy/placement

## 2021-07-26 NOTE — ASSESSMENT & PLAN NOTE
· History of hyperthyroidism  Takes methimazole 2 5 mg EOD at home    · TSH 5 55 on admission, Free T4 1 37, Free T3 2 43  · Labs reviewed in care everywhere and from previous admission in June 2021 - euthyroid state  · Methimazole currently on hold    Pt now comfort measures only

## 2021-07-26 NOTE — ASSESSMENT & PLAN NOTE
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

## 2021-07-26 NOTE — ASSESSMENT & PLAN NOTE
Lab Results   Component Value Date    EGFR 19 07/26/2021    EGFR 17 07/25/2021    EGFR 16 07/24/2021    CREATININE 2 49 (H) 07/26/2021    CREATININE 2 69 (H) 07/25/2021    CREATININE 2 87 (H) 07/24/2021    Baseline Cr: variable: likely 0 7 to 1 1 per nephrology note last admission    Admission Cr: 4 01   Etiology:  Patient appears clinically dry but also has chronic hydronephrosis with stents and history of post obstructive uropathy  o Will obtain kidney ultrasound  o FENa calculated and indicates prerenal  o Urine Na, K, chloride obtained - UAG positive; 4 8 mEq/L  - Of note, patient has chronic bicarb loss (though may be 2/2 diarrhea)   Balanced crystalloid resuscitation on admission  Now on D5LR     Considered nephrology consult- now comfort measures   Avoid nephrotoxic medications/hypotension   Monitor UOP    IVF d/c'd - pt now comfort measures only

## 2021-07-26 NOTE — ASSESSMENT & PLAN NOTE
· Seen on last admission  · At previous discharge (07/10/2021) alk phos was 128  · Alk phos 189 on present admission (07/23/2021)  · Previous labs from 04/2021 show normal alk phos  · Unclear etiology, biliary vs osseous given history of breast ca  · Check gamma GT to rule in/out biliary cause  · GGT elevated at 198   Given that both alk phos and GGT are elevated, eitology likely related to liver disease rather than osseous eitiology  · Trend LFTS    · RUQ ultrasound d/c'd - pt now comfort measures only

## 2021-07-26 NOTE — CASE MANAGEMENT
Home hospice may not be ideal due to pt's home situation  CM sent a referral to [facility] for inpatient hospice to evaluate for appropriateness

## 2021-07-26 NOTE — ASSESSMENT & PLAN NOTE
· Unclear, possibly chronic  · Will cover with cefepime/flagyl  · If patient with diarrhea consider C   Diff given recent healthcare contacts as well as stool cultures/O&P    ABX d/c'd - pt now comfort measures only

## 2021-07-26 NOTE — ASSESSMENT & PLAN NOTE
Lab Results   Component Value Date    HGBA1C 4 8 07/09/2021       Recent Labs     07/24/21  2340 07/25/21  1143 07/25/21  1558 07/25/21  2316   POCGLU 103 130 113 87       Blood Sugar Average: Last 72 hrs:  · A1C checked on last admission on 07/09/2021 - 4 8  · Documented history of DM type 2 found in chart review  Patient does not take medications for DM at home    · Monitor accuchecks Q 6 hours, insulin per sliding scale algorithm 2  · Hypoglycemia protocol    SSI d/c'd - pt now comfort measures only

## 2021-07-26 NOTE — ASSESSMENT & PLAN NOTE
· Etiology unclear  · No fever, no bandemia  · No clear signs of infection on CT scan other than ?  Enteritis  · Check abdominal ultrasound  · Will cover with cefepime/flagyl (due to penicillin allergy)  · Trend procal: 0 14 > 0 51 > 0 56  · Expect elevation given ZOYA  · Blood cultures x2 - NGTD  · Urine culture negative  · Trend WBCs and fever curve    ABX d/c'd - pt now comfort measures only

## 2021-07-26 NOTE — ASSESSMENT & PLAN NOTE
· Etiology unclear  · Cardiology evaluated in ED  · EKG without ischemic change  · Repeat EKG - NSR without ischemic change, prolonged QTC of 514 > 531  · May be 2/2 time down with significant ZOYA  · Troponin peaked at 6 9  · Serial EKGs  · No ECHO in our system, will obtain to evaluate function, assess for RWMA    ECHO d/c'd - pt now comfort measures only

## 2021-07-26 NOTE — ASSESSMENT & PLAN NOTE
· Unclear etiology  · Present on CT imaging  Also present of previous CT scan on 04/01/2021  · Obtain RUQ ultrasound  · Admission ammonia 56  · May be slow to clear lactic acid due to this and ZOYA  Lactic acidosis resolved 07/24    · Monitor for hypoglycemia  · INR 1 36    Pt now comfort measures only

## 2021-07-26 NOTE — CASE MANAGEMENT
CM met with pt and her daughter, Abril Martin  Pt more alert at this time  Abril Martin confirmed that she spoke with Wilbur Strickland from [facility]  She said that home hospice is not an option at this time  During this time, CM discussed SNF options  CM provided them with a list to review  Pt was able to choose some possibilities  1  [facility]   2  [facility]   3  [facility]   4  [address]   6  [place]   7  [facility]   8  [facility]   9  [address] [place] [address]     CM sent referrals to the above SNFs for inpatient hospice  Pt and her daughter will continue to review the SNF list for additional options if needed

## 2021-07-26 NOTE — CASE MANAGEMENT
CM notified by Harshil Mason, hospice liaison that pt was not approved for inpatient hospice but is approved for home hospice, however, pt lives alone and family is unable to care for pt at home  CM will follow up

## 2021-07-26 NOTE — ASSESSMENT & PLAN NOTE
· Patient presented after being found down by caregiver, last seen/heard from 3 days ago  · Arrived to the emergency department GCS of 6 (4, 1T, 1) and therefore was intubated for airway protection  · CT head/cervical spine negative  · Likely secondary to severe metabolic derangements  · Due to history of cirrhosis, check ammonia; 56 > 53  · UDS negative  · Monitor for hypoglycemia  · Treat derangements as per below  · Post extubation on 07/25, mental status slowing improving but still having periods of restlessness  Overall, agitation is less than during intubation  · NPO due to mental status  SLP evaluation pending    · Encephalopathy resolved, pt back to baseline per daughter - patient was able to participate in goals of care discussion and completed POLST    Pt now comfort measures only

## 2021-07-26 NOTE — CASE MANAGEMENT
During mobility rounds in ICU, CM was informed that pt will be comfort care  CM met with pt and her daughter, Alice Castellanos at bedside to discuss hospice care  Alice Castellanos reported that pt would want to go home on hospice  CM explained that pt will need to be evaluated for home hospice appropriateness  Depending on this evaluation, pt may be best suitable for inpatient hospice  Alice Castellanos agreeable for CM to place a referral to [facility]  CM placed a referral to [facility] into [facility]

## 2021-07-26 NOTE — PROGRESS NOTES
[address]  Progress Note - Liam Cross 1945, 70 y o  female MRN: [id number]  Unit/Bed#: -01 Encounter: [id number]  Primary Care Provider: Bradley Paige MD   Date and time admitted to hospital: 7/23/2021 12:03 PM    * Acute metabolic encephalopathy  Assessment & Plan  · Patient presented after being found down by caregiver, last seen/heard from 3 days ago  · Arrived to the emergency department GCS of 6 (4, 1T, 1) and therefore was intubated for airway protection  · CT head/cervical spine negative  · Likely secondary to severe metabolic derangements  · Due to history of cirrhosis, check ammonia; 56 > 53  · UDS negative  · Monitor for hypoglycemia  · Treat derangements as per below  · Post extubation on 07/25, mental status slowing improving but still having periods of restlessness  Overall, agitation is less than during intubation  · NPO due to mental status  SLP evaluation pending  · Mental status improving - patient was able to participate in goals of care discussion and completed POLST    Acute on chronic kidney failure [facility])  Assessment & Plan  Lab Results   Component Value Date    EGFR 17 07/25/2021    EGFR 16 07/24/2021    EGFR 16 07/24/2021    CREATININE 2 69 (H) 07/25/2021    CREATININE 2 87 (H) 07/24/2021    CREATININE 2 84 (H) 07/24/2021    Baseline Cr: variable: likely 0 7 to 1 1 per nephrology note last admission    Admission Cr: 4 01   Etiology:  Patient appears clinically dry but also has chronic hydronephrosis with stents and history of post obstructive uropathy  o Will obtain kidney ultrasound  o FENa calculated and indicates prerenal  o Urine Na, K, chloride obtained - UAG positive; 4 8 mEq/L  - Of note, patient has chronic bicarb loss (though may be 2/2 diarrhea)   Balanced crystalloid resuscitation on admission  Now on D5LR     Consider nephrology consult   Avoid nephrotoxic medications/hypotension   Monitor UOP    High anion gap metabolic acidosis  Assessment & Plan  · Likely 2/2 intravascular volume depletion/ maybe chronic component given chronically low serum bicarb  · Aggressively fluid resuscitated on admission  Metabolic acidosis improving  · Continue to trend BMP/electrolytes    Hypotension  Assessment & Plan  · Not present on admission  · Likely due to medication/sedation  Post intubation sedation was held  Started fentanyl gtt due to restlessness approximately 5 hours after intubation  · Required additional IVF boluses and albumen  · Increased hypotension noted with precedex infusion and prn fentanyl  Required insertion of central line and levophed to be started  · Continue central line for levophed  · Continue levophed for MAP goal > 65  · Discontinued 07/25    Elevated troponin I level  Assessment & Plan  · Etiology unclear  · Cardiology evaluated in ED  · EKG without ischemic change  · Repeat EKG - NSR without ischemic change, prolonged QTC of 514 > 531  · May be 2/2 time down with significant ZOYA  · Troponin peaked at 6 9  · Serial EKGs  · No ECHO in our system, will obtain to evaluate function, assess for RWMA    Leukocytosis  Assessment & Plan  · Etiology unclear  · No fever, no bandemia  · No clear signs of infection on CT scan other than ? Enteritis  · Check abdominal ultrasound  · Will cover with cefepime/flagyl (due to penicillin allergy)  · Trend procal: 0 14 > 0 51 > 0 56  · Expect elevation given ZOYA  · Blood cultures x2 - NGTD  · Urine culture negative  · Trend WBCs and fever curve    Elevated alkaline phosphatase level  Assessment & Plan  · Seen on last admission  · At previous discharge (07/10/2021) alk phos was 128  · Alk phos 189 on present admission (07/23/2021)  · Previous labs from 04/2021 show normal alk phos  · Unclear etiology, biliary vs osseous given history of breast ca  · Check gamma GT to rule in/out biliary cause  · GGT elevated at 198   Given that both alk phos and GGT are elevated, eitology likely related to liver disease rather than osseous eitiology  · Trend LFTS  · RUQ ultrasound pending    Enteritis  Assessment & Plan  · Unclear, possibly chronic  · Will cover with cefepime/flagyl  · If patient with diarrhea consider C  Diff given recent healthcare contacts as well as stool cultures/O&P    Non-compliance  Assessment & Plan  · Patient with 3 admissions in the last year where she signed out AMA  · Also recommended post-acute rehab at last admission but patient refused this   · Goals of care discussions with family in further days  · 07/25 POLST completed by patient - comfort measures only selected  · Consult to Case management for home hospice versus hospice house candidacy/placement? History of breast cancer  Assessment & Plan  · History of stage IIIa L IDC, 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  S/p left mastectomy  · Currently on Femara    Hydronephrosis  Assessment & Plan  · History of chronic hydronephrosis  · Recently had stents removed at [facility] 07/20/21  · CT showed relatively unchanged size of hydro with no hydroureter  · U/A reassuring as far as infection; trace leukocytes, negative nitrites, WBCs 10-20, no bacteria  Urine culture pending  · Check renal ultrasound  · Monitor UOP    Type 2 diabetes mellitus without complication, without long-term current use of insulin [facility])  Assessment & Plan  Lab Results   Component Value Date    HGBA1C 4 8 07/09/2021       Recent Labs     07/24/21  2340 07/25/21  1143 07/25/21  1558 07/25/21  2316   POCGLU 103 130 113 87       Blood Sugar Average: Last 72 hrs:  · A1C checked on last admission on 07/09/2021 - 4 8  · Documented history of DM type 2 found in chart review  Patient does not take medications for DM at home  · Monitor accuchecks Q 6 hours, insulin per sliding scale algorithm 2  · Hypoglycemia protocol    Cirrhosis of liver (HCC)  Assessment & Plan  · Unclear etiology  · Present on CT imaging  Also present of previous CT scan on 04/01/2021    · Obtain RUQ ultrasound  · Admission ammonia 56  · May be slow to clear lactic acid due to this and ZOYA  Lactic acidosis resolved 07/24  · Monitor for hypoglycemia  · INR 1 36    Hyperthyroidism  Assessment & Plan  · History of hyperthyroidism  Takes methimazole 2 5 mg EOD at home  · TSH 5 55 on admission, Free T4 1 37, Free T3 2 43  · Labs reviewed in care everywhere and from previous admission in June 2021 - euthyroid state  · Methimazole currently on hold      ----------------------------------------------------------------------------------------  HPI/24hr events:  Mental status improved; as of last evening patient was alert and oriented x3  Yesterday patient did have goals of care discussion with critical care attending and daughter at bedside  During that discussion patient did fill out POLST with comfort measures only  At patient's request, patient's friend will visit and then after that time she wishes to Trend the surgeon to comfort measures only  Case management consult pending to assist with planning including possible hospice services  Also of note Levophed infusion weaned off yesterday, BP remained stable overnight  Disposition: Continue Stepdown Level 1 level of care   Code Status: Level 3 - DNAR and DNI  ---------------------------------------------------------------------------------------  SUBJECTIVE    Review of Systems   Constitutional: Negative  HENT: Negative  Eyes: Negative  Respiratory: Positive for cough  Cardiovascular: Negative  Gastrointestinal: Positive for nausea  Genitourinary: Negative  Musculoskeletal: Positive for back pain  Neurological: Negative      Psychiatric/Behavioral: Negative       ---------------------------------------------------------------------------------------  OBJECTIVE    Vitals   Vitals:    07/26/21 0000 07/26/21 0100 07/26/21 0200 07/26/21 0300   BP: 112/57 112/52 120/58 127/70   Pulse: 103 98 98 97   Resp: 22 18 19 18   Temp: 98 8 °F (37 1 °C)  98 8 °F (37 1 °C) 98 6 °F (37 °C)   TempSrc: Bladder      SpO2: 97% 97% 97% 97%   Weight:       Height:         Temp (24hrs), Av 4 °F (36 9 °C), Min:98 1 °F (36 7 °C), Max:98 8 °F (37 1 °C)  Current: Temperature: 98 6 °F (37 °C)  Arterial Line BP: 130/57  Arterial Line MAP (mmHg): 81 mmHg    Respiratory:  SpO2: SpO2: 97 %, SpO2 Activity: SpO2 Activity: At Rest, SpO2 Device: O2 Device: None (Room air)       Invasive/non-invasive ventilation settings   Respiratory    Lab Data (Last 4 hours)    None         O2/Vent Data (Last 4 hours)    None                Physical Exam  Vitals reviewed  Constitutional:       General: She is not in acute distress  Appearance: She is well-developed  She is ill-appearing  She is not diaphoretic  Comments: Chronically ill appearing, frail   HENT:      Head: Normocephalic and atraumatic  Mouth/Throat:      Mouth: Mucous membranes are moist       Pharynx: Oropharynx is clear  Eyes:      General: No scleral icterus  Conjunctiva/sclera: Conjunctivae normal       Pupils: Pupils are equal, round, and reactive to light  Neck:      Vascular: No JVD  Cardiovascular:      Rate and Rhythm: Normal rate and regular rhythm  Heart sounds: Normal heart sounds  No murmur heard  No friction rub  Pulmonary:      Effort: Pulmonary effort is normal  No respiratory distress  Breath sounds: No wheezing or rales  Comments: Breath sounds diminished  Abdominal:      General: Bowel sounds are normal  There is distension  Palpations: Abdomen is soft  There is no mass  Tenderness: There is no abdominal tenderness  There is no guarding or rebound  Musculoskeletal:         General: No swelling, tenderness or deformity  Normal range of motion  Cervical back: Normal range of motion and neck supple  Skin:     General: Skin is warm and dry  Capillary Refill: Capillary refill takes less than 2 seconds  Coloration: Skin is not pale  Findings: No erythema or rash  Neurological:      General: No focal deficit present  Mental Status: She is alert and oriented to person, place, and time  GCS: GCS eye subscore is 4  GCS verbal subscore is 5  GCS motor subscore is 6  Cranial Nerves: No cranial nerve deficit     Psychiatric:         Behavior: Behavior normal          Laboratory and Diagnostics:  Results from last 7 days   Lab Units 07/25/21  0528 07/24/21  0540 07/23/21  1754 07/23/21  1229   WBC Thousand/uL 10 71* 17 40*  --  16 58*   HEMOGLOBIN g/dL 10 1* 10 5*  --  12 2   HEMATOCRIT % 30 5* 32 2*  --  36 5   PLATELETS Thousands/uL 161 214 206 233   NEUTROS PCT % 72 79*  --  88*   MONOS PCT % 9 7  --  6     Results from last 7 days   Lab Units 07/25/21  0528 07/24/21  1310 07/24/21  0540 07/23/21  2329 07/23/21  1644 07/23/21  1229   SODIUM mmol/L 144 141 141 141 139 143   POTASSIUM mmol/L 3 5 3 4* 3 9 3 8 3 8 4 0   CHLORIDE mmol/L 113* 109* 109* 109* 108 109*   CO2 mmol/L 17* 15* 16* 15* 13* 11*   ANION GAP mmol/L 14* 17* 16* 17* 18* 23*   BUN mg/dL 26* 26* 28* 32* 30* 31*   CREATININE mg/dL 2 69* 2 87* 2 84* 3 08* 3 45* 4 01*   CALCIUM mg/dL 8 2* 7 9* 7 8* 8 0* 8 5 8 7   GLUCOSE RANDOM mg/dL 116 89 97 76 164* 153*   ALT U/L 22  --  22  --   --  24   AST U/L 67*  --  58*  --   --  59*   ALK PHOS U/L 132*  --  134*  --   --  189*   ALBUMIN g/dL 1 9*  --  2 2*  --   --  2 4*   TOTAL BILIRUBIN mg/dL 0 61  --  0 55  --   --  0 62     Results from last 7 days   Lab Units 07/25/21  0528 07/24/21  0540 07/23/21  2329 07/23/21  1644   MAGNESIUM mg/dL 2 5 2 7* 3 2* 1 4*   PHOSPHORUS mg/dL 2 2* 2 5  --  4 5*      Results from last 7 days   Lab Units 07/23/21  1229   INR  1 36*   PTT seconds 31      Results from last 7 days   Lab Units 07/24/21  1310 07/24/21  0540 07/24/21  0319 07/23/21  2329 07/23/21  2118 07/23/21  1754 07/23/21  1644   TROPONIN I ng/mL 6 58* 6 52* 6 98* 6 84* 5 92* 3 80* 5 14*     Results from last 7 days   Lab Units 07/24/21  0540 07/24/21  0319 07/23/21  2329 07/23/21  2118 07/23/21  1935 07/23/21  1754 07/23/21  1527   LACTIC ACID mmol/L 1 7 2 4* 2 7* 2 8* 4 4* 4 3* 6 6*     ABG:  Results from last 7 days   Lab Units 07/25/21  0542   PH ART  7 395   PCO2 ART mm Hg 25 3*   PO2 ART mm Hg 65 7*   HCO3 ART mmol/L 15 1*   BASE EXC ART mmol/L -8 2   ABG SOURCE  Radial, Left     VBG:  Results from last 7 days   Lab Units 07/25/21  0542   ABG SOURCE  Radial, Left     Results from last 7 days   Lab Units 07/25/21  0528 07/24/21  0540 07/23/21  1229   PROCALCITONIN ng/ml 0 56* 0 51* 0 14       Micro  Results from last 7 days   Lab Units 07/24/21  0910 07/23/21  1321 07/23/21  1230 07/23/21  1229   BLOOD CULTURE   --   --  No Growth at 48 hrs  No Growth at 48 hrs  URINE CULTURE   --  No Growth <1000 cfu/mL  --   --    MRSA CULTURE ONLY  No Methicillin Resistant Staphlyococcus aureus (MRSA) isolated  --   --   --        EKG: NSR, qtc 470  Imaging: I have personally reviewed pertinent reports  and I have personally reviewed pertinent films in PACS    Intake and Output  I/O       07/24 0701 - 07/25 0700 07/25 0701 - 07/26 0700    P  O   60    I V  (mL/kg) 3313 7 (52) 1941 8 (30 5)    IV Piggyback 400 625    Total Intake(mL/kg) 3713 7 (58 3) 2626 8 (41 2)    Urine (mL/kg/hr) 665 (0 4) 540 (0 4)    Emesis/NG output 0     Total Output 665 540    Net +3048 7 +2086 8                Height and Weights   Height: 5' (152 4 cm)  IBW (Ideal Body Weight): 45 5 kg  Body mass index is 27 43 kg/m²  Weight (last 2 days)     Date/Time   Weight    07/24/21 0600   63 7 (140 43)                Nutrition       Diet Orders   (From admission, onward)             Start     Ordered    07/25/21 1930  Diet NPO; Sips of clear liquids  Diet effective now     Question Answer Comment   Diet Type NPO    NPO Except: Sips of clear liquids    RD to adjust diet per protocol?  Yes        07/25/21 1929                  Active Medications  Scheduled Meds:  Current Facility-Administered Medications   Medication Dose Route Frequency Provider Last Rate    acetaminophen  650 mg Oral Q6H PRN Raisa BlendPOLY      cefepime  1,000 mg Intravenous Q24H Raisa BlendPOLY Stopped (07/25/21 1636)    dextrose 5% lactated ringer's  100 mL/hr Intravenous Continuous Chele Licea  mL/hr (07/26/21 0310)    famotidine  10 mg Oral Daily Raisa BlendPOLY      insulin lispro  1-5 Units Subcutaneous Q6H [facility] SIMON Hall      metroNIDAZOLE  500 mg Intravenous Q8H Raisa BlendPOLY Stopped (07/25/21 2348)    norepinephrine  1-30 mcg/min Intravenous Titrated SIMON Hall 2 mcg/min (07/25/21 1600)    nystatin   Topical BID Raisa BlendPOLY      ondansetron  4 mg Intravenous Once SIMON Hall       Continuous Infusions:  dextrose 5% lactated ringer's, 100 mL/hr, Last Rate: 100 mL/hr (07/26/21 0310)  norepinephrine, 1-30 mcg/min, Last Rate: 2 mcg/min (07/25/21 1600)      PRN Meds:   acetaminophen, 650 mg, Q6H PRN        Invasive Devices Review  Invasive Devices     Central Venous Catheter Line            CVC Central Lines 07/24/21 Triple 16cm 2 days          Peripheral Intravenous Line            Peripheral IV 07/23/21 Dorsal (posterior); Right Hand 2 days    Peripheral IV 07/24/21 Proximal;Right;Ventral (anterior) Forearm 2 days          Drain            Urethral Catheter Temperature probe 16 Fr  2 days                Rationale for remaining devices:  Patient possibly transitioning to comfort care or hospice care today - maintain central line until decision is made to provide central access and for lab draws    ---------------------------------------------------------------------------------------  Advance Directive and Living Will:      Power of :    POLST: Yes  ---------------------------------------------------------------------------------------  Care Time Delivered:   No Critical Care time spent       [facility]      Portions of the record may have been created with voice recognition software  Occasional wrong word or "sound a like" substitutions may have occurred due to the inherent limitations of voice recognition software    Read the chart carefully and recognize, using context, where substitutions have occurred

## 2021-07-26 NOTE — PLAN OF CARE
Pt continues on IVF with dextrose, IV abx, and K replacements  Pt's K replaced last evening and currently receiving 40 meq for K of 3 4  Pt's urine output diminished  Pt had 225 ml for entire 12 hr shift and urine is not QS  Pt with over 1L input this shift  [place] notified of results  Pt with intermittent nausea and dry heaving  Pt given 4mg IV zofran x 1 for this, this am   Plan for pt's friend to visit and then for pt to be made comfort measures  Will continue to monitor and intervene as needed      Problem: PAIN - ADULT  Goal: Verbalizes/displays adequate comfort level or baseline comfort level  Description: Interventions:  - Encourage patient to monitor pain and request assistance  - Assess pain using appropriate pain scale  - Administer analgesics based on type and severity of pain and evaluate response  - Implement non-pharmacological measures as appropriate and evaluate response  - Consider cultural and social influences on pain and pain management  - Notify physician/advanced practitioner if interventions unsuccessful or patient reports new pain  Outcome: Progressing     Problem: INFECTION - ADULT  Goal: Absence or prevention of progression during hospitalization  Description: INTERVENTIONS:  - Assess and monitor for signs and symptoms of infection  - Monitor lab/diagnostic results  - Monitor all insertion sites, i e  indwelling lines, tubes, and drains  - Monitor endotracheal if appropriate and nasal secretions for changes in amount and color  - [place] appropriate cooling/warming therapies per order  - Administer medications as ordered  - Instruct and encourage patient and family to use good hand hygiene technique  - Identify and instruct in appropriate isolation precautions for identified infection/condition  Outcome: Progressing     Problem: SAFETY ADULT  Goal: Patient will remain free of falls  Description: INTERVENTIONS:  - Educate patient/family on patient safety including physical limitations  - Instruct patient to call for assistance with activity   - Consult OT/PT to assist with strengthening/mobility   - Keep Call bell within reach  - Keep bed low and locked with side rails adjusted as appropriate  - Keep care items and personal belongings within reach  - Initiate and maintain comfort rounds  - Make Fall Risk Sign visible to staff  - Apply yellow socks and bracelet for high fall risk patients  - Consider moving patient to room near nurses station  Outcome: Progressing     Problem: DISCHARGE PLANNING  Goal: Discharge to home or other facility with appropriate resources  Description: INTERVENTIONS:  - Identify barriers to discharge w/patient and caregiver  - Arrange for needed discharge resources and transportation as appropriate  - Identify discharge learning needs (meds, wound care, etc )  - Arrange for interpretive services to assist at discharge as needed  - Refer to Case Management Department for coordinating discharge planning if the patient needs post-hospital services based on physician/advanced practitioner order or complex needs related to functional status, cognitive ability, or social support system  Outcome: Progressing     Problem: Knowledge Deficit  Goal: Patient/family/caregiver demonstrates understanding of disease process, treatment plan, medications, and discharge instructions  Description: Complete learning assessment and assess knowledge base    Interventions:  - Provide teaching at level of understanding  - Provide teaching via preferred learning methods  Outcome: Progressing     Problem: Potential for Falls  Goal: Patient will remain free of falls  Description: INTERVENTIONS:  - Educate patient/family on patient safety including physical limitations  - Instruct patient to call for assistance with activity   - Consult OT/PT to assist with strengthening/mobility   - Keep Call bell within reach  - Keep bed low and locked with side rails adjusted as appropriate  - Keep care items and personal belongings within reach  - Initiate and maintain comfort rounds  - Make Fall Risk Sign visible to staff  - Apply yellow socks and bracelet for high fall risk patients  - Consider moving patient to room near nurses station  Outcome: Progressing     Problem: Prexisting or High Potential for Compromised Skin Integrity  Goal: Skin integrity is maintained or improved  Description: INTERVENTIONS:  - Identify patients at risk for skin breakdown  - Assess and monitor skin integrity  - Assess and monitor nutrition and hydration status  - Monitor labs   - Assess for incontinence   - Turn and reposition patient  - Assist with mobility/ambulation  - Relieve pressure over bony prominences  - Avoid friction and shearing  - Provide appropriate hygiene as needed including keeping skin clean and dry  - Evaluate need for skin moisturizer/barrier cream  - Collaborate with interdisciplinary team   - Patient/family teaching  - Consider wound care consult   Outcome: Progressing     Problem: GENITOURINARY - ADULT  Goal: Maintains or returns to baseline urinary function  Description: INTERVENTIONS:  - Assess urinary function  - Encourage oral fluids to ensure adequate hydration if ordered  - Administer IV fluids as ordered to ensure adequate hydration  - Administer ordered medications as needed  - Offer frequent toileting  - Follow urinary retention protocol if ordered  Outcome: Progressing  Goal: Absence of urinary retention  Description: INTERVENTIONS:  - Assess patients ability to void and empty bladder  - Monitor I/O  - Bladder scan as needed  - Discuss with physician/AP medications to alleviate retention as needed  - Discuss catheterization for long term situations as appropriate  Outcome: Progressing  Goal: Urinary catheter remains patent  Description: INTERVENTIONS:  - Assess patency of urinary catheter  - If patient has a chronic mathis, consider changing catheter if non-functioning  - Follow guidelines for intermittent irrigation of non-functioning urinary catheter  Outcome: Progressing     Problem: METABOLIC, FLUID AND ELECTROLYTES - ADULT  Goal: Electrolytes maintained within normal limits  Description: INTERVENTIONS:  - Monitor labs and assess patient for signs and symptoms of electrolyte imbalances  - Administer electrolyte replacement as ordered  - Monitor response to electrolyte replacements, including repeat lab results as appropriate  - Instruct patient on fluid and nutrition as appropriate  Outcome: Progressing  Goal: Glucose maintained within target range  Description: INTERVENTIONS:  - Monitor Blood Glucose as ordered  - Assess for signs and symptoms of hyperglycemia and hypoglycemia  - Administer ordered medications to maintain glucose within target range  - Assess nutritional intake and initiate nutrition service referral as needed  Outcome: Progressing     Problem: SKIN/TISSUE INTEGRITY - ADULT  Goal: Skin Integrity remains intact(Skin Breakdown Prevention)  Description: Assess:  -Perform Aman assessment every Q12 hrs  -Clean and moisturize skin every 8 hrs  -Inspect skin when repositioning, toileting, and assisting with ADLS  -Assess extremities for adequate circulation and sensation     Bed Management:  -Have minimal linens on bed & keep smooth, unwrinkled  -Change linens as needed when moist or perspiring    Toileting:  -Offer bedside commode  -Assess for incontinence every 2 hrs  -Use incontinent care products after each incontinent episode such as protective/moisture barrier creams    Activity:  -Encourage activity and walks on unit  -Encourage or provide ROM exercises   -Turn and reposition patient every 2 Hours  -Use appropriate equipment to lift or move patient in bed    Skin Care:  -Avoid use of baby powder, tape, friction and shearing, hot water or constrictive clothing  -Do not massage red bony areas    Outcome: Progressing     Problem: Nutrition/Hydration-ADULT  Goal: Nutrient/Hydration intake appropriate for improving, restoring or maintaining nutritional needs  Description: Monitor and assess patient's nutrition/hydration status for malnutrition  Collaborate with interdisciplinary team and initiate plan and interventions as ordered  Monitor patient's weight and dietary intake as ordered or per policy  Utilize nutrition screening tool and intervene as necessary  Determine patient's food preferences and provide high-protein, high-caloric foods as appropriate       INTERVENTIONS:  - Monitor oral intake, urinary output, labs, and treatment plans  - Assess nutrition and hydration status and recommend course of action  - Evaluate amount of meals eaten  - Assist patient with eating if necessary   - Allow adequate time for meals  - Recommend/ encourage appropriate diets, oral nutritional supplements, and vitamin/mineral supplements  - Order, calculate, and assess calorie counts as needed  - Recommend, monitor, and adjust tube feedings and TPN/PPN based on assessed needs  - Assess need for intravenous fluids  - Provide specific nutrition/hydration education as appropriate  - Include patient/family/caregiver in decisions related to nutrition  Outcome: Not Progressing     Problem: METABOLIC, FLUID AND ELECTROLYTES - ADULT  Goal: Fluid balance maintained  Description: INTERVENTIONS:  - Monitor labs   - Monitor I/O and WT  - Instruct patient on fluid and nutrition as appropriate  - Assess for signs & symptoms of volume excess or deficit  Outcome: Not Progressing

## 2021-07-26 NOTE — HOSPICE NOTE
Liaison spoke with daughter Heather Bo to discuss hospice options   Pt was not approved for IPU and was approved for home hospice  Family can not care for pt at home and will need to explore other options   Liaison will continue to follow

## 2021-07-27 PROCEDURE — 99232 SBSQ HOSP IP/OBS MODERATE 35: CPT | Performed by: PHYSICIAN ASSISTANT

## 2021-07-27 RX ORDER — HYDROMORPHONE HYDROCHLORIDE 2 MG/1
1 TABLET ORAL EVERY 4 HOURS PRN
Status: DISCONTINUED | OUTPATIENT
Start: 2021-07-27 | End: 2021-07-31

## 2021-07-27 RX ORDER — CALCIUM CARBONATE 200(500)MG
1000 TABLET,CHEWABLE ORAL DAILY PRN
Status: DISCONTINUED | OUTPATIENT
Start: 2021-07-27 | End: 2021-08-04 | Stop reason: HOSPADM

## 2021-07-27 RX ORDER — LANOLIN ALCOHOL/MO/W.PET/CERES
3 CREAM (GRAM) TOPICAL
Status: DISCONTINUED | OUTPATIENT
Start: 2021-07-27 | End: 2021-08-04 | Stop reason: HOSPADM

## 2021-07-27 RX ADMIN — CALCIUM CARBONATE (ANTACID) CHEW TAB 500 MG 1000 MG: 500 CHEW TAB at 21:09

## 2021-07-27 RX ADMIN — MELATONIN 3 MG: 3 TAB ORAL at 21:10

## 2021-07-27 RX ADMIN — CALCIUM CARBONATE (ANTACID) CHEW TAB 500 MG 500 MG: 500 CHEW TAB at 10:23

## 2021-07-27 RX ADMIN — METOCLOPRAMIDE HYDROCHLORIDE 5 MG: 5 INJECTION INTRAMUSCULAR; INTRAVENOUS at 08:01

## 2021-07-27 RX ADMIN — CALCIUM CARBONATE (ANTACID) CHEW TAB 500 MG 500 MG: 500 CHEW TAB at 11:30

## 2021-07-27 NOTE — CASE MANAGEMENT
[facility] does not have a bed available  CM met with pt and Waldemar Dasilva to obtain additional options  Additional options listed below:    1  [facility]   2  [facility] (Aunt Dot resides here)  3  [facility]   4  [facility]   5  [address]   6  [address]   7  [facility]   8  [facility]   9  [facility]   10  [address]   11  [place]     CM sent referrals to these facilities  [Follow-Up] : a follow-up visit [Sleep Apnea] : sleep apnea

## 2021-07-27 NOTE — CASE MANAGEMENT
CM received a vm from Felipe Roque from [facility] stating that there are no beds available at [facility]  CM notified via [facility] that there are beds at [facility]

## 2021-07-27 NOTE — PROGRESS NOTES
[address]  Progress Note - Chano Blackwell 1945, 70 y o  female MRN: [id number]  Unit/Bed#: -01 Encounter: [id number]  Primary Care Provider: Haily Mckenzie MD   Date and time admitted to hospital: 7/23/2021 12:03 PM    * Acute metabolic encephalopathy  Assessment & Plan  · Patient presented after being found down by caregiver, last seen/heard from 3 days ago  · Arrived to the emergency department GCS of 6 (4, 1T, 1) and therefore was intubated for airway protection  · CT head/cervical spine negative  · Likely secondary to severe metabolic derangements  · Due to history of cirrhosis, check ammonia; 56 > 53  · UDS negative  · Monitor for hypoglycemia  · Treat derangements as per below  · Post extubation on 07/25, mental status slowing improving but still having periods of restlessness  Overall, agitation is less than during intubation  · NPO due to mental status  SLP evaluation pending  · Encephalopathy resolved, pt back to baseline per daughter - patient was able to participate in goals of care discussion and completed POLST    Pt now comfort measures only    Acute on chronic kidney failure [facility])  Assessment & Plan  Lab Results   Component Value Date    EGFR 19 07/26/2021    EGFR 17 07/25/2021    EGFR 16 07/24/2021    CREATININE 2 49 (H) 07/26/2021    CREATININE 2 69 (H) 07/25/2021    CREATININE 2 87 (H) 07/24/2021    Baseline Cr: variable: likely 0 7 to 1 1 per nephrology note last admission    Admission Cr: 4 01   Etiology:  Patient appears clinically dry but also has chronic hydronephrosis with stents and history of post obstructive uropathy  o Will obtain kidney ultrasound  o FENa calculated and indicates prerenal  o Urine Na, K, chloride obtained - UAG positive; 4 8 mEq/L  - Of note, patient has chronic bicarb loss (though may be 2/2 diarrhea)   Balanced crystalloid resuscitation on admission  Now on D5LR     Considered nephrology consult- now comfort measures   Avoid nephrotoxic medications/hypotension   Monitor UOP    IVF d/c'd - pt now comfort measures only    High anion gap metabolic acidosis  Assessment & Plan  · Likely 2/2 intravascular volume depletion/ maybe chronic component given chronically low serum bicarb  · Aggressively fluid resuscitated on admission  Metabolic acidosis improving  · Continue to trend BMP/electrolytes    Pt now comfort measures only    Elevated troponin I level  Assessment & Plan  · Etiology unclear  · Cardiology evaluated in ED  · EKG without ischemic change  · Repeat EKG - NSR without ischemic change, prolonged QTC of 514 > 531  · May be 2/2 time down with significant ZOYA  · Troponin peaked at 6 9  · Serial EKGs  · No ECHO in our system, will obtain to evaluate function, assess for RWMA    ECHO d/c'd - pt now comfort measures only    Leukocytosis  Assessment & Plan  · Etiology unclear  · No fever, no bandemia  · No clear signs of infection on CT scan other than ? Enteritis  · Check abdominal ultrasound  · Will cover with cefepime/flagyl (due to penicillin allergy)  · Trend procal: 0 14 > 0 51 > 0 56  · Expect elevation given ZOYA  · Blood cultures x2 - NGTD  · Urine culture negative  · Trend WBCs and fever curve    ABX d/c'd - pt now comfort measures only    Elevated alkaline phosphatase level  Assessment & Plan  · Seen on last admission  · At previous discharge (07/10/2021) alk phos was 128  · Alk phos 189 on present admission (07/23/2021)  · Previous labs from 04/2021 show normal alk phos  · Unclear etiology, biliary vs osseous given history of breast ca  · Check gamma GT to rule in/out biliary cause  · GGT elevated at 198   Given that both alk phos and GGT are elevated, eitology likely related to liver disease rather than osseous eitiology  · Trend LFTS    · RUQ ultrasound d/c'd - pt now comfort measures only    Enteritis  Assessment & Plan  · Unclear, possibly chronic  · Will cover with cefepime/flagyl  · If patient with diarrhea consider C  Diff given recent healthcare contacts as well as stool cultures/O&P    ABX d/c'd - pt now comfort measures only    Non-compliance  Assessment & Plan  · Patient with 3 admissions in the last year where she signed out AMA  · Also recommended post-acute rehab at last admission but patient refused this   · Goals of care discussions with family in further days  · 07/25 POLST completed by patient - comfort measures only selected  · Consult to Case management for home hospice versus hospice house candidacy/placement    History of breast cancer  Assessment & Plan  · History of stage IIIa L IDC, 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  S/p left mastectomy  · Currently on Femara    Pt now comfort measures only    Hydronephrosis  Assessment & Plan  · History of chronic hydronephrosis  · Recently had stents removed at [facility] 07/20/21  · CT showed relatively unchanged size of hydro with no hydroureter  · U/A reassuring as far as infection; trace leukocytes, negative nitrites, WBCs 10-20, no bacteria  Urine culture NGTD  · Monitor UOP    Pt now comfort measures only    Type 2 diabetes mellitus without complication, without long-term current use of insulin [facility])  Assessment & Plan  Lab Results   Component Value Date    HGBA1C 4 8 07/09/2021       Recent Labs     07/24/21  2340 07/25/21  1143 07/25/21  1558 07/25/21  2316   POCGLU 103 130 113 87       Blood Sugar Average: Last 72 hrs:  · A1C checked on last admission on 07/09/2021 - 4 8  · Documented history of DM type 2 found in chart review  Patient does not take medications for DM at home  · Monitor accuchecks Q 6 hours, insulin per sliding scale algorithm 2  · Hypoglycemia protocol    SSI d/c'd - pt now comfort measures only    Cirrhosis of liver (HCC)  Assessment & Plan  · Unclear etiology  · Present on CT imaging  Also present of previous CT scan on 04/01/2021    · Obtain RUQ ultrasound  · Admission ammonia 56  · May be slow to clear lactic acid due to this and ZOYA  Lactic acidosis resolved 07/24  · Monitor for hypoglycemia  · INR 1 36    Pt now comfort measures only    Hyperthyroidism  Assessment & Plan  · History of hyperthyroidism  Takes methimazole 2 5 mg EOD at home  · TSH 5 55 on admission, Free T4 1 37, Free T3 2 43  · Labs reviewed in care everywhere and from previous admission in June 2021 - euthyroid state  · Methimazole currently on hold    Pt now comfort measures only    End of life care  Assessment & Plan  · Pt transitioned to comfort measures only  · Not a candidate for inpatient hospice  · Case management looking into placement at nearby SNF  · Oxycodone discontinued due to adverse reaction in past  Will try Dilaudid 1 mg PO PRN, tylenol prn  · Benzos not ordered due to adverse reactions- consider Zyprexa prn if needed  · Cont delfina    ----------------------------------------------------------------------------------------  HPI/24hr events: Slept from 9p-3a  Has not taken anything for pain because she states that Oxycodone "keeps me up for days"  Offered to trial PO Dilaudid instead and patient agreed  No acute issues overnight  Disposition: Comfort Care  Code Status: Level 4 - Comfort Care  ---------------------------------------------------------------------------------------  SUBJECTIVE    Review of Systems   Constitutional: Positive for fatigue  Negative for fever  Respiratory: Negative for shortness of breath  Cardiovascular: Negative for chest pain  Gastrointestinal: Negative for abdominal pain and nausea  Neurological: Negative for dizziness       Review of systems was reviewed and negative unless stated above in HPI/24-hour events   ---------------------------------------------------------------------------------------  OBJECTIVE    Vitals   Vitals:    07/26/21 0739 07/26/21 1138 07/26/21 1342 07/26/21 1505   BP: 131/67 111/67  125/82   BP Location: Right arm Right arm  Right arm   Pulse: (!) 109 104  105   Resp: (!) 23 (!) 23  (!) 23 Temp: 98 3 °F (36 8 °C) 99 1 °F (37 3 °C)  98 5 °F (36 9 °C)   TempSrc: Oral Bladder  Temporal   SpO2: 96% 96%  96%   Weight:       Height:   5' (1 524 m)      Temp (24hrs), Av 6 °F (37 °C), Min:98 °F (36 7 °C), Max:99 1 °F (37 3 °C)  Current: Temperature: 98 5 °F (36 9 °C)  Arterial Line BP: 130/57  Arterial Line MAP (mmHg): 81 mmHg    Respiratory:  SpO2: SpO2: 96 %       Invasive/non-invasive ventilation settings   Respiratory    Lab Data (Last 4 hours)    None         O2/Vent Data (Last 4 hours)    None                Physical Exam  Vitals and nursing note reviewed  Constitutional:       Appearance: She is ill-appearing  HENT:      Head: Normocephalic and atraumatic  Cardiovascular:      Rate and Rhythm: Normal rate and regular rhythm  Pulmonary:      Effort: Pulmonary effort is normal       Breath sounds: Normal breath sounds  Abdominal:      General: Bowel sounds are normal  There is no distension  Palpations: Abdomen is soft  Tenderness: There is no abdominal tenderness  Neurological:      General: No focal deficit present  Mental Status: She is alert           Laboratory and Diagnostics:  Results from last 7 days   Lab Units 21  0438 21  0528 21  0540 21  1754 21  1229   WBC Thousand/uL 7 12 10 71* 17 40*  --  16 58*   HEMOGLOBIN g/dL 9 9* 10 1* 10 5*  --  12 2   HEMATOCRIT % 30 5* 30 5* 32 2*  --  36 5   PLATELETS Thousands/uL 146* 161 214 206 233   NEUTROS PCT % 71 72 79*  --  88*   MONOS PCT % 8 9 7  --  6     Results from last 7 days   Lab Units 21  0438 21  0528 21  1310 21  0540 21  2329 21  1644 21  1229   SODIUM mmol/L 144 144 141 141 141 139 143   POTASSIUM mmol/L 3 4* 3 5 3 4* 3 9 3 8 3 8 4 0   CHLORIDE mmol/L 115* 113* 109* 109* 109* 108 109*   CO2 mmol/L 19* 17* 15* 16* 15* 13* 11*   ANION GAP mmol/L 10 14* 17* 16* 17* 18* 23*   BUN mg/dL 21 26* 26* 28* 32* 30* 31*   CREATININE mg/dL 2 49* 2 69* 2 87* 2 84* 3 08* 3 45* 4 01*   CALCIUM mg/dL 8 3 8 2* 7 9* 7 8* 8 0* 8 5 8 7   GLUCOSE RANDOM mg/dL 92 116 89 97 76 164* 153*   ALT U/L 26 22  --  22  --   --  24   AST U/L 58* 67*  --  58*  --   --  59*   ALK PHOS U/L 122* 132*  --  134*  --   --  189*   ALBUMIN g/dL 1 7* 1 9*  --  2 2*  --   --  2 4*   TOTAL BILIRUBIN mg/dL 0 60 0 61  --  0 55  --   --  0 62     Results from last 7 days   Lab Units 07/25/21  0528 07/24/21  0540 07/23/21  2329 07/23/21  1644   MAGNESIUM mg/dL 2 5 2 7* 3 2* 1 4*   PHOSPHORUS mg/dL 2 2* 2 5  --  4 5*      Results from last 7 days   Lab Units 07/23/21  1229   INR  1 36*   PTT seconds 31      Results from last 7 days   Lab Units 07/24/21  1310 07/24/21  0540 07/24/21  0319 07/23/21  2329 07/23/21 2118 07/23/21  1754 07/23/21  1644   TROPONIN I ng/mL 6 58* 6 52* 6 98* 6 84* 5 92* 3 80* 5 14*     Results from last 7 days   Lab Units 07/24/21  0540 07/24/21  0319 07/23/21  2329 07/23/21 2118 07/23/21  1935 07/23/21  1754 07/23/21  1527   LACTIC ACID mmol/L 1 7 2 4* 2 7* 2 8* 4 4* 4 3* 6 6*     ABG:  Results from last 7 days   Lab Units 07/25/21  0542   PH ART  7 395   PCO2 ART mm Hg 25 3*   PO2 ART mm Hg 65 7*   HCO3 ART mmol/L 15 1*   BASE EXC ART mmol/L -8 2   ABG SOURCE  Radial, Left     VBG:  Results from last 7 days   Lab Units 07/25/21  0542   ABG SOURCE  Radial, Left     Results from last 7 days   Lab Units 07/25/21  0528 07/24/21  0540 07/23/21  1229   PROCALCITONIN ng/ml 0 56* 0 51* 0 14       Micro  Results from last 7 days   Lab Units 07/24/21  0910 07/23/21  1321 07/23/21  1230 07/23/21  1229   BLOOD CULTURE   --   --  No Growth at 72 hrs  No Growth at 72 hrs  URINE CULTURE   --  No Growth <1000 cfu/mL  --   --    MRSA CULTURE ONLY  No Methicillin Resistant Staphlyococcus aureus (MRSA) isolated  --   --   --        EKG:   Imaging: I have personally reviewed pertinent reports  Intake and Output  I/O       07/25 0701 - 07/26 0700 07/26 0701 - 07/27 0700    P  O  120 120 I V  (mL/kg) 2709 7 (38 8) 860 (12 3)    IV Piggyback 625 200    Total Intake(mL/kg) 3454 7 (49 4) 1180 (16 9)    Urine (mL/kg/hr) 625 (0 4) 150 (0 1)    Emesis/NG output      Stool  0    Total Output 625 150    Net +2829 7 +1030          Unmeasured Stool Occurrence  3 x          Height and Weights   Height: 5' (152 4 cm)  IBW (Ideal Body Weight): 45 5 kg  Body mass index is 30 1 kg/m²  Weight (last 2 days)     Date/Time   Weight    07/26/21 0500   69 9 (154 1)    07/24/21 0600   63 7 (140 43)                Nutrition       Diet Orders   (From admission, onward)             Start     Ordered    07/26/21 1355  Diet Regular; Pleasure Feed  Diet effective now     Question Answer Comment   Diet Type Regular    Regular Pleasure Feed    RD to adjust diet per protocol? No        07/26/21 1355                  Active Medications  Scheduled Meds:  Current Facility-Administered Medications   Medication Dose Route Frequency Provider Last Rate    acetaminophen  650 mg Oral Q6H PRN Ranulfo Snow PA-C      bisacodyl  10 mg Rectal Daily PRN [place], AMYNP      calcium carbonate  500 mg Oral Daily PRN Ara rAora PA-C      glycopyrrolate  0 1 mg Intravenous Q4H PRN Fawn Kiana Ramella, AMYNP      metoclopramide  5 mg Intravenous Q6H PRN Fawn Kiana Ramella, CRNP      oxyCODONE  5 mg Oral Q2H PRN SIMON Perez       Continuous Infusions:     PRN Meds:   acetaminophen, 650 mg, Q6H PRN  bisacodyl, 10 mg, Daily PRN  calcium carbonate, 500 mg, Daily PRN  glycopyrrolate, 0 1 mg, Q4H PRN  metoclopramide, 5 mg, Q6H PRN  oxyCODONE, 5 mg, Q2H PRN        Invasive Devices Review  Invasive Devices     Peripheral Intravenous Line            Peripheral IV 07/23/21 Dorsal (posterior); Right Hand 2 days    Peripheral IV 07/24/21 Proximal;Right;Ventral (anterior) Forearm 2 days          Drain            Urethral Catheter Temperature probe 16 Fr  3 days                Rationale for remaining devices: comfort  ---------------------------------------------------------------------------------------  Advance Directive and Living Will:      Power of :    POLST: Yes  ---------------------------------------------------------------------------------------  Care Time Delivered:   No Critical Care time spent       Jay Washington PA-C      Portions of the record may have been created with voice recognition software  Occasional wrong word or "sound a like" substitutions may have occurred due to the inherent limitations of voice recognition software    Read the chart carefully and recognize, using context, where substitutions have occurred

## 2021-07-27 NOTE — CASE MANAGEMENT
CM received a call from [facility] at [place]  She said that if they can accept, pt will need a Level of Care assessment to make sure she will qualify for MA in SNF  She said that she will discuss pt's case further with her team and then let CM know if they can accommodate

## 2021-07-28 LAB
BACTERIA BLD CULT: NORMAL
BACTERIA BLD CULT: NORMAL

## 2021-07-28 PROCEDURE — 99233 SBSQ HOSP IP/OBS HIGH 50: CPT | Performed by: PHYSICIAN ASSISTANT

## 2021-07-28 RX ORDER — ONDANSETRON 4 MG/1
4 TABLET, ORALLY DISINTEGRATING ORAL EVERY 6 HOURS PRN
Status: DISCONTINUED | OUTPATIENT
Start: 2021-07-28 | End: 2021-07-30

## 2021-07-28 RX ADMIN — ONDANSETRON 4 MG: 4 TABLET, ORALLY DISINTEGRATING ORAL at 00:26

## 2021-07-28 RX ADMIN — ONDANSETRON 4 MG: 4 TABLET, ORALLY DISINTEGRATING ORAL at 13:51

## 2021-07-28 RX ADMIN — CALCIUM CARBONATE (ANTACID) CHEW TAB 500 MG 1000 MG: 500 CHEW TAB at 15:50

## 2021-07-28 RX ADMIN — CALCIUM CARBONATE (ANTACID) CHEW TAB 500 MG 1000 MG: 500 CHEW TAB at 05:20

## 2021-07-28 RX ADMIN — ONDANSETRON 4 MG: 4 TABLET, ORALLY DISINTEGRATING ORAL at 22:40

## 2021-07-28 NOTE — PLAN OF CARE
Problem: PAIN - ADULT  Goal: Verbalizes/displays adequate comfort level or baseline comfort level  Description: Interventions:  - Encourage patient to monitor pain and request assistance  - Assess pain using appropriate pain scale  - Administer analgesics based on type and severity of pain and evaluate response  - Implement non-pharmacological measures as appropriate and evaluate response  - Consider cultural and social influences on pain and pain management  - Notify physician/advanced practitioner if interventions unsuccessful or patient reports new pain  Outcome: Progressing     Problem: INFECTION - ADULT  Goal: Absence or prevention of progression during hospitalization  Description: INTERVENTIONS:  - Assess and monitor for signs and symptoms of infection  - Monitor lab/diagnostic results  - Monitor all insertion sites, i e  indwelling lines, tubes, and drains  - Monitor endotracheal if appropriate and nasal secretions for changes in amount and color  - Spencer appropriate cooling/warming therapies per order  - Administer medications as ordered  - Instruct and encourage patient and family to use good hand hygiene technique  - Identify and instruct in appropriate isolation precautions for identified infection/condition  Outcome: Progressing     Problem: SAFETY ADULT  Goal: Patient will remain free of falls  Description: INTERVENTIONS:  - Educate patient/family on patient safety including physical limitations  - Instruct patient to call for assistance with activity   - Consult OT/PT to assist with strengthening/mobility   - Keep Call bell within reach  - Keep bed low and locked with side rails adjusted as appropriate  - Keep care items and personal belongings within reach  - Initiate and maintain comfort rounds  - Make Fall Risk Sign visible to staff  - Apply yellow socks and bracelet for high fall risk patients  - Consider moving patient to room near nurses station  Outcome: Progressing     Problem: DISCHARGE PLANNING  Goal: Discharge to home or other facility with appropriate resources  Description: INTERVENTIONS:  - Identify barriers to discharge w/patient and caregiver  - Arrange for needed discharge resources and transportation as appropriate  - Identify discharge learning needs (meds, wound care, etc )  - Arrange for interpretive services to assist at discharge as needed  - Refer to Case Management Department for coordinating discharge planning if the patient needs post-hospital services based on physician/advanced practitioner order or complex needs related to functional status, cognitive ability, or social support system  Outcome: Progressing     Problem: Knowledge Deficit  Goal: Patient/family/caregiver demonstrates understanding of disease process, treatment plan, medications, and discharge instructions  Description: Complete learning assessment and assess knowledge base  Interventions:  - Provide teaching at level of understanding  - Provide teaching via preferred learning methods  Outcome: Progressing     Problem: Potential for Falls  Goal: Patient will remain free of falls  Description: INTERVENTIONS:  - Educate patient/family on patient safety including physical limitations  - Instruct patient to call for assistance with activity   - Consult OT/PT to assist with strengthening/mobility   - Keep Call bell within reach  - Keep bed low and locked with side rails adjusted as appropriate  - Keep care items and personal belongings within reach  - Initiate and maintain comfort rounds  - Make Fall Risk Sign visible to staff  - Apply yellow socks and bracelet for high fall risk patients  - Consider moving patient to room near nurses station  Outcome: Progressing     Problem: Nutrition/Hydration-ADULT  Goal: Nutrient/Hydration intake appropriate for improving, restoring or maintaining nutritional needs  Description: Monitor and assess patient's nutrition/hydration status for malnutrition   Collaborate with interdisciplinary team and initiate plan and interventions as ordered  Monitor patient's weight and dietary intake as ordered or per policy  Utilize nutrition screening tool and intervene as necessary  Determine patient's food preferences and provide high-protein, high-caloric foods as appropriate       INTERVENTIONS:  - Monitor oral intake, urinary output, labs, and treatment plans  - Assess nutrition and hydration status and recommend course of action  - Evaluate amount of meals eaten  - Assist patient with eating if necessary   - Allow adequate time for meals  - Recommend/ encourage appropriate diets, oral nutritional supplements, and vitamin/mineral supplements  - Order, calculate, and assess calorie counts as needed  - Recommend, monitor, and adjust tube feedings and TPN/PPN based on assessed needs  - Assess need for intravenous fluids  - Provide specific nutrition/hydration education as appropriate  - Include patient/family/caregiver in decisions related to nutrition  Outcome: Progressing     Problem: Prexisting or High Potential for Compromised Skin Integrity  Goal: Skin integrity is maintained or improved  Description: INTERVENTIONS:  - Identify patients at risk for skin breakdown  - Assess and monitor skin integrity  - Assess and monitor nutrition and hydration status  - Monitor labs   - Assess for incontinence   - Turn and reposition patient  - Assist with mobility/ambulation  - Relieve pressure over bony prominences  - Avoid friction and shearing  - Provide appropriate hygiene as needed including keeping skin clean and dry  - Evaluate need for skin moisturizer/barrier cream  - Collaborate with interdisciplinary team   - Patient/family teaching  - Consider wound care consult   Outcome: Progressing     Problem: GENITOURINARY - ADULT  Goal: Absence of urinary retention  Description: INTERVENTIONS:  - Assess patients ability to void and empty bladder  - Monitor I/O  - Bladder scan as needed  - Discuss with physician/AP medications to alleviate retention as needed  - Discuss catheterization for long term situations as appropriate  Outcome: Progressing  Goal: Urinary catheter remains patent  Description: INTERVENTIONS:  - Assess patency of urinary catheter  - If patient has a chronic mathis, consider changing catheter if non-functioning  - Follow guidelines for intermittent irrigation of non-functioning urinary catheter  Outcome: Progressing     Problem: SKIN/TISSUE INTEGRITY - ADULT  Goal: Skin Integrity remains intact(Skin Breakdown Prevention)  Description: Assess:  -Perform Aman assessment every Q12 hrs  -Clean and moisturize skin every 8 hrs  -Inspect skin when repositioning, toileting, and assisting with ADLS  -Assess extremities for adequate circulation and sensation     Bed Management:  -Have minimal linens on bed & keep smooth, unwrinkled  -Change linens as needed when moist or perspiring    Toileting:  -Offer bedside commode  -Assess for incontinence every 2 hrs  -Use incontinent care products after each incontinent episode such as protective/moisture barrier creams    Activity:  -Encourage activity and walks on unit  -Encourage or provide ROM exercises   -Turn and reposition patient every 2 Hours  -Use appropriate equipment to lift or move patient in bed    Skin Care:  -Avoid use of baby powder, tape, friction and shearing, hot water or constrictive clothing  -Do not massage red bony areas    Outcome: Progressing

## 2021-07-28 NOTE — ASSESSMENT & PLAN NOTE
Lab Results   Component Value Date    HGBA1C 4 8 07/09/2021       Recent Labs     07/25/21  1143 07/25/21  1558 07/25/21  2316 07/26/21  1137   POCGLU 130 113 87 95       Blood Sugar Average: Last 72 hrs:  · A1C checked on last admission on 07/09/2021 - 4 8  · Documented history of DM type 2 found in chart review  Patient does not take medications for DM at home    · Monitor accuchecks Q 6 hours, insulin per sliding scale algorithm 2  · Hypoglycemia protocol    SSI d/c'd - pt now comfort measures only

## 2021-07-28 NOTE — CASE MANAGEMENT
RUDOLPH spoke with Katherine Portillo at [facility] [address] at [facility]  He said that he will check into pt's secondary to see what the coverage would be for room and board and then contact CM back

## 2021-07-28 NOTE — CASE MANAGEMENT
CM called [facility] and left vm for Admissions  CM called [facility] and left vm for Admissions  CM called [facility]  and left vm for Admissions

## 2021-07-28 NOTE — ASSESSMENT & PLAN NOTE
· History of chronic hydronephrosis  · Recently had stents removed at [facility] 07/20/21  · CT showed relatively unchanged size of hydro with no hydroureter  · U/A reassuring as far as infection; trace leukocytes, negative nitrites, WBCs 10-20, no bacteria  Urine culture NGTD    · Monitor UOP    Pt now comfort measures only

## 2021-07-28 NOTE — PROGRESS NOTES
[address]  Progress Note - Murali Flores 1945, 70 y o  female MRN: [id number]  Unit/Bed#: -01 Encounter: [id number]  Primary Care Provider: Elmira Bonilla MD   Date and time admitted to hospital: 7/23/2021 12:03 PM    * Acute metabolic encephalopathy  Assessment & Plan  · Patient presented after being found down by caregiver, last seen/heard from 3 days ago  · Arrived to the emergency department GCS of 6 (4, 1T, 1) and therefore was intubated for airway protection  · CT head/cervical spine negative  · Likely secondary to severe metabolic derangements  · Due to history of cirrhosis, check ammonia; 56 > 53  · UDS negative  · Monitor for hypoglycemia  · Treat derangements as per below  · Post extubation on 07/25, mental status slowing improving but still having periods of restlessness  Overall, agitation is less than during intubation  · NPO due to mental status  SLP evaluation pending  · Encephalopathy resolved, pt back to baseline per daughter - patient was able to participate in goals of care discussion and completed POLST    Pt now comfort measures only    Acute on chronic kidney failure [facility])  Assessment & Plan  Lab Results   Component Value Date    EGFR 19 07/26/2021    EGFR 17 07/25/2021    EGFR 16 07/24/2021    CREATININE 2 49 (H) 07/26/2021    CREATININE 2 69 (H) 07/25/2021    CREATININE 2 87 (H) 07/24/2021    Baseline Cr: variable: likely 0 7 to 1 1 per nephrology note last admission    Admission Cr: 4 01   Etiology:  Patient appears clinically dry but also has chronic hydronephrosis with stents and history of post obstructive uropathy  o Will obtain kidney ultrasound  o FENa calculated and indicates prerenal  o Urine Na, K, chloride obtained - UAG positive; 4 8 mEq/L  - Of note, patient has chronic bicarb loss (though may be 2/2 diarrhea)   Balanced crystalloid resuscitation on admission  Now on D5LR     Considered nephrology consult- now comfort measures   Avoid nephrotoxic medications/hypotension   Monitor UOP    IVF d/c'd - pt now comfort measures only    High anion gap metabolic acidosis  Assessment & Plan  · Likely 2/2 intravascular volume depletion/ maybe chronic component given chronically low serum bicarb  · Aggressively fluid resuscitated on admission  Metabolic acidosis improving  · Continue to trend BMP/electrolytes    Pt now comfort measures only    Elevated troponin I level  Assessment & Plan  · Etiology unclear  · Cardiology evaluated in ED  · EKG without ischemic change  · Repeat EKG - NSR without ischemic change, prolonged QTC of 514 > 531  · May be 2/2 time down with significant ZOYA  · Troponin peaked at 6 9  · Serial EKGs  · No ECHO in our system, will obtain to evaluate function, assess for RWMA    ECHO d/c'd - pt now comfort measures only    Leukocytosis  Assessment & Plan  · Etiology unclear  · No fever, no bandemia  · No clear signs of infection on CT scan other than ? Enteritis  · Check abdominal ultrasound  · Will cover with cefepime/flagyl (due to penicillin allergy)  · Trend procal: 0 14 > 0 51 > 0 56  · Expect elevation given ZOYA  · Blood cultures x2 - NGTD  · Urine culture negative  · Trend WBCs and fever curve    ABX d/c'd - pt now comfort measures only    Elevated alkaline phosphatase level  Assessment & Plan  · Seen on last admission  · At previous discharge (07/10/2021) alk phos was 128  · Alk phos 189 on present admission (07/23/2021)  · Previous labs from 04/2021 show normal alk phos  · Unclear etiology, biliary vs osseous given history of breast ca  · Check gamma GT to rule in/out biliary cause  · GGT elevated at 198   Given that both alk phos and GGT are elevated, eitology likely related to liver disease rather than osseous eitiology    · RUQ ultrasound d/c'd - pt now comfort measures only    Enteritis  Assessment & Plan  · Unclear, possibly chronic  · Will cover with cefepime/flagyl  · If patient with diarrhea consider C  Diff given recent healthcare contacts as well as stool cultures/O&P    ABX d/c'd - pt now comfort measures only    Non-compliance  Assessment & Plan  · Patient with 3 admissions in the last year where she signed out AMA  · Also recommended post-acute rehab at last admission but patient refused this   · Goals of care discussions with family in further days  · 07/25 POLST completed by patient - comfort measures only selected  · Consult to Case management for home hospice versus hospice house candidacy/placement    History of breast cancer  Assessment & Plan  · History of stage IIIa L IDC, 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  S/p left mastectomy  · Currently on Femara    Pt now comfort measures only    Hydronephrosis  Assessment & Plan  · History of chronic hydronephrosis  · Recently had stents removed at [facility] 07/20/21  · CT showed relatively unchanged size of hydro with no hydroureter  · U/A reassuring as far as infection; trace leukocytes, negative nitrites, WBCs 10-20, no bacteria  Urine culture NGTD  · Monitor UOP    Pt now comfort measures only    Type 2 diabetes mellitus without complication, without long-term current use of insulin [facility])  Assessment & Plan  Lab Results   Component Value Date    HGBA1C 4 8 07/09/2021       Recent Labs     07/25/21  1143 07/25/21  1558 07/25/21  2316 07/26/21  1137   POCGLU 130 113 87 95       Blood Sugar Average: Last 72 hrs:  · A1C checked on last admission on 07/09/2021 - 4 8  · Documented history of DM type 2 found in chart review  Patient does not take medications for DM at home  · Monitor accuchecks Q 6 hours, insulin per sliding scale algorithm 2  · Hypoglycemia protocol    SSI d/c'd - pt now comfort measures only    Cirrhosis of liver (HCC)  Assessment & Plan  · Unclear etiology  · Present on CT imaging  Also present of previous CT scan on 04/01/2021  · Obtain RUQ ultrasound  · Admission ammonia 56  · May be slow to clear lactic acid due to this and ZOYA   Lactic acidosis resolved 07/24  · Monitor for hypoglycemia  · INR 1 36    Pt now comfort measures only    Hyperthyroidism  Assessment & Plan  · History of hyperthyroidism  Takes methimazole 2 5 mg EOD at home  · TSH 5 55 on admission, Free T4 1 37, Free T3 2 43  · Labs reviewed in care everywhere and from previous admission in June 2021 - euthyroid state  · Methimazole currently on hold    Pt now comfort measures only    End of life care  Assessment & Plan  · Pt transitioned to comfort measures only  · Not a candidate for inpatient hospice  · Case management looking into placement at nearby SNF  · Oxycodone discontinued due to adverse reaction in past  Will try Dilaudid 1 mg PO PRN, tylenol prn  · Benzos not ordered due to adverse reactions- consider Zyprexa prn if needed  · Cont mathis      ----------------------------------------------------------------------------------------  HPI/24hr events: Patient c/o heartburn with nausea and vomiting overnight  TUMS did not resolve heartburn  Patient offered milk of magnesia but refused  Zofran ODT administered with some relief  No other acute events occurred overnight  Patient endorses nausea and vomiting overnight and fatigue  Denies SOB, abdominal pain and chest pain  No  Disposition: Comfort Care  Code Status: Level 4 - Comfort Care  ---------------------------------------------------------------------------------------    Review of Systems  Review of systems was reviewed and negative unless stated above in HPI/24-hour events   ---------------------------------------------------------------------------------------  OBJECTIVE    Vitals   Vitals:    07/26/21 1342 07/26/21 1505 07/26/21 2100 07/27/21 2100   BP:  125/82  162/75   BP Location:  Right arm  Right arm   Pulse:  105 98 86   Resp:  (!) 23 (!) 25 16   Temp:  98 5 °F (36 9 °C)     TempSrc:  Temporal     SpO2:  96%  98%   Weight:       Height: 5' (1 524 m)        No data recorded    Current: Temperature: 98 5 °F (36 9 °C)  Arterial Line BP: 130/57  Arterial Line MAP (mmHg): 81 mmHg    Respiratory:  SpO2: SpO2: 98 %       Invasive/non-invasive ventilation settings   Respiratory    Lab Data (Last 4 hours)    None         O2/Vent Data (Last 4 hours)    None                Physical Exam  Constitutional:       General: She is not in acute distress  Appearance: She is ill-appearing  She is not diaphoretic  HENT:      Head: Normocephalic and atraumatic  Right Ear: External ear normal       Left Ear: External ear normal       Nose: Nose normal  No congestion  Mouth/Throat:      Mouth: Mucous membranes are moist       Pharynx: Oropharynx is clear  Eyes:      General: No scleral icterus  Right eye: No discharge  Left eye: No discharge  Extraocular Movements: Extraocular movements intact  Conjunctiva/sclera: Conjunctivae normal       Pupils: Pupils are equal, round, and reactive to light  Cardiovascular:      Rate and Rhythm: Normal rate and regular rhythm  Pulses: Normal pulses  Heart sounds: No friction rub  No gallop  Pulmonary:      Effort: Pulmonary effort is normal  No respiratory distress  Breath sounds: Normal breath sounds  No stridor  No wheezing, rhonchi or rales  Chest:      Chest wall: No tenderness  Abdominal:      General: Bowel sounds are normal  There is no distension  Palpations: Abdomen is soft  There is no mass  Tenderness: There is no abdominal tenderness  There is no guarding or rebound  Hernia: No hernia is present  Musculoskeletal:         General: No swelling, tenderness, deformity or signs of injury  Normal range of motion  Cervical back: Normal range of motion and neck supple  No rigidity or tenderness  Right lower leg: No edema  Left lower leg: No edema  Skin:     General: Skin is warm and dry  Capillary Refill: Capillary refill takes less than 2 seconds  Coloration: Skin is not jaundiced or pale  Findings: No rash  Neurological:      General: No focal deficit present  Mental Status: She is alert and oriented to person, place, and time  GCS: GCS eye subscore is 4  GCS verbal subscore is 5  GCS motor subscore is 6  Sensory: No sensory deficit           Laboratory and Diagnostics:  Results from last 7 days   Lab Units 07/26/21  0438 07/25/21  0528 07/24/21  0540 07/23/21  1754 07/23/21  1229   WBC Thousand/uL 7 12 10 71* 17 40*  --  16 58*   HEMOGLOBIN g/dL 9 9* 10 1* 10 5*  --  12 2   HEMATOCRIT % 30 5* 30 5* 32 2*  --  36 5   PLATELETS Thousands/uL 146* 161 214 206 233   NEUTROS PCT % 71 72 79*  --  88*   MONOS PCT % 8 9 7  --  6     Results from last 7 days   Lab Units 07/26/21  0438 07/25/21  0528 07/24/21  1310 07/24/21  0540 07/23/21  2329 07/23/21  1644 07/23/21  1229   SODIUM mmol/L 144 144 141 141 141 139 143   POTASSIUM mmol/L 3 4* 3 5 3 4* 3 9 3 8 3 8 4 0   CHLORIDE mmol/L 115* 113* 109* 109* 109* 108 109*   CO2 mmol/L 19* 17* 15* 16* 15* 13* 11*   ANION GAP mmol/L 10 14* 17* 16* 17* 18* 23*   BUN mg/dL 21 26* 26* 28* 32* 30* 31*   CREATININE mg/dL 2 49* 2 69* 2 87* 2 84* 3 08* 3 45* 4 01*   CALCIUM mg/dL 8 3 8 2* 7 9* 7 8* 8 0* 8 5 8 7   GLUCOSE RANDOM mg/dL 92 116 89 97 76 164* 153*   ALT U/L 26 22  --  22  --   --  24   AST U/L 58* 67*  --  58*  --   --  59*   ALK PHOS U/L 122* 132*  --  134*  --   --  189*   ALBUMIN g/dL 1 7* 1 9*  --  2 2*  --   --  2 4*   TOTAL BILIRUBIN mg/dL 0 60 0 61  --  0 55  --   --  0 62     Results from last 7 days   Lab Units 07/25/21  0528 07/24/21  0540 07/23/21  2329 07/23/21  1644   MAGNESIUM mg/dL 2 5 2 7* 3 2* 1 4*   PHOSPHORUS mg/dL 2 2* 2 5  --  4 5*      Results from last 7 days   Lab Units 07/23/21  1229   INR  1 36*   PTT seconds 31      Results from last 7 days   Lab Units 07/24/21  1310 07/24/21  0540 07/24/21  0319 07/23/21  2329 07/23/21  2118 07/23/21  1754 07/23/21  1644   TROPONIN I ng/mL 6 58* 6 52* 6 98* 6 84* 5 92* 3 80* 5 14* Results from last 7 days   Lab Units 07/24/21  0540 07/24/21  0319 07/23/21  2329 07/23/21  2118 07/23/21  1935 07/23/21  1754 07/23/21  1527   LACTIC ACID mmol/L 1 7 2 4* 2 7* 2 8* 4 4* 4 3* 6 6*     ABG:  Results from last 7 days   Lab Units 07/25/21  0542   PH ART  7 395   PCO2 ART mm Hg 25 3*   PO2 ART mm Hg 65 7*   HCO3 ART mmol/L 15 1*   BASE EXC ART mmol/L -8 2   ABG SOURCE  Radial, Left     VBG:  Results from last 7 days   Lab Units 07/25/21  0542   ABG SOURCE  Radial, Left     Results from last 7 days   Lab Units 07/25/21  0528 07/24/21  0540 07/23/21  1229   PROCALCITONIN ng/ml 0 56* 0 51* 0 14       Micro  Results from last 7 days   Lab Units 07/24/21  0910 07/23/21  1321 07/23/21  1230 07/23/21  1229   BLOOD CULTURE   --   --  No Growth After 4 Days  No Growth After 4 Days  URINE CULTURE   --  No Growth <1000 cfu/mL  --   --    MRSA CULTURE ONLY  No Methicillin Resistant Staphlyococcus aureus (MRSA) isolated  --   --   --          Intake and Output  I/O       07/26 0701 - 07/27 0700 07/27 0701 - 07/28 0700    P  O  120 480    I V  (mL/kg) 860 (12 3)     IV Piggyback 200     Total Intake(mL/kg) 1180 (16 9) 480 (6 9)    Urine (mL/kg/hr) 240 (0 1) 400 (0 2)    Emesis/NG output  0    Stool 0 0    Total Output 240 400    Net +940 +80          Unmeasured Stool Occurrence 3 x 5 x    Unmeasured Emesis Occurrence  1 x          Height and Weights   Height: 5' (152 4 cm)  IBW (Ideal Body Weight): 45 5 kg  Body mass index is 30 1 kg/m²  Weight (last 2 days)     Date/Time   Weight    07/26/21 0500   69 9 (154 1)                Nutrition       Diet Orders   (From admission, onward)             Start     Ordered    07/26/21 1355  Diet Regular; Pleasure Feed  Diet effective now     Question Answer Comment   Diet Type Regular    Regular Pleasure Feed    RD to adjust diet per protocol?  No        07/26/21 1757                  Active Medications  Scheduled Meds:  Current Facility-Administered Medications   Medication Dose Route Frequency Provider Last Rate    acetaminophen  650 mg Oral Q6H PRN Royce RockyPOLY      bisacodyl  10 mg Rectal Daily PRN Rene Meadows, CRNP      calcium carbonate  1,000 mg Oral Daily PRN Celina Carver, CRNP      glycopyrrolate  0 1 mg Intravenous Q4H PRN Simeon Castañeda, SIMON      HYDROmorphone  1 mg Oral Q4H PRN Cally Romero PA-C      magnesium hydroxide  30 mL Oral Once Omer Jorgensen PA-C      melatonin  3 mg Oral HS Ara Arora PA-C      ondansetron  4 mg Oral Q6H PRN Omer Byers Jr, PA-C       Continuous Infusions:     PRN Meds:   acetaminophen, 650 mg, Q6H PRN  bisacodyl, 10 mg, Daily PRN  calcium carbonate, 1,000 mg, Daily PRN  glycopyrrolate, 0 1 mg, Q4H PRN  HYDROmorphone, 1 mg, Q4H PRN  ondansetron, 4 mg, Q6H PRN        Invasive Devices Review  Invasive Devices     Drain            Urethral Catheter Temperature probe 16 Fr  4 days                Rationale for remaining devices: Comfort  ---------------------------------------------------------------------------------------  Advance Directive and Living Will:      Power of :    POLST: Yes  ---------------------------------------------------------------------------------------  Care Time Delivered:   No Critical Care time spent       [employer] InsurancePOLY      Portions of the record may have been created with voice recognition software  Occasional wrong word or "sound a like" substitutions may have occurred due to the inherent limitations of voice recognition software    Read the chart carefully and recognize, using context, where substitutions have occurred

## 2021-07-28 NOTE — PLAN OF CARE
Problem: PAIN - ADULT  Goal: Verbalizes/displays adequate comfort level or baseline comfort level  Description: Interventions:  - Encourage patient to monitor pain and request assistance  - Assess pain using appropriate pain scale  - Administer analgesics based on type and severity of pain and evaluate response  - Implement non-pharmacological measures as appropriate and evaluate response  - Consider cultural and social influences on pain and pain management  - Notify physician/advanced practitioner if interventions unsuccessful or patient reports new pain  Outcome: Progressing     Problem: INFECTION - ADULT  Goal: Absence or prevention of progression during hospitalization  Description: INTERVENTIONS:  - Assess and monitor for signs and symptoms of infection  - Monitor lab/diagnostic results  - Monitor all insertion sites, i e  indwelling lines, tubes, and drains  - Monitor endotracheal if appropriate and nasal secretions for changes in amount and color  - [place] appropriate cooling/warming therapies per order  - Administer medications as ordered  - Instruct and encourage patient and family to use good hand hygiene technique  - Identify and instruct in appropriate isolation precautions for identified infection/condition  Outcome: Progressing     Problem: SAFETY ADULT  Goal: Patient will remain free of falls  Description: INTERVENTIONS:  - Educate patient/family on patient safety including physical limitations  - Instruct patient to call for assistance with activity   - Consult OT/PT to assist with strengthening/mobility   - Keep Call bell within reach  - Keep bed low and locked with side rails adjusted as appropriate  - Keep care items and personal belongings within reach  - Initiate and maintain comfort rounds  - Make Fall Risk Sign visible to staff  - Apply yellow socks and bracelet for high fall risk patients  - Consider moving patient to room near nurses station  Outcome: Progressing     Problem: DISCHARGE PLANNING  Goal: Discharge to home or other facility with appropriate resources  Description: INTERVENTIONS:  - Identify barriers to discharge w/patient and caregiver  - Arrange for needed discharge resources and transportation as appropriate  - Identify discharge learning needs (meds, wound care, etc )  - Arrange for interpretive services to assist at discharge as needed  - Refer to Case Management Department for coordinating discharge planning if the patient needs post-hospital services based on physician/advanced practitioner order or complex needs related to functional status, cognitive ability, or social support system  Outcome: Progressing     Problem: Knowledge Deficit  Goal: Patient/family/caregiver demonstrates understanding of disease process, treatment plan, medications, and discharge instructions  Description: Complete learning assessment and assess knowledge base  Interventions:  - Provide teaching at level of understanding  - Provide teaching via preferred learning methods  Outcome: Progressing     Problem: Potential for Falls  Goal: Patient will remain free of falls  Description: INTERVENTIONS:  - Educate patient/family on patient safety including physical limitations  - Instruct patient to call for assistance with activity   - Consult OT/PT to assist with strengthening/mobility   - Keep Call bell within reach  - Keep bed low and locked with side rails adjusted as appropriate  - Keep care items and personal belongings within reach  - Initiate and maintain comfort rounds  - Make Fall Risk Sign visible to staff  - Apply yellow socks and bracelet for high fall risk patients  - Consider moving patient to room near nurses station  Outcome: Progressing     Problem: Nutrition/Hydration-ADULT  Goal: Nutrient/Hydration intake appropriate for improving, restoring or maintaining nutritional needs  Description: Monitor and assess patient's nutrition/hydration status for malnutrition   Collaborate with interdisciplinary team and initiate plan and interventions as ordered  Monitor patient's weight and dietary intake as ordered or per policy  Utilize nutrition screening tool and intervene as necessary  Determine patient's food preferences and provide high-protein, high-caloric foods as appropriate       INTERVENTIONS:  - Monitor oral intake, urinary output, labs, and treatment plans  - Assess nutrition and hydration status and recommend course of action  - Evaluate amount of meals eaten  - Assist patient with eating if necessary   - Allow adequate time for meals  - Recommend/ encourage appropriate diets, oral nutritional supplements, and vitamin/mineral supplements  - Order, calculate, and assess calorie counts as needed  - Recommend, monitor, and adjust tube feedings and TPN/PPN based on assessed needs  - Assess need for intravenous fluids  - Provide specific nutrition/hydration education as appropriate  - Include patient/family/caregiver in decisions related to nutrition  Outcome: Progressing     Problem: Prexisting or High Potential for Compromised Skin Integrity  Goal: Skin integrity is maintained or improved  Description: INTERVENTIONS:  - Identify patients at risk for skin breakdown  - Assess and monitor skin integrity  - Assess and monitor nutrition and hydration status  - Monitor labs   - Assess for incontinence   - Turn and reposition patient  - Assist with mobility/ambulation  - Relieve pressure over bony prominences  - Avoid friction and shearing  - Provide appropriate hygiene as needed including keeping skin clean and dry  - Evaluate need for skin moisturizer/barrier cream  - Collaborate with interdisciplinary team   - Patient/family teaching  - Consider wound care consult   Outcome: Progressing     Problem: GENITOURINARY - ADULT  Goal: Maintains or returns to baseline urinary function  Description: INTERVENTIONS:  - Assess urinary function  - Encourage oral fluids to ensure adequate hydration if ordered  - Administer IV fluids as ordered to ensure adequate hydration  - Administer ordered medications as needed  - Offer frequent toileting  - Follow urinary retention protocol if ordered  Outcome: Progressing  Goal: Absence of urinary retention  Description: INTERVENTIONS:  - Assess patients ability to void and empty bladder  - Monitor I/O  - Bladder scan as needed  - Discuss with physician/AP medications to alleviate retention as needed  - Discuss catheterization for long term situations as appropriate  Outcome: Progressing  Goal: Urinary catheter remains patent  Description: INTERVENTIONS:  - Assess patency of urinary catheter  - If patient has a chronic mathis, consider changing catheter if non-functioning  - Follow guidelines for intermittent irrigation of non-functioning urinary catheter  Outcome: Progressing     Problem: METABOLIC, FLUID AND ELECTROLYTES - ADULT  Goal: Electrolytes maintained within normal limits  Description: INTERVENTIONS:  - Monitor labs and assess patient for signs and symptoms of electrolyte imbalances  - Administer electrolyte replacement as ordered  - Monitor response to electrolyte replacements, including repeat lab results as appropriate  - Instruct patient on fluid and nutrition as appropriate  Outcome: Progressing  Goal: Fluid balance maintained  Description: INTERVENTIONS:  - Monitor labs   - Monitor I/O and WT  - Instruct patient on fluid and nutrition as appropriate  - Assess for signs & symptoms of volume excess or deficit  Outcome: Progressing  Goal: Glucose maintained within target range  Description: INTERVENTIONS:  - Monitor Blood Glucose as ordered  - Assess for signs and symptoms of hyperglycemia and hypoglycemia  - Administer ordered medications to maintain glucose within target range  - Assess nutritional intake and initiate nutrition service referral as needed  Outcome: Progressing     Problem: SKIN/TISSUE INTEGRITY - ADULT  Goal: Skin Integrity remains intact(Skin Breakdown Prevention)  Description: Assess:  -Perform Aman assessment every Q12 hrs  -Clean and moisturize skin every 8 hrs  -Inspect skin when repositioning, toileting, and assisting with ADLS  -Assess extremities for adequate circulation and sensation     Bed Management:  -Have minimal linens on bed & keep smooth, unwrinkled  -Change linens as needed when moist or perspiring    Toileting:  -Offer bedside commode  -Assess for incontinence every 2 hrs  -Use incontinent care products after each incontinent episode such as protective/moisture barrier creams    Activity:  -Encourage activity and walks on unit  -Encourage or provide ROM exercises   -Turn and reposition patient every 2 Hours  -Use appropriate equipment to lift or move patient in bed    Skin Care:  -Avoid use of baby powder, tape, friction and shearing, hot water or constrictive clothing  -Do not massage red bony areas    Outcome: Progressing

## 2021-07-28 NOTE — CASE MANAGEMENT
CM received a call back from [facility]  She asked that CM send them updated notes  CM sent updated notes in [facility]

## 2021-07-28 NOTE — ASSESSMENT & PLAN NOTE
· Seen on last admission  · At previous discharge (07/10/2021) alk phos was 128  · Alk phos 189 on present admission (07/23/2021)  · Previous labs from 04/2021 show normal alk phos  · Unclear etiology, biliary vs osseous given history of breast ca  · Check gamma GT to rule in/out biliary cause  · GGT elevated at 198   Given that both alk phos and GGT are elevated, eitology likely related to liver disease rather than osseous eitiology    · RUQ ultrasound d/c'd - pt now comfort measures only

## 2021-07-29 PROCEDURE — 99232 SBSQ HOSP IP/OBS MODERATE 35: CPT | Performed by: PHYSICIAN ASSISTANT

## 2021-07-29 RX ADMIN — TRIMETHOBENZAMIDE HYDROCHLORIDE 200 MG: 100 INJECTION INTRAMUSCULAR at 01:29

## 2021-07-29 RX ADMIN — MELATONIN 3 MG: 3 TAB ORAL at 22:35

## 2021-07-29 RX ADMIN — CALCIUM CARBONATE (ANTACID) CHEW TAB 500 MG 1000 MG: 500 CHEW TAB at 00:58

## 2021-07-29 RX ADMIN — FAMOTIDINE 20 MG: 10 INJECTION, SOLUTION INTRAVENOUS at 11:50

## 2021-07-29 RX ADMIN — ONDANSETRON 4 MG: 4 TABLET, ORALLY DISINTEGRATING ORAL at 08:02

## 2021-07-29 NOTE — CASE MANAGEMENT
CM spoke with Milan Fields from [address] at [facility]  He said that they have one bed left and they may have someone going into it tomorrow  He said that he will keep CM updated  Estelle Carmen and JULI completed and faxed to [facility] for Level of Care Determination  This may be needed for qualification of MA in SNF

## 2021-07-29 NOTE — ASSESSMENT & PLAN NOTE
Lab Results   Component Value Date    EGFR 19 07/26/2021    EGFR 17 07/25/2021    EGFR 16 07/24/2021    CREATININE 2 49 (H) 07/26/2021    CREATININE 2 69 (H) 07/25/2021    CREATININE 2 87 (H) 07/24/2021     Baseline creatinine closer to 1, admission creatinine 4 01  Patient also has chronic hydronephrosis with stents and history of postobstructive uropathy  Patient at comfort measures  Way in place and IV fluids discontinued

## 2021-07-29 NOTE — ASSESSMENT & PLAN NOTE
Numerous admissions in last year, pt always signed out [facility]   [address] discussion with pt and family, POLST completed 7/25   Pt transitioned to [address] [place] [address], not candidate for inpt hospice   CM following for placement   Continue prn medications for comfort

## 2021-07-29 NOTE — PROGRESS NOTES
[address]  Progress Note - Saroj Copeulogio 1945, 70 y o  female MRN: [id number]  Unit/Bed#: -01 Encounter: [id number]  Primary Care Provider: Michele Goff MD   Date and time admitted to hospital: 7/23/2021 12:03 PM    * Acute metabolic encephalopathy  Assessment & Plan  Pt presented after being found down by caregiver, unknown etiology possibly 3 days   GCS on admit was 6 s/p intubation for airway protection   CTH and C spine negative   Mental status slowly improved and back to BS   Likely 2/2 metabolic derangements     End of life care  Assessment & Plan  Numerous admissions in last year, pt always signed out AMA   [address] discussion with pt and family, POLST completed 7/25   Pt transitioned to [address] [place] [address], not candidate for inpt hospice   CM following for placement   Continue prn medications for comfort     Acute on chronic kidney failure [facility])  Assessment & Plan  Lab Results   Component Value Date    EGFR 19 07/26/2021    EGFR 17 07/25/2021    EGFR 16 07/24/2021    CREATININE 2 49 (H) 07/26/2021    CREATININE 2 69 (H) 07/25/2021    CREATININE 2 87 (H) 07/24/2021     Baseline creatinine closer to 1, admission creatinine 4 01  Patient also has chronic hydronephrosis with stents and history of postobstructive uropathy  Patient at comfort measures  Way in place and IV fluids discontinued    Enteritis  Assessment & Plan  Difficulty tolerating PO   Continue tx for nausea and vomiting   Diet as tolerated   Add pepcid     Elevated troponin I level  Assessment & Plan  Unknown etiology, seen by cards initially   May be 2/2 ZOYA, found down  Trop peaked at 6 9   Echo d/c since patient made CMO    High anion gap metabolic acidosis  Assessment & Plan  Likely 2/2 intravascular volume depletion, chronically low serum bicarb   S/p aggressive fluid resuscitation      Hydronephrosis  Assessment & Plan  Hx of chronic hydronephrosis and had stent removed at [facility] 7/20/21   CT showed unchanged hydro Rayna     Cirrhosis of liver [facility])  Assessment & Plan  Unknown etiology  Present on CT imaging     History of breast cancer  Assessment & Plan  Diagnosis in 2017 status post left mastectomy on Femara    Hyperthyroidism  Assessment & Plan  Tapazole is hold    VTE Pharmacologic Prophylaxis:   Pharmacologic: Pharmacologic VTE Prophylaxis contraindicated due to CMO  Mechanical VTE Prophylaxis in Place: Yes    Patient Centered Rounds: I have performed bedside rounds with nursing staff today  Discussions with Specialists or Other Care Team Provider: CM     Education and Discussions with Family / Patient: patient     Time Spent for Care: 30 minutes  More than 50% of total time spent on counseling and coordination of care as described above  Current Length of Stay: 6 day(s)    Current Patient Status: Inpatient   Certification Statement: The patient will continue to require additional inpatient hospital stay due to pending safe d/c planning     Discharge Plan: pending nursing facility     Code Status: Level 4 - Comfort Care    Subjective:   Pt seen and examined at bedside  States she canr eat or drink much, burns her chest and she throws it up  Objective:     Vitals:   Temp (24hrs), Av 3 °F (36 8 °C), Min:97 9 °F (36 6 °C), Max:98 7 °F (37 1 °C)    Temp:  [97 9 °F (36 6 °C)-98 7 °F (37 1 °C)] 98 7 °F (37 1 °C)  HR:  [] 105  Resp:  [12-21] 21  BP: (125-140)/(57-92) 140/92  SpO2:  [96 %-97 %] 97 %  Body mass index is 30 1 kg/m²  Input and Output Summary (last 24 hours): Intake/Output Summary (Last 24 hours) at 2021 1151  Last data filed at 2021 0900  Gross per 24 hour   Intake 120 ml   Output 275 ml   Net -155 ml     Physical Exam:     Physical Exam  Constitutional:       Appearance: She is ill-appearing  Comments: Thin and frail appearing    HENT:      Head: Normocephalic and atraumatic        Mouth/Throat:      Mouth: Mucous membranes are moist    Eyes:      Extraocular Movements: Extraocular movements intact  Cardiovascular:      Rate and Rhythm: Regular rhythm  Tachycardia present  Pulmonary:      Effort: Pulmonary effort is normal       Breath sounds: Normal breath sounds  Abdominal:      General: Abdomen is flat  There is distension  Palpations: Abdomen is soft  Musculoskeletal:         General: No swelling  Normal range of motion  Cervical back: Normal range of motion and neck supple  Skin:     General: Skin is warm and dry  Neurological:      General: No focal deficit present  Mental Status: She is alert  Psychiatric:         Mood and Affect: Mood normal          Behavior: Behavior normal        Additional Data:     Labs:    Results from last 7 days   Lab Units 07/26/21  0438   WBC Thousand/uL 7 12   HEMOGLOBIN g/dL 9 9*   HEMATOCRIT % 30 5*   PLATELETS Thousands/uL 146*   NEUTROS PCT % 71   LYMPHS PCT % 17   MONOS PCT % 8   EOS PCT % 4     Results from last 7 days   Lab Units 07/26/21  0438   SODIUM mmol/L 144   POTASSIUM mmol/L 3 4*   CHLORIDE mmol/L 115*   CO2 mmol/L 19*   BUN mg/dL 21   CREATININE mg/dL 2 49*   ANION GAP mmol/L 10   CALCIUM mg/dL 8 3   ALBUMIN g/dL 1 7*   TOTAL BILIRUBIN mg/dL 0 60   ALK PHOS U/L 122*   ALT U/L 26   AST U/L 58*   GLUCOSE RANDOM mg/dL 92     Results from last 7 days   Lab Units 07/23/21  1229   INR  1 36*     Results from last 7 days   Lab Units 07/26/21  1137 07/25/21  2316 07/25/21  1558 07/25/21  1143 07/24/21  2340 07/24/21  1706 07/24/21  1206 07/24/21  0553 07/24/21  0022 07/23/21  2335   POC GLUCOSE mg/dl 95 87 113 130 103 114 75 90 126 67         Results from last 7 days   Lab Units 07/25/21  0528 07/24/21  0540 07/24/21  0319 07/23/21  2329 07/23/21  2118 07/23/21  1229   LACTIC ACID mmol/L  --  1 7 2 4* 2 7* 2 8*  --    PROCALCITONIN ng/ml 0 56* 0 51*  --   --   --  0 14           * I Have Reviewed All Lab Data Listed Above  * Additional Pertinent Lab Tests Reviewed:  Carmita Fisher Admission Reviewed    Imaging:    Imaging Reports Reviewed Today Include: all  Imaging Personally Reviewed by Myself Includes:  none    Recent Cultures (last 7 days):     Results from last 7 days   Lab Units 07/23/21  1321 07/23/21  1230 07/23/21  1229   BLOOD CULTURE   --  No Growth After 5 Days  No Growth After 5 Days  URINE CULTURE  No Growth <1000 cfu/mL  --   --        Last 24 Hours Medication List:   Current Facility-Administered Medications   Medication Dose Route Frequency Provider Last Rate    acetaminophen  650 mg Oral Q6H PRN Leeroy Li PA-C      bisacodyl  10 mg Rectal Daily PRN SIMON Avina      calcium carbonate  1,000 mg Oral Daily PRN SIMON Balderas      famotidine  20 mg Intravenous Q24H [address] Yogi Rodriguez PA-C      glycopyrrolate  0 1 mg Intravenous Q4H PRN SIMON Bartholomew      HYDROmorphone  1 mg Oral Q4H PRN Alexandra Kearns PA-C      magnesium hydroxide  30 mL Oral Once Author POLY Garcia      melatonin  3 mg Oral HS Alexandra Kearns PA-C      ondansetron  4 mg Oral Q6H PRN Author Jose PA-C          Today, Patient Was Seen By: Melita Mtz PA-C    ** Please Note: Dictation voice to text software may have been used in the creation of this document   **

## 2021-07-29 NOTE — ASSESSMENT & PLAN NOTE
Unknown etiology, seen by cards initially   May be 2/2 ZOYA, found down  Trop peaked at 6 9   Echo d/c since patient made CMO

## 2021-07-29 NOTE — CASE MANAGEMENT
CM received a vm from [employer] requesting updated clinicals from 7/25/21 to 7/29/21 to her at [phone]  CM faxed updated clinicals to [phone]

## 2021-07-29 NOTE — CASE MANAGEMENT
CM received a vm from [facility] with [facility] informing CM that they do not have bed availability

## 2021-07-29 NOTE — ASSESSMENT & PLAN NOTE
Hx of chronic hydronephrosis and had stent removed at [facility] 7/20/21   CT showed unchanged hydro   Way

## 2021-07-29 NOTE — CASE MANAGEMENT
RUDOLPH spoke with Jessica Zapata from [facility] [address] at [facility]  He said that he is waiting to hear from his team regarding bed availability  He said that they offered 2 beds and waiting to see if they come  He said that he will contact CM back around noon to inform CM if they have a bed available  RUDOLPH received a call from Admissions at [facility]  stating that they do not have a bed available at [facility] or [facility]

## 2021-07-29 NOTE — PLAN OF CARE
Problem: PAIN - ADULT  Goal: Verbalizes/displays adequate comfort level or baseline comfort level  Description: Interventions:  - Encourage patient to monitor pain and request assistance  - Assess pain using appropriate pain scale  - Administer analgesics based on type and severity of pain and evaluate response  - Implement non-pharmacological measures as appropriate and evaluate response  - Consider cultural and social influences on pain and pain management  - Notify physician/advanced practitioner if interventions unsuccessful or patient reports new pain  Outcome: Progressing     Problem: INFECTION - ADULT  Goal: Absence or prevention of progression during hospitalization  Description: INTERVENTIONS:  - Assess and monitor for signs and symptoms of infection  - Monitor lab/diagnostic results  - Monitor all insertion sites, i e  indwelling lines, tubes, and drains  - Monitor endotracheal if appropriate and nasal secretions for changes in amount and color  - Robeline appropriate cooling/warming therapies per order  - Administer medications as ordered  - Instruct and encourage patient and family to use good hand hygiene technique  - Identify and instruct in appropriate isolation precautions for identified infection/condition  Outcome: Progressing     Problem: SAFETY ADULT  Goal: Patient will remain free of falls  Description: INTERVENTIONS:  - Educate patient/family on patient safety including physical limitations  - Instruct patient to call for assistance with activity   - Consult OT/PT to assist with strengthening/mobility   - Keep Call bell within reach  - Keep bed low and locked with side rails adjusted as appropriate  - Keep care items and personal belongings within reach  - Initiate and maintain comfort rounds  - Make Fall Risk Sign visible to staff  - Apply yellow socks and bracelet for high fall risk patients  - Consider moving patient to room near nurses station  Outcome: Progressing     Problem: DISCHARGE PLANNING  Goal: Discharge to home or other facility with appropriate resources  Description: INTERVENTIONS:  - Identify barriers to discharge w/patient and caregiver  - Arrange for needed discharge resources and transportation as appropriate  - Identify discharge learning needs (meds, wound care, etc )  - Arrange for interpretive services to assist at discharge as needed  - Refer to Case Management Department for coordinating discharge planning if the patient needs post-hospital services based on physician/advanced practitioner order or complex needs related to functional status, cognitive ability, or social support system  Outcome: Progressing     Problem: Knowledge Deficit  Goal: Patient/family/caregiver demonstrates understanding of disease process, treatment plan, medications, and discharge instructions  Description: Complete learning assessment and assess knowledge base  Interventions:  - Provide teaching at level of understanding  - Provide teaching via preferred learning methods  Outcome: Progressing     Problem: Potential for Falls  Goal: Patient will remain free of falls  Description: INTERVENTIONS:  - Educate patient/family on patient safety including physical limitations  - Instruct patient to call for assistance with activity   - Consult OT/PT to assist with strengthening/mobility   - Keep Call bell within reach  - Keep bed low and locked with side rails adjusted as appropriate  - Keep care items and personal belongings within reach  - Initiate and maintain comfort rounds  - Make Fall Risk Sign visible to staff  - Apply yellow socks and bracelet for high fall risk patients  - Consider moving patient to room near nurses station  Outcome: Progressing     Problem: Nutrition/Hydration-ADULT  Goal: Nutrient/Hydration intake appropriate for improving, restoring or maintaining nutritional needs  Description: Monitor and assess patient's nutrition/hydration status for malnutrition   Collaborate with interdisciplinary team and initiate plan and interventions as ordered  Monitor patient's weight and dietary intake as ordered or per policy  Utilize nutrition screening tool and intervene as necessary  Determine patient's food preferences and provide high-protein, high-caloric foods as appropriate       INTERVENTIONS:  - Monitor oral intake, urinary output, labs, and treatment plans  - Assess nutrition and hydration status and recommend course of action  - Evaluate amount of meals eaten  - Assist patient with eating if necessary   - Allow adequate time for meals  - Recommend/ encourage appropriate diets, oral nutritional supplements, and vitamin/mineral supplements  - Order, calculate, and assess calorie counts as needed  - Recommend, monitor, and adjust tube feedings and TPN/PPN based on assessed needs  - Assess need for intravenous fluids  - Provide specific nutrition/hydration education as appropriate  - Include patient/family/caregiver in decisions related to nutrition  Outcome: Progressing     Problem: Prexisting or High Potential for Compromised Skin Integrity  Goal: Skin integrity is maintained or improved  Description: INTERVENTIONS:  - Identify patients at risk for skin breakdown  - Assess and monitor skin integrity  - Assess and monitor nutrition and hydration status  - Monitor labs   - Assess for incontinence   - Turn and reposition patient  - Assist with mobility/ambulation  - Relieve pressure over bony prominences  - Avoid friction and shearing  - Provide appropriate hygiene as needed including keeping skin clean and dry  - Evaluate need for skin moisturizer/barrier cream  - Collaborate with interdisciplinary team   - Patient/family teaching  - Consider wound care consult   Outcome: Progressing     Problem: GENITOURINARY - ADULT  Goal: Maintains or returns to baseline urinary function  Description: INTERVENTIONS:  - Assess urinary function  - Encourage oral fluids to ensure adequate hydration if ordered  - Administer IV fluids as ordered to ensure adequate hydration  - Administer ordered medications as needed  - Offer frequent toileting  - Follow urinary retention protocol if ordered  Outcome: Progressing  Goal: Absence of urinary retention  Description: INTERVENTIONS:  - Assess patients ability to void and empty bladder  - Monitor I/O  - Bladder scan as needed  - Discuss with physician/AP medications to alleviate retention as needed  - Discuss catheterization for long term situations as appropriate  Outcome: Progressing  Goal: Urinary catheter remains patent  Description: INTERVENTIONS:  - Assess patency of urinary catheter  - If patient has a chronic mathis, consider changing catheter if non-functioning  - Follow guidelines for intermittent irrigation of non-functioning urinary catheter  Outcome: Progressing     Problem: METABOLIC, FLUID AND ELECTROLYTES - ADULT  Goal: Electrolytes maintained within normal limits  Description: INTERVENTIONS:  - Monitor labs and assess patient for signs and symptoms of electrolyte imbalances  - Administer electrolyte replacement as ordered  - Monitor response to electrolyte replacements, including repeat lab results as appropriate  - Instruct patient on fluid and nutrition as appropriate  Outcome: Progressing  Goal: Fluid balance maintained  Description: INTERVENTIONS:  - Monitor labs   - Monitor I/O and WT  - Instruct patient on fluid and nutrition as appropriate  - Assess for signs & symptoms of volume excess or deficit  Outcome: Progressing  Goal: Glucose maintained within target range  Description: INTERVENTIONS:  - Monitor Blood Glucose as ordered  - Assess for signs and symptoms of hyperglycemia and hypoglycemia  - Administer ordered medications to maintain glucose within target range  - Assess nutritional intake and initiate nutrition service referral as needed  Outcome: Progressing     Problem: SKIN/TISSUE INTEGRITY - ADULT  Goal: Skin Integrity remains intact(Skin Breakdown Prevention)  Description: Assess:  -Perform Aman assessment every Q12 hrs  -Clean and moisturize skin every 8 hrs  -Inspect skin when repositioning, toileting, and assisting with ADLS  -Assess extremities for adequate circulation and sensation     Bed Management:  -Have minimal linens on bed & keep smooth, unwrinkled  -Change linens as needed when moist or perspiring    Toileting:  -Offer bedside commode  -Assess for incontinence every 2 hrs  -Use incontinent care products after each incontinent episode such as protective/moisture barrier creams    Activity:  -Encourage activity and walks on unit  -Encourage or provide ROM exercises   -Turn and reposition patient every 2 Hours  -Use appropriate equipment to lift or move patient in bed    Skin Care:  -Avoid use of baby powder, tape, friction and shearing, hot water or constrictive clothing  -Do not massage red bony areas    Outcome: Progressing

## 2021-07-29 NOTE — ASSESSMENT & PLAN NOTE
Pt presented after being found down by caregiver, unknown etiology possibly 3 days   GCS on admit was 6 s/p intubation for airway protection   CTH and C spine negative   Mental status slowly improved and back to BS   Likely 2/2 metabolic derangements

## 2021-07-29 NOTE — ASSESSMENT & PLAN NOTE
Likely 2/2 intravascular volume depletion, chronically low serum bicarb   S/p aggressive fluid resuscitation

## 2021-07-30 PROCEDURE — 99232 SBSQ HOSP IP/OBS MODERATE 35: CPT | Performed by: PHYSICIAN ASSISTANT

## 2021-07-30 RX ORDER — ONDANSETRON 2 MG/ML
4 INJECTION INTRAMUSCULAR; INTRAVENOUS EVERY 6 HOURS PRN
Status: DISCONTINUED | OUTPATIENT
Start: 2021-07-30 | End: 2021-08-01 | Stop reason: CLARIF

## 2021-07-30 RX ADMIN — FAMOTIDINE 20 MG: 10 INJECTION, SOLUTION INTRAVENOUS at 08:10

## 2021-07-30 RX ADMIN — MELATONIN 3 MG: 3 TAB ORAL at 20:58

## 2021-07-30 RX ADMIN — ONDANSETRON 4 MG: 2 INJECTION INTRAMUSCULAR; INTRAVENOUS at 20:58

## 2021-07-30 NOTE — CASE MANAGEMENT
CM met with pt and her daughter, Jeff Duenas at pt's bedside to provide updates  Pt's daughter asked pt if CM can put a referral to [facility] locations  Pt agreeable to this  Pt and Jeff Duenas did question cost of pt's stay at [facility]  CM informed them that Medicare covers Hospice  Pt has MA as a secondary which will cover room and board  The SNF will have to obtain paperwork that will confirm pt will qualify for MA in SNF  CM sent a referral to multiple [facility] facilities  CM spoke with Anika Kirkpatrick from [place]  She said that she continues to communicate with her team about the possibility of pt coming there  She will keep CM updated

## 2021-07-30 NOTE — CASE MANAGEMENT
[facility] cannot accept due to non-compliance of previous AMAs  CM asked if they can re-evaluate as pt is coming there for hospice  Awaiting response

## 2021-07-30 NOTE — ASSESSMENT & PLAN NOTE
Numerous admissions in last year   [address] discussion with pt and family, POLST completed 7/25   Pt transitioned to [address] [place] [address], not candidate for inpt hospice   CM following for placement   Continue prn medications for comfort

## 2021-07-30 NOTE — PROGRESS NOTES
[address]  Progress Note - Carmin Libman 1945, 70 y o  female MRN: [id number]  Unit/Bed#: -Forrest Encounter: [id number]  Primary Care Provider: Jason Barnard MD   Date and time admitted to hospital: 7/23/2021 12:03 PM    * Acute metabolic encephalopathy  Assessment & Plan  Pt presented after being found down by caregiver, unknown etiology possibly 3 days   GCS on admit was 6 s/p intubation for airway protection   CTH and C spine negative   Mental status slowly improved and back to BS   Likely 2/2 metabolic derangements     End of life care  Assessment & Plan  Numerous admissions in last year   Bygget 64 discussion with pt and family, POLST completed 7/25   Pt transitioned to [address] [place] [address], not candidate for inpt hospice   CM following for placement   Continue prn medications for comfort     Acute on chronic kidney failure [facility])  Assessment & Plan  Lab Results   Component Value Date    EGFR 19 07/26/2021    EGFR 17 07/25/2021    EGFR 16 07/24/2021    CREATININE 2 49 (H) 07/26/2021    CREATININE 2 69 (H) 07/25/2021    CREATININE 2 87 (H) 07/24/2021     Baseline creatinine closer to 1, admission creatinine 4 01  Patient also has chronic hydronephrosis with stents and history of postobstructive uropathy  Patient at comfort measures  Way in place and IV fluids discontinued    Enteritis  Assessment & Plan  Difficulty tolerating PO   Continue tx for nausea and vomiting   Diet as tolerated   pepcid added     Elevated troponin I level  Assessment & Plan  Unknown etiology, seen by cards initially   May be 2/2 ZOYA, found down  Trop peaked at 6 9   Echo d/c since patient made CMO    High anion gap metabolic acidosis  Assessment & Plan  Likely 2/2 intravascular volume depletion, chronically low serum bicarb   S/p aggressive fluid resuscitation      Hydronephrosis  Assessment & Plan  Hx of chronic hydronephrosis and had stent removed at [facility] 7/20/21   CT showed unchanged hydro   Way     Cirrhosis of liver [facility])  Assessment & Plan  Unknown etiology  Present on CT imaging     History of breast cancer  Assessment & Plan  Diagnosis in 2017 status post left mastectomy on Femara    Hyperthyroidism  Assessment & Plan  Tapazole is hold    VTE Pharmacologic Prophylaxis:   Pharmacologic: Pharmacologic VTE Prophylaxis contraindicated due to CMO  Mechanical VTE Prophylaxis in Place: Yes    Patient Centered Rounds: I have performed bedside rounds with nursing staff today  Discussions with Specialists or Other Care Team Provider: nursing, cm      Education and Discussions with Family / Patient: patient, dtr at bedside      Time Spent for Care: 30 minutes  More than 50% of total time spent on counseling and coordination of care as described above  Current Length of Stay: 7 day(s)    Current Patient Status: Inpatient   Certification Statement: The patient will continue to require additional inpatient hospital stay due to pending safe d/c planning     Discharge Plan: pending placement     Code Status: Level 4 - Comfort Care      Subjective:   Pt seen and examined at bedside  Had episode of vomiting while we were talking and she says she feels much better after  Objective:     Vitals:   Temp (24hrs), Av 1 °F (36 7 °C), Min:98 1 °F (36 7 °C), Max:98 1 °F (36 7 °C)    Temp:  [98 1 °F (36 7 °C)] 98 1 °F (36 7 °C)  HR:  [95] 95  Resp:  [18] 18  BP: (128)/(65) 128/65  SpO2:  [96 %] 96 %  Body mass index is 30 1 kg/m²  Input and Output Summary (last 24 hours): Intake/Output Summary (Last 24 hours) at 2021 1228  Last data filed at 2021 1938  Gross per 24 hour   Intake 140 ml   Output 100 ml   Net 40 ml       Physical Exam:     Physical Exam  Constitutional:       Appearance: Normal appearance  She is ill-appearing (thin and frail appearing )  Comments: 1 episode small amt bilious vomit   HENT:      Head: Normocephalic and atraumatic        Mouth/Throat:      Mouth: Mucous membranes are moist  Eyes:      Extraocular Movements: Extraocular movements intact  Cardiovascular:      Rate and Rhythm: Normal rate and regular rhythm  Pulmonary:      Effort: Pulmonary effort is normal       Breath sounds: Normal breath sounds  Abdominal:      General: Abdomen is flat  There is distension  Palpations: Abdomen is soft  Musculoskeletal:         General: Normal range of motion  Cervical back: Normal range of motion and neck supple  Skin:     General: Skin is warm  Neurological:      General: No focal deficit present  Mental Status: She is alert and oriented to person, place, and time  Psychiatric:         Mood and Affect: Mood normal      Additional Data:     Labs:    Results from last 7 days   Lab Units 07/26/21  0438   WBC Thousand/uL 7 12   HEMOGLOBIN g/dL 9 9*   HEMATOCRIT % 30 5*   PLATELETS Thousands/uL 146*   NEUTROS PCT % 71   LYMPHS PCT % 17   MONOS PCT % 8   EOS PCT % 4     Results from last 7 days   Lab Units 07/26/21  0438   SODIUM mmol/L 144   POTASSIUM mmol/L 3 4*   CHLORIDE mmol/L 115*   CO2 mmol/L 19*   BUN mg/dL 21   CREATININE mg/dL 2 49*   ANION GAP mmol/L 10   CALCIUM mg/dL 8 3   ALBUMIN g/dL 1 7*   TOTAL BILIRUBIN mg/dL 0 60   ALK PHOS U/L 122*   ALT U/L 26   AST U/L 58*   GLUCOSE RANDOM mg/dL 92     Results from last 7 days   Lab Units 07/23/21  1229   INR  1 36*     Results from last 7 days   Lab Units 07/26/21  1137 07/25/21  2316 07/25/21  1558 07/25/21  1143 07/24/21  2340 07/24/21  1706 07/24/21  1206 07/24/21  0553 07/24/21  0022 07/23/21  2335   POC GLUCOSE mg/dl 95 87 113 130 103 114 75 90 126 67         Results from last 7 days   Lab Units 07/25/21  0528 07/24/21  0540 07/24/21  0319 07/23/21  2329 07/23/21  2118 07/23/21  1229   LACTIC ACID mmol/L  --  1 7 2 4* 2 7* 2 8*  --    PROCALCITONIN ng/ml 0 56* 0 51*  --   --   --  0 14           * I Have Reviewed All Lab Data Listed Above  * Additional Pertinent Lab Tests Reviewed:  Carmita Fisher Admission Reviewed    Imaging:    Imaging Reports Reviewed Today Include: all  Imaging Personally Reviewed by Myself Includes:  none    Recent Cultures (last 7 days):     Results from last 7 days   Lab Units 07/23/21  1321 07/23/21  1230 07/23/21  1229   BLOOD CULTURE   --  No Growth After 5 Days  No Growth After 5 Days  URINE CULTURE  No Growth <1000 cfu/mL  --   --        Last 24 Hours Medication List:   Current Facility-Administered Medications   Medication Dose Route Frequency Provider Last Rate    acetaminophen  650 mg Oral Q6H PRN Celina A Sedora, CRNP      bisacodyl  10 mg Rectal Daily PRN Celina A Sedora, CRNP      calcium carbonate  1,000 mg Oral Daily PRN Celina A Sedora, CRNP      famotidine  20 mg Intravenous Q24H [facility] Shelia Gonzalezx, CRNP      glycopyrrolate  0 1 mg Intravenous Q4H PRN Celina A Sedora, CRNP      HYDROmorphone  1 mg Oral Q4H PRN Celina A Sedora, CRNP      magnesium hydroxide  30 mL Oral Once Celina A Sedora, CRNP      melatonin  3 mg Oral HS Celina A Sedora, CRNP      ondansetron  4 mg Intravenous Q6H PRN Tremaine Rodriguez PA-C          Today, Patient Was Seen By: Douglas Sagastume PA-C    ** Please Note: Dictation voice to text software may have been used in the creation of this document   **

## 2021-07-31 PROCEDURE — 99232 SBSQ HOSP IP/OBS MODERATE 35: CPT | Performed by: PHYSICIAN ASSISTANT

## 2021-07-31 RX ORDER — PROMETHAZINE HYDROCHLORIDE 25 MG/ML
12.5 INJECTION, SOLUTION INTRAMUSCULAR; INTRAVENOUS ONCE
Status: COMPLETED | OUTPATIENT
Start: 2021-07-31 | End: 2021-07-31

## 2021-07-31 RX ORDER — HYDROMORPHONE HCL/PF 1 MG/ML
0.5 SYRINGE (ML) INJECTION
Status: DISCONTINUED | OUTPATIENT
Start: 2021-07-31 | End: 2021-08-04 | Stop reason: HOSPADM

## 2021-07-31 RX ADMIN — ONDANSETRON 4 MG: 2 INJECTION INTRAMUSCULAR; INTRAVENOUS at 11:03

## 2021-07-31 RX ADMIN — PROMETHAZINE HYDROCHLORIDE 12.5 MG: 25 INJECTION INTRAMUSCULAR; INTRAVENOUS at 23:25

## 2021-07-31 RX ADMIN — ONDANSETRON 4 MG: 2 INJECTION INTRAMUSCULAR; INTRAVENOUS at 04:45

## 2021-07-31 RX ADMIN — HYDROMORPHONE HYDROCHLORIDE 0.5 MG: 1 INJECTION, SOLUTION INTRAMUSCULAR; INTRAVENOUS; SUBCUTANEOUS at 23:46

## 2021-07-31 RX ADMIN — ONDANSETRON 4 MG: 2 INJECTION INTRAMUSCULAR; INTRAVENOUS at 21:28

## 2021-07-31 RX ADMIN — CALCIUM CARBONATE (ANTACID) CHEW TAB 500 MG 1000 MG: 500 CHEW TAB at 08:12

## 2021-07-31 RX ADMIN — PROMETHAZINE HYDROCHLORIDE 12.5 MG: 25 INJECTION INTRAMUSCULAR; INTRAVENOUS at 12:36

## 2021-07-31 RX ADMIN — SODIUM CHLORIDE 500 ML: 0.9 INJECTION, SOLUTION INTRAVENOUS at 12:32

## 2021-07-31 RX ADMIN — HYDROMORPHONE HYDROCHLORIDE 0.5 MG: 1 INJECTION, SOLUTION INTRAMUSCULAR; INTRAVENOUS; SUBCUTANEOUS at 12:15

## 2021-07-31 RX ADMIN — FAMOTIDINE 20 MG: 10 INJECTION, SOLUTION INTRAVENOUS at 08:01

## 2021-07-31 NOTE — PLAN OF CARE

## 2021-07-31 NOTE — ASSESSMENT & PLAN NOTE
Numerous admissions in last year   Blade 66 discussion with pt and family, POLST completed 7/25   Pt transitioned to [address] [place] [address], not candidate for inpt hospice   CM following for placement   Continue prn medications for comfort

## 2021-07-31 NOTE — CASE MANAGEMENT
Case Management Progress Note    Patient name Addison Sanchez  Location /-21 MRN [id number]  : 1945 Date 2021       LOS (days): 8  Geometric Mean LOS (GMLOS) (days): 4 30  Days to GMLOS:-3 7          PROGRESS NOTE:  CM remains in the hospital on [address]  Pt is in need of a SNF where she can receive Hospice/Comfort care    Multiple referrals placed, pt still not placed  CM making referrals to the following :    [facility] at [address] [facility] [address]  [address]  [address] [facility]

## 2021-07-31 NOTE — ASSESSMENT & PLAN NOTE
Difficulty tolerating PO but passing flatus and BS active x4  Continue tx for nausea and vomiting - trial phenergan x 1  Brief IVF 500cc x 1  Diet as tolerated   Pepcid and zofran added

## 2021-07-31 NOTE — PROGRESS NOTES
[address]  Progress Note - Andres Lennon 1945, 70 y o  female MRN: [id number]  Unit/Bed#: -Forrest Encounter: [id number]  Primary Care Provider: Kylah Lester MD   Date and time admitted to hospital: 7/23/2021 12:03 PM    * Acute metabolic encephalopathy  Assessment & Plan  Pt presented after being found down by caregiver, unknown etiology possibly 3 days   GCS on admit was 6 s/p intubation for airway protection   CTH and C spine negative   Mental status slowly improved and back to BS   Likely 2/2 metabolic derangements     End of life care  Assessment & Plan  Numerous admissions in last year   [address] discussion with pt and family, POLST completed 7/25   Pt transitioned to [address] [place] [address], not candidate for inpt hospice   CM following for placement   Continue prn medications for comfort     Acute on chronic kidney failure [facility])  Assessment & Plan  Lab Results   Component Value Date    EGFR 19 07/26/2021    EGFR 17 07/25/2021    EGFR 16 07/24/2021    CREATININE 2 49 (H) 07/26/2021    CREATININE 2 69 (H) 07/25/2021    CREATININE 2 87 (H) 07/24/2021     Baseline creatinine closer to 1, admission creatinine 4 01  Patient also has chronic hydronephrosis with stents and history of postobstructive uropathy  Patient at comfort measures  Way in place and IV fluids discontinued    Enteritis  Assessment & Plan  Difficulty tolerating PO but passing flatus and BS active x4  Continue tx for nausea and vomiting - trial phenergan x 1  Brief IVF 500cc x 1  Diet as tolerated   Pepcid and zofran added     Elevated troponin I level  Assessment & Plan  Unknown etiology, seen by cards initially   May be 2/2 ZOYA, found down  Trop peaked at 6 9   Echo d/c since patient made CMO    High anion gap metabolic acidosis  Assessment & Plan  Likely 2/2 intravascular volume depletion, chronically low serum bicarb   S/p aggressive fluid resuscitation      Hydronephrosis  Assessment & Plan  Hx of chronic hydronephrosis and had stent removed at [facility] 7/20/21   CT showed unchanged hydro   Way     Cirrhosis of liver [facility])  Assessment & Plan  Unknown etiology  Present on CT imaging     History of breast cancer  Assessment & Plan  Diagnosis in 2017 status post left mastectomy on Femara    Hyperthyroidism  Assessment & Plan  Tapazole is hold    VTE Pharmacologic Prophylaxis:   Pharmacologic: Pharmacologic VTE Prophylaxis contraindicated due to CMO  Mechanical VTE Prophylaxis in Place: Yes    Patient Centered Rounds: I have performed bedside rounds with nursing staff today  Discussions with Specialists or Other Care Team Provider: nursing     Education and Discussions with Family / Patient: patient, dtr at bedside      Time Spent for Care: 30 minutes  More than 50% of total time spent on counseling and coordination of care as described above  Current Length of Stay: 8 day(s)    Current Patient Status: Inpatient   Certification Statement: The patient will continue to require additional inpatient hospital stay due to pending safe d/c planning     Discharge Plan: pending d/c to rehab     Code Status: Level 4 - Comfort Care    Subjective:   Pt seen and examined at bedside  Still have vomiting episodes  Objective:     Vitals:   No data recorded  Body mass index is 30 1 kg/m²  Input and Output Summary (last 24 hours): Intake/Output Summary (Last 24 hours) at 7/31/2021 1228  Last data filed at 7/31/2021 3684  Gross per 24 hour   Intake --   Output 250 ml   Net -250 ml     Physical Exam:     Physical Exam  Constitutional:       General: She is in acute distress (currently vomiting )  Appearance: Normal appearance  She is ill-appearing  HENT:      Head: Normocephalic and atraumatic  Mouth/Throat:      Mouth: Mucous membranes are moist    Eyes:      Extraocular Movements: Extraocular movements intact  Cardiovascular:      Rate and Rhythm: Regular rhythm  Tachycardia present     Pulmonary:      Effort: Pulmonary effort is normal       Breath sounds: Normal breath sounds  Abdominal:      General: Abdomen is flat  There is distension  Palpations: Abdomen is soft  Musculoskeletal:         General: No swelling  Normal range of motion  Cervical back: Normal range of motion and neck supple  Skin:     General: Skin is warm and dry  Neurological:      General: No focal deficit present  Mental Status: She is alert  Psychiatric:         Mood and Affect: Mood normal          Behavior: Behavior normal        Additional Data:     Labs:    Results from last 7 days   Lab Units 07/26/21  0438   WBC Thousand/uL 7 12   HEMOGLOBIN g/dL 9 9*   HEMATOCRIT % 30 5*   PLATELETS Thousands/uL 146*   NEUTROS PCT % 71   LYMPHS PCT % 17   MONOS PCT % 8   EOS PCT % 4     Results from last 7 days   Lab Units 07/26/21  0438   SODIUM mmol/L 144   POTASSIUM mmol/L 3 4*   CHLORIDE mmol/L 115*   CO2 mmol/L 19*   BUN mg/dL 21   CREATININE mg/dL 2 49*   ANION GAP mmol/L 10   CALCIUM mg/dL 8 3   ALBUMIN g/dL 1 7*   TOTAL BILIRUBIN mg/dL 0 60   ALK PHOS U/L 122*   ALT U/L 26   AST U/L 58*   GLUCOSE RANDOM mg/dL 92         Results from last 7 days   Lab Units 07/26/21  1137 07/25/21  2316 07/25/21  1558 07/25/21  1143 07/24/21  2340 07/24/21  1706   POC GLUCOSE mg/dl 95 87 113 130 103 114         Results from last 7 days   Lab Units 07/25/21  0528   PROCALCITONIN ng/ml 0 56*           * I Have Reviewed All Lab Data Listed Above  * Additional Pertinent Lab Tests Reviewed:  Carmita Fisher Admission Reviewed    Imaging:    Imaging Reports Reviewed Today Include: all  Imaging Personally Reviewed by Myself Includes:  none    Recent Cultures (last 7 days):           Last 24 Hours Medication List:   Current Facility-Administered Medications   Medication Dose Route Frequency Provider Last Rate    acetaminophen  650 mg Oral Q6H PRN SIMON Balderas      bisacodyl  10 mg Rectal Daily PRN SIMON Riley      calcium carbonate  1,000 mg Oral Daily PRN Celina A Sedora, CRNP      famotidine  20 mg Intravenous Q24H [address] Shelia S Jana, CRNP      glycopyrrolate  0 1 mg Intravenous Q4H PRN Celina A Sedora, CRNP      HYDROmorphone  0 5 mg Intravenous Q3H PRN Yogi Rodriguez PA-C      melatonin  3 mg Oral HS Celina A Sedora, CRNP      ondansetron  4 mg Intravenous Q6H PRN Yogi Rodriguez PA-C      promethazine  12 5 mg Intravenous Once Yogi Rodriguez PA-C      sodium chloride  500 mL Intravenous Once Pete Lesches, PA-C          Today, Patient Was Seen By: Pete Lesches, PA-C    ** Please Note: Dictation voice to text software may have been used in the creation of this document   **

## 2021-08-01 PROCEDURE — 99232 SBSQ HOSP IP/OBS MODERATE 35: CPT | Performed by: FAMILY MEDICINE

## 2021-08-01 RX ORDER — ONDANSETRON 4 MG/1
4 TABLET, ORALLY DISINTEGRATING ORAL EVERY 6 HOURS PRN
Status: DISCONTINUED | OUTPATIENT
Start: 2021-08-01 | End: 2021-08-04 | Stop reason: HOSPADM

## 2021-08-01 RX ORDER — PROMETHAZINE HYDROCHLORIDE 25 MG/ML
12.5 INJECTION, SOLUTION INTRAMUSCULAR; INTRAVENOUS EVERY 6 HOURS PRN
Status: DISCONTINUED | OUTPATIENT
Start: 2021-08-01 | End: 2021-08-04 | Stop reason: HOSPADM

## 2021-08-01 RX ADMIN — PROMETHAZINE HYDROCHLORIDE 12.5 MG: 25 INJECTION INTRAMUSCULAR; INTRAVENOUS at 15:14

## 2021-08-01 RX ADMIN — ONDANSETRON 4 MG: 4 TABLET, ORALLY DISINTEGRATING ORAL at 03:15

## 2021-08-01 RX ADMIN — SODIUM CHLORIDE 500 ML: 0.9 INJECTION, SOLUTION INTRAVENOUS at 15:10

## 2021-08-01 RX ADMIN — ONDANSETRON 4 MG: 4 TABLET, ORALLY DISINTEGRATING ORAL at 09:21

## 2021-08-01 RX ADMIN — HYDROMORPHONE HYDROCHLORIDE 0.5 MG: 1 INJECTION, SOLUTION INTRAMUSCULAR; INTRAVENOUS; SUBCUTANEOUS at 15:14

## 2021-08-01 NOTE — ASSESSMENT & PLAN NOTE
Numerous admissions in last year   Blade 64 discussion with pt and family, POLST completed 7/25   Pt transitioned to comfort care, not candidate for inpt hospice   CM following for placement   Continue prn medications for comfort

## 2021-08-01 NOTE — QUICK NOTE
Alerted by bedside RN, pt unable to tolerate oral medication due to severe nausea and vomiting  Multiple attempts were made by nursing to obtain PIV access at pt's request, however these attempts were all unsuccessful  Request was made for critical care AP to utilize POCUS to gain PIV  B/L upper extremities visualized under US, veins appear very small and difficult to access  2 attempts unsuccessful  On 3rd attempt, 20 G arrow catheter used with successful placement, catheter sutured in place for securement along with Tegaderm  Beside RN present and notified

## 2021-08-01 NOTE — PROGRESS NOTES
[address]  Progress Note - Kiki Liz 1945, 70 y o  female MRN: [id number]  Unit/Bed#: -Forrest Encounter: [id number]  Primary Care Provider: Gabriel Morgan MD   Date and time admitted to hospital: 7/23/2021 12:03 PM    * Acute metabolic encephalopathy  Assessment & Plan  Pt presented after being found down by caregiver, unknown etiology possibly 3 days   GCS on admit was 6 s/p intubation for airway protection   CTH and C spine negative   Mental status slowly improved and back to BS   Likely 2/2 metabolic derangements     End of life care  Assessment & Plan  Numerous admissions in last year   By[address] discussion with pt and family, POLST completed 7/25   Pt transitioned to comfort care, not candidate for inpt hospice   CM following for placement   Continue prn medications for comfort     Acute on chronic kidney failure [facility])  Assessment & Plan  Lab Results   Component Value Date    EGFR 19 07/26/2021    EGFR 17 07/25/2021    EGFR 16 07/24/2021    CREATININE 2 49 (H) 07/26/2021    CREATININE 2 69 (H) 07/25/2021    CREATININE 2 87 (H) 07/24/2021     Baseline creatinine closer to 1, admission creatinine 4 01  Patient also has chronic hydronephrosis with stents and history of postobstructive uropathy  Patient at comfort measures  Way in place and IV fluids discontinued    Enteritis  Assessment & Plan  Difficulty tolerating PO but passing flatus and BS active x4  Continue tx for nausea and vomiting - trial phenergan and Zofran  Diet as tolerated   Pepcid and zofran added     Hydronephrosis  Assessment & Plan  Hx of chronic hydronephrosis and had stent removed at [facility] 7/20/21   CT showed unchanged hydro   Way     Cirrhosis of liver [facility])  Assessment & Plan  Unknown etiology  Present on CT imaging     History of breast cancer  Assessment & Plan  Diagnosis in 2017 status post left mastectomy on Femara    Hyperthyroidism  Assessment & Plan  Tapazole is on hold      VTE Pharmacologic Prophylaxis:   Pharmacologic: Pharmacologic VTE Prophylaxis contraindicated due to Comfort care  Mechanical VTE Prophylaxis in Place: No    Patient Centered Rounds: I have performed bedside rounds with nursing staff today  Discussions with Specialists or Other Care Team Provider:  Discussed with case management    Education and Discussions with Family / Patient:  Discussed with patient and daughter at bedside    Time Spent for Care: 30 minutes  More than 50% of total time spent on counseling and coordination of care as described above  Current Length of Stay: 9 day(s)    Current Patient Status: Inpatient   Certification Statement: The patient will continue to require additional inpatient hospital stay due to Comfort care    Discharge Plan:  Currently on comfort care  Await placement in skilled nursing facility and transition to hospice    Code Status: Level 4 - Comfort Care      Subjective:   Patient complains of intractable nausea  Denies any abdominal pain  Had some relief with Phenergan yesterday  Denies any chest pain otherwise is comfortable except for the intractable nausea    Objective:     Vitals:   No data recorded  Body mass index is 30 1 kg/m²  Input and Output Summary (last 24 hours): Intake/Output Summary (Last 24 hours) at 8/1/2021 1056  Last data filed at 8/1/2021 0855  Gross per 24 hour   Intake --   Output 300 ml   Net -300 ml       Physical Exam:     Physical Exam  Vitals and nursing note reviewed  Constitutional:       Appearance: She is ill-appearing  HENT:      Head: Normocephalic and atraumatic  Right Ear: External ear normal       Left Ear: External ear normal       Nose: Nose normal       Mouth/Throat:      Pharynx: Oropharynx is clear  Eyes:      Pupils: Pupils are equal, round, and reactive to light  Cardiovascular:      Rate and Rhythm: Normal rate and regular rhythm  Heart sounds: Normal heart sounds     Pulmonary:      Effort: Pulmonary effort is normal       Breath sounds: Normal breath sounds  Abdominal:      General: Bowel sounds are normal  There is distension  Palpations: Abdomen is soft  Tenderness: There is no abdominal tenderness  Musculoskeletal:         General: Normal range of motion  Cervical back: Normal range of motion and neck supple  Skin:     General: Skin is warm and dry  Capillary Refill: Capillary refill takes less than 2 seconds  Neurological:      General: No focal deficit present  Mental Status: She is alert  Comments: Oriented to person place   Psychiatric:         Mood and Affect: Mood normal            Additional Data:     Labs:    Results from last 7 days   Lab Units 07/26/21  0438   WBC Thousand/uL 7 12   HEMOGLOBIN g/dL 9 9*   HEMATOCRIT % 30 5*   PLATELETS Thousands/uL 146*   NEUTROS PCT % 71   LYMPHS PCT % 17   MONOS PCT % 8   EOS PCT % 4     Results from last 7 days   Lab Units 07/26/21  0438   SODIUM mmol/L 144   POTASSIUM mmol/L 3 4*   CHLORIDE mmol/L 115*   CO2 mmol/L 19*   BUN mg/dL 21   CREATININE mg/dL 2 49*   ANION GAP mmol/L 10   CALCIUM mg/dL 8 3   ALBUMIN g/dL 1 7*   TOTAL BILIRUBIN mg/dL 0 60   ALK PHOS U/L 122*   ALT U/L 26   AST U/L 58*   GLUCOSE RANDOM mg/dL 92         Results from last 7 days   Lab Units 07/26/21  1137 07/25/21  2316 07/25/21  1558 07/25/21  1143   POC GLUCOSE mg/dl 95 87 113 130                   * I Have Reviewed All Lab Data Listed Above  * Additional Pertinent Lab Tests Reviewed:  Carmita 66 Admission Reviewed    Imaging:    Imaging Reports Reviewed Today Include:  None  Imaging Personally Reviewed by Myself Includes:  None    Recent Cultures (last 7 days):           Last 24 Hours Medication List:   Current Facility-Administered Medications   Medication Dose Route Frequency Provider Last Rate    acetaminophen  650 mg Oral Q6H PRN SIMON Balderas      bisacodyl  10 mg Rectal Daily PRN SIMON Erickson      calcium carbonate  1,000 mg Oral Daily PRN Celina Ferraroora, CRNP      famotidine  20 mg Intravenous Q24H [address] Shelia S Jana, CRNP      HYDROmorphone  0 5 mg Intravenous Q3H PRN Yogi Rodriguez PA-C      melatonin  3 mg Oral HS Celina Carver, CRNP      ondansetron  4 mg Intravenous Q6H PRN Yogi Rodriguez PA-C      ondansetron  4 mg Oral Q6H PRN SIMON Degroot      promethazine  12 5 mg Intravenous Q6H PRN Justin Juarez MD          Today, Patient Was Seen By: Justin Juarez MD    ** Please Note: Dictation voice to text software may have been used in the creation of this document   **

## 2021-08-02 PROCEDURE — 99232 SBSQ HOSP IP/OBS MODERATE 35: CPT | Performed by: FAMILY MEDICINE

## 2021-08-02 RX ADMIN — ONDANSETRON 4 MG: 4 TABLET, ORALLY DISINTEGRATING ORAL at 11:17

## 2021-08-02 RX ADMIN — ONDANSETRON 4 MG: 4 TABLET, ORALLY DISINTEGRATING ORAL at 05:19

## 2021-08-02 RX ADMIN — ONDANSETRON 4 MG: 4 TABLET, ORALLY DISINTEGRATING ORAL at 17:15

## 2021-08-02 RX ADMIN — FAMOTIDINE 20 MG: 10 INJECTION, SOLUTION INTRAVENOUS at 08:05

## 2021-08-02 RX ADMIN — CALCIUM CARBONATE (ANTACID) CHEW TAB 500 MG 1000 MG: 500 CHEW TAB at 08:06

## 2021-08-02 RX ADMIN — MELATONIN 3 MG: 3 TAB ORAL at 21:54

## 2021-08-02 RX ADMIN — PROMETHAZINE HYDROCHLORIDE 12.5 MG: 25 INJECTION INTRAMUSCULAR; INTRAVENOUS at 21:54

## 2021-08-02 RX ADMIN — HYDROMORPHONE HYDROCHLORIDE 0.5 MG: 1 INJECTION, SOLUTION INTRAMUSCULAR; INTRAVENOUS; SUBCUTANEOUS at 20:41

## 2021-08-02 NOTE — ASSESSMENT & PLAN NOTE
Numerous admissions in last year   Blade [address] discussion with pt and family, POLST completed 7/25   Pt transitioned to comfort care, not candidate for inpt hospice   CM following for placement   Continue prn medications for comfort   Continues to have intractable nausea only responding to IV Phenergan and Dilaudid  Continue both for now and try oral agents as well  Today doing a little bit better compared to yesterday

## 2021-08-02 NOTE — CASE MANAGEMENT
Case Management Progress Note    Patient name Liam Cross  Location /-21 MRN [id number]  : 1945 Date 2021       LOS (days): 10  Geometric Mean LOS (GMLOS) (days): 4 30  Days to GMLOS:-5 5        BUNDLE:      OBJECTIVE:  Pt is a 70y o  year old Single, white or  [1], female with Judaism preference of None admitted on 2021 12:03 PM  Pt is admitted to [facility] 87 327-01 at [address] [facility] with complaints of Acute metabolic encephalopathy   Current admission status: Inpatient  Preferred Pharmacy:   [facility]) - [place], [address] [facility]  [facility] [place] [address]  Phone: [phone] Fax: [phone]    Primary Care Provider: Bradley Paige MD    Primary Insurance: MEDICARE  Secondary Insurance: [facility] NOTE:    Chart reviewed_     CM contacted [address] to reevaluate for Hospice Care at the [employer]  Pt is not eating, requiring Phenergan IM and IV  Await reevaluation  200 Pt is not appropriate for IP Hospice at this time  Liaison for [facility] was here and went in to see patient  Daughter was in the room  Pt was not interested in going to [facility] as " a friend had a bad experience " Per Liaison she is gong home- CM and MD went to speak to patient, and daughter Ruy Zackary-- home is not an options at this time as patient does not have 24/7 supervision and care  [facility] is interested in accepting patient- Pt and daughter would like to speak to the admission liaison- phone number was provided  Await follow up on [facility] with patient and daughter  DC plan SNF when accepted  [address] does not have an appropriate bed for Princess Vazquez, needs a private room, due to Non Covid vaccines  CM reached out to [address] they are willing to accept pt on comfort care and will work with patient and family with hospice care and payor source  Discuss this with Ashish Wayne and her daughterAgnieszka Logan will think about this over night

## 2021-08-02 NOTE — PLAN OF CARE
Problem: Nutrition/Hydration-ADULT  Goal: Nutrient/Hydration intake appropriate for improving, restoring or maintaining nutritional needs  Description: Monitor and assess patient's nutrition/hydration status for malnutrition  Collaborate with interdisciplinary team and initiate plan and interventions as ordered  Monitor patient's weight and dietary intake as ordered or per policy  Utilize nutrition screening tool and intervene as necessary  Determine patient's food preferences and provide high-protein, high-caloric foods as appropriate       INTERVENTIONS:  - Monitor oral intake, urinary output, labs, and treatment plans  - Assess nutrition and hydration status and recommend course of action  - Evaluate amount of meals eaten  - Assist patient with eating if necessary   - Allow adequate time for meals  - Recommend/ encourage appropriate diets, oral nutritional supplements, and vitamin/mineral supplements  - Order, calculate, and assess calorie counts as needed  - Recommend, monitor, and adjust tube feedings and TPN/PPN based on assessed needs  - Assess need for intravenous fluids  - Provide specific nutrition/hydration education as appropriate  - Include patient/family/caregiver in decisions related to nutrition  Outcome: Not Progressing     Problem: GENITOURINARY - ADULT  Goal: Urinary catheter remains patent  Description: INTERVENTIONS:  - Assess patency of urinary catheter  - If patient has a chronic mathis, consider changing catheter if non-functioning  - Follow guidelines for intermittent irrigation of non-functioning urinary catheter  Outcome: Not Progressing     Problem: METABOLIC, FLUID AND ELECTROLYTES - ADULT  Goal: Electrolytes maintained within normal limits  Description: INTERVENTIONS:  - Monitor labs and assess patient for signs and symptoms of electrolyte imbalances  - Administer electrolyte replacement as ordered  - Monitor response to electrolyte replacements, including repeat lab results as appropriate  - Instruct patient on fluid and nutrition as appropriate  Outcome: Not Progressing     Problem: METABOLIC, FLUID AND ELECTROLYTES - ADULT  Goal: Fluid balance maintained  Description: INTERVENTIONS:  - Monitor labs   - Monitor I/O and WT  - Instruct patient on fluid and nutrition as appropriate  - Assess for signs & symptoms of volume excess or deficit  Outcome: Not Progressing     Problem: SKIN/TISSUE INTEGRITY - ADULT  Goal: Skin Integrity remains intact(Skin Breakdown Prevention)  Description: Assess:  -Perform Aman assessment every Q12 hrs  -Clean and moisturize skin every 8 hrs  -Inspect skin when repositioning, toileting, and assisting with ADLS  -Assess extremities for adequate circulation and sensation     Bed Management:  -Have minimal linens on bed & keep smooth, unwrinkled  -Change linens as needed when moist or perspiring    Toileting:  -Offer bedside commode  -Assess for incontinence every 2 hrs  -Use incontinent care products after each incontinent episode such as protective/moisture barrier creams    Activity:  -Encourage activity and walks on unit  -Encourage or provide ROM exercises   -Turn and reposition patient every 2 Hours  -Use appropriate equipment to lift or move patient in bed    Skin Care:  -Avoid use of baby powder, tape, friction and shearing, hot water or constrictive clothing  -Do not massage red bony areas    Outcome: Not Progressing

## 2021-08-02 NOTE — PROGRESS NOTES
[address]  Progress Note - Judye Part 1945, 70 y o  female MRN: [id number]  Unit/Bed#: -01 Encounter: [id number]  Primary Care Provider: Marcie June MD   Date and time admitted to hospital: 7/23/2021 12:03 PM    * Acute metabolic encephalopathy  Assessment & Plan  Pt presented after being found down by caregiver, unknown etiology possibly 3 days   GCS on admit was 6 s/p intubation for airway protection   CTH and C spine negative   Mental status slowly improved and back to BS   Likely 2/2 metabolic derangements     End of life care  Assessment & Plan  Numerous admissions in last year   [address] discussion with pt and family, POLST completed 7/25   Pt transitioned to comfort care, not candidate for inpt hospice   CM following for placement   Continue prn medications for comfort   Continues to have intractable nausea only responding to IV Phenergan and Dilaudid  Continue both for now and try oral agents as well  Today doing a little bit better compared to yesterday  Cirrhosis of liver [facility])  Assessment & Plan  Unknown etiology  Present on CT imaging     History of breast cancer  Assessment & Plan  Diagnosis in 2017 status post left mastectomy on Femara    Hyperthyroidism  Assessment & Plan  Tapazole is on hold      VTE Pharmacologic Prophylaxis:   Pharmacologic: Pharmacologic VTE Prophylaxis contraindicated due to Comfort care  Mechanical VTE Prophylaxis in Place: No    Patient Centered Rounds: I have performed bedside rounds with nursing staff today  Discussions with Specialists or Other Care Team Provider:  Discussed with case management    Education and Discussions with Family / Patient:  Discussed with patient and daughter at bedside    Time Spent for Care: 30 minutes  More than 50% of total time spent on counseling and coordination of care as described above  Current Length of Stay: 10 day(s)    Current Patient Status: Inpatient   Certification Statement:  The patient will continue to require additional inpatient hospital stay due to Comfort care    Discharge Plan:  Pending placement in SNF    Code Status: Level 4 - Comfort Care      Subjective:   Patient was having intractable nausea and vomiting all day yesterday and then finally we got an IV site and give her IV Phenergan Dilaudid following which she got relief and slept  So far she has been tolerating sips of water today with no vomiting reported  Continue oral agents and IV agents to help to relieve her nausea vomiting if it occurs again    Objective:     Vitals:   No data recorded  SpO2:  [98 %] 98 %  Body mass index is 30 1 kg/m²  Input and Output Summary (last 24 hours): Intake/Output Summary (Last 24 hours) at 8/2/2021 1137  Last data filed at 8/1/2021 1847  Gross per 24 hour   Intake 0 ml   Output --   Net 0 ml       Physical Exam:     Physical Exam  Vitals and nursing note reviewed  Constitutional:       Appearance: She is ill-appearing  HENT:      Head: Normocephalic and atraumatic  Right Ear: External ear normal       Left Ear: External ear normal       Nose: Nose normal       Mouth/Throat:      Pharynx: Oropharynx is clear  Cardiovascular:      Rate and Rhythm: Normal rate and regular rhythm  Heart sounds: Normal heart sounds  Pulmonary:      Effort: Pulmonary effort is normal       Breath sounds: Normal breath sounds  Abdominal:      General: Bowel sounds are normal  There is distension  Palpations: Abdomen is soft  Tenderness: There is no abdominal tenderness  Musculoskeletal:         General: Normal range of motion  Cervical back: Normal range of motion and neck supple  Skin:     General: Skin is warm and dry  Capillary Refill: Capillary refill takes less than 2 seconds  Neurological:      General: No focal deficit present  Mental Status: She is alert and oriented to person, place, and time     Psychiatric:         Mood and Affect: Mood normal            Additional Data:     Labs:                                  * I Have Reviewed All Lab Data Listed Above  * Additional Pertinent Lab Tests Reviewed: [address] Admission Reviewed    Imaging:    Imaging Reports Reviewed Today Include:  None  Imaging Personally Reviewed by Myself Includes:  None    Recent Cultures (last 7 days):           Last 24 Hours Medication List:   Current Facility-Administered Medications   Medication Dose Route Frequency Provider Last Rate    acetaminophen  650 mg Oral Q6H PRN Celina A Sedora, CRNP      bisacodyl  10 mg Rectal Daily PRN Celina A Sedora, CRNP      calcium carbonate  1,000 mg Oral Daily PRN Celina A Sedora, CRNP      famotidine  20 mg Intravenous Q24H [address] Shelia S Jana, CRNP      HYDROmorphone  0 5 mg Intravenous Q3H PRN Yogi Rodriguez PA-C      melatonin  3 mg Oral HS Celina A Ronanora, CRNP      ondansetron  4 mg Oral Q6H PRN Donna Moralez, CRNP      promethazine  12 5 mg Intravenous Q6H PRN Umu Carty MD          Today, Patient Was Seen By: Umu Carty MD    ** Please Note: Dictation voice to text software may have been used in the creation of this document   **

## 2021-08-03 VITALS
HEART RATE: 89 BPM | WEIGHT: 154.1 LBS | DIASTOLIC BLOOD PRESSURE: 65 MMHG | HEIGHT: 60 IN | TEMPERATURE: 98.1 F | RESPIRATION RATE: 20 BRPM | SYSTOLIC BLOOD PRESSURE: 128 MMHG | BODY MASS INDEX: 30.25 KG/M2 | OXYGEN SATURATION: 97 %

## 2021-08-03 PROCEDURE — 99232 SBSQ HOSP IP/OBS MODERATE 35: CPT | Performed by: PHYSICIAN ASSISTANT

## 2021-08-03 RX ORDER — LORAZEPAM 2 MG/ML
0.5 INJECTION INTRAMUSCULAR EVERY 6 HOURS PRN
Status: DISCONTINUED | OUTPATIENT
Start: 2021-08-03 | End: 2021-08-04 | Stop reason: HOSPADM

## 2021-08-03 RX ADMIN — LORAZEPAM 0.5 MG: 2 INJECTION INTRAMUSCULAR; INTRAVENOUS at 01:41

## 2021-08-03 RX ADMIN — FAMOTIDINE 20 MG: 10 INJECTION, SOLUTION INTRAVENOUS at 07:36

## 2021-08-03 RX ADMIN — ONDANSETRON 4 MG: 4 TABLET, ORALLY DISINTEGRATING ORAL at 16:02

## 2021-08-03 RX ADMIN — HYDROMORPHONE HYDROCHLORIDE 0.5 MG: 1 INJECTION, SOLUTION INTRAMUSCULAR; INTRAVENOUS; SUBCUTANEOUS at 01:05

## 2021-08-03 RX ADMIN — HYDROMORPHONE HYDROCHLORIDE 0.5 MG: 1 INJECTION, SOLUTION INTRAMUSCULAR; INTRAVENOUS; SUBCUTANEOUS at 07:32

## 2021-08-03 RX ADMIN — HYDROMORPHONE HYDROCHLORIDE 0.5 MG: 1 INJECTION, SOLUTION INTRAMUSCULAR; INTRAVENOUS; SUBCUTANEOUS at 18:32

## 2021-08-03 NOTE — NURSING NOTE
PT unable to eat and drink due to it coming back up  Daughter said even the water she will bring back up  Repo pt onto her left side

## 2021-08-03 NOTE — CASE MANAGEMENT
Case Management Progress Note    Patient name Dameon Andrade  Location /-96 MRN [id number]  : 1945 Date 8/3/2021       LOS (days): 11  Geometric Mean LOS (GMLOS) (days): 4 30  Days to GMLOS:-6 7        BUNDLE:      OBJECTIVE:  Pt is a 70y o  year old Single, white or  [1], female with Yazidism preference of None admitted on 2021 12:03 PM  Pt is admitted to [facility] 87 327-01 at [address] [facility] with complaints of Acute metabolic encephalopathy   Current admission status: Inpatient  Preferred Pharmacy:   [facility]) - [place], [address] [facility]  [address] [facility]  [place] [facility]  Phone: [phone] Fax: [phone]    Primary Care Provider: Troy Ruiz MD    Primary Insurance: MEDICARE  Secondary Insurance: [facility]    PROGRESS NOTE:      Met with daughter this AM and she was very emotional, stating her Mother had a bad night  Pt was sleeping when I was in the room  Alice Castellanos does not want her Mother going to [facility], she expressed her Mother is dying and she would like her to stay here  CM reviewed Medicare Guidelines, appropriate level of care requirement  CM informed Alice Castellanos that I will check with IP Hospice referral to see if they feel patient is eligible for the hospice house  CM also discussed option of home hospice-- Per daughter her Mom would like to be at home, however Alice Castellanos is not sure if she will have enough of support at home  CM followed up about 1:30 with daughter and a friend at the bedside and informed her patient is Not approved for the Hospice House and recommendations remains SNF vs Home with Home Care  Daughter is reluctant for a SNF related to not having opening visitation  We discussed Silvestre Hensley options again, patient was open to having a discussion with hospice Liaison  Discussed choice and preference- NO preference      CM called and spoke to [facility] Liaison-- Rene Wang will be here to see Sonia Padron around 3 PM   DC plan SNF on Hospice vs Home Hospice  3:30 [address]  Liaison informed me daughter has decided to take patient home on hospice care- Pt did state she wants to go home  Plan is to dc tomorrow on Hospice Care- equipment to be delivered to the home tomorrow and medication pack  Will set up transport tomorrow  CM updated provider

## 2021-08-03 NOTE — ASSESSMENT & PLAN NOTE
Numerous admissions in last year   [address] discussion with pt and family, POLST completed 7/25   Pt transitioned to comfort care, not candidate for inpt hospice   CM following for placement- pending hospice liaison evaluation  Continue prn medications for comfort   Continues to have intractable nausea only responding to IV Phenergan and Dilaudid  Continue both for now and try oral agents as well

## 2021-08-03 NOTE — PROGRESS NOTES
[address]  Progress Note - Aidan Goodrich 1945, 70 y o  female MRN: [id number]  Unit/Bed#: -Forrest Encounter: [id number]  Primary Care Provider: Moncho Rodriguez MD   Date and time admitted to hospital: 7/23/2021 12:03 PM    * Acute metabolic encephalopathy  Assessment & Plan  Pt presented after being found down by caregiver, unknown etiology possibly 3 days   GCS on admit was 6 s/p intubation for airway protection   CTH and C spine negative   Likely 2/2 metabolic derangements   Patient currently on comfort care    End of life care  Assessment & Plan  Numerous admissions in last year   [address] discussion with pt and family, POLST completed 7/25   Pt transitioned to comfort care, not candidate for inpt hospice   CM following for placement- pending hospice liaison evaluation  Continue prn medications for comfort   Continues to have intractable nausea only responding to IV Phenergan and Dilaudid  Continue both for now and try oral agents as well          Acute on chronic kidney failure [facility])  Assessment & Plan  Lab Results   Component Value Date    EGFR 19 07/26/2021    EGFR 17 07/25/2021    EGFR 16 07/24/2021    CREATININE 2 49 (H) 07/26/2021    CREATININE 2 69 (H) 07/25/2021    CREATININE 2 87 (H) 07/24/2021     Baseline creatinine closer to 1, admission creatinine 4 01  Patient also has chronic hydronephrosis with stents and history of postobstructive uropathy  Patient at comfort measures  Way in place and IV fluids discontinued    Hydronephrosis  Assessment & Plan  Hx of chronic hydronephrosis and had stent removed at [place] 7/20/21   CT showed unchanged hydro   Way     Cirrhosis of liver [facility])  Assessment & Plan  Unknown etiology  Present on CT imaging     History of breast cancer  Assessment & Plan  Diagnosis in 2017 status post left mastectomy on Femara    Hyperthyroidism  Assessment & Plan  Tapazole is on hold          VTE Pharmacologic Prophylaxis: VTE Score: 12 patient is on comfort care    Patient Centered Rounds: I performed bedside rounds with nursing staff today  Discussions with Specialists or Other Care Team Provider: none    Education and Discussions with Family / Patient: Updated  (friend) at bedside  Time Spent for Care: 30 minutes  More than 50% of total time spent on counseling and coordination of care as described above  Current Length of Stay: 11 day(s)  Current Patient Status: Inpatient   Certification Statement: The patient will continue to require additional inpatient hospital stay due to pending home hospice tomorrow   Discharge Plan: Anticipate discharge tomorrow to home hospice    Code Status: Level 4 - Comfort Care    Subjective:   Patient resting in bed comfortably  No signs of pain  Not actively vomiting while I was in the room  Friend notes decreased PO intake worsening today  Objective:     Vitals:   No data recorded  HR:  [89] 89  Resp:  [20] 20  SpO2:  [97 %] 97 %  Body mass index is 30 1 kg/m²  Input and Output Summary (last 24 hours): Intake/Output Summary (Last 24 hours) at 8/3/2021 1539  Last data filed at 8/3/2021 0959  Gross per 24 hour   Intake 0 ml   Output 250 ml   Net -250 ml       Physical Exam:   Physical Exam  Constitutional:       Appearance: She is ill-appearing  Cardiovascular:      Rate and Rhythm: Normal rate and regular rhythm  Pulmonary:      Effort: Pulmonary effort is normal  No respiratory distress  Breath sounds: Wheezing present  Abdominal:      General: Abdomen is flat  Bowel sounds are normal       Palpations: Abdomen is soft  Skin:     General: Skin is warm  Neurological:      Mental Status: Mental status is at baseline            Additional Data:     Labs:                            Lines/Drains:  Invasive Devices     Peripheral Intravenous Line            Peripheral IV 08/01/21 Left Antecubital 2 days          Drain            Urethral Catheter Temperature probe 16 Fr  11 days Urinary Catheter:  Goal for removal: Way catheter for comfort care               Imaging: No pertinent imaging reviewed  Recent Cultures (last 7 days):         Last 24 Hours Medication List:   Current Facility-Administered Medications   Medication Dose Route Frequency Provider Last Rate    acetaminophen  650 mg Oral Q6H PRN Celina A Sedora, CRNP      bisacodyl  10 mg Rectal Daily PRN Celina A Sedora, CRNP      calcium carbonate  1,000 mg Oral Daily PRN Celina A Sedora, CRNP      famotidine  20 mg Intravenous Q24H [address] Shelia S Jana, CRNP      HYDROmorphone  0 5 mg Intravenous Q3H PRN Yogi Rodriguez PA-C      LORazepam  0 5 mg Intravenous Q6H PRN Shelia S Jana, CRNP      melatonin  3 mg Oral HS Celina Ferraroora, CRNP      ondansetron  4 mg Oral Q6H PRN Canelo Logan, AMYNP      promethazine  12 5 mg Intravenous Q6H PRN Rhiannon Bates MD          Today, Patient Was Seen By: Roger Anderson PA-C    **Please Note: This note may have been constructed using a voice recognition system  **

## 2021-08-03 NOTE — PLAN OF CARE
Problem: PAIN - ADULT  Goal: Verbalizes/displays adequate comfort level or baseline comfort level  Description: Interventions:  - Encourage patient to monitor pain and request assistance  - Assess pain using appropriate pain scale  - Administer analgesics based on type and severity of pain and evaluate response  - Implement non-pharmacological measures as appropriate and evaluate response  - Consider cultural and social influences on pain and pain management  - Notify physician/advanced practitioner if interventions unsuccessful or patient reports new pain  Outcome: Progressing     Problem: INFECTION - ADULT  Goal: Absence or prevention of progression during hospitalization  Description: INTERVENTIONS:  - Assess and monitor for signs and symptoms of infection  - Monitor lab/diagnostic results  - Monitor all insertion sites, i e  indwelling lines, tubes, and drains  - Monitor endotracheal if appropriate and nasal secretions for changes in amount and color  - Elwin appropriate cooling/warming therapies per order  - Administer medications as ordered  - Instruct and encourage patient and family to use good hand hygiene technique  - Identify and instruct in appropriate isolation precautions for identified infection/condition  Outcome: Progressing     Problem: SAFETY ADULT  Goal: Patient will remain free of falls  Description: INTERVENTIONS:  - Educate patient/family on patient safety including physical limitations  - Instruct patient to call for assistance with activity   - Consult OT/PT to assist with strengthening/mobility   - Keep Call bell within reach  - Keep bed low and locked with side rails adjusted as appropriate  - Keep care items and personal belongings within reach  - Initiate and maintain comfort rounds  - Make Fall Risk Sign visible to staff  - Apply yellow socks and bracelet for high fall risk patients  - Consider moving patient to room near nurses station  Outcome: Progressing     Problem: DISCHARGE PLANNING  Goal: Discharge to home or other facility with appropriate resources  Description: INTERVENTIONS:  - Identify barriers to discharge w/patient and caregiver  - Arrange for needed discharge resources and transportation as appropriate  - Identify discharge learning needs (meds, wound care, etc )  - Arrange for interpretive services to assist at discharge as needed  - Refer to Case Management Department for coordinating discharge planning if the patient needs post-hospital services based on physician/advanced practitioner order or complex needs related to functional status, cognitive ability, or social support system  Outcome: Progressing     Problem: Knowledge Deficit  Goal: Patient/family/caregiver demonstrates understanding of disease process, treatment plan, medications, and discharge instructions  Description: Complete learning assessment and assess knowledge base  Interventions:  - Provide teaching at level of understanding  - Provide teaching via preferred learning methods  Outcome: Progressing     Problem: Potential for Falls  Goal: Patient will remain free of falls  Description: INTERVENTIONS:  - Educate patient/family on patient safety including physical limitations  - Instruct patient to call for assistance with activity   - Consult OT/PT to assist with strengthening/mobility   - Keep Call bell within reach  - Keep bed low and locked with side rails adjusted as appropriate  - Keep care items and personal belongings within reach  - Initiate and maintain comfort rounds  - Make Fall Risk Sign visible to staff  - Apply yellow socks and bracelet for high fall risk patients  - Consider moving patient to room near nurses station  Outcome: Progressing     Problem: Nutrition/Hydration-ADULT  Goal: Nutrient/Hydration intake appropriate for improving, restoring or maintaining nutritional needs  Description: Monitor and assess patient's nutrition/hydration status for malnutrition   Collaborate with interdisciplinary team and initiate plan and interventions as ordered  Monitor patient's weight and dietary intake as ordered or per policy  Utilize nutrition screening tool and intervene as necessary  Determine patient's food preferences and provide high-protein, high-caloric foods as appropriate       INTERVENTIONS:  - Monitor oral intake, urinary output, labs, and treatment plans  - Assess nutrition and hydration status and recommend course of action  - Evaluate amount of meals eaten  - Assist patient with eating if necessary   - Allow adequate time for meals  - Recommend/ encourage appropriate diets, oral nutritional supplements, and vitamin/mineral supplements  - Order, calculate, and assess calorie counts as needed  - Recommend, monitor, and adjust tube feedings and TPN/PPN based on assessed needs  - Assess need for intravenous fluids  - Provide specific nutrition/hydration education as appropriate  - Include patient/family/caregiver in decisions related to nutrition  Outcome: Progressing     Problem: Prexisting or High Potential for Compromised Skin Integrity  Goal: Skin integrity is maintained or improved  Description: INTERVENTIONS:  - Identify patients at risk for skin breakdown  - Assess and monitor skin integrity  - Assess and monitor nutrition and hydration status  - Monitor labs   - Assess for incontinence   - Turn and reposition patient  - Assist with mobility/ambulation  - Relieve pressure over bony prominences  - Avoid friction and shearing  - Provide appropriate hygiene as needed including keeping skin clean and dry  - Evaluate need for skin moisturizer/barrier cream  - Collaborate with interdisciplinary team   - Patient/family teaching  - Consider wound care consult   Outcome: Progressing     Problem: GENITOURINARY - ADULT  Goal: Maintains or returns to baseline urinary function  Description: INTERVENTIONS:  - Assess urinary function  - Encourage oral fluids to ensure adequate hydration if ordered  - Administer IV fluids as ordered to ensure adequate hydration  - Administer ordered medications as needed  - Offer frequent toileting  - Follow urinary retention protocol if ordered  Outcome: Progressing  Goal: Absence of urinary retention  Description: INTERVENTIONS:  - Assess patients ability to void and empty bladder  - Monitor I/O  - Bladder scan as needed  - Discuss with physician/AP medications to alleviate retention as needed  - Discuss catheterization for long term situations as appropriate  Outcome: Progressing  Goal: Urinary catheter remains patent  Description: INTERVENTIONS:  - Assess patency of urinary catheter  - If patient has a chronic mathis, consider changing catheter if non-functioning  - Follow guidelines for intermittent irrigation of non-functioning urinary catheter  Outcome: Progressing     Problem: METABOLIC, FLUID AND ELECTROLYTES - ADULT  Goal: Electrolytes maintained within normal limits  Description: INTERVENTIONS:  - Monitor labs and assess patient for signs and symptoms of electrolyte imbalances  - Administer electrolyte replacement as ordered  - Monitor response to electrolyte replacements, including repeat lab results as appropriate  - Instruct patient on fluid and nutrition as appropriate  Outcome: Progressing  Goal: Fluid balance maintained  Description: INTERVENTIONS:  - Monitor labs   - Monitor I/O and WT  - Instruct patient on fluid and nutrition as appropriate  - Assess for signs & symptoms of volume excess or deficit  Outcome: Progressing  Goal: Glucose maintained within target range  Description: INTERVENTIONS:  - Monitor Blood Glucose as ordered  - Assess for signs and symptoms of hyperglycemia and hypoglycemia  - Administer ordered medications to maintain glucose within target range  - Assess nutritional intake and initiate nutrition service referral as needed  Outcome: Progressing     Problem: SKIN/TISSUE INTEGRITY - ADULT  Goal: Skin Integrity remains intact(Skin Breakdown Prevention)  Description: Assess:  -Perform Aman assessment every Q12 hrs  -Clean and moisturize skin every 8 hrs  -Inspect skin when repositioning, toileting, and assisting with ADLS  -Assess extremities for adequate circulation and sensation     Bed Management:  -Have minimal linens on bed & keep smooth, unwrinkled  -Change linens as needed when moist or perspiring    Toileting:  -Offer bedside commode  -Assess for incontinence every 2 hrs  -Use incontinent care products after each incontinent episode such as protective/moisture barrier creams    Activity:  -Encourage activity and walks on unit  -Encourage or provide ROM exercises   -Turn and reposition patient every 2 Hours  -Use appropriate equipment to lift or move patient in bed    Skin Care:  -Avoid use of baby powder, tape, friction and shearing, hot water or constrictive clothing  -Do not massage red bony areas    Outcome: Progressing

## 2021-08-03 NOTE — NURSING NOTE
Patient sleeping  Offered to repo , daughter deferred mother resting comfortably (Jordana Saul RN present assisting me in room)

## 2021-08-03 NOTE — ASSESSMENT & PLAN NOTE
Pt presented after being found down by caregiver, unknown etiology possibly 3 days   GCS on admit was 6 s/p intubation for airway protection   CTH and C spine negative   Likely 2/2 metabolic derangements   Patient currently on comfort care

## 2021-08-03 NOTE — TRANSPORTATION MEDICAL NECESSITY
Section I - General Information    Name of Patient: Wendy Moore                 : 1945    Medicare #: [id number]  Transport Date: 21 (PCS is valid for round trips on this date and for all repetitive trips in the 60-day range as noted below )  [facility]                                                         Destination: [address] [place] [address]    Is the pt's stay covered under Medicare Part A (PPS/DRG)   [x]     Closest appropriate facility? If no, why is transport to more distant facility required? Yes  If hospice pt, is this transport related to pt's terminal illness? No       Section II - Medical Necessity Questionnaire  Ambulance transportation is medically necessary only if other means of transport are contraindicated or would be potentially harmful to the patient  To meet this requirement, the patient must either be "bed confined" or suffer from a condition such that transport by means other than ambulance is contraindicated by the patient's condition  The following questions must be answered by the medical professional signing below for this form to be valid:    1)  Describe the MEDICAL CONDITION (physical and/or mental) of this patient AT [address] [place] [address] that requires the patient to be transported in an ambulance and why transport by other means is contraindicated by the patient's condition: acute metablic encephalopathy    2) Is the patient "bed confined" as defined below? No  To be "be confined" the patient must satisfy all three of the following conditions: (1) unable to get up from bed without Assistance; AND (2) unable to ambulate; AND (3) unable to sit in a chair or wheelchair  3) Can this patient safely be transported by car or wheelchair van (i e , seated during transport without a medical attendant or monitoring)?    No    4) In addition to completing questions 1-3 above, please check any of the following conditions that apply*:   *Note: supporting documentation for any boxes checked must be maintained in the patient's medical records  If hosp-hosp transfer, describe services needed at 2nd facility not available at 1st facility? Medical attendant required    Inconsistently follows commands, safety concern,  Periods of confusion and decrease consciousness    If hosp-hosp transfer, describe services needed at 2nd facility not available at 1st facility? NA  Section III - Signature of Physician or Healthcare Professional  I certify that the above information is true and correct based on my evaluation of this patient, and represent that the patient requires transport by ambulance and that other forms of transport are contraindicated  I understand that this information will be used by the Centers for Medicare and Medicaid Services (CMS) to support the determination of medical necessity for ambulance services, and I represent that I have personal knowledge of the patient's condition at time of transport  []  If this box is checked, I also certify that the patient is physically or mentally incapable of signing the ambulance service's claim and that the institution with which I am affiliated has furnished care, services, or assistance to the patient  My signature below is made on behalf of the patient pursuant to 42 CFR §424 36(b)(4)  In accordance with 42 CFR §424 37, the specific reason(s) that the patient is physically or mentally incapable of signing the claim form is as follows:  Jay Martinez of Physician* or Healthcare Professional______________________________________________________________  Signature Date 08/03/21 (For scheduled repetitive transports, this form is not valid for transports performed more than 60 days after this date)    Printed Name & Credentials of Physician or Healthcare Professional (MD, DO, RN, etc )________________________________  *Form must be signed by patient's attending physician for scheduled, repetitive transports   For non-repetitive, unscheduled ambulance transports, if unable to obtain the signature of the attending physician, any of the following may sign (choose appropriate option below)  [] Physician Assistant []  Clinical Nurse Specialist [x]  Registered Nurse  []  Nurse Practitioner  [] Discharge Planner

## 2021-08-04 PROCEDURE — 99239 HOSP IP/OBS DSCHRG MGMT >30: CPT | Performed by: PHYSICIAN ASSISTANT

## 2021-08-04 RX ADMIN — HYDROMORPHONE HYDROCHLORIDE 0.5 MG: 1 INJECTION, SOLUTION INTRAMUSCULAR; INTRAVENOUS; SUBCUTANEOUS at 08:55

## 2021-08-04 RX ADMIN — HYDROMORPHONE HYDROCHLORIDE 0.5 MG: 1 INJECTION, SOLUTION INTRAMUSCULAR; INTRAVENOUS; SUBCUTANEOUS at 01:59

## 2021-08-04 RX ADMIN — FAMOTIDINE 20 MG: 10 INJECTION, SOLUTION INTRAVENOUS at 08:56

## 2021-08-04 NOTE — CASE MANAGEMENT
Case Management ED Discharge Planning Note    Patient name Cary Adrian  Location /-75 MRN [id number]  : 1945 Date 2021       Previous Admission - Discharge Date:7/10/21   Previous Discharge Diagnosis:  There are no discharge diagnoses documented for the most recent discharge  BUNDLE:      OBJECTIVE:  Pt is a 70y o  year old Single, white or  [1], female with Gnosticism preference of None who presented to the ER at [address] [place] [address] on 2021 12:03 PM with complaints of Acute metabolic encephalopathy   Patient Class: Inpatient  Preferred Pharmacy:   [facility] - [place], [address] [facility]  [facility] [place] [address]  Phone: [phone] Fax: [phone]    Primary Care Provider: Lisha Falk MD    Primary Insurance: MEDICARE  Secondary Insurance: [address]    ED Discharge Details:    Discharge planning discussed with[de-identified] daughter  Freedom of Choice: Yes         [address]         Is the patient interested in [facility] at discharge?: Yes  [address] requested[de-identified] [address] Name[de-identified] Other (please enter name in comment) ([address] )  [address][de-identified] Other (comment) ([address])  Homebound Criteria Met[de-identified] Uses an Assist Device (i e  cane, walker, etc)  Supporting Clincal Findings[de-identified] Fatigues Easliy in Short Distances     Confirmed transport home for 11:00 with Sharon/ LUCIA  Medical Necessity for transportation was completed  Copy available for transport team and a copy was placed in bin to be scanned into the chart  CM spoke to daughter in the room, provided support, she is aware of transport home  Updated [facility] Liaison of [address]  of Transport time  Out of hospital DNR completed and POLST form were placed in the chart for transport team   Nursing updated on transport time       [facility]  [place]

## 2021-08-04 NOTE — ASSESSMENT & PLAN NOTE
Pt presented after being found down by caregiver, unknown etiology possibly 3 days   GCS on admit was 6 s/p intubation for airway protection    Extubated on 07/24  CTH and C spine negative   Likely 2/2 metabolic derangements   Mental status slowly improved and back to baseline  Patient currently on comfort care

## 2021-08-04 NOTE — DISCHARGE SUMMARY
[address]  Discharge- Lesli Hatchet 1945, 70 y o  female MRN: [id number]  Unit/Bed#: -[facility] Encounter: [id number]  Primary Care Provider: Oleksanrd Morris MD   Date and time admitted to hospital: 7/23/2021 12:03 PM    * Acute metabolic encephalopathy  Assessment & Plan  Pt presented after being found down by caregiver, unknown etiology possibly 3 days   GCS on admit was 6 s/p intubation for airway protection  Extubated on 07/24  CTH and C spine negative   Likely 2/2 metabolic derangements   Mental status slowly improved and back to baseline  Patient currently on comfort care    End of life care  Assessment & Plan  Numerous admissions in last year   [address] discussion with pt and family, POLST completed 7/25   Pt transitioned to comfort care, not candidate for inpt hospice   CM following for placement- pending hospice liaison evaluation  Continue prn medications for comfort       Patient was approved for home hospice and will be discharged today    Acute on chronic kidney failure [facility])  Assessment & Plan  Lab Results   Component Value Date    EGFR 19 07/26/2021    EGFR 17 07/25/2021    EGFR 16 07/24/2021    CREATININE 2 49 (H) 07/26/2021    CREATININE 2 69 (H) 07/25/2021    CREATININE 2 87 (H) 07/24/2021     Baseline creatinine closer to 1, admission creatinine 4 01  Patient also has chronic hydronephrosis with stents and history of postobstructive uropathy  Patient at comfort measures  Way in place and IV fluids discontinued    Cirrhosis of liver [facility])  Assessment & Plan  Unknown etiology  Present on CT imaging     History of breast cancer  Assessment & Plan  Diagnosis in 2017 status post left mastectomy on Femara which is held as patient is on comfort care    Hyperthyroidism  Assessment & Plan  Tapazole is on hold      Medical Problems     Resolved Problems  Date Reviewed: 8/3/2021    None              Discharging Physician / Practitioner: Tera Hurst PA-C  PCP: Carrie Connolly Tony Howell MD  Admission Date:   Admission Orders (From admission, onward)     Ordered        07/23/21 1445  Inpatient Admission  Once         07/23/21 1450  Inpatient Admission  Once                   Discharge Date: 08/04/21    Consultations During Hospital Stay:  · Critical Care, Hospice liaison    Procedures Performed:   · 07/23 intubation, extubated on 07/24  · Arterial line on 07/23  · Central line 07/24    Significant Findings / Test Results:   XR chest portable ICU   Final Result by Jose Eduardo Christensen MD (07/24 0232)      Support lines and tubes in standard position  No pneumothorax  Pulmonary vascular and interstitial congestion appears slightly more pronounced compared to the prior study  Workstation performed: [id number]         XR chest 1 view portable   Final Result by Sierra Morrison MD (07/23 1452)      Endotracheal tube as described  Workstation performed: [id number]         XR shoulder 2+ views LEFT   Final Result by Sierra Morrison MD (07/23 1347)      No acute osseous abnormality  Workstation performed: [id number]         TRAUMA - CT head wo contrast   Final Result by Jennifer Kumar MD (07/23 1309)      No acute intracranial abnormality  Workstation performed: [id number]         TRAUMA - CT spine cervical wo contrast   Final Result by Ratna Osorio MD (07/23 1318)      No cervical spine fracture or traumatic malalignment  Workstation performed: [id number]         TRAUMA - CT chest abdomen pelvis w contrast   Final Result by Jennifer Kumar MD (07/23 1328)      No acute injury within the chest, abdomen or pelvis  ET tube tip is located in the proximal right bronchus and can be retracted 3 cm  Chronic mild bilateral hydronephrosis without hydroureter or ureteral calculi  Mild thickening of numerous segments of small bowel in keeping with a nonspecific enteritis  No bowel obstruction  The study was marked in [facility] for immediate notification  Workstation performed: [id number]         CT recon only thoracic spine (no charge)   Final Result by Kaushik Cabral MD (07/23 1323)      No fracture or traumatic subluxation  Workstation performed: [id number]         CT recon only lumbar spine (no charge)   Final Result by Kaushik Cabral MD (07/23 1325)      No fracture or traumatic subluxation  Workstation performed: [id number]         XR chest 1 view portable   ED Interpretation by Joseph Goncalves DO (07/23 1237)   No acute traumatic injury or evidence of pneumothorax  Endotracheal tube is at adrien  OG tube is in place  Final Result by Faby Tenorio MD (07/23 1241)      No acute cardiopulmonary disease  Low endotracheal tube, just above the adrien  Workstation performed: [id number]         XR Trauma pelvis ap only 1 or 2 vw   ED Interpretation by Joseph Goncalves DO (07/23 5907)   No acute traumatic injury  Calcification noted within the pelvis      Final Result by Selam Franklin MD (07/23 1333)   Mild degenerative arthritis both hips      No acute osseous abnormality  Workstation performed: [id number]             Incidental Findings:   · None    Test Results Pending at Discharge (will require follow up): · None     Outpatient Tests Requested:  · None    Complications:  None    Reason for Admission:     Hospital Course:   Saroj Ren is a 70 y o  female patient who originally presented to the hospital on 7/23/2021 due to being found down at home for unknown time by her friend, GCS of 6, and was subsequently intubated by Critical Care  Patient was under ICU care from July 23rd until July 29th  Patient had history of multiple recent admissions  Patient was extubated on July 24th  Was placed on empiric cefepime and Flagyl and had arterial line and central  line placed   The patient has chronic comorbidities, including but not limited to breast cancer stage IIIa s/p left mastectomy (2017), CKD stage IV, bilateral hydroureteronephrosis s/p stent placement and removal on 07/20/21, NIDDM2, colitis, partial SBO, diverticulitis s/p moncada's s/p reversal, hyponatremia, elevated alk phos, hyperthyroidism, which is now further complicated by the following acute conditions: acute metabolic encephalopathy requiring intubation due to GCS 6, ZOYA atop CKD stage IV, high anion gap metabolic acidosis, multiple metabolic derangements, and now with hypotension   Due to the severity of the patient's acute condition and/or the extent of chronic conditions, additional conversations pertaining to advanced care planning were required  Critical care did have discussion with the daughter and POLST form was filled out  Patient was officially transition to comfort care on 07/25  Patient's mental status eventually improved back to baseline and patient was then admitted on our service on 07/29  Patient was seen by hospice liaison and was found to be qualified for home hospice  Patient will be discharged today to home with hospice care  Please see above list of diagnoses and related plan for additional information  Condition at Discharge: stable going home hospice    Discharge Day Visit / Exam:   Subjective:  Patient was resting in bed comfortably with daughter at bedside  Patient was able to answer "no" when I asked her if she was in any pain  Daughter states that she has not been able to eat for the past few days  Denies any vomiting today  Patient will be discharged today to home with hospice care daughter states that everything is set up for her to come home    Vitals: Blood Pressure: 128/65 (07/29/21 1938)  Pulse: 89 (08/02/21 2040)  Temperature: 98 1 °F (36 7 °C) (07/29/21 1938)  Temp Source: Temporal (07/29/21 1938)  Respirations: 20 (08/02/21 2040)  Height: 5' (152 4 cm) (07/26/21 1342)  Weight - Scale: 69 9 kg (154 lb 1 6 oz) (07/26/21 0500)  SpO2: 97 % (08/03/21 0739)  Exam:   Physical Exam  Constitutional:       General: She is not in acute distress  Appearance: She is ill-appearing  HENT:      Mouth/Throat:      Mouth: Mucous membranes are dry  Cardiovascular:      Rate and Rhythm: Normal rate and regular rhythm  Pulses: Normal pulses  Heart sounds: Normal heart sounds  Pulmonary:      Effort: Pulmonary effort is normal       Breath sounds: Normal breath sounds  Abdominal:      General: Abdomen is flat  Comments: Hypoactive bowel sounds     Musculoskeletal:         General: No swelling  Skin:     General: Skin is warm and dry  Neurological:      Mental Status: Mental status is at baseline  Discussion with Family: Updated  (daughter) at bedside  Discharge instructions/Information to patient and family:   See after visit summary for information provided to patient and family  Provisions for Follow-Up Care:  See after visit summary for information related to follow-up care and any pertinent home health orders  Disposition:   Other: Home with home hospice care    Planned Readmission:  None     Discharge Statement:  I spent 45 minutes discharging the patient  This time was spent on the day of discharge  I had direct contact with the patient on the day of discharge  Greater than 50% of the total time was spent examining patient, answering all patient questions, arranging and discussing plan of care with patient as well as directly providing post-discharge instructions  Additional time then spent on discharge activities  Discharge Medications:  See after visit summary for reconciled discharge medications provided to patient and/or family        **Please Note: This note may have been constructed using a voice recognition system**

## 2021-08-04 NOTE — ASSESSMENT & PLAN NOTE
Numerous admissions in last year   [address] discussion with pt and family, POLST completed 7/25   Pt transitioned to comfort care, not candidate for inpt hospice   CM following for placement- pending hospice liaison evaluation  Continue prn medications for comfort       Patient was approved for home hospice and will be discharged today

## 2021-08-04 NOTE — DISCHARGE INSTRUCTIONS
Hospice Care   WHAT YOU NEED TO KNOW:   What is hospice care? Hospice care focuses on making you comfortable during the last months of your life  Hospice care will be specific to your needs and the needs of those close to you  Care can be provided at home or in a hospital  It can also be provided in a hospice or long-term care facility  Who provides hospice care? Hospice care is provided by a team trained in support and education related to death and dying  The team includes doctors, nurses, and social workers  It may also include home health aides, clergy, therapists, and trained volunteers  The hospice doctor and your healthcare providers will work together to manage your care  They will also work to understand and respect your wishes and the wishes of your family  A team member will be available any time day or night to answer questions, help, or just to listen  What do hospice care services include? · Treatment management  helps ease your symptoms, such as pain  Pain may be relieved with medicine or with other methods, such as massage, music, or aromatherapy  Equipment, a bed, or other medical supplies may be provided to help care for you  · Emotional and psychological care  is provided to help you and those close to you  Team members will meet with you and your family regularly about your condition  You and those close to you may also join support groups or meet others receiving hospice care  · Respite care  gives your family or regular care providers short-term rest  During this time, you will be cared for in a hospice facility, hospital, or other care facility  · Practical support  assists you and your family with concerns such as legal issues and  arrangements  Your  can help you find services that fit your needs and your family's needs  · Spiritual and cultural support  helps you and your family evaluate Jew values and cultural beliefs   This may make it easier to understand and accept your condition  · Loss support  is provided to your family for about 1 year  The hospice care team will help your family through the process of grieving  Your loved ones will receive visits and phone calls, and may be referred to support groups  Where can I find more information? · [address] [facility])  [facility], [address]  [place] , [address]  Phone: [phone]  Web Address: [employer]  · [address] and [address] [facility] [address], [address]  [place] , [address] [address]  Phone: [phone]  Web Address: [email or web address]  [email or web address]    CARE AGREEMENT:   You have the right to help plan your care  Learn about your health condition and how it may be treated  Discuss treatment options with your healthcare providers to decide what care you want to receive  You always have the right to refuse treatment  The above information is an  only  It is not intended as medical advice for individual conditions or treatments  Talk to your doctor, nurse or pharmacist before following any medical regimen to see if it is safe and effective for you  © Copyright [employer] 2021 Information is for End User's use only and may not be sold, redistributed or otherwise used for commercial purposes   All illustrations and images included in CareNotes® are the copyrighted property of [employer]  or [address] [place] [address] [employer]

## 2021-08-04 NOTE — PLAN OF CARE
Problem: PAIN - ADULT  Goal: Verbalizes/displays adequate comfort level or baseline comfort level  Description: Interventions:  - Encourage patient to monitor pain and request assistance  - Assess pain using appropriate pain scale  - Administer analgesics based on type and severity of pain and evaluate response  - Implement non-pharmacological measures as appropriate and evaluate response  - Consider cultural and social influences on pain and pain management  - Notify physician/advanced practitioner if interventions unsuccessful or patient reports new pain  Outcome: Progressing     Problem: INFECTION - ADULT  Goal: Absence or prevention of progression during hospitalization  Description: INTERVENTIONS:  - Assess and monitor for signs and symptoms of infection  - Monitor lab/diagnostic results  - Monitor all insertion sites, i e  indwelling lines, tubes, and drains  - Monitor endotracheal if appropriate and nasal secretions for changes in amount and color  - Allen appropriate cooling/warming therapies per order  - Administer medications as ordered  - Instruct and encourage patient and family to use good hand hygiene technique  - Identify and instruct in appropriate isolation precautions for identified infection/condition  Outcome: Progressing     Problem: SAFETY ADULT  Goal: Patient will remain free of falls  Description: INTERVENTIONS:  - Educate patient/family on patient safety including physical limitations  - Instruct patient to call for assistance with activity   - Consult OT/PT to assist with strengthening/mobility   - Keep Call bell within reach  - Keep bed low and locked with side rails adjusted as appropriate  - Keep care items and personal belongings within reach  - Initiate and maintain comfort rounds  - Make Fall Risk Sign visible to staff  - Apply yellow socks and bracelet for high fall risk patients  - Consider moving patient to room near nurses station  Outcome: Progressing     Problem: DISCHARGE PLANNING  Goal: Discharge to home or other facility with appropriate resources  Description: INTERVENTIONS:  - Identify barriers to discharge w/patient and caregiver  - Arrange for needed discharge resources and transportation as appropriate  - Identify discharge learning needs (meds, wound care, etc )  - Arrange for interpretive services to assist at discharge as needed  - Refer to Case Management Department for coordinating discharge planning if the patient needs post-hospital services based on physician/advanced practitioner order or complex needs related to functional status, cognitive ability, or social support system  Outcome: Progressing     Problem: Knowledge Deficit  Goal: Patient/family/caregiver demonstrates understanding of disease process, treatment plan, medications, and discharge instructions  Description: Complete learning assessment and assess knowledge base  Interventions:  - Provide teaching at level of understanding  - Provide teaching via preferred learning methods  Outcome: Progressing     Problem: Potential for Falls  Goal: Patient will remain free of falls  Description: INTERVENTIONS:  - Educate patient/family on patient safety including physical limitations  - Instruct patient to call for assistance with activity   - Consult OT/PT to assist with strengthening/mobility   - Keep Call bell within reach  - Keep bed low and locked with side rails adjusted as appropriate  - Keep care items and personal belongings within reach  - Initiate and maintain comfort rounds  - Make Fall Risk Sign visible to staff  - Apply yellow socks and bracelet for high fall risk patients  - Consider moving patient to room near nurses station  Outcome: Progressing     Problem: Nutrition/Hydration-ADULT  Goal: Nutrient/Hydration intake appropriate for improving, restoring or maintaining nutritional needs  Description: Monitor and assess patient's nutrition/hydration status for malnutrition   Collaborate with interdisciplinary team and initiate plan and interventions as ordered  Monitor patient's weight and dietary intake as ordered or per policy  Utilize nutrition screening tool and intervene as necessary  Determine patient's food preferences and provide high-protein, high-caloric foods as appropriate       INTERVENTIONS:  - Monitor oral intake, urinary output, labs, and treatment plans  - Assess nutrition and hydration status and recommend course of action  - Evaluate amount of meals eaten  - Assist patient with eating if necessary   - Allow adequate time for meals  - Recommend/ encourage appropriate diets, oral nutritional supplements, and vitamin/mineral supplements  - Order, calculate, and assess calorie counts as needed  - Recommend, monitor, and adjust tube feedings and TPN/PPN based on assessed needs  - Assess need for intravenous fluids  - Provide specific nutrition/hydration education as appropriate  - Include patient/family/caregiver in decisions related to nutrition  Outcome: Progressing     Problem: Prexisting or High Potential for Compromised Skin Integrity  Goal: Skin integrity is maintained or improved  Description: INTERVENTIONS:  - Identify patients at risk for skin breakdown  - Assess and monitor skin integrity  - Assess and monitor nutrition and hydration status  - Monitor labs   - Assess for incontinence   - Turn and reposition patient  - Assist with mobility/ambulation  - Relieve pressure over bony prominences  - Avoid friction and shearing  - Provide appropriate hygiene as needed including keeping skin clean and dry  - Evaluate need for skin moisturizer/barrier cream  - Collaborate with interdisciplinary team   - Patient/family teaching  - Consider wound care consult   Outcome: Progressing     Problem: GENITOURINARY - ADULT  Goal: Maintains or returns to baseline urinary function  Description: INTERVENTIONS:  - Assess urinary function  - Encourage oral fluids to ensure adequate hydration if ordered  - Administer IV fluids as ordered to ensure adequate hydration  - Administer ordered medications as needed  - Offer frequent toileting  - Follow urinary retention protocol if ordered  Outcome: Progressing  Goal: Absence of urinary retention  Description: INTERVENTIONS:  - Assess patients ability to void and empty bladder  - Monitor I/O  - Bladder scan as needed  - Discuss with physician/AP medications to alleviate retention as needed  - Discuss catheterization for long term situations as appropriate  Outcome: Progressing  Goal: Urinary catheter remains patent  Description: INTERVENTIONS:  - Assess patency of urinary catheter  - If patient has a chronic mathis, consider changing catheter if non-functioning  - Follow guidelines for intermittent irrigation of non-functioning urinary catheter  Outcome: Progressing     Problem: METABOLIC, FLUID AND ELECTROLYTES - ADULT  Goal: Electrolytes maintained within normal limits  Description: INTERVENTIONS:  - Monitor labs and assess patient for signs and symptoms of electrolyte imbalances  - Administer electrolyte replacement as ordered  - Monitor response to electrolyte replacements, including repeat lab results as appropriate  - Instruct patient on fluid and nutrition as appropriate  Outcome: Progressing  Goal: Fluid balance maintained  Description: INTERVENTIONS:  - Monitor labs   - Monitor I/O and WT  - Instruct patient on fluid and nutrition as appropriate  - Assess for signs & symptoms of volume excess or deficit  Outcome: Progressing  Goal: Glucose maintained within target range  Description: INTERVENTIONS:  - Monitor Blood Glucose as ordered  - Assess for signs and symptoms of hyperglycemia and hypoglycemia  - Administer ordered medications to maintain glucose within target range  - Assess nutritional intake and initiate nutrition service referral as needed  Outcome: Progressing     Problem: SKIN/TISSUE INTEGRITY - ADULT  Goal: Skin Integrity remains intact(Skin Breakdown Prevention)  Description: Assess:  -Perform Aman assessment every Q12 hrs  -Clean and moisturize skin every 8 hrs  -Inspect skin when repositioning, toileting, and assisting with ADLS  -Assess extremities for adequate circulation and sensation     Bed Management:  -Have minimal linens on bed & keep smooth, unwrinkled  -Change linens as needed when moist or perspiring    Toileting:  -Offer bedside commode  -Assess for incontinence every 2 hrs  -Use incontinent care products after each incontinent episode such as protective/moisture barrier creams    Activity:  -Encourage activity and walks on unit  -Encourage or provide ROM exercises   -Turn and reposition patient every 2 Hours  -Use appropriate equipment to lift or move patient in bed    Skin Care:  -Avoid use of baby powder, tape, friction and shearing, hot water or constrictive clothing  -Do not massage red bony areas    Outcome: Progressing

## 2022-05-02 NOTE — ASSESSMENT & PLAN NOTE
Sp iv fluids hydration and poor oral intake   · Pt reports not eating well for last two weeks  · Will administer 40meq po now and rechk labs in the am [facility] [employer]ts[employer] Text: The defect edges were debeveled with a #15 scalpel blade.  The primary defect was closed partially with a complex linear closure.  Given the location of the defect, shape of the defect and the proximity to free margins a full thickness skin graft was deemed most appropriate to repair the remaining defect.  The graft was trimmed to fit the size of the remaining defect.  The graft was then placed in the primary defect, oriented appropriately, and sutured into place.

## 2023-02-02 NOTE — CASE MANAGEMENT
CM called [facility] and left       CM spoke with Noy Ceja from [facility]  She asked for updated notes after 7/24/21  CM faxed updated notes to her at [phone]  [[email or web address]] : Vital signs are recorded and unremarkable.  There is no thyroid enlargement throat is clear neck is supple there is no carotid bruit thyromegaly or lymphadenopathy.  Chest is clear and heart sounds are normal.\par \par Neurologically there is mild bilateral ptosis and left lateral rectus weakness with appropriate diplopia but rest of her entire neurological examination is normal which compared to her previous evaluations is dramatic. [General Appearance - Alert] : alert [General Appearance - In No Acute Distress] : in no acute distress [Oriented To Time, Place, And Person] : oriented to person, place, and time [Impaired Insight] : insight and judgment were intact [Affect] : the affect was normal [Person] : oriented to person [Place] : oriented to place [Time] : oriented to time [Concentration Intact] : normal concentrating ability [Visual Intact] : visual attention was ~T not ~L decreased [Naming Objects] : no difficulty naming common objects [Repeating Phrases] : no difficulty repeating a phrase [Writing A Sentence] : no difficulty writing a sentence [Fluency] : fluency intact [Comprehension] : comprehension intact [Reading] : reading intact [Past History] : adequate knowledge of personal past history [Cranial Nerves Optic (II)] : visual acuity intact bilaterally,  visual fields full to confrontation, pupils equal round and reactive to light [Cranial Nerves Oculomotor (III)] : extraocular motion intact [Cranial Nerves Trigeminal (V)] : facial sensation intact symmetrically [Cranial Nerves Facial (VII)] : face symmetrical [Cranial Nerves Vestibulocochlear (VIII)] : hearing was intact bilaterally [Cranial Nerves Glossopharyngeal (IX)] : tongue and palate midline [Cranial Nerves Accessory (XI - Cranial And Spinal)] : head turning and shoulder shrug symmetric [Cranial Nerves Hypoglossal (XII)] : there was no tongue deviation with protrusion [Motor Tone] : muscle tone was normal in all four extremities [Motor Strength] : muscle strength was normal in all four extremities [No Muscle Atrophy] : normal bulk in all four extremities [Sensation Tactile Decrease] : light touch was intact [Abnormal Walk] : normal gait [Balance] : balance was intact [Past-pointing] : there was no past-pointing [Tremor] : no tremor present [2+] : Ankle jerk left 2+ [Plantar Reflex Right Only] : normal on the right [Plantar Reflex Left Only] : normal on the left

## 2024-02-04 NOTE — PROGRESS NOTES
NEPHROLOGY PROGRESS NOTE   Gery Councilman 70 y o  female MRN: [id number]  Unit/Bed#: -01 Encounter: [id number]  Reason for Consult: ZOYA    ASSESSMENT AND PLAN:  Patient is 77-year-old female with multiple medical issues of breast cancer in 2017, status post chemo, history of diverticulitis perforated, status post colostomy eventually reversal, diabetes for five years, presented with abdominal pain  She was found to have bilateral hydronephrosis  We are consulted for ZOYA management      ZOYA POA, baseline serum creatinine 0 7 to 0 8 in 2020, isolated value 1 1 in February 2021  -creatinine 4 2 on admission improved to 3 7 and now again worsening up to 4 0 today  -ZOYA likely secondary to obstructive nephropathy with bilateral hydronephrosis with hydroureter, component of prerenal given poor p o  Intake  -renal ultrasound results to follow, if persistent hydronephrosis, given worsening creatinine again, consider urology reevaluation if intervention needed for hydronephrosis  -currently has Way catheter placed, urine output seems to be improving  -IV fluid as below  -UA this admission bland without hematuria or proteinuria  -avoid nephrotoxins or NSAIDs    Hypernatremia, serum sodium worsening 146  -encourage free water intake  -change IV fluid to half-normal saline and continue at 75 mL/hour     Hypokalemia, resolved with replacement  -serum magnesium stable  -BMP in a m      Blood pressure overall acceptable and well controlled      Bilateral hydronephrosis with proximal hydroureter, no obvious evidence of obstructing lesion or calculi  -currently has Way catheter placed   -creatinine unfortunately worsening again  -repeat renal ultrasound results to follow,? Need for urological intervention     Discussed above plan in detail with primary team    SUBJECTIVE:  Patient seen and examined at bedside   No chest pain, shortness of breath, nausea, vomiting, abdominal pain    OBJECTIVE:  Current Weight: Weight - Scale: 71 3 kg (157 lb 3 oz)  Vitals:    04/03/21 0648   BP: 143/90   Pulse:    Resp: 18   Temp: 99 9 °F (37 7 °C)   SpO2: 90%       Intake/Output Summary (Last 24 hours) at 4/3/2021 0705  Last data filed at 4/3/2021 2350  Gross per 24 hour   Intake 2600 ml   Output 1400 ml   Net 1200 ml     Wt Readings from Last 3 Encounters:   04/01/21 71 3 kg (157 lb 3 oz)   04/01/21 73 3 kg (161 lb 9 6 oz)     Temp Readings from Last 3 Encounters:   04/03/21 99 9 °F (37 7 °C)   04/01/21 (!) 97 4 °F (36 3 °C) (Temporal)     BP Readings from Last 3 Encounters:   04/03/21 143/90   04/01/21 124/61     Pulse Readings from Last 3 Encounters:   04/02/21 62   04/01/21 (!) 134        Physical Examination:  General:  Lying in bed, no acute distress   Eyes:  No conjunctival pallor present  ENT:  External examination of ears and nose unremarkable  Neck:  No Obvious lymphadenopathy appreciated  Respiratory:  Bilateral air entry present  CVS:  S1, S2 present  GI:  Soft, nondistended  CNS:  Active alert oriented x3  Skin:  No new rash in legs  Musculoskeletal:  No obvious gross deformity noted    Medications:    Current Facility-Administered Medications:     acetaminophen (TYLENOL) tablet 650 mg, 650 mg, Oral, Q6H PRN, Marshall Aaron DO, 650 mg at 04/02/21 2119    bisacodyl (DULCOLAX) EC tablet 5 mg, 5 mg, Oral, Once, SIMON Tapia, Stopped at 04/02/21 1838    bisacodyl (DULCOLAX) rectal suppository 10 mg, 10 mg, Rectal, Once, Marshall Aaron DO    docusate sodium (COLACE) capsule 100 mg, 100 mg, Oral, BID, Marshall Aaron DO, 100 mg at 04/02/21 9366    heparin (porcine) subcutaneous injection 5,000 Units, 5,000 Units, Subcutaneous, Q8H [address], Marshall Aaron, , 5,000 Units at 04/03/21 0435    insulin lispro (HumaLOG) 100 units/mL subcutaneous injection 1-5 Units, 1-5 Units, Subcutaneous, 4x Daily (AC & HS) **AND** Fingerstick Glucose (POCT), , , 4x Daily AC and at bedtime, Yocasta Grayson PA-C    letrozole [facility]) tablet 2 5 mg, 2 5 mg, Oral, Daily, Mark Dull, DO, 2 5 mg at 04/02/21 8485    lidocaine (LIDODERM) 5 % patch 1 patch, 1 patch, Topical, Daily, Karina Youssef PA-C, 1 patch at 04/03/21 0040    methimazole (TAPAZOLE) tablet 2 5 mg, 2 5 mg, Oral, Every Other Day, Mark Dull, DO, 2 5 mg at 04/02/21 5571    ondansetron [facility]) injection 4 mg, 4 mg, Intravenous, Q6H PRN, Mark Dull, DO, 4 mg at 04/02/21 0534    polyethylene glycol (MIRALAX) packet 17 g, 17 g, Oral, Daily, Mark Dull, DO, Stopped at 04/02/21 9116    senna (SENOKOT) tablet 8 6 mg, 1 tablet, Oral, Daily, Mark Dull, DO, 8 6 mg at 04/02/21 3504    sodium chloride 0 9 % infusion, 75 mL/hr, Intravenous, Continuous, Karina Youssef PA-C, Last Rate: 75 mL/hr at 04/03/21 0440, 75 mL/hr at 04/03/21 0440    Laboratory Results:  Results from last 7 days   Lab Units 04/03/21  0449 04/02/21  1406 04/02/21  0354 04/02/21  0353 04/01/21  1551   WBC Thousand/uL  --   --  9 48  --  9 01   HEMOGLOBIN g/dL  --   --  14 2  --  15 6*   HEMATOCRIT %  --   --  44 7  --  46 9*   PLATELETS Thousands/uL  --   --  141*  --  152   SODIUM mmol/L 146* 142  --  138 139   POTASSIUM mmol/L 4 0 3 8  --  3 0* 3 3*   CHLORIDE mmol/L 119* 113*  --  106 101   CO2 mmol/L 21 21  --  23 24   BUN mg/dL 32* 33*  --  31* 35*   CREATININE mg/dL 4 03* 3 90*  --  3 76* 4 26*   CALCIUM mg/dL 8 8 9 0  --  9 3 9 7   MAGNESIUM mg/dL  --   --   --  2 2  --        US kidney and bladder    (Results Pending)       Portions of the record may have been created with voice recognition software  Occasional wrong word or "sound a like" substitutions may have occurred due to the inherent limitations of voice recognition software  Read the chart carefully and recognize, using context, where substitutions have occurred  normal...

## 2024-04-09 NOTE — ASSESSMENT & PLAN NOTE
Past Medical History:   Diagnosis Date    ADD (attention deficit disorder)     Alopecia     Fatigue     Scoliosis     Thyroid disease     Weight gain        Past Surgical History:   Procedure Laterality Date     SECTION N/A 2023    Procedure:  SECTION;  Surgeon: Rosa Monteiro MD;  [facility];  Service: OB/GYN;  Laterality: N/A;       Review of patient's allergies indicates:  No Known Allergies    No current facility-administered medications on file prior to encounter.     Current Outpatient Medications on File Prior to Encounter   Medication Sig    diltiaZEM (CARDIZEM CD) 120 MG Cp24 Take 120 mg by mouth.    levothyroxine (SYNTHROID) 112 MCG tablet Take 112 mcg by mouth once daily.    metFORMIN (GLUCOPHAGE-XR) 750 MG ER 24hr tablet Take 750 mg by mouth daily with breakfast.     Family History       Problem Relation (Age of Onset)    Cancer Mother    Heart disease Father    Stroke Paternal Grandfather          Tobacco Use    Smoking status: Never    Smokeless tobacco: Never   Substance and Sexual Activity    Alcohol use: Never    Drug use: Never    Sexual activity: Yes     Partners: Male     Birth control/protection: None     Review of Systems   All other systems reviewed and are negative.    Objective:     Vital Signs (Most Recent):    Vital Signs (24h Range):  BP: ()/()   Arterial Line BP: ()/()           There is no height or weight on file to calculate BMI.             Physical Exam  Vitals and nursing note reviewed.   Constitutional:       Appearance: Normal appearance.   Cardiovascular:      Rate and Rhythm: Normal rate and regular rhythm.      Pulses: Normal pulses.      Heart sounds: Normal heart sounds.   Pulmonary:      Effort: Pulmonary effort is normal.   Musculoskeletal:      Right lower leg: No edema.      Left lower leg: No edema.   Skin:     General: Skin is warm.   Neurological:      General: No focal deficit present.      Mental Status: She is alert.         · X1 episode has had no recurrence  · Low suspicion for ACS serial troponin, are negative   · EKG from the ED sinus rhythm at 89 beats per minute no acute ST changes  · Continue telemetry monitoring     Significant Labs: All pertinent lab results from the last 24 hours have been reviewed.

## 2024-08-31 NOTE — ASSESSMENT & PLAN NOTE
Difficulty tolerating PO but passing flatus and BS active x4  Continue tx for nausea and vomiting - trial phenergan and Zofran  Diet as tolerated   Pepcid and zofran added Universal Safety Interventions

## (undated) DEVICE — CATH FOLEY 18FR 5ML 2 WAY UNCOATED SILICONE

## (undated) DEVICE — GUIDEWIRE STRGHT TIP 0.035 IN  SOLO PLUS

## (undated) DEVICE — OASIS DRAIN, SINGLE, INLINE & ATS COMPATIBLE: Brand: OASIS

## (undated) DEVICE — URETERAL DUAL LUMEN CATH

## (undated) DEVICE — GLOVE SRG BIOGEL 8

## (undated) DEVICE — SPECIMEN CONTAINER STERILE PEEL PACK

## (undated) DEVICE — PREMIUM DRY TRAY LF: Brand: MEDLINE INDUSTRIES, INC.

## (undated) DEVICE — STERILE CYSTO PACK: Brand: CARDINAL HEALTH